# Patient Record
Sex: FEMALE | Race: WHITE | Employment: OTHER | URBAN - METROPOLITAN AREA
[De-identification: names, ages, dates, MRNs, and addresses within clinical notes are randomized per-mention and may not be internally consistent; named-entity substitution may affect disease eponyms.]

---

## 2017-01-01 ENCOUNTER — TELEPHONE (OUTPATIENT)
Dept: ONCOLOGY | Age: 67
End: 2017-01-01

## 2017-01-01 ENCOUNTER — DOCUMENTATION ONLY (OUTPATIENT)
Dept: ONCOLOGY | Age: 67
End: 2017-01-01

## 2017-01-01 ENCOUNTER — OFFICE VISIT (OUTPATIENT)
Dept: ONCOLOGY | Age: 67
End: 2017-01-01

## 2017-01-01 ENCOUNTER — HOSPITAL ENCOUNTER (OUTPATIENT)
Dept: LAB | Age: 67
Discharge: HOME | End: 2017-11-15
Payer: MEDICARE

## 2017-01-01 VITALS
RESPIRATION RATE: 16 BRPM | DIASTOLIC BLOOD PRESSURE: 87 MMHG | TEMPERATURE: 98.7 F | BODY MASS INDEX: 28.12 KG/M2 | HEIGHT: 63 IN | WEIGHT: 158.7 LBS | OXYGEN SATURATION: 97 % | SYSTOLIC BLOOD PRESSURE: 126 MMHG | HEART RATE: 111 BPM

## 2017-01-01 VITALS
DIASTOLIC BLOOD PRESSURE: 81 MMHG | OXYGEN SATURATION: 98 % | TEMPERATURE: 98.2 F | HEART RATE: 64 BPM | SYSTOLIC BLOOD PRESSURE: 129 MMHG | RESPIRATION RATE: 16 BRPM | HEIGHT: 63 IN | BODY MASS INDEX: 28.17 KG/M2 | WEIGHT: 159 LBS

## 2017-01-01 DIAGNOSIS — H66.90 EAR INFECTION: ICD-10-CM

## 2017-01-01 DIAGNOSIS — R53.0 NEOPLASTIC MALIGNANT RELATED FATIGUE: ICD-10-CM

## 2017-01-01 DIAGNOSIS — C82.00 FOLLICULAR LYMPHOMA GRADE I, UNSPECIFIED BODY REGION (HCC): Primary | ICD-10-CM

## 2017-01-01 DIAGNOSIS — N28.9 RENAL INSUFFICIENCY: ICD-10-CM

## 2017-01-01 DIAGNOSIS — J18.9 PNEUMONIA DUE TO INFECTIOUS ORGANISM, UNSPECIFIED LATERALITY, UNSPECIFIED PART OF LUNG: ICD-10-CM

## 2017-01-01 DIAGNOSIS — H57.12 LEFT EYE PAIN: ICD-10-CM

## 2017-01-01 DIAGNOSIS — H54.7 POOR VISION: ICD-10-CM

## 2017-01-01 DIAGNOSIS — R22.0 MASS OF HEAD: ICD-10-CM

## 2017-01-01 DIAGNOSIS — C85.99 ORBITAL LYMPHOMA (HCC): ICD-10-CM

## 2017-01-01 DIAGNOSIS — H73.811: ICD-10-CM

## 2017-01-01 DIAGNOSIS — Z09 CHEMOTHERAPY FOLLOW-UP EXAMINATION: ICD-10-CM

## 2017-01-01 LAB
ALBUMIN SERPL-MCNC: 4.1 G/DL (ref 3.6–4.8)
ALBUMIN SERPL-MCNC: 4.2 G/DL (ref 3.6–4.8)
ALBUMIN SERPL-MCNC: 4.4 G/DL (ref 3.6–4.8)
ALBUMIN SERPL-MCNC: 4.5 G/DL (ref 3.6–4.8)
ALBUMIN SERPL-MCNC: 4.5 G/DL (ref 3.6–4.8)
ALBUMIN/GLOB SERPL: 1.7 {RATIO} (ref 1.2–2.2)
ALBUMIN/GLOB SERPL: 1.9 {RATIO} (ref 1.2–2.2)
ALBUMIN/GLOB SERPL: 2.1 {RATIO} (ref 1.2–2.2)
ALBUMIN/GLOB SERPL: 2.2 {RATIO} (ref 1.2–2.2)
ALBUMIN/GLOB SERPL: 2.4 {RATIO} (ref 1.2–2.2)
ALP SERPL-CCNC: 89 IU/L (ref 39–117)
ALP SERPL-CCNC: 92 IU/L (ref 39–117)
ALP SERPL-CCNC: 94 IU/L (ref 39–117)
ALP SERPL-CCNC: 95 IU/L (ref 39–117)
ALP SERPL-CCNC: 97 IU/L (ref 39–117)
ALT SERPL-CCNC: 7 IU/L (ref 0–32)
ALT SERPL-CCNC: 7 IU/L (ref 0–32)
ALT SERPL-CCNC: 8 IU/L (ref 0–32)
ALT SERPL-CCNC: 9 IU/L (ref 0–32)
ALT SERPL-CCNC: 9 IU/L (ref 0–32)
AST SERPL-CCNC: 11 IU/L (ref 0–40)
AST SERPL-CCNC: 14 IU/L (ref 0–40)
AST SERPL-CCNC: 15 IU/L (ref 0–40)
AST SERPL-CCNC: 15 IU/L (ref 0–40)
AST SERPL-CCNC: 16 IU/L (ref 0–40)
BASOPHILS # BLD AUTO: 0 X10E3/UL (ref 0–0.2)
BASOPHILS # BLD AUTO: 0.1 X10E3/UL (ref 0–0.2)
BASOPHILS # BLD AUTO: 0.1 X10E3/UL (ref 0–0.2)
BASOPHILS NFR BLD AUTO: 1 %
BILIRUB SERPL-MCNC: 0.2 MG/DL (ref 0–1.2)
BILIRUB SERPL-MCNC: 0.2 MG/DL (ref 0–1.2)
BILIRUB SERPL-MCNC: 0.4 MG/DL (ref 0–1.2)
BILIRUB SERPL-MCNC: 0.4 MG/DL (ref 0–1.2)
BILIRUB SERPL-MCNC: 0.5 MG/DL (ref 0–1.2)
BUN SERPL-MCNC: 11 MG/DL (ref 8–27)
BUN SERPL-MCNC: 13 MG/DL (ref 8–27)
BUN SERPL-MCNC: 14 MG/DL (ref 8–27)
BUN SERPL-MCNC: 14 MG/DL (ref 8–27)
BUN SERPL-MCNC: 15 MG/DL (ref 8–27)
BUN/CREAT SERPL: 10 (ref 12–28)
BUN/CREAT SERPL: 11 (ref 12–28)
BUN/CREAT SERPL: 13 (ref 12–28)
CALCIUM SERPL-MCNC: 8.9 MG/DL (ref 8.7–10.3)
CALCIUM SERPL-MCNC: 9.5 MG/DL (ref 8.7–10.3)
CALCIUM SERPL-MCNC: 9.6 MG/DL (ref 8.7–10.3)
CALCIUM SERPL-MCNC: 9.7 MG/DL (ref 8.7–10.3)
CALCIUM SERPL-MCNC: 9.7 MG/DL (ref 8.7–10.3)
CHLORIDE SERPL-SCNC: 100 MMOL/L (ref 96–106)
CHLORIDE SERPL-SCNC: 101 MMOL/L (ref 96–106)
CO2 SERPL-SCNC: 26 MMOL/L (ref 18–29)
CO2 SERPL-SCNC: 27 MMOL/L (ref 18–29)
CO2 SERPL-SCNC: 27 MMOL/L (ref 18–29)
CREAT SERPL-MCNC: 1.05 MG/DL (ref 0.57–1)
CREAT SERPL-MCNC: 1.1 MG/DL (ref 0.57–1)
CREAT SERPL-MCNC: 1.11 MG/DL (ref 0.57–1)
CREAT SERPL-MCNC: 1.17 MG/DL (ref 0.57–1)
CREAT SERPL-MCNC: 1.2 MG/DL (ref 0.57–1)
EOSINOPHIL # BLD AUTO: 0.2 X10E3/UL (ref 0–0.4)
EOSINOPHIL # BLD AUTO: 0.2 X10E3/UL (ref 0–0.4)
EOSINOPHIL # BLD AUTO: 0.3 X10E3/UL (ref 0–0.4)
EOSINOPHIL # BLD AUTO: 0.3 X10E3/UL (ref 0–0.4)
EOSINOPHIL # BLD AUTO: 0.4 X10E3/UL (ref 0–0.4)
EOSINOPHIL NFR BLD AUTO: 4 %
EOSINOPHIL NFR BLD AUTO: 6 %
EOSINOPHIL NFR BLD AUTO: 6 %
ERYTHROCYTE [DISTWIDTH] IN BLOOD BY AUTOMATED COUNT: 13.3 % (ref 12.3–15.4)
ERYTHROCYTE [DISTWIDTH] IN BLOOD BY AUTOMATED COUNT: 13.5 % (ref 12.3–15.4)
ERYTHROCYTE [DISTWIDTH] IN BLOOD BY AUTOMATED COUNT: 13.7 % (ref 12.3–15.4)
GFR SERPLBLD CREATININE-BSD FMLA CKD-EPI: 47 ML/MIN/1.73
GFR SERPLBLD CREATININE-BSD FMLA CKD-EPI: 48 ML/MIN/1.73
GFR SERPLBLD CREATININE-BSD FMLA CKD-EPI: 52 ML/MIN/1.73
GFR SERPLBLD CREATININE-BSD FMLA CKD-EPI: 54 ML/MIN/1.73
GFR SERPLBLD CREATININE-BSD FMLA CKD-EPI: 56 ML/MIN/1.73
GFR SERPLBLD CREATININE-BSD FMLA CKD-EPI: 60 ML/MIN/1.73
GLOBULIN SER CALC-MCNC: 1.9 G/DL (ref 1.5–4.5)
GLOBULIN SER CALC-MCNC: 2 G/DL (ref 1.5–4.5)
GLOBULIN SER CALC-MCNC: 2.1 G/DL (ref 1.5–4.5)
GLOBULIN SER CALC-MCNC: 2.2 G/DL (ref 1.5–4.5)
GLOBULIN SER CALC-MCNC: 2.5 G/DL (ref 1.5–4.5)
GLUCOSE SERPL-MCNC: 101 MG/DL (ref 65–99)
GLUCOSE SERPL-MCNC: 102 MG/DL (ref 65–99)
GLUCOSE SERPL-MCNC: 113 MG/DL (ref 65–99)
GLUCOSE SERPL-MCNC: 96 MG/DL (ref 65–99)
GLUCOSE SERPL-MCNC: 97 MG/DL (ref 65–99)
HCT VFR BLD AUTO: 37.6 % (ref 34–46.6)
HCT VFR BLD AUTO: 38.7 % (ref 34–46.6)
HCT VFR BLD AUTO: 39.7 % (ref 34–46.6)
HCT VFR BLD AUTO: 40.6 % (ref 34–46.6)
HCT VFR BLD AUTO: 41.7 % (ref 34–46.6)
HGB BLD-MCNC: 12.8 G/DL (ref 11.1–15.9)
HGB BLD-MCNC: 12.8 G/DL (ref 11.1–15.9)
HGB BLD-MCNC: 13 G/DL (ref 11.1–15.9)
HGB BLD-MCNC: 13.7 G/DL (ref 11.1–15.9)
HGB BLD-MCNC: 13.8 G/DL (ref 11.1–15.9)
IMM GRANULOCYTES # BLD: 0 X10E3/UL (ref 0–0.1)
IMM GRANULOCYTES NFR BLD: 0 %
IMM GRANULOCYTES NFR BLD: 1 %
LYMPHOCYTES # BLD AUTO: 0.7 X10E3/UL (ref 0.7–3.1)
LYMPHOCYTES # BLD AUTO: 0.8 X10E3/UL (ref 0.7–3.1)
LYMPHOCYTES # BLD AUTO: 0.8 X10E3/UL (ref 0.7–3.1)
LYMPHOCYTES # BLD AUTO: 1 X10E3/UL (ref 0.7–3.1)
LYMPHOCYTES # BLD AUTO: 1.8 X10E3/UL (ref 0.7–3.1)
LYMPHOCYTES NFR BLD AUTO: 12 %
LYMPHOCYTES NFR BLD AUTO: 14 %
LYMPHOCYTES NFR BLD AUTO: 14 %
LYMPHOCYTES NFR BLD AUTO: 16 %
LYMPHOCYTES NFR BLD AUTO: 27 %
MCH RBC QN AUTO: 30.6 PG (ref 26.6–33)
MCH RBC QN AUTO: 30.9 PG (ref 26.6–33)
MCH RBC QN AUTO: 30.9 PG (ref 26.6–33)
MCH RBC QN AUTO: 31.2 PG (ref 26.6–33)
MCH RBC QN AUTO: 31.3 PG (ref 26.6–33)
MCHC RBC AUTO-ENTMCNC: 32.7 G/DL (ref 31.5–35.7)
MCHC RBC AUTO-ENTMCNC: 33.1 G/DL (ref 31.5–35.7)
MCHC RBC AUTO-ENTMCNC: 33.1 G/DL (ref 31.5–35.7)
MCHC RBC AUTO-ENTMCNC: 33.7 G/DL (ref 31.5–35.7)
MCHC RBC AUTO-ENTMCNC: 34 G/DL (ref 31.5–35.7)
MCV RBC AUTO: 92 FL (ref 79–97)
MCV RBC AUTO: 93 FL (ref 79–97)
MCV RBC AUTO: 93 FL (ref 79–97)
MCV RBC AUTO: 94 FL (ref 79–97)
MCV RBC AUTO: 94 FL (ref 79–97)
MONOCYTES # BLD AUTO: 0.5 X10E3/UL (ref 0.1–0.9)
MONOCYTES # BLD AUTO: 0.6 X10E3/UL (ref 0.1–0.9)
MONOCYTES # BLD AUTO: 0.8 X10E3/UL (ref 0.1–0.9)
MONOCYTES NFR BLD AUTO: 10 %
MONOCYTES NFR BLD AUTO: 11 %
MONOCYTES NFR BLD AUTO: 14 %
MONOCYTES NFR BLD AUTO: 9 %
MONOCYTES NFR BLD AUTO: 9 %
NEUTROPHILS # BLD AUTO: 3.6 X10E3/UL (ref 1.4–7)
NEUTROPHILS # BLD AUTO: 3.9 X10E3/UL (ref 1.4–7)
NEUTROPHILS # BLD AUTO: 4.1 X10E3/UL (ref 1.4–7)
NEUTROPHILS # BLD AUTO: 4.3 X10E3/UL (ref 1.4–7)
NEUTROPHILS # BLD AUTO: 4.3 X10E3/UL (ref 1.4–7)
NEUTROPHILS NFR BLD AUTO: 58 %
NEUTROPHILS NFR BLD AUTO: 65 %
NEUTROPHILS NFR BLD AUTO: 70 %
NEUTROPHILS NFR BLD AUTO: 70 %
NEUTROPHILS NFR BLD AUTO: 71 %
PLATELET # BLD AUTO: 199 X10E3/UL (ref 150–379)
PLATELET # BLD AUTO: 227 X10E3/UL (ref 150–379)
PLATELET # BLD AUTO: 236 X10E3/UL (ref 150–379)
PLATELET # BLD AUTO: 244 X10E3/UL (ref 150–379)
PLATELET # BLD AUTO: 266 X10E3/UL (ref 150–379)
POTASSIUM SERPL-SCNC: 4 MMOL/L (ref 3.5–5.2)
POTASSIUM SERPL-SCNC: 4 MMOL/L (ref 3.5–5.2)
POTASSIUM SERPL-SCNC: 4.3 MMOL/L (ref 3.5–5.2)
POTASSIUM SERPL-SCNC: 4.6 MMOL/L (ref 3.5–5.2)
POTASSIUM SERPL-SCNC: 4.7 MMOL/L (ref 3.5–5.2)
PROT SERPL-MCNC: 6.3 G/DL (ref 6–8.5)
PROT SERPL-MCNC: 6.4 G/DL (ref 6–8.5)
PROT SERPL-MCNC: 6.4 G/DL (ref 6–8.5)
PROT SERPL-MCNC: 6.6 G/DL (ref 6–8.5)
PROT SERPL-MCNC: 6.7 G/DL (ref 6–8.5)
RBC # BLD AUTO: 4.09 X10E6/UL (ref 3.77–5.28)
RBC # BLD AUTO: 4.14 X10E6/UL (ref 3.77–5.28)
RBC # BLD AUTO: 4.25 X10E6/UL (ref 3.77–5.28)
RBC # BLD AUTO: 4.39 X10E6/UL (ref 3.77–5.28)
RBC # BLD AUTO: 4.46 X10E6/UL (ref 3.77–5.28)
SODIUM SERPL-SCNC: 140 MMOL/L (ref 134–144)
SODIUM SERPL-SCNC: 141 MMOL/L (ref 134–144)
SODIUM SERPL-SCNC: 141 MMOL/L (ref 134–144)
SODIUM SERPL-SCNC: 142 MMOL/L (ref 134–144)
SODIUM SERPL-SCNC: 142 MMOL/L (ref 134–144)
WBC # BLD AUTO: 5.6 X10E3/UL (ref 3.4–10.8)
WBC # BLD AUTO: 5.8 X10E3/UL (ref 3.4–10.8)
WBC # BLD AUTO: 6 X10E3/UL (ref 3.4–10.8)
WBC # BLD AUTO: 6.1 X10E3/UL (ref 3.4–10.8)
WBC # BLD AUTO: 6.6 X10E3/UL (ref 3.4–10.8)

## 2017-01-01 PROCEDURE — 80053 COMPREHEN METABOLIC PANEL: CPT

## 2017-01-01 PROCEDURE — 85025 COMPLETE CBC W/AUTO DIFF WBC: CPT

## 2017-01-01 PROCEDURE — 36415 COLL VENOUS BLD VENIPUNCTURE: CPT

## 2017-01-01 RX ORDER — AMOXICILLIN AND CLAVULANATE POTASSIUM 875; 125 MG/1; MG/1
1 TABLET, FILM COATED ORAL 2 TIMES DAILY
Qty: 20 TAB | Refills: 0 | Status: SHIPPED | OUTPATIENT
Start: 2017-01-01 | End: 2018-01-01 | Stop reason: SDUPTHER

## 2017-01-01 RX ORDER — AZITHROMYCIN 250 MG/1
TABLET, FILM COATED ORAL
COMMUNITY
Start: 2017-01-01 | End: 2018-01-01 | Stop reason: ALTCHOICE

## 2017-01-01 RX ORDER — ALBUTEROL SULFATE 90 UG/1
POWDER, METERED RESPIRATORY (INHALATION)
COMMUNITY
Start: 2017-01-01 | End: 2018-01-01 | Stop reason: ALTCHOICE

## 2017-01-11 ENCOUNTER — DOCUMENTATION ONLY (OUTPATIENT)
Dept: ONCOLOGY | Age: 67
End: 2017-01-11

## 2017-01-11 ENCOUNTER — OFFICE VISIT (OUTPATIENT)
Dept: ONCOLOGY | Age: 67
End: 2017-01-11

## 2017-01-11 VITALS
TEMPERATURE: 97.4 F | WEIGHT: 163.8 LBS | HEART RATE: 64 BPM | RESPIRATION RATE: 16 BRPM | HEIGHT: 62 IN | BODY MASS INDEX: 30.14 KG/M2 | SYSTOLIC BLOOD PRESSURE: 151 MMHG | DIASTOLIC BLOOD PRESSURE: 79 MMHG | OXYGEN SATURATION: 98 %

## 2017-01-11 DIAGNOSIS — H05.229 ORBITAL SWELLING: ICD-10-CM

## 2017-01-11 DIAGNOSIS — C85.99 ORBITAL LYMPHOMA (HCC): ICD-10-CM

## 2017-01-11 DIAGNOSIS — H54.7 POOR VISION: ICD-10-CM

## 2017-01-11 DIAGNOSIS — C82.00 FOLLICULAR LYMPHOMA GRADE I, UNSPECIFIED BODY REGION (HCC): Primary | ICD-10-CM

## 2017-01-11 DIAGNOSIS — R22.0 MASS OF HEAD: ICD-10-CM

## 2017-01-11 NOTE — MR AVS SNAPSHOT
Visit Information Date & Time Provider Department Dept. Phone Encounter #  
 1/11/2017 10:30 AM Julia Sow, [address] [facility] Oncology at [address] [id number] Follow-up Instructions Return in about 3 months (around 4/11/2017). Follow-up and Disposition History Upcoming Health Maintenance Date Due Hepatitis C Screening 1950 DTaP/Tdap/Td series (1 - Tdap) 11/19/1970 BREAST CANCER SCRN MAMMOGRAM 11/19/1999 FOBT Q 1 YEAR AGE 50-75 11/19/1999 ZOSTER VACCINE AGE 60> 11/19/2009 GLAUCOMA SCREENING Q2Y 11/19/2014 OSTEOPOROSIS SCREENING (DEXA) 11/19/2014 Pneumococcal 65+ High/Highest Risk (1 of 2 - PCV13) 11/19/2014 MEDICARE YEARLY EXAM 11/19/2014 INFLUENZA AGE 9 TO ADULT 8/1/2016 Allergies as of 1/11/2017  Review Complete On: 1/11/2017 By: Julia Sow DO Severity Noted Reaction Type Reactions Other Medication  12/22/2015    Hives Rituxin Valtrex [Valacyclovir]  02/03/2016   Side Effect Itching Patient states no allergy Current Immunizations  Reviewed on 1/11/2017 Name Date Influenza Vaccine 10/1/2015, 11/5/2014 Reviewed by Faye Chu LPN on 4/53/4767 at 87:15 AM  
You Were Diagnosed With   
  
 Codes Comments Follicular lymphoma grade I, unspecified body region [facility])    -  Primary ICD-10-CM: C82.00 ICD-9-CM: 202.00 [facility]     ICD-10-CM: C85.81 ICD-9-CM: 202.81 Mass of head     ICD-10-CM: R22.0 ICD-9-CM: 219. 2 Poor vision     ICD-10-CM: H54.7 ICD-9-CM: 369.9 Orbital swelling     ICD-10-CM: H57.8 ICD-9-CM: 379.92 Vitals BP Pulse Temp Resp Height(growth percentile) Weight(growth percentile) 151/79 64 97.4 °F (36.3 °C) (Oral) 16 5' 2\" (1.575 m) 163 lb 12.8 oz (74.3 kg) LMP SpO2 BMI OB Status Smoking Status (LMP Unknown) 98% 29.96 kg/m2 Postmenopausal Former Smoker Vitals History BMI and BSA Data Body Mass Index Body Surface Area  
 29.96 kg/m 2 1.8 m 2 Preferred Pharmacy Pharmacy Name Phone [facility] [address] Your Updated Medication List  
  
   
This list is accurate as of: 1/11/17 11:17 AM.  Always use your most recent med list.  
  
  
  
  
 atenolol 25 mg tablet Commonly known as:  TENORMIN Take 25 mg by mouth daily. bumetanide 2 mg tablet Commonly known as:  Malachy Bile Take 2 mg by mouth as needed. cholecalciferol 1,000 unit tablet Commonly known as:  VITAMIN D3 Take  by mouth daily. gabapentin 300 mg capsule Commonly known as:  NEURONTIN Take 1 Cap by mouth three (3) times daily. Indications: NEUROPATHIC PAIN, POSTHERPETIC NEURALGIA  
  
 KLOR-CON M20 20 mEq tablet Generic drug:  potassium chloride Take 20 mEq by mouth daily. levothyroxine 175 mcg tablet Commonly known as:  SYNTHROID  
daily. MULTIVITAMIN & MINERAL FORMULA PO Take  by mouth. VITAMIN B-12 1,000 mcg/mL injection Generic drug:  cyanocobalamin  
1,000 mcg by IntraMUSCular route every month. We Performed the Following CBC WITH AUTOMATED DIFF [44657 CPT(R)]   
 LD [83624 CPT(R)] METABOLIC PANEL, COMPREHENSIVE [48083 CPT(R)] Follow-up Instructions Return in about 3 months (around 4/11/2017). To-Do List   
 02/11/2017 Imaging:  PET/CT TUMOR IMAGE SKULL THIGH (SUB) Introducing [facility] SERVICES! Galen Rubio introduces [employer] patient portal. Now you can access parts of your medical record, email your doctor's office, and request medication refills online. 1. In your internet browser, go to [email or web address] 2. Click on the First Time User? Click Here link in the Sign In box. You will see the New Member Sign Up page. 3. Enter your [employer] Access Code exactly as it appears below.  You will not need to use this code after youve completed the sign-up process. If you do not sign up before the expiration date, you must request a new code. · MedPageToday Access Code: [email or web address] Expires: 4/11/2017 11:17 AM 
 
4. Enter the last four digits of your Social Security Number (xxxx) and Date of Birth (mm/dd/yyyy) as indicated and click Submit. You will be taken to the next sign-up page. 5. Create a MedPageToday ID. This will be your MedPageToday login ID and cannot be changed, so think of one that is secure and easy to remember. 6. Create a [employer]PageToday password. You can change your password at any time. 7. Enter your Password Reset Question and Answer. This can be used at a later time if you forget your password. 8. Enter your e-mail address. You will receive e-mail notification when new information is available in [address] 9. Click Sign Up. You can now view and download portions of your medical record. 10. Click the Download Summary menu link to download a portable copy of your medical information. If you have questions, please visit the Frequently Asked Questions section of the [employer]PageToday website. Remember, MedPageToday is NOT to be used for urgent needs. For medical emergencies, dial 911. Now available from your iPhone and Android! Please provide this summary of care documentation to your next provider. Your primary care clinician is listed as Brook Carrel. If you have any questions after today's visit, please call [phone].

## 2017-01-11 NOTE — PROGRESS NOTES
Gus Macias is a 78 y.o. 1950 female and presents with NHL. CC NHL Dx  10/10    STAGE: 3 possibly    TREATMENT COURSE:  Orbit radiation  Chlorambucil x 1 cycle 4/16  Attempted one dose of rituxan. INTERIM HISTORY:  Pt here for f/u of NHL not on any tx from here. pt did oral chorambucil for 6 weeks for one cycle 4/16. Pt doing ok overall. Pt has some pain in LUQ and chest wall pain. Saw cardio and ok. PET 10/16 stable. Labs stable. No B sx. Has some allergies maybe. Here with daughter. Eating well. Past Medical History   Diagnosis Date    Anemia NEC     Cancer ([facility]      low grade NHL     Past Surgical History   Procedure Laterality Date    Hx cholecystectomy  1982    Pr rplcmt prost aortic valve open xcp homogrf/stent  1972    Hx heent  2005     RIGHT EAR     Hx cataract removal  6/2015 & 7/2015     Social History     Social History    Marital status:      Spouse name: N/A    Number of children: N/A    Years of education: N/A     Social History Main Topics    Smoking status: Former Smoker     Packs/day: 0.50     Years: 45.00     Types: Cigarettes     Quit date: 1/1/2016    Smokeless tobacco: Former User     Quit date: 12/1/2015      Comment: 7 cigarettes daily.  Alcohol use Yes      Comment: Rare    Drug use: No    Sexual activity: Not Asked     Other Topics Concern    None     Social History Narrative     Family History   Problem Relation Age of Onset    Heart Disease Mother     Kidney Disease Mother     Alcohol abuse Father     Cancer Sister      Breast    No Known Problems Brother     Cancer Sister      Colon    Alcohol abuse Sister     Alcohol abuse Sister        Current Outpatient Prescriptions   Medication Sig Dispense Refill    MULTIVITAMIN WITH MINERALS (MULTIVITAMIN & MINERAL FORMULA PO) Take  by mouth.  gabapentin (NEURONTIN) 300 mg capsule Take 1 Cap by mouth three (3) times daily.  Indications: NEUROPATHIC PAIN, POSTHERPETIC NEURALGIA 90 Cap 3    levothyroxine (SYNTHROID) 175 mcg tablet daily.  cholecalciferol (VITAMIN D3) 1,000 unit tablet Take  by mouth daily.  KLOR-CON M20 20 mEq tablet Take 20 mEq by mouth daily.  bumetanide (BUMEX) 2 mg tablet Take 2 mg by mouth as needed.  cyanocobalamin (VITAMIN B-12) 1,000 mcg/mL injection 1,000 mcg by IntraMUSCular route every month.  atenolol (TENORMIN) 25 mg tablet Take 25 mg by mouth daily. Allergies   Allergen Reactions    Other Medication Hives     Rituxin    Valtrex [Valacyclovir] Itching     Patient states no allergy       Review of Systems    A comprehensive review of systems was negative except for: per HPI and sheet      Objective:  Visit Vitals    /79    Pulse 64    Temp 97.4 °F (36.3 °C) (Oral)    Resp 16    Ht 5' 2\" (1.575 m)    Wt 163 lb 12.8 oz (74.3 kg)    LMP  (LMP Unknown)    SpO2 98%    BMI 29.96 kg/m2         Physical Exam:   General appearance - alert, well appearing, and in no distress, oriented to person, place, and time and normal appearing weight  Mental status - alert, oriented normal mood, behavior, speech, dress, motor activity, and thought processes  Head ? Mass right occiput  EYE-conj clear no swelling in eyes  Mouth - mucous membranes moist  Neck - supple   CV regular  resp clear b/l  GI soft NT  Ext-no pedal edema noted  Skin-Warm and dry.   Neuro -alert, oriented, normal speech, no focal findings    Diagnostic Imaging     reviewed      Lab Results    reviewed  Lab Results   Component Value Date/Time    WBC 7.6 10/10/2016 01:07 PM    HGB 13.9 10/10/2016 01:07 PM    HCT 41.8 10/10/2016 01:07 PM    PLATELET 109 39/43/6869 01:07 PM    MCV 90 10/10/2016 01:07 PM       Lab Results   Component Value Date/Time    Sodium 140 10/10/2016 01:07 PM    Potassium 4.1 10/10/2016 01:07 PM    Chloride 102 10/10/2016 01:07 PM    CO2 22 10/10/2016 01:07 PM    Anion gap 4 02/19/2015 09:13 AM    Glucose 111 10/10/2016 01:07 PM    BUN 14 10/10/2016 01:07 PM    Creatinine 0.92 10/10/2016 01:07 PM    BUN/Creatinine ratio 15 10/10/2016 01:07 PM    GFR est AA 75 10/10/2016 01:07 PM    GFR est non-AA 65 10/10/2016 01:07 PM    Calcium 9.5 10/10/2016 01:07 PM    ALT 13 10/10/2016 01:07 PM    AST 17 10/10/2016 01:07 PM    Alk. phosphatase 80 10/10/2016 01:07 PM    Protein, total 6.1 10/10/2016 01:07 PM    Albumin 4.1 10/10/2016 01:07 PM    Globulin 3.1 02/19/2015 09:13 AM    A-G Ratio 2.1 10/10/2016 01:07 PM       .    Assessment/Plan: Angélica Higgins is a 57 y.o. 1950 female and presents with NHL. CC NHL Dx  10/10    STAGE: 3 possibly    TREATMENT COURSE:  rituxan 2/19/15 with reaction and treatment not finished. Hives and hypotension. INTERIM HISTORY:  F/u for NHL      1. NHL low grade follicular stage 3 dx in 2010      PET 5/16 good post chlorambucil 4/16. Clinically pt is stable. Will set up labs and PET for f/u. Ordered. Pt is here with grand daughter/ new baby today. 2.  Poor vision left eye/ ocular NHL. Post XRT. Did well. 3.  Back of head mass ? Coming back on other side. Monitor. 4.  Renal insuff. Monitor. 5.  Hypothyroid on thyroid  per endocrine    6. 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 No change. Call if questions. F/u here in 3 months      ICD-10-CM ICD-9-CM    1. 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 Mass of head R22.0 784.2 PET/CT TUMOR IMAGE SKULL THIGH (SUB)      CBC WITH AUTOMATED DIFF      METABOLIC PANEL, COMPREHENSIVE      LD   4. Poor vision H54.7 369.9 PET/CT TUMOR IMAGE SKULL THIGH (SUB)      CBC WITH AUTOMATED DIFF      METABOLIC PANEL, COMPREHENSIVE      LD   5. Orbital swelling H57.8 379.92 PET/CT TUMOR IMAGE SKULL THIGH (SUB)      CBC WITH AUTOMATED DIFF      METABOLIC PANEL, COMPREHENSIVE      LD     Orders Placed This Encounter    PET/CT TUMOR IMAGE SKULL THIGH (SUB)     Standing Status:   Future     Standing Expiration Date:   2/11/2018     Order Specific Question:   Reason for Exam     Answer:   NHL off treatment/ LUQ discomfort/ left groin LN/chest wall pain     Order Specific Question:   Is Patient Allergic to Contrast Dye? Answer:   Unknown    CBC WITH AUTOMATED DIFF    METABOLIC PANEL, COMPREHENSIVE    LDH       continue present plan, call if any problems  There are no Patient Instructions on file for this visit. Follow-up Disposition:  Return in about 3 months (around 4/11/2017).     Fab Good, DO

## 2017-02-20 ENCOUNTER — HOSPITAL ENCOUNTER (OUTPATIENT)
Dept: LAB | Age: 67
Discharge: HOME | End: 2017-02-20
Payer: MEDICARE

## 2017-02-20 PROCEDURE — 85025 COMPLETE CBC W/AUTO DIFF WBC: CPT

## 2017-02-20 PROCEDURE — 80053 COMPREHEN METABOLIC PANEL: CPT

## 2017-02-20 PROCEDURE — 83615 LACTATE (LD) (LDH) ENZYME: CPT

## 2017-02-20 PROCEDURE — 36415 COLL VENOUS BLD VENIPUNCTURE: CPT

## 2017-02-21 LAB
ALBUMIN SERPL-MCNC: 3.9 G/DL (ref 3.6–4.8)
ALBUMIN/GLOB SERPL: 1.9 {RATIO} (ref 1.1–2.5)
ALP SERPL-CCNC: 75 IU/L (ref 39–117)
ALT SERPL-CCNC: 15 IU/L (ref 0–32)
AST SERPL-CCNC: 20 IU/L (ref 0–40)
BASOPHILS # BLD AUTO: 0 X10E3/UL (ref 0–0.2)
BASOPHILS NFR BLD AUTO: 1 %
BILIRUB SERPL-MCNC: 0.4 MG/DL (ref 0–1.2)
BUN SERPL-MCNC: 12 MG/DL (ref 8–27)
BUN/CREAT SERPL: 13 (ref 11–26)
CALCIUM SERPL-MCNC: 9.9 MG/DL (ref 8.7–10.3)
CHLORIDE SERPL-SCNC: 104 MMOL/L (ref 96–106)
CO2 SERPL-SCNC: 25 MMOL/L (ref 18–29)
CREAT SERPL-MCNC: 0.93 MG/DL (ref 0.57–1)
EOSINOPHIL # BLD AUTO: 0.2 X10E3/UL (ref 0–0.4)
EOSINOPHIL NFR BLD AUTO: 3 %
ERYTHROCYTE [DISTWIDTH] IN BLOOD BY AUTOMATED COUNT: 13.1 % (ref 12.3–15.4)
GLOBULIN SER CALC-MCNC: 2.1 G/DL (ref 1.5–4.5)
GLUCOSE SERPL-MCNC: 98 MG/DL (ref 65–99)
HCT VFR BLD AUTO: 39.1 % (ref 34–46.6)
HGB BLD-MCNC: 13 G/DL (ref 11.1–15.9)
IMM GRANULOCYTES # BLD: 0 X10E3/UL (ref 0–0.1)
IMM GRANULOCYTES NFR BLD: 0 %
LDH SERPL-CCNC: 159 IU/L (ref 119–226)
LYMPHOCYTES # BLD AUTO: 2.2 X10E3/UL (ref 0.7–3.1)
LYMPHOCYTES NFR BLD AUTO: 30 %
MCH RBC QN AUTO: 30.3 PG (ref 26.6–33)
MCHC RBC AUTO-ENTMCNC: 33.2 G/DL (ref 31.5–35.7)
MCV RBC AUTO: 91 FL (ref 79–97)
MONOCYTES # BLD AUTO: 0.5 X10E3/UL (ref 0.1–0.9)
MONOCYTES NFR BLD AUTO: 7 %
NEUTROPHILS # BLD AUTO: 4.3 X10E3/UL (ref 1.4–7)
NEUTROPHILS NFR BLD AUTO: 59 %
PLATELET # BLD AUTO: 241 X10E3/UL (ref 150–379)
POTASSIUM SERPL-SCNC: 4.1 MMOL/L (ref 3.5–5.2)
PROT SERPL-MCNC: 6 G/DL (ref 6–8.5)
RBC # BLD AUTO: 4.29 X10E6/UL (ref 3.77–5.28)
SODIUM SERPL-SCNC: 143 MMOL/L (ref 134–144)
WBC # BLD AUTO: 7.2 X10E3/UL (ref 3.4–10.8)

## 2017-02-22 ENCOUNTER — HOSPITAL ENCOUNTER (OUTPATIENT)
Dept: PET IMAGING | Age: 67
Discharge: HOME | End: 2017-02-22
Attending: INTERNAL MEDICINE
Payer: MEDICARE

## 2017-02-22 VITALS — WEIGHT: 160 LBS | HEIGHT: 62 IN | BODY MASS INDEX: 29.44 KG/M2

## 2017-02-22 DIAGNOSIS — R22.0 MASS OF HEAD: ICD-10-CM

## 2017-02-22 DIAGNOSIS — H05.229 ORBITAL SWELLING: ICD-10-CM

## 2017-02-22 DIAGNOSIS — C85.99 ORBITAL LYMPHOMA (HCC): ICD-10-CM

## 2017-02-22 DIAGNOSIS — C82.00 FOLLICULAR LYMPHOMA GRADE I, UNSPECIFIED BODY REGION (HCC): ICD-10-CM

## 2017-02-22 DIAGNOSIS — H54.7 POOR VISION: ICD-10-CM

## 2017-02-22 PROCEDURE — A9552 F18 FDG: HCPCS

## 2017-02-22 RX ORDER — SODIUM CHLORIDE 0.9 % (FLUSH) 0.9 %
10 SYRINGE (ML) INJECTION
Status: COMPLETED | OUTPATIENT
Start: 2017-02-22 | End: 2017-02-22

## 2017-02-22 RX ADMIN — Medication 10 ML: at 13:01

## 2017-03-02 ENCOUNTER — OFFICE VISIT (OUTPATIENT)
Dept: ONCOLOGY | Age: 67
End: 2017-03-02

## 2017-03-02 VITALS
HEIGHT: 62 IN | OXYGEN SATURATION: 98 % | BODY MASS INDEX: 30.95 KG/M2 | DIASTOLIC BLOOD PRESSURE: 70 MMHG | WEIGHT: 168.2 LBS | SYSTOLIC BLOOD PRESSURE: 130 MMHG | TEMPERATURE: 98.5 F | RESPIRATION RATE: 16 BRPM

## 2017-03-02 DIAGNOSIS — C82.00 FOLLICULAR LYMPHOMA GRADE I, UNSPECIFIED BODY REGION (HCC): Primary | ICD-10-CM

## 2017-03-02 DIAGNOSIS — R22.0 MASS OF HEAD: ICD-10-CM

## 2017-03-02 DIAGNOSIS — B02.23 POST-HERPETIC POLYNEUROPATHY: ICD-10-CM

## 2017-03-02 DIAGNOSIS — R53.0 NEOPLASTIC MALIGNANT RELATED FATIGUE: ICD-10-CM

## 2017-03-02 DIAGNOSIS — C85.99 ORBITAL LYMPHOMA (HCC): ICD-10-CM

## 2017-03-02 RX ORDER — GABAPENTIN 300 MG/1
300 CAPSULE ORAL 3 TIMES DAILY
Qty: 90 CAP | Refills: 3 | Status: SHIPPED | OUTPATIENT
Start: 2017-03-02 | End: 2018-01-01

## 2017-03-02 RX ORDER — ONDANSETRON 8 MG/1
8 TABLET, ORALLY DISINTEGRATING ORAL
Qty: 30 TAB | Refills: 2 | Status: SHIPPED | OUTPATIENT
Start: 2017-03-02 | End: 2018-01-01

## 2017-03-02 NOTE — PROGRESS NOTES
Rashmi Collins is a 78 y.o. 1950 female and presents with NHL. CC NHL Dx  10/10    STAGE: 3 possibly    TREATMENT COURSE:  Orbit radiation  Chlorambucil x 1 cycle 4/16  Attempted one dose of rituxan. INTERIM HISTORY:  Ms. Ksenia Schneider is here for follow-up for NHL. She completed oral chlorambucil for 6 weeks for one cycle 4/2016. She is not on any therapy at the present time. Most recent PET 2/22/17 shows progressive disease. She reports she has felt well since her last visit with no new symptoms. She remains fatigued but energy level is overall unchanged. She denies fevers, chills, or shortness of breath. She denies headaches or vision changes. She denies night sweats or unintentional weight loss. She reports the left upper quadrant pain she reported at her last visit has resolved. She continues to have mid-chest discomfort, worse with leaning forward. She has seen cardiology for evaluation and reports symptoms are unchanged. She feels symptoms are related to her hiatal hernia. Ms. Ksenia Schneider has noticed no enlarged lymph nodes or masses. She is eating and drinking well. Past Medical History:   Diagnosis Date    Anemia NEC     Cancer ([address]     low grade NHL     Past Surgical History:   Procedure Laterality Date    HX CATARACT REMOVAL  6/2015 & 7/2015    HX CHOLECYSTECTOMY  1982    HX HEENT  2005    RIGHT EAR     WA RPLCMT PROST AORTIC VALVE OPEN XCP HOMOGRF/STENT  1972     Social History     Social History    Marital status:      Spouse name: N/A    Number of children: N/A    Years of education: N/A     Social History Main Topics    Smoking status: Former Smoker     Packs/day: 0.50     Years: 45.00     Types: Cigarettes     Quit date: 1/1/2016    Smokeless tobacco: Former User     Quit date: 12/1/2015      Comment: 7 cigarettes daily.     Alcohol use Yes      Comment: Rare    Drug use: No    Sexual activity: Not Asked     Other Topics Concern    None     Social History Narrative Family History   Problem Relation Age of Onset    Heart Disease Mother     Kidney Disease Mother     Alcohol abuse Father     Cancer Sister      Breast    No Known Problems Brother     Cancer Sister      Colon    Alcohol abuse Sister     Alcohol abuse Sister        Current Outpatient Prescriptions   Medication Sig Dispense Refill    gabapentin (NEURONTIN) 300 mg capsule Take 1 Cap by mouth three (3) times daily. Indications: NEUROPATHIC PAIN, POSTHERPETIC NEURALGIA 90 Cap 3    chlorambucil (LEUKERAN) 2 mg tablet Take 4 Tabs by mouth daily. (0.1 mg/kg PO daily on days 1-28 of a 28 day cycle, weight 76.3 kg)  Indications: FOLLICULAR LYMPHOMA 866 Tab 2    ondansetron (ZOFRAN ODT) 8 mg disintegrating tablet Take 1 Tab by mouth every eight (8) hours as needed for Nausea. 30 Tab 2    MULTIVITAMIN WITH MINERALS (MULTIVITAMIN & MINERAL FORMULA PO) Take  by mouth.  levothyroxine (SYNTHROID) 175 mcg tablet daily.  cholecalciferol (VITAMIN D3) 1,000 unit tablet Take  by mouth daily.  KLOR-CON M20 20 mEq tablet Take 20 mEq by mouth daily.  bumetanide (BUMEX) 2 mg tablet Take 2 mg by mouth as needed.  cyanocobalamin (VITAMIN B-12) 1,000 mcg/mL injection 1,000 mcg by IntraMUSCular route every month.  atenolol (TENORMIN) 25 mg tablet Take 25 mg by mouth daily.          Allergies   Allergen Reactions    Other Medication Hives     Rituxin    Valtrex [Valacyclovir] Itching     Patient states no allergy       Review of Systems    A comprehensive review of systems was negative except for: per HPI and sheet    Objective:  Visit Vitals    /70    Temp 98.5 °F (36.9 °C)    Resp 16    Ht 5' 2\" (1.575 m)    Wt 168 lb 3.2 oz (76.3 kg)    LMP  (LMP Unknown)    SpO2 98%    BMI 30.76 kg/m2     Physical Exam:   General appearance - alert, well appearing, and in no distress, oriented to person, place, and time and normal appearing weight  Mental status - alert, oriented normal mood, behavior, speech, dress, motor activity, and thought processes  EYE-conj clear no swelling in eyes. Right eyelid droop noted. Mouth - mucous membranes pink, moist, intact. No lesions or thrush. Neck - supple, no adenopathy appreciated  CV - Regular rate and rhythm  Resp - Clear to auscultation bilaterally  GI - Round, soft, non-tender. No hepatosplenomegaly appreciated. Ext-No pedal edema noted bilaterally  Skin-Warm and dry. Intact without rash. Neuro - Awake, alert, oriented. Exam is non-focal.    Diagnostic Imaging   PET Results (most recent):    Results from Hospital Encounter encounter on 02/22/17   PET/CT TUMOR IMAGE SKULL THIGH (SUB)   Narrative PET/CT SCAN    PROCEDURE: Following IV injection of 10.6 mCi 18 Fluoro 2 deoxyglucose (FDG) and  a standard uptake delay, PET imaging is performed head to thighs and axial,  sagittal and coronal images were acquired. Unenhanced CT is obtained for  anatomic localization, and attenuation correction of the PET scan. Patient  preprocedure blood glucose level: 100mg/dL. CORRELATIVE IMAGING STUDIES: None. PRIOR PET:  October 12, 2016    HISTORY: The study is requested for subsequent treatment strategy. Non-Hodgkin's  lymphoma. FINDINGS:    HEAD/NECK: There is persistent hypermetabolic activity and a left level 2 lymph  node with improvement. Current SUV is 9.1 and was previously 11.8. There is  persistent hypermetabolic activity in the left level 1 lymph node with interval  improvement. Previous SUV was 23 and is currently 5.6. There is a hypermetabolic  lymph node in the left posterior triangle with SUV of 3.4 and maximal dimension  of 6.6 mm (image 56). There is a hypermetabolic right supraclavicular lymph node  (image 66) with maximal dimension of 5.9 mm, SUV 2.8. CHEST: There has been interval progression of hypermetabolic activity in  bilateral axillary lymph nodes.  Sample SUV on the right is 5.7 and was  previously 4.0, and on the left 5.3 and was previously 4.1. Lymph nodes have  also enlarged. The largest lymph node on the left on image 97 measures 18 mm and  previously measured 14 mm. On the right, the largest lymph node measures 16 mm  and previously measured 13 mm. A lymph node on image 108 SUV measures 5.4 and  previously measured 3.3. Short axis dimension is 13 mm and was previously 7 mm. A subcutaneous lymph node SUV is 4.1 and was previously 2.9, seen as a 11 mm  mass on image 123. Paraspinal activity in the posterior mediastinum has  significantly progressed and measures 6.4 on the left and 3.9 on the right,  previously 2.5 and 3.1. The soft tissue to the left of the spine measures 13.7  mm and was previously 9.8 mm. On the right, measurement is 9 mm and was  previously 8 mm. There is lobular hypermetabolic pleural thickening particularly  on the left which has progressed. ABDOMEN/PELVIS: There is hypermetabolic activity in the wall of the gastric  antrum with SUV of 4.9. There is a 4.1 x 2.0 cm hypermetabolic lymph node  posterior to the pancreatic head which previously measured 3.6 x 1.5 cm. SUV is  5.3 and was previously 5.2. There is a hypermetabolic left retroperitoneal lymph  node with SUV 3.6, previously 3.1, seen on image 164. There are multiple  hypermetabolic lymph nodes in the retroperitoneum extending into the left pelvis  with SUV is 6.3, previously 5.6. Maximal dimension is 7.3 x 5.7 cm and was  previously 6.8 x 5.1 cm. There is an area of hypermetabolic activity into the  right so as with SUV of 3.4. There is an area of hypermetabolic activity in the  right pelvis SUV of 5.1, new from prior study, on image 208. Hypermetabolic  activity extends into the left pelvis. There are 2 hypermetabolic left groin  lymph nodes. One of these lymph nodes is new when compared to the prior exam and  measures 17 mm, seen on image 232. SUV is 5.8. The larger left groin lymph node  SUV is 7.8.  Maximal dimension is 5.1 x 3.8 cm and was previously 4.8 x 2.8 cm. There is a right groin lymph node with SUV of 2.8, not significantly changed. SKELETON: No foci of abnormal hypermetabolism in the axial and visualized  appendicular skeleton. Impression IMPRESSION: Interval worsening of the PET scan with new and worsening areas of  hypermetabolic activity as described compatible with progressive lymphoma. Lab Results    reviewed  Lab Results   Component Value Date/Time    WBC 7.2 02/20/2017 10:53 AM    HGB 13.0 02/20/2017 10:53 AM    HCT 39.1 02/20/2017 10:53 AM    PLATELET 485 95/82/2761 10:53 AM    MCV 91 02/20/2017 10:53 AM       Lab Results   Component Value Date/Time    Sodium 143 02/20/2017 10:53 AM    Potassium 4.1 02/20/2017 10:53 AM    Chloride 104 02/20/2017 10:53 AM    CO2 25 02/20/2017 10:53 AM    Anion gap 4 02/19/2015 09:13 AM    Glucose 98 02/20/2017 10:53 AM    BUN 12 02/20/2017 10:53 AM    Creatinine 0.93 02/20/2017 10:53 AM    BUN/Creatinine ratio 13 02/20/2017 10:53 AM    GFR est AA 74 02/20/2017 10:53 AM    GFR est non-AA 64 02/20/2017 10:53 AM    Calcium 9.9 02/20/2017 10:53 AM    AST (SGOT) 20 02/20/2017 10:53 AM    Alk. phosphatase 75 02/20/2017 10:53 AM    Protein, total 6.0 02/20/2017 10:53 AM    Albumin 3.9 02/20/2017 10:53 AM    Globulin 3.1 02/19/2015 09:13 AM    A-G Ratio 1.9 02/20/2017 10:53 AM    ALT (SGPT) 15 02/20/2017 10:53 AM     Assessment/Plan: Mar Dale is a 78 y.o. female and presents with NHL. CC NHL Dx  10/10    STAGE: 3 possibly    TREATMENT COURSE:  rituxan 2/19/15 with reaction and treatment not finished. Hives and hypotension. INTERIM HISTORY:  F/u for NHL    1. NHL low grade follicular stage 3 dx in 2010     PET 5/16 good post chlorambucil 4/16. PET 10/2016 was stable. Most recent PET scan 2/22/17 showed progression of disease both above and below the level of the diaphragm. Reviewed PET with patient and daughter today. Discussed ordering CT head and orbits.  Patient feels mass is unchanged to the right back of the head. She would prefer to wait on CT's at this time. Pt clinically is doing well overall. Discussed restarting chlorambucil due to progressive disease on CTs. Patient prefers to restart therapy. Reviewed risks, benefits and alternatives to chlorambucil again and pt agreeable to chemo. Will start process for chlorambucil for patient to start. 2.  Poor vision left eye/ ocular NHL. Post XRT. No vision changes since last visit. 3.  Back of head mass. Patient report right sided mass is unchanged. Discussed CT head and orbits with patient but she declines at this time. Will continue to monitor. 4.  Renal insufficiency. Creatinine 0.96 on 2/20/17. Will monitor labs while on Chlorambucil. Will check CBC with diff and CMP weekly. 5.  Hypothyroid. Managed by endocrinology. 6.  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 Stable and not worsened. Refilled gabapentin today. Follow-up in 1 month. Seen in conjunction with Tracy Lopez NP. ICD-10-CM ICD-9-CM    1. Follicular lymphoma grade I, unspecified body region ([facility] [address] C82.00 202.00    2. 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 Indications: NEUROPATHIC PAIN, POSTHERPETIC NEURALGIA     Dispense:  90 Cap     Refill:  3    chlorambucil (LEUKERAN) 2 mg tablet     Sig: Take 4 Tabs by mouth daily. (0.1 mg/kg PO daily on days 1-28 of a 28 day cycle, weight 76.3 kg)  Indications: FOLLICULAR LYMPHOMA     Dispense:  120 Tab     Refill:  2    ondansetron (ZOFRAN ODT) 8 mg disintegrating tablet     Sig: Take 1 Tab by mouth every eight (8) hours as needed for Nausea. Dispense:  30 Tab     Refill:  2       continue present plan, call if any problems  There are no Patient Instructions on file for this visit.    Follow-up Disposition:  Return in about 4 weeks (around 3/30/2017).     Giacomo Ramirez, DO

## 2017-03-07 ENCOUNTER — DOCUMENTATION ONLY (OUTPATIENT)
Dept: ONCOLOGY | Age: 67
End: 2017-03-07

## 2017-03-07 ENCOUNTER — TELEPHONE (OUTPATIENT)
Dept: ONCOLOGY | Age: 67
End: 2017-03-07

## 2017-03-07 NOTE — TELEPHONE ENCOUNTER
Call to patient. HIPAA verified. Advised medication to be sent to [facility]  And may receive call. Aware of need for weekly labs and side effects of med reviewed. Declined additional questions. Chemo journal mailed to patient with standing lab order. To call when med recd. Thanked for call.

## 2017-03-09 ENCOUNTER — TELEPHONE (OUTPATIENT)
Dept: ONCOLOGY | Age: 67
End: 2017-03-09

## 2017-03-13 NOTE — TELEPHONE ENCOUNTER
Patient calling, she has received the medication in mail. Please call the patient back and let her know what to do now.  Thanks

## 2017-03-13 NOTE — TELEPHONE ENCOUNTER
HIPAA verified. Reviewed when to take med-1 hour before or 2 hours after meal.  Is to have labs weekly and will do on Mondays/[facility].  Reviewed major side effects per [facility]. Has anti-emetics. Thanked for call and will call as needed.

## 2017-03-20 ENCOUNTER — HOSPITAL ENCOUNTER (OUTPATIENT)
Dept: LAB | Age: 67
Discharge: HOME | End: 2017-03-20
Payer: MEDICARE

## 2017-03-20 PROCEDURE — 36415 COLL VENOUS BLD VENIPUNCTURE: CPT

## 2017-03-20 PROCEDURE — 80053 COMPREHEN METABOLIC PANEL: CPT

## 2017-03-20 PROCEDURE — 85025 COMPLETE CBC W/AUTO DIFF WBC: CPT

## 2017-03-21 LAB
ALBUMIN SERPL-MCNC: 4.1 G/DL (ref 3.6–4.8)
ALBUMIN/GLOB SERPL: 2.1 {RATIO} (ref 1.2–2.2)
ALP SERPL-CCNC: 76 IU/L (ref 39–117)
ALT SERPL-CCNC: 11 IU/L (ref 0–32)
AST SERPL-CCNC: 17 IU/L (ref 0–40)
BASOPHILS # BLD AUTO: 0 X10E3/UL (ref 0–0.2)
BASOPHILS NFR BLD AUTO: 0 %
BILIRUB SERPL-MCNC: 0.3 MG/DL (ref 0–1.2)
BUN SERPL-MCNC: 14 MG/DL (ref 8–27)
BUN/CREAT SERPL: 15 (ref 11–26)
CALCIUM SERPL-MCNC: 9.7 MG/DL (ref 8.7–10.3)
CHLORIDE SERPL-SCNC: 102 MMOL/L (ref 96–106)
CO2 SERPL-SCNC: 24 MMOL/L (ref 18–29)
CREAT SERPL-MCNC: 0.96 MG/DL (ref 0.57–1)
EOSINOPHIL # BLD AUTO: 0.2 X10E3/UL (ref 0–0.4)
EOSINOPHIL NFR BLD AUTO: 3 %
ERYTHROCYTE [DISTWIDTH] IN BLOOD BY AUTOMATED COUNT: 13.4 % (ref 12.3–15.4)
GLOBULIN SER CALC-MCNC: 2 G/DL (ref 1.5–4.5)
GLUCOSE SERPL-MCNC: 72 MG/DL (ref 65–99)
HCT VFR BLD AUTO: 39.9 % (ref 34–46.6)
HGB BLD-MCNC: 13.2 G/DL (ref 11.1–15.9)
IMM GRANULOCYTES # BLD: 0 X10E3/UL (ref 0–0.1)
IMM GRANULOCYTES NFR BLD: 1 %
LYMPHOCYTES # BLD AUTO: 1.8 X10E3/UL (ref 0.7–3.1)
LYMPHOCYTES NFR BLD AUTO: 25 %
MCH RBC QN AUTO: 30.2 PG (ref 26.6–33)
MCHC RBC AUTO-ENTMCNC: 33.1 G/DL (ref 31.5–35.7)
MCV RBC AUTO: 91 FL (ref 79–97)
MONOCYTES # BLD AUTO: 0.6 X10E3/UL (ref 0.1–0.9)
MONOCYTES NFR BLD AUTO: 9 %
NEUTROPHILS # BLD AUTO: 4.4 X10E3/UL (ref 1.4–7)
NEUTROPHILS NFR BLD AUTO: 62 %
PLATELET # BLD AUTO: 239 X10E3/UL (ref 150–379)
POTASSIUM SERPL-SCNC: 4.2 MMOL/L (ref 3.5–5.2)
PROT SERPL-MCNC: 6.1 G/DL (ref 6–8.5)
RBC # BLD AUTO: 4.37 X10E6/UL (ref 3.77–5.28)
SODIUM SERPL-SCNC: 144 MMOL/L (ref 134–144)
WBC # BLD AUTO: 7.2 X10E3/UL (ref 3.4–10.8)

## 2017-03-27 ENCOUNTER — HOSPITAL ENCOUNTER (OUTPATIENT)
Dept: LAB | Age: 67
Discharge: HOME | End: 2017-03-27
Payer: MEDICARE

## 2017-03-27 PROCEDURE — 80053 COMPREHEN METABOLIC PANEL: CPT

## 2017-03-27 PROCEDURE — 85025 COMPLETE CBC W/AUTO DIFF WBC: CPT

## 2017-03-27 PROCEDURE — 36415 COLL VENOUS BLD VENIPUNCTURE: CPT

## 2017-03-28 LAB
ALBUMIN SERPL-MCNC: 4 G/DL (ref 3.6–4.8)
ALBUMIN/GLOB SERPL: 2.1 {RATIO} (ref 1.2–2.2)
ALP SERPL-CCNC: 80 IU/L (ref 39–117)
ALT SERPL-CCNC: 13 IU/L (ref 0–32)
AST SERPL-CCNC: 18 IU/L (ref 0–40)
BASOPHILS # BLD AUTO: 0 X10E3/UL (ref 0–0.2)
BASOPHILS NFR BLD AUTO: 1 %
BILIRUB SERPL-MCNC: 0.4 MG/DL (ref 0–1.2)
BUN SERPL-MCNC: 14 MG/DL (ref 8–27)
BUN/CREAT SERPL: 15 (ref 11–26)
CALCIUM SERPL-MCNC: 9.4 MG/DL (ref 8.7–10.3)
CHLORIDE SERPL-SCNC: 103 MMOL/L (ref 96–106)
CO2 SERPL-SCNC: 25 MMOL/L (ref 18–29)
CREAT SERPL-MCNC: 0.94 MG/DL (ref 0.57–1)
EOSINOPHIL # BLD AUTO: 0.2 X10E3/UL (ref 0–0.4)
EOSINOPHIL NFR BLD AUTO: 3 %
ERYTHROCYTE [DISTWIDTH] IN BLOOD BY AUTOMATED COUNT: 13.4 % (ref 12.3–15.4)
GLOBULIN SER CALC-MCNC: 1.9 G/DL (ref 1.5–4.5)
GLUCOSE SERPL-MCNC: 98 MG/DL (ref 65–99)
HCT VFR BLD AUTO: 38.5 % (ref 34–46.6)
HGB BLD-MCNC: 12.8 G/DL (ref 11.1–15.9)
IMM GRANULOCYTES # BLD: 0 X10E3/UL (ref 0–0.1)
IMM GRANULOCYTES NFR BLD: 0 %
LYMPHOCYTES # BLD AUTO: 1.2 X10E3/UL (ref 0.7–3.1)
LYMPHOCYTES NFR BLD AUTO: 18 %
MCH RBC QN AUTO: 31.1 PG (ref 26.6–33)
MCHC RBC AUTO-ENTMCNC: 33.2 G/DL (ref 31.5–35.7)
MCV RBC AUTO: 94 FL (ref 79–97)
MONOCYTES # BLD AUTO: 0.6 X10E3/UL (ref 0.1–0.9)
MONOCYTES NFR BLD AUTO: 9 %
NEUTROPHILS # BLD AUTO: 4.6 X10E3/UL (ref 1.4–7)
NEUTROPHILS NFR BLD AUTO: 69 %
PLATELET # BLD AUTO: 255 X10E3/UL (ref 150–379)
POTASSIUM SERPL-SCNC: 3.8 MMOL/L (ref 3.5–5.2)
PROT SERPL-MCNC: 5.9 G/DL (ref 6–8.5)
RBC # BLD AUTO: 4.11 X10E6/UL (ref 3.77–5.28)
SODIUM SERPL-SCNC: 143 MMOL/L (ref 134–144)
WBC # BLD AUTO: 6.7 X10E3/UL (ref 3.4–10.8)

## 2017-04-03 ENCOUNTER — HOSPITAL ENCOUNTER (OUTPATIENT)
Dept: LAB | Age: 67
Discharge: HOME | End: 2017-04-03
Payer: MEDICARE

## 2017-04-03 ENCOUNTER — OFFICE VISIT (OUTPATIENT)
Dept: ONCOLOGY | Age: 67
End: 2017-04-03

## 2017-04-03 VITALS
WEIGHT: 162.4 LBS | OXYGEN SATURATION: 98 % | DIASTOLIC BLOOD PRESSURE: 74 MMHG | TEMPERATURE: 98.3 F | HEIGHT: 62 IN | SYSTOLIC BLOOD PRESSURE: 130 MMHG | HEART RATE: 70 BPM | RESPIRATION RATE: 16 BRPM | BODY MASS INDEX: 29.88 KG/M2

## 2017-04-03 DIAGNOSIS — Z09 CHEMOTHERAPY FOLLOW-UP EXAMINATION: ICD-10-CM

## 2017-04-03 DIAGNOSIS — C82.00 FOLLICULAR LYMPHOMA GRADE I, UNSPECIFIED BODY REGION (HCC): Primary | ICD-10-CM

## 2017-04-03 DIAGNOSIS — R01.1 HEART MURMUR: ICD-10-CM

## 2017-04-03 DIAGNOSIS — C85.99 ORBITAL LYMPHOMA (HCC): ICD-10-CM

## 2017-04-03 PROCEDURE — 85025 COMPLETE CBC W/AUTO DIFF WBC: CPT

## 2017-04-03 PROCEDURE — 80053 COMPREHEN METABOLIC PANEL: CPT

## 2017-04-03 PROCEDURE — 36415 COLL VENOUS BLD VENIPUNCTURE: CPT

## 2017-04-03 NOTE — MR AVS SNAPSHOT
Visit Information Date & Time Provider Department Dept. Phone Encounter #  
 4/3/2017  4:00 PM Ariana Martinez, [facility] [id number] Upcoming Health Maintenance Date Due Hepatitis C Screening 1950 DTaP/Tdap/Td series (1 - Tdap) 11/19/1970 BREAST CANCER SCRN MAMMOGRAM 11/19/1999 FOBT Q 1 YEAR AGE 50-75 11/19/1999 ZOSTER VACCINE AGE 60> 11/19/2009 GLAUCOMA SCREENING Q2Y 11/19/2014 OSTEOPOROSIS SCREENING (DEXA) 11/19/2014 Pneumococcal 65+ High/Highest Risk (1 of 2 - PCV13) 11/19/2014 MEDICARE YEARLY EXAM 11/19/2014 INFLUENZA AGE 9 TO ADULT 8/1/2016 Allergies as of 4/3/2017  Review Complete On: 4/3/2017 By: Ariana Martinez DO Severity Noted Reaction Type Reactions Other Medication  12/22/2015    Hives Rituxin Valtrex [Valacyclovir]  02/03/2016   Side Effect Itching Patient states no allergy Current Immunizations  Reviewed on 4/3/2017 Name Date Influenza Vaccine 10/1/2015, 11/5/2014 Reviewed by Ashley Engle LPN on 6/0/8990 at  1:53 PM  
Vitals BP Pulse Temp Resp Height(growth percentile) Weight(growth percentile) 130/74 70 98.3 °F (36.8 °C) (Oral) 16 5' 2\" (1.575 m) 162 lb 6.4 oz (73.7 kg) LMP SpO2 BMI OB Status Smoking Status (LMP Unknown) 98% 29.7 kg/m2 Postmenopausal Former Smoker Vitals History BMI and BSA Data Body Mass Index Body Surface Area  
 29.7 kg/m 2 1.8 m 2 Preferred Pharmacy Pharmacy Name Phone [facility] [address] [place] Your Updated Medication List  
  
   
This list is accurate as of: 4/3/17  4:55 PM.  Always use your most recent med list.  
  
  
  
  
 atenolol 25 mg tablet Commonly known as:  TENORMIN Take 25 mg by mouth daily. bumetanide 2 mg tablet Commonly known as:  Belkis Salon Take 2 mg by mouth as needed. chlorambucil 2 mg tablet Commonly known as:  LEUKERAN Take 4 Tabs by mouth daily. (0.1 mg/kg PO daily on days 1-28 of a 28 day cycle, weight 76.3 kg)  Indications: FOLLICULAR LYMPHOMA  
  
 cholecalciferol 1,000 unit tablet Commonly known as:  VITAMIN D3 Take  by mouth daily. gabapentin 300 mg capsule Commonly known as:  NEURONTIN Take 1 Cap by mouth three (3) times daily. Indications: NEUROPATHIC PAIN, POSTHERPETIC NEURALGIA  
  
 KLOR-CON M20 20 mEq tablet Generic drug:  potassium chloride Take 20 mEq by mouth daily. levothyroxine 175 mcg tablet Commonly known as:  SYNTHROID  
daily. MULTIVITAMIN & MINERAL FORMULA PO Take  by mouth. ondansetron 8 mg disintegrating tablet Commonly known as:  ZOFRAN ODT Take 1 Tab by mouth every eight (8) hours as needed for Nausea. VITAMIN B-12 1,000 mcg/mL injection Generic drug:  cyanocobalamin  
1,000 mcg by IntraMUSCular route every month. Introducing [facility]! [facility] introduces Unocoin patient portal. Now you can access parts of your medical record, email your doctor's office, and request medication refills online. 1. In your internet browser, go to [email or web address] 2. Click on the First Time User? Click Here link in the Sign In box. You will see the New Member Sign Up page. 3. Enter your Unocoin Access Code exactly as it appears below. You will not need to use this code after youve completed the sign-up process. If you do not sign up before the expiration date, you must request a new code. · Unocoin Access Code: [email or web address] Expires: 4/11/2017 12:17 PM 
 
4. Enter the last four digits of your Social Security Number (xxxx) and Date of Birth (mm/dd/yyyy) as indicated and click Submit. You will be taken to the next sign-up page. 5. Create a Unocoin ID.  This will be your Unocoin login ID and cannot be changed, so think of one that is secure and easy to remember. 6. Create a Nuzzel password. You can change your password at any time. 7. Enter your Password Reset Question and Answer. This can be used at a later time if you forget your password. 8. Enter your e-mail address. You will receive e-mail notification when new information is available in [address] 9. Click Sign Up. You can now view and download portions of your medical record. 10. Click the Download Summary menu link to download a portable copy of your medical information. If you have questions, please visit the Frequently Asked Questions section of the Nuzzel website. Remember, Nuzzel is NOT to be used for urgent needs. For medical emergencies, dial 911. Now available from your iPhone and Android! Please provide this summary of care documentation to your next provider. Your primary care clinician is listed as Shaniqua Mccloud. If you have any questions after today's visit, please call [phone].

## 2017-04-03 NOTE — PROGRESS NOTES
Sejal Gruber is a 78 y.o. 1950 female and presents with NHL. CC NHL Dx  10/10    STAGE: 3 possibly    TREATMENT COURSE:  Orbit radiation  Chlorambucil x 1 cycle 4/16. Cycle 2 3/17. Attempted one dose of rituxan. INTERIM HISTORY:  Ms. Srikanth Novak is here for follow-up for NHL on oral chlorambucil daily now. No problems with chemo. Some fatigue. No issues with chemo. Labs weekly ok. Pt had chemo for 6 weeks for one cycle 4/2016. Most recent PET 2/22/17 shows progressive disease. Past Medical History:   Diagnosis Date    Anemia NEC     Cancer ([facility] [address]     low grade NHL     Past Surgical History:   Procedure Laterality Date    HX CATARACT REMOVAL  6/2015 & 7/2015    HX CHOLECYSTECTOMY  1982    HX HEENT  2005    RIGHT EAR     IA RPLCMT PROST AORTIC VALVE OPEN XCP HOMOGRF/STENT  1972     Social History     Social History    Marital status:      Spouse name: N/A    Number of children: N/A    Years of education: N/A     Social History Main Topics    Smoking status: Former Smoker     Packs/day: 0.50     Years: 45.00     Types: Cigarettes     Quit date: 1/1/2016    Smokeless tobacco: Former User     Quit date: 12/1/2015      Comment: 7 cigarettes daily.  Alcohol use Yes      Comment: Rare    Drug use: No    Sexual activity: Not Asked     Other Topics Concern    None     Social History Narrative     Family History   Problem Relation Age of Onset    Heart Disease Mother     Kidney Disease Mother     Alcohol abuse Father     Cancer Sister      Breast    No Known Problems Brother     Cancer Sister      Colon    Alcohol abuse Sister     Alcohol abuse Sister        Current Outpatient Prescriptions   Medication Sig Dispense Refill    gabapentin (NEURONTIN) 300 mg capsule Take 1 Cap by mouth three (3) times daily. Indications: NEUROPATHIC PAIN, POSTHERPETIC NEURALGIA 90 Cap 3    chlorambucil (LEUKERAN) 2 mg tablet Take 4 Tabs by mouth daily.  (0.1 mg/kg PO daily on days 1-28 of a 28 day cycle, weight 76.3 kg)  Indications: FOLLICULAR LYMPHOMA 791 Tab 2    MULTIVITAMIN WITH MINERALS (MULTIVITAMIN & MINERAL FORMULA PO) Take  by mouth.  levothyroxine (SYNTHROID) 175 mcg tablet daily.  cholecalciferol (VITAMIN D3) 1,000 unit tablet Take  by mouth daily.  KLOR-CON M20 20 mEq tablet Take 20 mEq by mouth daily.  bumetanide (BUMEX) 2 mg tablet Take 2 mg by mouth as needed.  cyanocobalamin (VITAMIN B-12) 1,000 mcg/mL injection 1,000 mcg by IntraMUSCular route every month.  atenolol (TENORMIN) 25 mg tablet Take 25 mg by mouth daily.  ondansetron (ZOFRAN ODT) 8 mg disintegrating tablet Take 1 Tab by mouth every eight (8) hours as needed for Nausea. 30 Tab 2       Allergies   Allergen Reactions    Other Medication Hives     Rituxin    Valtrex [Valacyclovir] Itching     Patient states no allergy       Review of Systems    A comprehensive review of systems was negative except for: per HPI and sheet    Objective:  Visit Vitals    /74    Pulse 70    Temp 98.3 °F (36.8 °C) (Oral)    Resp 16    Ht 5' 2\" (1.575 m)    Wt 162 lb 6.4 oz (73.7 kg)    LMP  (LMP Unknown)    SpO2 98%    BMI 29.7 kg/m2     Physical Exam:   General appearance - alert, well appearing, and in no distress, oriented to person, place, and time and normal appearing weight  Mental status - alert, oriented normal mood, behavior, speech, dress, motor activity, and thought processes  EYE-conj clear no swelling in eyes. Right eyelid droop noted. Mouth - mucous membranes pink, moist, intact. No lesions or thrush. Neck - supple, no adenopathy appreciated  CV - Regular rate and rhythm with heart murmur chronically  Resp - Clear to auscultation bilaterally  GI - Round, soft, non-tender. Ext-No pedal edema noted bilaterally  Skin-Warm and dry. Intact without rash. Neuro - Awake, alert, oriented.  Exam is non-focal.    Diagnostic Imaging   PET Results (most recent):    Results from [facility] Encounter encounter on 02/22/17   PET/CT TUMOR IMAGE SKULL THIGH (SUB)   Narrative PET/CT SCAN    PROCEDURE: Following IV injection of 10.6 mCi 18 Fluoro 2 deoxyglucose (FDG) and  a standard uptake delay, PET imaging is performed head to thighs and axial,  sagittal and coronal images were acquired. Unenhanced CT is obtained for  anatomic localization, and attenuation correction of the PET scan. Patient  preprocedure blood glucose level: 100mg/dL. CORRELATIVE IMAGING STUDIES: None. PRIOR PET:  October 12, 2016    HISTORY: The study is requested for subsequent treatment strategy. Non-Hodgkin's  lymphoma. FINDINGS:    HEAD/NECK: There is persistent hypermetabolic activity and a left level 2 lymph  node with improvement. Current SUV is 9.1 and was previously 11.8. There is  persistent hypermetabolic activity in the left level 1 lymph node with interval  improvement. Previous SUV was 23 and is currently 5.6. There is a hypermetabolic  lymph node in the left posterior triangle with SUV of 3.4 and maximal dimension  of 6.6 mm (image 56). There is a hypermetabolic right supraclavicular lymph node  (image 66) with maximal dimension of 5.9 mm, SUV 2.8. CHEST: There has been interval progression of hypermetabolic activity in  bilateral axillary lymph nodes. Sample SUV on the right is 5.7 and was  previously 4.0, and on the left 5.3 and was previously 4.1. Lymph nodes have  also enlarged. The largest lymph node on the left on image 97 measures 18 mm and  previously measured 14 mm. On the right, the largest lymph node measures 16 mm  and previously measured 13 mm. A lymph node on image 108 SUV measures 5.4 and  previously measured 3.3. Short axis dimension is 13 mm and was previously 7 mm. A subcutaneous lymph node SUV is 4.1 and was previously 2.9, seen as a 11 mm  mass on image 123.  Paraspinal activity in the posterior mediastinum has  significantly progressed and measures 6.4 on the left and 3.9 on the right,  previously 2.5 and 3.1. The soft tissue to the left of the spine measures 13.7  mm and was previously 9.8 mm. On the right, measurement is 9 mm and was  previously 8 mm. There is lobular hypermetabolic pleural thickening particularly  on the left which has progressed. ABDOMEN/PELVIS: There is hypermetabolic activity in the wall of the gastric  antrum with SUV of 4.9. There is a 4.1 x 2.0 cm hypermetabolic lymph node  posterior to the pancreatic head which previously measured 3.6 x 1.5 cm. SUV is  5.3 and was previously 5.2. There is a hypermetabolic left retroperitoneal lymph  node with SUV 3.6, previously 3.1, seen on image 164. There are multiple  hypermetabolic lymph nodes in the retroperitoneum extending into the left pelvis  with SUV is 6.3, previously 5.6. Maximal dimension is 7.3 x 5.7 cm and was  previously 6.8 x 5.1 cm. There is an area of hypermetabolic activity into the  right so as with SUV of 3.4. There is an area of hypermetabolic activity in the  right pelvis SUV of 5.1, new from prior study, on image 208. Hypermetabolic  activity extends into the left pelvis. There are 2 hypermetabolic left groin  lymph nodes. One of these lymph nodes is new when compared to the prior exam and  measures 17 mm, seen on image 232. SUV is 5.8. The larger left groin lymph node  SUV is 7.8. Maximal dimension is 5.1 x 3.8 cm and was previously 4.8 x 2.8 cm. There is a right groin lymph node with SUV of 2.8, not significantly changed. SKELETON: No foci of abnormal hypermetabolism in the axial and visualized  appendicular skeleton. Impression IMPRESSION: Interval worsening of the PET scan with new and worsening areas of  hypermetabolic activity as described compatible with progressive lymphoma.         Lab Results    reviewed  Lab Results   Component Value Date/Time    WBC 6.7 03/27/2017 09:20 AM    HGB 12.8 03/27/2017 09:20 AM    HCT 38.5 03/27/2017 09:20 AM    PLATELET 729 82/64/7129 09:20 AM    MCV 94 03/27/2017 09:20 AM       Lab Results   Component Value Date/Time    Sodium 143 03/27/2017 09:20 AM    Potassium 3.8 03/27/2017 09:20 AM    Chloride 103 03/27/2017 09:20 AM    CO2 25 03/27/2017 09:20 AM    Anion gap 4 02/19/2015 09:13 AM    Glucose 98 03/27/2017 09:20 AM    BUN 14 03/27/2017 09:20 AM    Creatinine 0.94 03/27/2017 09:20 AM    BUN/Creatinine ratio 15 03/27/2017 09:20 AM    GFR est AA 73 03/27/2017 09:20 AM    GFR est non-AA 63 03/27/2017 09:20 AM    Calcium 9.4 03/27/2017 09:20 AM    AST (SGOT) 18 03/27/2017 09:20 AM    Alk. phosphatase 80 03/27/2017 09:20 AM    Protein, total 5.9 03/27/2017 09:20 AM    Albumin 4.0 03/27/2017 09:20 AM    Globulin 3.1 02/19/2015 09:13 AM    A-G Ratio 2.1 03/27/2017 09:20 AM    ALT (SGPT) 13 03/27/2017 09:20 AM     Assessment/Plan: Rosemary Hopper is a 78 y.o. female and presents with NHL. CC NHL Dx  10/10    STAGE: 3 possibly    TREATMENT COURSE:  rituxan 2/19/15 with reaction and treatment not finished. Hives and hypotension. INTERIM HISTORY:  F/u for NHL    1. NHL low grade follicular stage 3 dx in 2010   PET 5/16 good post chlorambucil 4/16. PET 10/2016 was stable. Most recent PET scan 2/22/17 showed progression of disease both above and below the level of the diaphragm. Pt clinically is doing well overall. Pt is onchlorambucil due to progressive disease on CTs. Tolerating daily chlorambucil and will finish this 6 weeks then break followed by CTs again. Weekly labs. 2.  Poor vision left eye/ ocular NHL. Post XRT. No vision changes since last visit. 3.  Back of head mass. Will continue to monitor. 4.  Renal insufficiency. Will monitor labs while on Chlorambucil. 5.  Hypothyroid. Managed by endocrinology. 6.  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 Stable and not worsened. Refilled gabapentin today. Follow-up in 1 month. Call if questions. ICD-10-CM ICD-9-CM    1.  Follicular lymphoma grade I, unspecified body region (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 Chemotherapy follow-up examination Z09 V67.2    4. Heart murmur R01.1 785.2      No orders of the defined types were placed in this encounter. continue present plan, call if any problems  There are no Patient Instructions on file for this visit.    Follow-up Disposition: Not on [address] [place] [address] Se, DO

## 2017-04-04 LAB
ALBUMIN SERPL-MCNC: 4 G/DL (ref 3.6–4.8)
ALBUMIN/GLOB SERPL: 1.9 {RATIO} (ref 1.2–2.2)
ALP SERPL-CCNC: 74 IU/L (ref 39–117)
ALT SERPL-CCNC: 15 IU/L (ref 0–32)
AST SERPL-CCNC: 18 IU/L (ref 0–40)
BASOPHILS # BLD AUTO: 0 X10E3/UL (ref 0–0.2)
BASOPHILS NFR BLD AUTO: 1 %
BILIRUB SERPL-MCNC: 0.3 MG/DL (ref 0–1.2)
BUN SERPL-MCNC: 15 MG/DL (ref 8–27)
BUN/CREAT SERPL: 15 (ref 12–28)
CALCIUM SERPL-MCNC: 9.9 MG/DL (ref 8.7–10.3)
CHLORIDE SERPL-SCNC: 103 MMOL/L (ref 96–106)
CO2 SERPL-SCNC: 25 MMOL/L (ref 18–29)
CREAT SERPL-MCNC: 1.02 MG/DL (ref 0.57–1)
EOSINOPHIL # BLD AUTO: 0.3 X10E3/UL (ref 0–0.4)
EOSINOPHIL NFR BLD AUTO: 4 %
ERYTHROCYTE [DISTWIDTH] IN BLOOD BY AUTOMATED COUNT: 13.9 % (ref 12.3–15.4)
GLOBULIN SER CALC-MCNC: 2.1 G/DL (ref 1.5–4.5)
GLUCOSE SERPL-MCNC: 100 MG/DL (ref 65–99)
HCT VFR BLD AUTO: 39.5 % (ref 34–46.6)
HGB BLD-MCNC: 13 G/DL (ref 11.1–15.9)
IMM GRANULOCYTES # BLD: 0 X10E3/UL (ref 0–0.1)
IMM GRANULOCYTES NFR BLD: 0 %
LYMPHOCYTES # BLD AUTO: 0.9 X10E3/UL (ref 0.7–3.1)
LYMPHOCYTES NFR BLD AUTO: 15 %
MCH RBC QN AUTO: 30.5 PG (ref 26.6–33)
MCHC RBC AUTO-ENTMCNC: 32.9 G/DL (ref 31.5–35.7)
MCV RBC AUTO: 93 FL (ref 79–97)
MONOCYTES # BLD AUTO: 0.6 X10E3/UL (ref 0.1–0.9)
MONOCYTES NFR BLD AUTO: 10 %
NEUTROPHILS # BLD AUTO: 4.4 X10E3/UL (ref 1.4–7)
NEUTROPHILS NFR BLD AUTO: 70 %
PLATELET # BLD AUTO: 246 X10E3/UL (ref 150–379)
POTASSIUM SERPL-SCNC: 4.4 MMOL/L (ref 3.5–5.2)
PROT SERPL-MCNC: 6.1 G/DL (ref 6–8.5)
RBC # BLD AUTO: 4.26 X10E6/UL (ref 3.77–5.28)
SODIUM SERPL-SCNC: 142 MMOL/L (ref 134–144)
WBC # BLD AUTO: 6.2 X10E3/UL (ref 3.4–10.8)

## 2017-04-10 ENCOUNTER — HOSPITAL ENCOUNTER (OUTPATIENT)
Dept: LAB | Age: 67
Discharge: HOME | End: 2017-04-10
Payer: MEDICARE

## 2017-04-10 PROCEDURE — 36415 COLL VENOUS BLD VENIPUNCTURE: CPT

## 2017-04-10 PROCEDURE — 80053 COMPREHEN METABOLIC PANEL: CPT

## 2017-04-10 PROCEDURE — 85025 COMPLETE CBC W/AUTO DIFF WBC: CPT

## 2017-04-11 LAB
ALBUMIN SERPL-MCNC: 4.1 G/DL (ref 3.6–4.8)
ALBUMIN/GLOB SERPL: 2.2 {RATIO} (ref 1.2–2.2)
ALP SERPL-CCNC: 79 IU/L (ref 39–117)
ALT SERPL-CCNC: 15 IU/L (ref 0–32)
AST SERPL-CCNC: 22 IU/L (ref 0–40)
BASOPHILS # BLD AUTO: 0.1 X10E3/UL (ref 0–0.2)
BASOPHILS NFR BLD AUTO: 1 %
BILIRUB SERPL-MCNC: 0.3 MG/DL (ref 0–1.2)
BUN SERPL-MCNC: 17 MG/DL (ref 8–27)
BUN/CREAT SERPL: 17 (ref 12–28)
CALCIUM SERPL-MCNC: 9.9 MG/DL (ref 8.7–10.3)
CHLORIDE SERPL-SCNC: 102 MMOL/L (ref 96–106)
CO2 SERPL-SCNC: 24 MMOL/L (ref 18–29)
CREAT SERPL-MCNC: 1.03 MG/DL (ref 0.57–1)
EOSINOPHIL # BLD AUTO: 0.2 X10E3/UL (ref 0–0.4)
EOSINOPHIL NFR BLD AUTO: 4 %
ERYTHROCYTE [DISTWIDTH] IN BLOOD BY AUTOMATED COUNT: 13.4 % (ref 12.3–15.4)
GLOBULIN SER CALC-MCNC: 1.9 G/DL (ref 1.5–4.5)
GLUCOSE SERPL-MCNC: 102 MG/DL (ref 65–99)
HCT VFR BLD AUTO: 37.4 % (ref 34–46.6)
HGB BLD-MCNC: 12.3 G/DL (ref 11.1–15.9)
IMM GRANULOCYTES # BLD: 0 X10E3/UL (ref 0–0.1)
IMM GRANULOCYTES NFR BLD: 0 %
LYMPHOCYTES # BLD AUTO: 1.2 X10E3/UL (ref 0.7–3.1)
LYMPHOCYTES NFR BLD AUTO: 22 %
MCH RBC QN AUTO: 30.9 PG (ref 26.6–33)
MCHC RBC AUTO-ENTMCNC: 32.9 G/DL (ref 31.5–35.7)
MCV RBC AUTO: 94 FL (ref 79–97)
MONOCYTES # BLD AUTO: 0.6 X10E3/UL (ref 0.1–0.9)
MONOCYTES NFR BLD AUTO: 11 %
NEUTROPHILS # BLD AUTO: 3.6 X10E3/UL (ref 1.4–7)
NEUTROPHILS NFR BLD AUTO: 62 %
PLATELET # BLD AUTO: 209 X10E3/UL (ref 150–379)
POTASSIUM SERPL-SCNC: 4.2 MMOL/L (ref 3.5–5.2)
PROT SERPL-MCNC: 6 G/DL (ref 6–8.5)
RBC # BLD AUTO: 3.98 X10E6/UL (ref 3.77–5.28)
SODIUM SERPL-SCNC: 142 MMOL/L (ref 134–144)
WBC # BLD AUTO: 5.8 X10E3/UL (ref 3.4–10.8)

## 2017-04-24 ENCOUNTER — HOSPITAL ENCOUNTER (OUTPATIENT)
Dept: LAB | Age: 67
Discharge: HOME | End: 2017-04-24
Payer: MEDICARE

## 2017-04-24 PROCEDURE — 85025 COMPLETE CBC W/AUTO DIFF WBC: CPT

## 2017-04-24 PROCEDURE — 80053 COMPREHEN METABOLIC PANEL: CPT

## 2017-04-24 PROCEDURE — 36415 COLL VENOUS BLD VENIPUNCTURE: CPT

## 2017-04-25 LAB
ALBUMIN SERPL-MCNC: 4.1 G/DL (ref 3.6–4.8)
ALBUMIN/GLOB SERPL: 2.3 {RATIO} (ref 1.2–2.2)
ALP SERPL-CCNC: 73 IU/L (ref 39–117)
ALT SERPL-CCNC: 15 IU/L (ref 0–32)
AST SERPL-CCNC: 17 IU/L (ref 0–40)
BASOPHILS # BLD AUTO: 0 X10E3/UL (ref 0–0.2)
BASOPHILS NFR BLD AUTO: 1 %
BILIRUB SERPL-MCNC: 0.4 MG/DL (ref 0–1.2)
BUN SERPL-MCNC: 18 MG/DL (ref 8–27)
BUN/CREAT SERPL: 18 (ref 12–28)
CALCIUM SERPL-MCNC: 9.4 MG/DL (ref 8.7–10.3)
CHLORIDE SERPL-SCNC: 102 MMOL/L (ref 96–106)
CO2 SERPL-SCNC: 24 MMOL/L (ref 18–29)
CREAT SERPL-MCNC: 0.99 MG/DL (ref 0.57–1)
EOSINOPHIL # BLD AUTO: 0.4 X10E3/UL (ref 0–0.4)
EOSINOPHIL NFR BLD AUTO: 7 %
ERYTHROCYTE [DISTWIDTH] IN BLOOD BY AUTOMATED COUNT: 14.2 % (ref 12.3–15.4)
GLOBULIN SER CALC-MCNC: 1.8 G/DL (ref 1.5–4.5)
GLUCOSE SERPL-MCNC: 109 MG/DL (ref 65–99)
HCT VFR BLD AUTO: 39.3 % (ref 34–46.6)
HGB BLD-MCNC: 13 G/DL (ref 11.1–15.9)
IMM GRANULOCYTES # BLD: 0 X10E3/UL (ref 0–0.1)
IMM GRANULOCYTES NFR BLD: 0 %
LYMPHOCYTES # BLD AUTO: 0.8 X10E3/UL (ref 0.7–3.1)
LYMPHOCYTES NFR BLD AUTO: 13 %
MCH RBC QN AUTO: 30.4 PG (ref 26.6–33)
MCHC RBC AUTO-ENTMCNC: 33.1 G/DL (ref 31.5–35.7)
MCV RBC AUTO: 92 FL (ref 79–97)
MONOCYTES # BLD AUTO: 0.7 X10E3/UL (ref 0.1–0.9)
MONOCYTES NFR BLD AUTO: 12 %
NEUTROPHILS # BLD AUTO: 4.1 X10E3/UL (ref 1.4–7)
NEUTROPHILS NFR BLD AUTO: 67 %
PLATELET # BLD AUTO: 192 X10E3/UL (ref 150–379)
POTASSIUM SERPL-SCNC: 4.4 MMOL/L (ref 3.5–5.2)
PROT SERPL-MCNC: 5.9 G/DL (ref 6–8.5)
RBC # BLD AUTO: 4.27 X10E6/UL (ref 3.77–5.28)
SODIUM SERPL-SCNC: 143 MMOL/L (ref 134–144)
WBC # BLD AUTO: 6.1 X10E3/UL (ref 3.4–10.8)

## 2017-05-02 ENCOUNTER — TELEPHONE (OUTPATIENT)
Dept: ONCOLOGY | Age: 67
End: 2017-05-02

## 2017-05-02 NOTE — TELEPHONE ENCOUNTER
Call from [facility]. Calling to confirm patient has completed leukeran. Most recent office notes reviewed. Per notes patient was to complete 6 weeks of therapy.   Thanked for assist.

## 2017-05-03 ENCOUNTER — OFFICE VISIT (OUTPATIENT)
Dept: ONCOLOGY | Age: 67
End: 2017-05-03

## 2017-05-03 VITALS
HEART RATE: 80 BPM | DIASTOLIC BLOOD PRESSURE: 75 MMHG | BODY MASS INDEX: 28.56 KG/M2 | OXYGEN SATURATION: 98 % | RESPIRATION RATE: 18 BRPM | WEIGHT: 155.2 LBS | HEIGHT: 62 IN | TEMPERATURE: 98.5 F | SYSTOLIC BLOOD PRESSURE: 114 MMHG

## 2017-05-03 DIAGNOSIS — Z09 CHEMOTHERAPY FOLLOW-UP EXAMINATION: ICD-10-CM

## 2017-05-03 DIAGNOSIS — J06.9 UPPER RESPIRATORY TRACT INFECTION, UNSPECIFIED TYPE: ICD-10-CM

## 2017-05-03 DIAGNOSIS — C82.00 FOLLICULAR LYMPHOMA GRADE I, UNSPECIFIED BODY REGION (HCC): Primary | ICD-10-CM

## 2017-05-03 DIAGNOSIS — C85.99 ORBITAL LYMPHOMA (HCC): ICD-10-CM

## 2017-05-03 RX ORDER — AZITHROMYCIN 250 MG/1
250 TABLET, FILM COATED ORAL SEE ADMIN INSTRUCTIONS
Qty: 6 TAB | Refills: 0 | Status: SHIPPED | OUTPATIENT
Start: 2017-05-03 | End: 2017-05-08

## 2017-05-03 NOTE — PROGRESS NOTES
Pt is here for 1 month follow up for follicular lymphoma. Complaints of a \"head cold\".   Yellow drainage

## 2017-05-03 NOTE — PROGRESS NOTES
John Ruiz is a 78 y.o. 1950 female and presents with NHL. CC NHL Dx  10/10    STAGE: 3 possibly    TREATMENT COURSE:  Orbit radiation  Chlorambucil x 1 cycle 4/16. Cycle 2 3/17. Attempted one dose of rituxan. INTERIM HISTORY:  Ms. Britany Hernández is here for follow-up for NHL done oral chlorambucil daily now. No problems with chemo. Some fatigue. C/o URI. Yellow drainage. Pt had chemo for 6 weeks for one cycle 4/2016. Then just finished cycle 2 last week. Pt states back of head mass and groin mass are gone on therapy. Most recent PET 2/22/17 shows progressive disease. Past Medical History:   Diagnosis Date    Anemia NEC     Cancer ([facility] [address]     low grade NHL     Past Surgical History:   Procedure Laterality Date    HX CATARACT REMOVAL  6/2015 & 7/2015    HX CHOLECYSTECTOMY  1982    HX HEENT  2005    RIGHT EAR     NC RPLCMT PROST AORTIC VALVE OPEN XCP HOMOGRF/STENT  1972     Social History     Social History    Marital status:      Spouse name: N/A    Number of children: N/A    Years of education: N/A     Social History Main Topics    Smoking status: Former Smoker     Packs/day: 0.50     Years: 45.00     Types: Cigarettes     Quit date: 1/1/2016    Smokeless tobacco: Former User     Quit date: 12/1/2015      Comment: 7 cigarettes daily.  Alcohol use Yes      Comment: Rare    Drug use: No    Sexual activity: Not on file     Other Topics Concern    Not on file     Social History Narrative     Family History   Problem Relation Age of Onset    Heart Disease Mother     Kidney Disease Mother     Alcohol abuse Father     Cancer Sister      Breast    No Known Problems Brother     Cancer Sister      Colon    Alcohol abuse Sister     Alcohol abuse Sister        Current Outpatient Prescriptions   Medication Sig Dispense Refill    azithromycin (ZITHROMAX) 250 mg tablet Take 1 Tab by mouth See Admin Instructions for 5 days.  6 Tab 0    gabapentin (NEURONTIN) 300 mg capsule Take 1 Cap by mouth three (3) times daily. Indications: NEUROPATHIC PAIN, POSTHERPETIC NEURALGIA 90 Cap 3    MULTIVITAMIN WITH MINERALS (MULTIVITAMIN & MINERAL FORMULA PO) Take  by mouth.  levothyroxine (SYNTHROID) 175 mcg tablet daily.  cholecalciferol (VITAMIN D3) 1,000 unit tablet Take  by mouth daily.  KLOR-CON M20 20 mEq tablet Take 20 mEq by mouth daily.  bumetanide (BUMEX) 2 mg tablet Take 2 mg by mouth as needed.  cyanocobalamin (VITAMIN B-12) 1,000 mcg/mL injection 1,000 mcg by IntraMUSCular route every month.  atenolol (TENORMIN) 25 mg tablet Take 25 mg by mouth daily.  chlorambucil (LEUKERAN) 2 mg tablet Take 4 Tabs by mouth daily. (0.1 mg/kg PO daily on days 1-28 of a 28 day cycle, weight 76.3 kg)  Indications: FOLLICULAR LYMPHOMA 732 Tab 2    ondansetron (ZOFRAN ODT) 8 mg disintegrating tablet Take 1 Tab by mouth every eight (8) hours as needed for Nausea. 30 Tab 2       Allergies   Allergen Reactions    Other Medication Hives     Rituxin    Valtrex [Valacyclovir] Itching     Patient states no allergy       Review of Systems    A comprehensive review of systems was negative except for: per HPI and sheet    Objective:  Visit Vitals    /75    Pulse 80    Temp 98.5 °F (36.9 °C)    Resp 18    Ht 5' 2\" (1.575 m)    Wt 155 lb 3.2 oz (70.4 kg)    LMP  (LMP Unknown)    SpO2 98%    BMI 28.39 kg/m2     Physical Exam:   General appearance - alert, well appearing, and in no distress, oriented to person, place, and time and normal appearing weight  Mental status - alert, oriented normal mood, behavior, speech, dress, motor activity, and thought processes  EYE-conj clear no swelling in eyes. Right eyelid droop noted. Mouth - mucous membranes pink, moist, intact. No lesions or thrush.   Neck - supple, no adenopathy appreciated  CV - Regular rate and rhythm with heart murmur chronically  Resp - Clear to auscultation bilaterally  GI - Round, soft, non-tender. Ext-No pedal edema noted bilaterally  Skin-Warm and dry. Intact without rash. Neuro - Awake, alert, oriented. Exam is non-focal.    Diagnostic Imaging   PET Results (most recent):    Results from Hospital Encounter encounter on 02/22/17   PET/CT TUMOR IMAGE SKULL THIGH (SUB)   Narrative PET/CT SCAN    PROCEDURE: Following IV injection of 10.6 mCi 18 Fluoro 2 deoxyglucose (FDG) and  a standard uptake delay, PET imaging is performed head to thighs and axial,  sagittal and coronal images were acquired. Unenhanced CT is obtained for  anatomic localization, and attenuation correction of the PET scan. Patient  preprocedure blood glucose level: 100mg/dL. CORRELATIVE IMAGING STUDIES: None. PRIOR PET:  October 12, 2016    HISTORY: The study is requested for subsequent treatment strategy. Non-Hodgkin's  lymphoma. FINDINGS:    HEAD/NECK: There is persistent hypermetabolic activity and a left level 2 lymph  node with improvement. Current SUV is 9.1 and was previously 11.8. There is  persistent hypermetabolic activity in the left level 1 lymph node with interval  improvement. Previous SUV was 23 and is currently 5.6. There is a hypermetabolic  lymph node in the left posterior triangle with SUV of 3.4 and maximal dimension  of 6.6 mm (image 56). There is a hypermetabolic right supraclavicular lymph node  (image 66) with maximal dimension of 5.9 mm, SUV 2.8. CHEST: There has been interval progression of hypermetabolic activity in  bilateral axillary lymph nodes. Sample SUV on the right is 5.7 and was  previously 4.0, and on the left 5.3 and was previously 4.1. Lymph nodes have  also enlarged. The largest lymph node on the left on image 97 measures 18 mm and  previously measured 14 mm. On the right, the largest lymph node measures 16 mm  and previously measured 13 mm. A lymph node on image 108 SUV measures 5.4 and  previously measured 3.3. Short axis dimension is 13 mm and was previously 7 mm.   A subcutaneous lymph node SUV is 4.1 and was previously 2.9, seen as a 11 mm  mass on image 123. Paraspinal activity in the posterior mediastinum has  significantly progressed and measures 6.4 on the left and 3.9 on the right,  previously 2.5 and 3.1. The soft tissue to the left of the spine measures 13.7  mm and was previously 9.8 mm. On the right, measurement is 9 mm and was  previously 8 mm. There is lobular hypermetabolic pleural thickening particularly  on the left which has progressed. ABDOMEN/PELVIS: There is hypermetabolic activity in the wall of the gastric  antrum with SUV of 4.9. There is a 4.1 x 2.0 cm hypermetabolic lymph node  posterior to the pancreatic head which previously measured 3.6 x 1.5 cm. SUV is  5.3 and was previously 5.2. There is a hypermetabolic left retroperitoneal lymph  node with SUV 3.6, previously 3.1, seen on image 164. There are multiple  hypermetabolic lymph nodes in the retroperitoneum extending into the left pelvis  with SUV is 6.3, previously 5.6. Maximal dimension is 7.3 x 5.7 cm and was  previously 6.8 x 5.1 cm. There is an area of hypermetabolic activity into the  right so as with SUV of 3.4. There is an area of hypermetabolic activity in the  right pelvis SUV of 5.1, new from prior study, on image 208. Hypermetabolic  activity extends into the left pelvis. There are 2 hypermetabolic left groin  lymph nodes. One of these lymph nodes is new when compared to the prior exam and  measures 17 mm, seen on image 232. SUV is 5.8. The larger left groin lymph node  SUV is 7.8. Maximal dimension is 5.1 x 3.8 cm and was previously 4.8 x 2.8 cm. There is a right groin lymph node with SUV of 2.8, not significantly changed. SKELETON: No foci of abnormal hypermetabolism in the axial and visualized  appendicular skeleton.          Impression IMPRESSION: Interval worsening of the PET scan with new and worsening areas of  hypermetabolic activity as described compatible with progressive lymphoma. Lab Results    reviewed  Lab Results   Component Value Date/Time    WBC 6.1 04/24/2017 08:37 AM    HGB 13.0 04/24/2017 08:37 AM    HCT 39.3 04/24/2017 08:37 AM    PLATELET 585 21/40/8181 08:37 AM    MCV 92 04/24/2017 08:37 AM       Lab Results   Component Value Date/Time    Sodium 143 04/24/2017 08:37 AM    Potassium 4.4 04/24/2017 08:37 AM    Chloride 102 04/24/2017 08:37 AM    CO2 24 04/24/2017 08:37 AM    Anion gap 4 02/19/2015 09:13 AM    Glucose 109 04/24/2017 08:37 AM    BUN 18 04/24/2017 08:37 AM    Creatinine 0.99 04/24/2017 08:37 AM    BUN/Creatinine ratio 18 04/24/2017 08:37 AM    GFR est AA 68 04/24/2017 08:37 AM    GFR est non-AA 59 04/24/2017 08:37 AM    Calcium 9.4 04/24/2017 08:37 AM    AST (SGOT) 17 04/24/2017 08:37 AM    Alk. phosphatase 73 04/24/2017 08:37 AM    Protein, total 5.9 04/24/2017 08:37 AM    Albumin 4.1 04/24/2017 08:37 AM    Globulin 3.1 02/19/2015 09:13 AM    A-G Ratio 2.3 04/24/2017 08:37 AM    ALT (SGPT) 15 04/24/2017 08:37 AM     Assessment/Plan: Hakan Cleveland is a 78 y.o. female and presents with NHL. CC NHL Dx  10/10    STAGE: 3 possibly    TREATMENT COURSE:  rituxan 2/19/15 with reaction and treatment not finished. Hives and hypotension. INTERIM HISTORY:  F/u for NHL    1. NHL low grade follicular stage 3 dx in 2010   PET 5/16 good post chlorambucil 4/16. PET 10/2016 was stable. Most recent PET scan 2/22/17 showed progression of disease both above and below the level of the diaphragm. Pt clinically is doing well overall. Pt is onchlorambucil due to progressive disease on CTs. Tolerating daily chlorambucil and finished last week. Weekly labs good. Back of head mass/ left groin mass almost gone. Will re check PET 6/17. 2.  Poor vision left eye/ ocular NHL. Post XRT. No vision changes since last visit. 3.  Back of head mass. Gone with chemo. 4.  Renal insufficiency. Stable. 5.  Hypothyroid.  Managed by endocrinology. 6.  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 Stable and not worsened. 7.  URI. rx zpack. Monitor sx and call if not better. Follow-up in 1 month. Call if questions. ICD-10-CM ICD-9-CM    1. 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 Chemotherapy follow-up examination Z09 V67.2 PET/CT TUMOR IMAGE SKULL THIGH (SUB)   4. Upper respiratory tract infection, unspecified type J06.9 465.9 PET/CT TUMOR IMAGE SKULL THIGH (SUB)     Orders Placed This Encounter    PET/CT TUMOR IMAGE SKULL THIGH (SUB)     Standing Status:   Future     Standing Expiration Date:   6/3/2018     Order Specific Question:   Is Patient Allergic to Contrast Dye? Answer:   Unknown    azithromycin (ZITHROMAX) 250 mg tablet     Sig: Take 1 Tab by mouth See Admin Instructions for 5 days. Dispense:  6 Tab     Refill:  0       continue present plan, call if any problems  There are no Patient Instructions on file for this visit. Follow-up Disposition:  Return in about 4 weeks (around 5/31/2017).     Flakito Delgado,

## 2017-05-24 ENCOUNTER — HOSPITAL ENCOUNTER (OUTPATIENT)
Dept: PET IMAGING | Age: 67
Discharge: HOME | End: 2017-05-24
Attending: INTERNAL MEDICINE
Payer: MEDICARE

## 2017-05-24 VITALS — WEIGHT: 155 LBS | HEIGHT: 62 IN | BODY MASS INDEX: 28.52 KG/M2

## 2017-05-24 DIAGNOSIS — J06.9 UPPER RESPIRATORY TRACT INFECTION, UNSPECIFIED TYPE: ICD-10-CM

## 2017-05-24 DIAGNOSIS — Z09 CHEMOTHERAPY FOLLOW-UP EXAMINATION: ICD-10-CM

## 2017-05-24 DIAGNOSIS — C82.00 FOLLICULAR LYMPHOMA GRADE I, UNSPECIFIED BODY REGION (HCC): ICD-10-CM

## 2017-05-24 DIAGNOSIS — C85.99 ORBITAL LYMPHOMA (HCC): ICD-10-CM

## 2017-05-24 PROCEDURE — A9552 F18 FDG: HCPCS

## 2017-05-24 RX ORDER — SODIUM CHLORIDE 0.9 % (FLUSH) 0.9 %
10 SYRINGE (ML) INJECTION
Status: COMPLETED | OUTPATIENT
Start: 2017-05-24 | End: 2017-05-24

## 2017-05-24 RX ADMIN — Medication 10 ML: at 07:52

## 2017-05-25 NOTE — PROGRESS NOTES
HIPPA verified. Patient informed of PET per provider message. Patient asked if she should keep her appointment's; Rufina Ross stated \"yes\". Patient verbalized understanding.

## 2017-06-08 ENCOUNTER — OFFICE VISIT (OUTPATIENT)
Dept: ONCOLOGY | Age: 67
End: 2017-06-08

## 2017-06-08 VITALS
SYSTOLIC BLOOD PRESSURE: 124 MMHG | DIASTOLIC BLOOD PRESSURE: 75 MMHG | RESPIRATION RATE: 16 BRPM | HEART RATE: 73 BPM | BODY MASS INDEX: 29.04 KG/M2 | WEIGHT: 157.8 LBS | HEIGHT: 62 IN | TEMPERATURE: 98.4 F | OXYGEN SATURATION: 98 %

## 2017-06-08 DIAGNOSIS — C82.00 FOLLICULAR LYMPHOMA GRADE I, UNSPECIFIED BODY REGION (HCC): Primary | ICD-10-CM

## 2017-06-08 DIAGNOSIS — R53.0 NEOPLASTIC MALIGNANT RELATED FATIGUE: ICD-10-CM

## 2017-06-08 DIAGNOSIS — B02.23 POST-HERPETIC POLYNEUROPATHY: ICD-10-CM

## 2017-06-08 DIAGNOSIS — H54.7 POOR VISION: ICD-10-CM

## 2017-06-08 DIAGNOSIS — C85.99 ORBITAL LYMPHOMA (HCC): ICD-10-CM

## 2017-06-08 DIAGNOSIS — H57.12 LEFT EYE PAIN: ICD-10-CM

## 2017-06-08 RX ORDER — DOCUSATE SODIUM 100 MG/1
CAPSULE, LIQUID FILLED ORAL
COMMUNITY
Start: 2009-07-20 | End: 2018-01-01 | Stop reason: SDUPTHER

## 2017-06-08 RX ORDER — LEVOTHYROXINE SODIUM 100 UG/1
TABLET ORAL
COMMUNITY
Start: 2009-07-10 | End: 2018-01-01 | Stop reason: DRUGHIGH

## 2017-06-08 RX ORDER — ZINC GLUCONATE 13.3 MG
LOZENGE ORAL
COMMUNITY
Start: 2009-07-10 | End: 2018-01-01 | Stop reason: ALTCHOICE

## 2017-06-08 RX ORDER — AMITRIPTYLINE HYDROCHLORIDE 50 MG/1
TABLET, FILM COATED ORAL
COMMUNITY
Start: 2009-07-10 | End: 2018-01-01 | Stop reason: ALTCHOICE

## 2017-06-08 RX ORDER — ATENOLOL 50 MG/1
TABLET ORAL
COMMUNITY
Start: 2009-07-10 | End: 2018-01-01 | Stop reason: SDUPTHER

## 2017-06-08 NOTE — PROGRESS NOTES
Sydnie Patrick is a 78 y.o. 1950 female and presents with NHL. CC NHL Dx  10/10    STAGE: 3 possibly    TREATMENT COURSE:  Orbit radiation  Chlorambucil x 1 cycle 4/16. Cycle 2 3/17. Attempted one dose of rituxan. INTERIM HISTORY:  Ms. Joanie Maldonado is here for follow-up for NHL. PET good 5/17. Doing well overall. Some fatigue still. 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 Pt had chemo for 6 weeks for one cycle 4/2016. Then just finished cycle 2 last week. Pt states back of head mass and groin mass are gone on therapy. Past Medical History:   Diagnosis Date    Anemia NEC     Cancer ([facility] [address]     low grade NHL     Past Surgical History:   Procedure Laterality Date    HX CATARACT REMOVAL  6/2015 & 7/2015    HX CHOLECYSTECTOMY  1982    HX HEENT  2005    RIGHT EAR     NE RPLCMT PROST AORTIC VALVE OPEN XCP HOMOGRF/STENT  1972     Social History     Social History    Marital status:      Spouse name: N/A    Number of children: N/A    Years of education: N/A     Social History Main Topics    Smoking status: Former Smoker     Packs/day: 0.50     Years: 45.00     Types: Cigarettes     Quit date: 1/1/2016    Smokeless tobacco: Former User     Quit date: 12/1/2015      Comment: 7 cigarettes daily.  Alcohol use Yes      Comment: Rare    Drug use: No    Sexual activity: Not Asked     Other Topics Concern    None     Social History Narrative     Family History   Problem Relation Age of Onset    Heart Disease Mother     Kidney Disease Mother     Alcohol abuse Father     Cancer Sister      Breast    No Known Problems Brother     Cancer Sister      Colon    Alcohol abuse Sister     Alcohol abuse Sister        Current Outpatient Prescriptions   Medication Sig Dispense Refill    amitriptyline (ELAVIL) 50 mg tablet Take 50 mg by mouth nightly.  cimetidine (TAGAMET) 200 mg tablet Take 200 mg by mouth daily.       docusate sodium (COLACE) 100 mg capsule Take 100 mg by mouth 3 times daily.  levothyroxine (SYNTHROID) 100 mcg tablet Take 100 mcg by mouth daily.  gabapentin (NEURONTIN) 300 mg capsule Take 1 Cap by mouth three (3) times daily. Indications: NEUROPATHIC PAIN, POSTHERPETIC NEURALGIA 90 Cap 3    chlorambucil (LEUKERAN) 2 mg tablet Take 4 Tabs by mouth daily. (0.1 mg/kg PO daily on days 1-28 of a 28 day cycle, weight 76.3 kg)  Indications: FOLLICULAR LYMPHOMA 127 Tab 2    MULTIVITAMIN WITH MINERALS (MULTIVITAMIN & MINERAL FORMULA PO) Take  by mouth.  levothyroxine (SYNTHROID) 175 mcg tablet daily.  cholecalciferol (VITAMIN D3) 1,000 unit tablet Take  by mouth daily.  KLOR-CON M20 20 mEq tablet Take 20 mEq by mouth daily.  bumetanide (BUMEX) 2 mg tablet Take 2 mg by mouth as needed.  cyanocobalamin (VITAMIN B-12) 1,000 mcg/mL injection 1,000 mcg by IntraMUSCular route every month.  atenolol (TENORMIN) 25 mg tablet Take 25 mg by mouth daily.  atenolol (TENORMIN) 50 mg tablet Take 50 mg by mouth daily.  ondansetron (ZOFRAN ODT) 8 mg disintegrating tablet Take 1 Tab by mouth every eight (8) hours as needed for Nausea. 30 Tab 2       Allergies   Allergen Reactions    Other Medication Hives     Rituxin    Valtrex [Valacyclovir] Itching     Patient states no allergy       Review of Systems    A comprehensive review of systems was negative except for: per HPI and sheet    Objective:  Visit Vitals    /75    Pulse 73    Temp 98.4 °F (36.9 °C) (Oral)    Resp 16    Ht 5' 2\" (1.575 m)    Wt 157 lb 12.8 oz (71.6 kg)    LMP  (LMP Unknown)    SpO2 98%    BMI 28.86 kg/m2     Physical Exam:   General appearance - alert, well appearing, and in no distress, oriented to person, place, and time and normal appearing weight  Mental status - alert, oriented normal mood, behavior, speech, dress, motor activity, and thought processes  EYE-conj clear no swelling in eyes. Right eyelid droop noted.   Mouth - mucous membranes pink, moist, intact. Neck - supple, no adenopathy appreciated  CV - Regular rate and rhythm with heart murmur chronically  Resp - Clear to auscultation bilaterally  GI - Round, soft, non-tender. Ext-No pedal edema noted bilaterally  No groin LAD  Skin-Warm and dry. Neuro -  non-focal.    Diagnostic Imaging   reviewed  PET Results (most recent):    Results from Hospital Encounter encounter on 05/24/17   PET/CT TUMOR IMAGE SKULL THIGH (SUB)   Narrative PET/CT SCAN    PROCEDURE: Following IV injection of mCi 18 Fluoro 2 deoxyglucose (FDG) and a  standard uptake delay, PET imaging is performed from head to thighs/skull vertex  to toes and axial, sagittal and coronal images were acquired. Unenhanced CT is  obtained for anatomic localization, and attenuation correction of the PET scan. Patient preprocedure blood glucose level: mg/dL. CORRELATIVE IMAGING STUDIES: .    PRIOR PET:  2/22/2017    HISTORY: The study is requested for subsequent treatment strategy. Biopsy  confirmed diagnosis. NHL. CC NHL Dx 10/10 Orbit radiation Chlorambucil x 1 cycle  4/16. Cycle 2 3/17. Attempted one dose of rituxan. Most recent PET 2/22/17 shows progressive disease. ?    FINDINGS:    HEAD/NECK: There is persistent hypermetabolic activity and a left level 2 lymph  node with improvement. Current SUV 5.3 previously 9.1 . 3-51     There is persistent hypermetabolic activity in the left level 1 lymph node with  interval improvement. Current SUV 3.9 previously 5.6. Resolved hypermetabolic right supraclavicular lymph node . CHEST: There has been interval regression of hypermetabolic activity in  bilateral axillary lymph nodes. .    Lymph nodes are diminished in size 3-95 5 mm short axis previously image 100 18  mm . On the right, the largest lymph node measures 5 m in short axis 16 mm  previously. A lymph node on image 107 currently exam 108 prior exam no longer  hypermetabolic.  Short axis dimension is 7 mm unchanged    Lymph node image 123 prior exam resolved activity. Paraspinal activity in the posterior mediastinum has significantly improved with  maximal SUV 2.7 previously 6.4 on the left. There is lobular only mildly pleural thickening particularly on the left . ABDOMEN/PELVIS:     Resolved hypermetabolic activity in the gastric antrum. Resolved hypermetabolic lymph node posterior to the pancreatic head. There is diminished hypermetabolic left retroperitoneal lymph node with maximal  SUV of 3.2 previously SUV 3.6, previously 3.1, seen on image 3-1 94. There are multiple enlarged though not hypermetabolic lymph nodes in the  retroperitoneum extending into the left pelvis with maximal SUV visualized of  2.13-to 86. SUV up to 6.3 previously. Diminished size of left inguinal lymph node, maximal dimension of 21.3 x 37 mm  on the current exam previously exam lymph node measured 36 x 50 mm current max  SUV of 2.3 previous max SUV is 7.8. Right inguinal hypermetabolic adenopathy has  resolved. SKELETON: No foci of abnormal hypermetabolism in the axial and visualized  appendicular skeleton. Impression IMPRESSION:    Findings are consistent with a response to therapy. Interval improvement of the PET scan with diminished adenopathy size and  diminished hypermetabolic activity.           Lab Results    reviewed  Lab Results   Component Value Date/Time    WBC 6.1 04/24/2017 08:37 AM    HGB 13.0 04/24/2017 08:37 AM    HCT 39.3 04/24/2017 08:37 AM    PLATELET 943 22/40/3022 08:37 AM    MCV 92 04/24/2017 08:37 AM       Lab Results   Component Value Date/Time    Sodium 143 04/24/2017 08:37 AM    Potassium 4.4 04/24/2017 08:37 AM    Chloride 102 04/24/2017 08:37 AM    CO2 24 04/24/2017 08:37 AM    Anion gap 4 02/19/2015 09:13 AM    Glucose 109 04/24/2017 08:37 AM    BUN 18 04/24/2017 08:37 AM    Creatinine 0.99 04/24/2017 08:37 AM    BUN/Creatinine ratio 18 04/24/2017 08:37 AM    GFR est AA 68 04/24/2017 08:37 AM    GFR est non-AA 59 04/24/2017 08:37 AM    Calcium 9.4 04/24/2017 08:37 AM    AST (SGOT) 17 04/24/2017 08:37 AM    Alk. phosphatase 73 04/24/2017 08:37 AM    Protein, total 5.9 04/24/2017 08:37 AM    Albumin 4.1 04/24/2017 08:37 AM    Globulin 3.1 02/19/2015 09:13 AM    A-G Ratio 2.3 04/24/2017 08:37 AM    ALT (SGPT) 15 04/24/2017 08:37 AM     Assessment/Plan: Vito Salas is a 78 y.o. female and presents with NHL. CC NHL Dx  10/10    STAGE: 3 possibly    TREATMENT COURSE:  rituxan 2/19/15 with reaction and treatment not finished. Hives and hypotension. INTERIM HISTORY:  F/u for NHL    1. NHL low grade follicular stage 3 dx in 2010   PET 5/16 good post chlorambucil 4/16. PET 10/2016 was stable. Most recent PET scan 2/22/17 showed progression of disease both above and below the level of the diaphragm. Pt is done chlorambucil due to progressive disease on CTs. Back of head mass/ left groin mass almost gone. PET 5/17 and good response to tx. Reviewed with pt and family today. Does not want more chemo. 2.  Poor vision left eye/ ocular NHL. Post XRT. No vision changes since last visit. 3.  Back of head mass. Gone with chemo. 4.  Renal insufficiency. Stable. 5.  Hypothyroid. Managed by endocrinology. 6.  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 Stable and not worsened. Follow-up in 3 months. Call if questions. ICD-10-CM ICD-9-CM    1. Follicular lymphoma grade I, unspecified body region ([facility] [address] C82.00 202.00    2. 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 Left eye pain H57.12 379.91    5. Poor vision H54.7 369.9    6. Neoplastic malignant related fatigue R53.0 780.79      Orders Placed This Encounter    amitriptyline (ELAVIL) 50 mg tablet     Sig: Take 50 mg by mouth nightly.  cimetidine (TAGAMET) 200 mg tablet     Sig: Take 200 mg by mouth daily.     docusate sodium (COLACE) 100 mg capsule     Sig: Take 100 mg by mouth 3 times daily.  atenolol (TENORMIN) 50 mg tablet     Sig: Take 50 mg by mouth daily.  levothyroxine (SYNTHROID) 100 mcg tablet     Sig: Take 100 mcg by mouth daily. continue present plan, call if any problems  There are no Patient Instructions on file for this visit. Follow-up Disposition:  Return in about 3 months (around 9/8/2017).     Janice Regan DO

## 2017-07-21 ENCOUNTER — TELEPHONE (OUTPATIENT)
Dept: ONCOLOGY | Age: 67
End: 2017-07-21

## 2017-07-21 NOTE — TELEPHONE ENCOUNTER
Received call from patient. HIPAA verified. States she has noticed a nodule to the middle right side of her head, toward the back. Reports this has been present for 2-3 days. Reports area is about the size of a quarter, has not increased or decreased in size. States area is firm to touch. She reports no falls, no injury to head. States area is not painful, no soreness to touch. Reports no headache or vision changes. Advised an update would be sent to provider & we will contact her with any recommendations. Patient verbalized understanding.

## 2017-07-24 NOTE — TELEPHONE ENCOUNTER
Detailed message left on patient identified VM that Dr. Dorian Truong would see 8/2/17 and no testing ordered now. Requested call back if needed.

## 2017-07-24 NOTE — TELEPHONE ENCOUNTER
Will evaluate this during office visit with Dr. Diana Aleman on 8/2. Encourage her to call prior to this should she notice any changes to the area. Please let her know.

## 2017-07-24 NOTE — TELEPHONE ENCOUNTER
Call placed to patient, HIPAA verified. Updated on note by provider and will update office with any changes. Patient states that she noted a new area just above her temple to the right side of her head, reports firm to touch, the size of a nickel. Again states no falls or injury to area. No pain noted. No vision changes. Advised patient will update provider and to call office with any further changes. Patient verbalized understanding and thanked for call.

## 2017-08-02 ENCOUNTER — OFFICE VISIT (OUTPATIENT)
Dept: ONCOLOGY | Age: 67
End: 2017-08-02

## 2017-08-02 VITALS
TEMPERATURE: 98.2 F | HEIGHT: 62 IN | SYSTOLIC BLOOD PRESSURE: 125 MMHG | DIASTOLIC BLOOD PRESSURE: 75 MMHG | OXYGEN SATURATION: 98 % | HEART RATE: 60 BPM | BODY MASS INDEX: 28.37 KG/M2 | WEIGHT: 154.2 LBS | RESPIRATION RATE: 16 BRPM

## 2017-08-02 DIAGNOSIS — R53.0 NEOPLASTIC MALIGNANT RELATED FATIGUE: ICD-10-CM

## 2017-08-02 DIAGNOSIS — R22.0 MASS OF HEAD: ICD-10-CM

## 2017-08-02 DIAGNOSIS — H54.7 POOR VISION: ICD-10-CM

## 2017-08-02 DIAGNOSIS — H57.12 LEFT EYE PAIN: ICD-10-CM

## 2017-08-02 DIAGNOSIS — H05.229 ORBITAL SWELLING: ICD-10-CM

## 2017-08-02 DIAGNOSIS — C85.99 ORBITAL LYMPHOMA (HCC): ICD-10-CM

## 2017-08-02 DIAGNOSIS — C82.00 FOLLICULAR LYMPHOMA GRADE I, UNSPECIFIED BODY REGION (HCC): Primary | ICD-10-CM

## 2017-08-02 RX ORDER — RANITIDINE 150 MG/1
150 TABLET, FILM COATED ORAL DAILY
COMMUNITY
Start: 2017-06-20

## 2017-08-02 RX ORDER — LEVOTHYROXINE SODIUM 150 UG/1
150 TABLET ORAL
COMMUNITY
Start: 2017-06-22

## 2017-08-02 NOTE — PROGRESS NOTES
Elyse Bernstein is a 78 y.o. 1950 female and presents with NHL. CC NHL Dx  10/10    STAGE: 3 possibly    TREATMENT COURSE:  Orbit radiation  Chlorambucil x 1 cycle 4/16. Cycle 2 3/17. Attempted one dose of rituxan. INTERIM HISTORY:  Ms. Alba Atkinson is here for follow-up for NHL with hx of chlorambucil chemo last done 3/17 but not on any therapy at present. Pt is here today due to 2 new spots on head and wants eval of this. Has some right ear issues and is seeing ENT this week. Still has recurrent left face/ head zoster pain/ lesions that come and go. PET good 5/17. Past Medical History:   Diagnosis Date    Anemia NEC     Cancer ([facility] [address]     low grade NHL     Past Surgical History:   Procedure Laterality Date    HX CATARACT REMOVAL  6/2015 & 7/2015    HX CHOLECYSTECTOMY  1982    HX HEENT  2005    RIGHT EAR     TN RPLCMT PROST AORTIC VALVE OPEN XCP HOMOGRF/STENT  1972     Social History     Social History    Marital status:      Spouse name: N/A    Number of children: N/A    Years of education: N/A     Social History Main Topics    Smoking status: Former Smoker     Packs/day: 0.50     Years: 45.00     Types: Cigarettes     Quit date: 1/1/2016    Smokeless tobacco: Former User     Quit date: 12/1/2015      Comment: 7 cigarettes daily.  Alcohol use Yes      Comment: Rare    Drug use: No    Sexual activity: Not Asked     Other Topics Concern    None     Social History Narrative     Family History   Problem Relation Age of Onset    Heart Disease Mother     Kidney Disease Mother     Alcohol abuse Father     Cancer Sister      Breast    No Known Problems Brother     Cancer Sister      Colon    Alcohol abuse Sister     Alcohol abuse Sister        Current Outpatient Prescriptions   Medication Sig Dispense Refill    levothyroxine (SYNTHROID) 150 mcg tablet       docusate sodium (COLACE) 100 mg capsule Take 100 mg by mouth 3 times daily.       atenolol (TENORMIN) 50 mg tablet Take 50 mg by mouth daily.  gabapentin (NEURONTIN) 300 mg capsule Take 1 Cap by mouth three (3) times daily. Indications: NEUROPATHIC PAIN, POSTHERPETIC NEURALGIA 90 Cap 3    MULTIVITAMIN WITH MINERALS (MULTIVITAMIN & MINERAL FORMULA PO) Take  by mouth.  cholecalciferol (VITAMIN D3) 1,000 unit tablet Take  by mouth daily.  KLOR-CON M20 20 mEq tablet Take 20 mEq by mouth daily.  bumetanide (BUMEX) 2 mg tablet Take 2 mg by mouth as needed.  cyanocobalamin (VITAMIN B-12) 1,000 mcg/mL injection 1,000 mcg by IntraMUSCular route every month.  atenolol (TENORMIN) 25 mg tablet Take 25 mg by mouth daily.  raNITIdine (ZANTAC) 150 mg tablet       amitriptyline (ELAVIL) 50 mg tablet Take 50 mg by mouth nightly.  cimetidine (TAGAMET) 200 mg tablet Take 200 mg by mouth daily.  levothyroxine (SYNTHROID) 100 mcg tablet Take 100 mcg by mouth daily.  chlorambucil (LEUKERAN) 2 mg tablet Take 4 Tabs by mouth daily. (0.1 mg/kg PO daily on days 1-28 of a 28 day cycle, weight 76.3 kg)  Indications: FOLLICULAR LYMPHOMA 235 Tab 2    ondansetron (ZOFRAN ODT) 8 mg disintegrating tablet Take 1 Tab by mouth every eight (8) hours as needed for Nausea. 30 Tab 2    levothyroxine (SYNTHROID) 175 mcg tablet daily.          Allergies   Allergen Reactions    Other Medication Hives     Rituxin    Valtrex [Valacyclovir] Itching     Patient states no allergy       Review of Systems    A comprehensive review of systems was negative except for: per HPI and sheet    Objective:  Visit Vitals    /75    Pulse 60    Temp 98.2 °F (36.8 °C) (Oral)    Resp 16    Ht 5' 2\" (1.575 m)    Wt 154 lb 3.2 oz (69.9 kg)    LMP  (LMP Unknown)    SpO2 98%    BMI 28.2 kg/m2     Physical Exam:   General appearance - alert, well appearing, and in no distress, oriented to person, place, and time and normal appearing weight  Mental status - alert, oriented normal mood, behavior, speech, dress, motor activity, and thought processes  Head superficial masses right temporal area and another anterior to this  EYE-conj clear no swelling in eyes. Right eyelid droop noted. Mouth - mucous membranes pink, moist, intact. Neck - supple  CV - Regular rate and rhythm with heart murmur chronically  Resp - Clear to auscultation bilaterally  GI - Round, soft, non-tender. Ext-No pedal edema noted bilaterally  Skin-Warm and dry. Neuro -  non-focal.    Diagnostic Imaging   reviewed  PET Results (most recent):    Results from Hospital Encounter encounter on 05/24/17   PET/CT TUMOR IMAGE SKULL THIGH (SUB)   Narrative PET/CT SCAN    PROCEDURE: Following IV injection of mCi 18 Fluoro 2 deoxyglucose (FDG) and a  standard uptake delay, PET imaging is performed from head to thighs/skull vertex  to toes and axial, sagittal and coronal images were acquired. Unenhanced CT is  obtained for anatomic localization, and attenuation correction of the PET scan. Patient preprocedure blood glucose level: mg/dL. CORRELATIVE IMAGING STUDIES: .    PRIOR PET:  2/22/2017    HISTORY: The study is requested for subsequent treatment strategy. Biopsy  confirmed diagnosis. NHL. CC NHL Dx 10/10 Orbit radiation Chlorambucil x 1 cycle  4/16. Cycle 2 3/17. Attempted one dose of rituxan. Most recent PET 2/22/17 shows progressive disease. ?    FINDINGS:    HEAD/NECK: There is persistent hypermetabolic activity and a left level 2 lymph  node with improvement. Current SUV 5.3 previously 9.1 . 3-51     There is persistent hypermetabolic activity in the left level 1 lymph node with  interval improvement. Current SUV 3.9 previously 5.6. Resolved hypermetabolic right supraclavicular lymph node . CHEST: There has been interval regression of hypermetabolic activity in  bilateral axillary lymph nodes. .    Lymph nodes are diminished in size 3-95 5 mm short axis previously image 100 18  mm .    On the right, the largest lymph node measures 5 m in short axis 16 mm  previously. A lymph node on image 107 currently exam 108 prior exam no longer  hypermetabolic. Short axis dimension is 7 mm unchanged    Lymph node image 123 prior exam resolved activity. Paraspinal activity in the posterior mediastinum has significantly improved with  maximal SUV 2.7 previously 6.4 on the left. There is lobular only mildly pleural thickening particularly on the left . ABDOMEN/PELVIS:     Resolved hypermetabolic activity in the gastric antrum. Resolved hypermetabolic lymph node posterior to the pancreatic head. There is diminished hypermetabolic left retroperitoneal lymph node with maximal  SUV of 3.2 previously SUV 3.6, previously 3.1, seen on image 3-1 94. There are multiple enlarged though not hypermetabolic lymph nodes in the  retroperitoneum extending into the left pelvis with maximal SUV visualized of  2.13-to 86. SUV up to 6.3 previously. Diminished size of left inguinal lymph node, maximal dimension of 21.3 x 37 mm  on the current exam previously exam lymph node measured 36 x 50 mm current max  SUV of 2.3 previous max SUV is 7.8. Right inguinal hypermetabolic adenopathy has  resolved. SKELETON: No foci of abnormal hypermetabolism in the axial and visualized  appendicular skeleton. Impression IMPRESSION:    Findings are consistent with a response to therapy. Interval improvement of the PET scan with diminished adenopathy size and  diminished hypermetabolic activity.           Lab Results    reviewed  Lab Results   Component Value Date/Time    WBC 6.1 04/24/2017 08:37 AM    HGB 13.0 04/24/2017 08:37 AM    HCT 39.3 04/24/2017 08:37 AM    PLATELET 311 00/07/2303 08:37 AM    MCV 92 04/24/2017 08:37 AM       Lab Results   Component Value Date/Time    Sodium 143 04/24/2017 08:37 AM    Potassium 4.4 04/24/2017 08:37 AM    Chloride 102 04/24/2017 08:37 AM    CO2 24 04/24/2017 08:37 AM    Anion gap 4 02/19/2015 09:13 AM    Glucose 109 04/24/2017 08:37 AM    BUN 18 04/24/2017 08:37 AM    Creatinine 0.99 04/24/2017 08:37 AM    BUN/Creatinine ratio 18 04/24/2017 08:37 AM    GFR est AA 68 04/24/2017 08:37 AM    GFR est non-AA 59 04/24/2017 08:37 AM    Calcium 9.4 04/24/2017 08:37 AM    AST (SGOT) 17 04/24/2017 08:37 AM    Alk. phosphatase 73 04/24/2017 08:37 AM    Protein, total 5.9 04/24/2017 08:37 AM    Albumin 4.1 04/24/2017 08:37 AM    Globulin 3.1 02/19/2015 09:13 AM    A-G Ratio 2.3 04/24/2017 08:37 AM    ALT (SGPT) 15 04/24/2017 08:37 AM     Assessment/Plan: Aleena Terrell is a 78 y.o. female and presents with NHL. CC NHL Dx  10/10    STAGE: 3 possibly    TREATMENT COURSE:  rituxan 2/19/15 with reaction and treatment not finished. Hives and hypotension. Chlorambucil x 2. INTERIM HISTORY:  F/u for NHL    1. NHL low grade follicular stage 3 dx in 2010   Hx of one dose of rituxan in past with side effects and pt did not want any further therapy. PET 5/16 good post chlorambucil 4/16. PET 10/2016 was stable. Most recent PET scan 2/22/17 showed progression of disease both above and below the level of the diaphragm. Pt had repeat chlorambucil due to progressive disease on CTs. Back of head mass/ left groin mass was almost gone. PET 5/17 and good response to tx. Did not want more chemo. 2.  Poor vision left eye/ ocular NHL. Post XRT. No vision changes since last visit. 3.  Back of head mass. Gone with chemo. Now 2 new mass superficial right temporal area. Monitor. 4.  Renal insufficiency. Stable. 5.  Hypothyroid. Managed by endocrinology. 6.  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 Still having lesions ? Recurrent zoster but lesions have never really gone away. May need ID eval.  ? Acyclovir again. 7.  Right ear issues. Going to ENT. Follow-up in 1 month. Call if questions. ICD-10-CM ICD-9-CM    1.  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 Mass of head R22.0 784.2 PET/CT TUMOR IMAGE [facility] W (INI)   4. Left eye pain H57.12 379.91 PET/CT TUMOR IMAGE  BDY W (INI)   5. Orbital swelling H57.8 379.92 PET/CT TUMOR IMAGE  BDY W (INI)   6. Neoplastic malignant related fatigue R53.0 780.79 PET/CT TUMOR IMAGE [facility] (INI)   7. Poor vision H54.7 369.9 PET/CT TUMOR IMAGE  BDY W (INI)     Orders Placed This Encounter    PET/CT TUMOR IMAGE [facility] (INI)     Standing Status:   Future     Standing Expiration Date:   9/2/2018     Order Specific Question:   Is Patient Allergic to Contrast Dye? Answer:   Unknown    levothyroxine (SYNTHROID) 150 mcg tablet    raNITIdine (ZANTAC) 150 mg tablet       continue present plan, call if any problems  There are no Patient Instructions on file for this visit. Follow-up Disposition:  Return in about 4 weeks (around 8/30/2017).     Nena High,

## 2017-08-02 NOTE — MR AVS SNAPSHOT
Visit Information Date & Time Provider Department Dept. Phone Encounter #  
 8/2/2017 11:30 AM Danuta Davenport 15[facility] at [address] [id number] Follow-up Instructions Return in about 4 weeks (around 8/30/2017). Routing History Follow-up and Disposition History Your Appointments 9/6/2017  1:00 PM  
Follow Up with Shiraz Dorsey  [facility] Oncology at [facility] Appt Note: 1 month f/u- Follicular lymphoma [address] [facility] [address] [id number]  
[phone]  
  
   
 [address] [facility] [address] Upcoming Health Maintenance Date Due Hepatitis C Screening 1950 DTaP/Tdap/Td series (1 - Tdap) 11/19/1970 BREAST CANCER SCRN MAMMOGRAM 11/19/1999 FOBT Q 1 YEAR AGE 50-75 11/19/1999 ZOSTER VACCINE AGE 60> 9/19/2009 GLAUCOMA SCREENING Q2Y 11/19/2014 OSTEOPOROSIS SCREENING (DEXA) 11/19/2014 Pneumococcal 65+ High/Highest Risk (1 of 2 - PCV13) 11/19/2014 MEDICARE YEARLY EXAM 11/19/2014 INFLUENZA AGE 9 TO ADULT 8/1/2017 Allergies as of 8/2/2017  Review Complete On: 8/2/2017 By: Shiraz Dorsey DO Severity Noted Reaction Type Reactions Other Medication  12/22/2015    Hives Rituxin Valtrex [Valacyclovir]  02/03/2016   Side Effect Itching Patient states no allergy Current Immunizations  Reviewed on 8/2/2017 Name Date Influenza Vaccine 10/1/2015, 11/5/2014 Reviewed by Rachel Perez LPN on 2/2/6423 at 50:52 AM  
You Were Diagnosed With   
  
 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 [address]     ICD-10-CM: C85.81 ICD-9-CM: 202.81 Mass of head     ICD-10-CM: R22.0 ICD-9-CM: 321. 2 Left eye pain     ICD-10-CM: H57.12 
ICD-9-CM: 379.91 Orbital swelling     ICD-10-CM: H57.8 ICD-9-CM: 379.92 Neoplastic malignant related fatigue     ICD-10-CM: R53.0 ICD-9-CM: 780.79 Poor vision     ICD-10-CM: H54.7 ICD-9-CM: 369.9 Vitals BP Pulse Temp Resp Height(growth percentile) Weight(growth percentile) 125/75 60 98.2 °F (36.8 °C) (Oral) 16 5' 2\" (1.575 m) 154 lb 3.2 oz (69.9 kg) LMP SpO2 BMI OB Status Smoking Status (LMP Unknown) 98% 28.2 kg/m2 Postmenopausal Former Smoker Vitals History BMI and BSA Data Body Mass Index Body Surface Area  
 28.2 kg/m 2 1.75 m 2 Preferred Pharmacy Pharmacy Name Phone 111 [address] [facility] [address] [place], [address] Your Updated Medication List  
  
   
This list is accurate as of: 8/2/17 12:47 PM.  Always use your most recent med list.  
  
  
  
  
 amitriptyline 50 mg tablet Commonly known as:  ELAVIL Take 50 mg by mouth nightly. * atenolol 50 mg tablet Commonly known as:  TENORMIN Take 50 mg by mouth daily. * atenolol 25 mg tablet Commonly known as:  TENORMIN Take 25 mg by mouth daily. bumetanide 2 mg tablet Commonly known as:  Lazarus Hillman Take 2 mg by mouth as needed. chlorambucil 2 mg tablet Commonly known as:  LEUKERAN Take 4 Tabs by mouth daily. (0.1 mg/kg PO daily on days 1-28 of a 28 day cycle, weight 76.3 kg)  Indications: FOLLICULAR LYMPHOMA  
  
 cholecalciferol 1,000 unit tablet Commonly known as:  VITAMIN D3 Take  by mouth daily. cimetidine 200 mg tablet Commonly known as:  TAGAMET Take 200 mg by mouth daily. docusate sodium 100 mg capsule Commonly known as:  Eilleen Schneiders Take 100 mg by mouth 3 times daily. gabapentin 300 mg capsule Commonly known as:  NEURONTIN Take 1 Cap by mouth three (3) times daily. Indications: NEUROPATHIC PAIN, POSTHERPETIC NEURALGIA  
  
 KLOR-CON M20 20 mEq tablet Generic drug:  potassium chloride Take 20 mEq by mouth daily. * levothyroxine 100 mcg tablet Commonly known as:  SYNTHROID Take 100 mcg by mouth daily. * levothyroxine 175 mcg tablet Commonly known as:  SYNTHROID  
daily. * levothyroxine 150 mcg tablet Commonly known as:  SYNTHROID  
  
 MULTIVITAMIN & MINERAL FORMULA PO Take  by mouth. ondansetron 8 mg disintegrating tablet Commonly known as:  ZOFRAN ODT Take 1 Tab by mouth every eight (8) hours as needed for Nausea. raNITIdine 150 mg tablet Commonly known as:  ZANTAC  
  
 VITAMIN B-12 1,000 mcg/mL injection Generic drug:  cyanocobalamin  
1,000 mcg by IntraMUSCular route every month. * Notice: This list has 5 medication(s) that are the same as other medications prescribed for you. Read the directions carefully, and ask your doctor or other care provider to review them with you. Follow-up Instructions Return in about 4 weeks (around 8/30/2017). To-Do List   
 08/02/2017 Imaging:  PET/CT TUMOR IMAGE 1 [facility]  ([facility]) Introducing [facility]! Navneet Fletcher introduces Needium patient portal. Now you can access parts of your medical record, email your doctor's office, and request medication refills online. 1. In your internet browser, go to [email or web address] 2. Click on the First Time User? Click Here link in the Sign In box. You will see the New Member Sign Up page. 3. Enter your Needium Access Code exactly as it appears below. You will not need to use this code after youve completed the sign-up process. If you do not sign up before the expiration date, you must request a new code. · Needium Access Code: [email or web address] Expires: 8/21/2017  4:42 PM 
 
4. Enter the last four digits of your Social Security Number (xxxx) and Date of Birth (mm/dd/yyyy) as indicated and click Submit. You will be taken to the next sign-up page. 5. Create a Needium ID.  This will be your Needium login ID and cannot be changed, so think of one that is secure and easy to remember. 6. Create a [employer] password. You can change your password at any time. 7. Enter your Password Reset Question and Answer. This can be used at a later time if you forget your password. 8. Enter your e-mail address. You will receive e-mail notification when new information is available in [address] 9. Click Sign Up. You can now view and download portions of your medical record. 10. Click the Download Summary menu link to download a portable copy of your medical information. If you have questions, please visit the Frequently Asked Questions section of the [employer] website. Remember, [employer] is NOT to be used for urgent needs. For medical emergencies, dial 911. Now available from your iPhone and Android! Please provide this summary of care documentation to your next provider. Your primary care clinician is listed as Dong Ross. If you have any questions after today's visit, please call [phone].

## 2017-08-08 ENCOUNTER — TELEPHONE (OUTPATIENT)
Dept: ONCOLOGY | Age: 67
End: 2017-08-08

## 2017-08-08 DIAGNOSIS — C85.99 ORBITAL LYMPHOMA (HCC): ICD-10-CM

## 2017-08-08 DIAGNOSIS — C82.00 FOLLICULAR LYMPHOMA GRADE I, UNSPECIFIED BODY REGION (HCC): Primary | ICD-10-CM

## 2017-08-08 NOTE — TELEPHONE ENCOUNTER
Evon (duncan) called in stated PET scan whole body is incorrect.  Needs to be ordered as: PET skull to thigh SUB

## 2017-08-10 ENCOUNTER — OFFICE VISIT (OUTPATIENT)
Dept: INTERNAL MEDICINE CLINIC | Age: 67
End: 2017-08-10

## 2017-08-10 VITALS
SYSTOLIC BLOOD PRESSURE: 123 MMHG | RESPIRATION RATE: 20 BRPM | OXYGEN SATURATION: 96 % | TEMPERATURE: 98.2 F | DIASTOLIC BLOOD PRESSURE: 62 MMHG | HEART RATE: 78 BPM | WEIGHT: 158 LBS | BODY MASS INDEX: 28 KG/M2 | HEIGHT: 63 IN

## 2017-08-10 DIAGNOSIS — B02.9 HERPES ZOSTER WITHOUT COMPLICATION: Primary | ICD-10-CM

## 2017-08-10 NOTE — MR AVS SNAPSHOT
Visit Information Date & Time Provider Department Dept. Phone Encounter #  
 8/10/2017  9:40 AM Tabatha Workman MD [facility] Internal Medicine [phone] [id number] Follow-up Instructions Return if symptoms worsen or fail to improve. Your Appointments 9/6/2017  1:00 PM  
Follow Up with Mor Luna DO [facility] Oncology at [facility]) Appt Note: 1 month f/u- Follicular lymphoma [address] [id number]  
[phone]  
  
   
 [address] [address] Upcoming Health Maintenance Date Due Hepatitis C Screening 1950 DTaP/Tdap/Td series (1 - Tdap) 11/19/1970 BREAST CANCER SCRN MAMMOGRAM 11/19/1999 FOBT Q 1 YEAR AGE 50-75 11/19/1999 ZOSTER VACCINE AGE 60> 9/19/2009 GLAUCOMA SCREENING Q2Y 11/19/2014 OSTEOPOROSIS SCREENING (DEXA) 11/19/2014 Pneumococcal 65+ High/Highest Risk (1 of 2 - PCV13) 11/19/2014 MEDICARE YEARLY EXAM 11/19/2014 INFLUENZA AGE 9 TO ADULT 8/1/2017 Allergies as of 8/10/2017  Review Complete On: 8/10/2017 By: Reji Manzanares LPN Severity Noted Reaction Type Reactions Other Medication  12/22/2015    Hives Rituxin Valtrex [Valacyclovir]  02/03/2016   Side Effect Itching Patient states no allergy Current Immunizations  Reviewed on 8/2/2017 Name Date Influenza Vaccine 10/1/2015, 11/5/2014 Not reviewed this visit You Were Diagnosed With   
  
 Codes Comments Herpes zoster without complication    -  Primary ICD-10-CM: B02.9 ICD-9-CM: 160. 9 Vitals BP Pulse Temp Resp Height(growth percentile) Weight(growth percentile) 123/62 (BP 1 Location: Right arm, BP Patient Position: Sitting) 78 98.2 °F (36.8 °C) (Oral) 20 5' 3\" (1.6 m) 158 lb (71.7 kg) LMP SpO2 BMI OB Status Smoking Status (LMP Unknown) 96% 27.99 kg/m2 Postmenopausal Current Every Day Smoker Vitals History BMI and BSA Data Body Mass Index Body Surface Area  
 27.99 kg/m 2 1.79 m 2 Preferred Pharmacy Pharmacy Name [facility] [address] [place], [address] Your Updated Medication List  
  
   
This list is accurate as of: 8/10/17 10:43 AM.  Always use your most recent med list.  
  
  
  
  
 amitriptyline 50 mg tablet Commonly known as:  ELAVIL Take 50 mg by mouth nightly. * atenolol 50 mg tablet Commonly known as:  TENORMIN Take 50 mg by mouth daily. * atenolol 25 mg tablet Commonly known as:  TENORMIN Take 25 mg by mouth daily. bumetanide 2 mg tablet Commonly known as:  Shellia Goad Take 2 mg by mouth as needed. chlorambucil 2 mg tablet Commonly known as:  LEUKERAN Take 4 Tabs by mouth daily. (0.1 mg/kg PO daily on days 1-28 of a 28 day cycle, weight 76.3 kg)  Indications: FOLLICULAR LYMPHOMA  
  
 cholecalciferol 1,000 unit tablet Commonly known as:  VITAMIN D3 Take  by mouth daily. cimetidine 200 mg tablet Commonly known as:  TAGAMET Take 200 mg by mouth daily. docusate sodium 100 mg capsule Commonly known as:  Russ Sher Take 100 mg by mouth 3 times daily. gabapentin 300 mg capsule Commonly known as:  NEURONTIN Take 1 Cap by mouth three (3) times daily. Indications: NEUROPATHIC PAIN, POSTHERPETIC NEURALGIA  
  
 KLOR-CON M20 20 mEq tablet Generic drug:  potassium chloride Take 20 mEq by mouth daily. * levothyroxine 100 mcg tablet Commonly known as:  SYNTHROID Take 100 mcg by mouth daily. * levothyroxine 175 mcg tablet Commonly known as:  SYNTHROID  
daily. * levothyroxine 150 mcg tablet Commonly known as:  SYNTHROID  
  
 MULTIVITAMIN & MINERAL FORMULA PO Take  by mouth. ondansetron 8 mg disintegrating tablet Commonly known as:  ZOFRAN ODT Take 1 Tab by mouth every eight (8) hours as needed for Nausea. raNITIdine 150 mg tablet Commonly known as:  ZANTAC  
  
 VITAMIN B-12 1,000 mcg/mL injection Generic drug:  cyanocobalamin  
1,000 mcg by IntraMUSCular route every month. * Notice: This list has 5 medication(s) that are the same as other medications prescribed for you. Read the directions carefully, and ask your doctor or other care provider to review them with you. Follow-up Instructions Return if symptoms worsen or fail to improve. To-Do List   
 08/30/2017 2:00 PM  
  Appointment with [facility] PET 1 at [facility] RAD PET ([phone]) FDG Instructions:  1. Patient should arrive 30 minutes early to register. Patient's entire visit to the hospital will last about 2 Hours. 2.  Eat a low carb/high protein diet the evening before your procedure. Stay away from food and drink that contains sugars the night before and ESPECIALLY the day of the test. 3.  Do Not eat 4 hours prior to your procedure. The patient may drink all the water they want, up until the time of their appointment. Encourage patient to be well hydrated. Coffee is ok, as long as an artificial sweetener is used instead of sugar. 4. Take meds with water or saltine crackers if food required. 5.  Do not exercise the morning of your procedure. 6.  If you take insulin, it must be taken at least 4 hours before your procedure. 7.  You should bring medications for pain, anxiety, or claustrophobia if you need them. If you take medications for anxiety or claustrophobia, a ride needs to come with you. 8.  Materials for this procedure are ordered in advance and are time-sensitive. If you need to cancel or reschedule, you must call [phone] at least 48 hours prior to your appointment time. 9.  Bring recent CT scan results from other facilities with you. AXUMIN Instructions:  Avoid significant or strenuous exercise for at least one day prior to PET-CT scan.    Do not eat or drink for at least 4 hours(other than small amounts of water for taking medications) prior to the scheduled time of the appointment. Please have patients report to:  [address] [facility] [address]: ER Registration SFM: [address] [facility] [place], Radiation/Oncology Department (left of the fireplace) MRM: OP Registration MOB 1. Patient Instructions You have NHL and have taken radiation to the left orbit in 2015. In 2016 you had lesions around the lefteye and got treated for shingles with valtrex. Since then you have had nerve pain and itching. You also note a spot on your eyebrow that can blister and dry up. There is also a spot on the scalp which is a dry scaly area. today I do not see any active shingle lesions and you dont need valtrex today You have post herpetic neuralgia. You are using neurontin one tablet a day- you can increase it to two or three a day . Other options are lyrica ( pregabalain) and amitryptiline If you develop any  blisters take a picture and  please let me know . Introducing [facility] & HEALTH SERVICES! [facility] introduces [employer] patient portal. Now you can access parts of your medical record, email your doctor's office, and request medication refills online. 1. In your internet browser, go to [email or web address] 2. Click on the First Time User? Click Here link in the Sign In box. You will see the New Member Sign Up page. 3. Enter your [employer] Access Code exactly as it appears below. You will not need to use this code after youve completed the sign-up process. If you do not sign up before the expiration date, you must request a new code. · [employer] Access Code: [email or web address] Expires: 8/21/2017  4:42 PM 
 
4. Enter the last four digits of your Social Security Number (xxxx) and Date of Birth (mm/dd/yyyy) as indicated and click Submit. You will be taken to the next sign-up page. 5. Create a [employer] ID. This will be your [employer] login ID and cannot be changed, so think of one that is secure and easy to remember. 6. Create a [employer] password. You can change your password at any time. 7. Enter your Password Reset Question and Answer. This can be used at a later time if you forget your password. 8. Enter your e-mail address. You will receive e-mail notification when new information is available in [address] 9. Click Sign Up. You can now view and download portions of your medical record. 10. Click the Download Summary menu link to download a portable copy of your medical information. If you have questions, please visit the Frequently Asked Questions section of the [employer] website. Remember, [employer] is NOT to be used for urgent needs. For medical emergencies, dial 911. Now available from your iPhone and Android! Please provide this summary of care documentation to your next provider. Your primary care clinician is listed as Miko Calhoun. If you have any questions after today's visit, please call [phone].

## 2017-08-10 NOTE — LETTER
8/10/2017 2:08 PM 
 
Patient: Ami Slade YOB: 1950 Date of Visit: 8/10/2017 Dear No Recipients: Thank you for referring Ms. Janna Negro to me for evaluation/treatment. Below are the relevant portions of my assessment and plan of care. If you have questions, please do not hesitate to call me. I look forward to following Ms. Watson along with you.  
 
 
 
Sincerely, 
 
 
Hansa Cummings MD

## 2017-08-10 NOTE — PROGRESS NOTES
Chief Complaint   Patient presents with   Jaren Prior Establish Care    Skin Problem     check head/scalp

## 2017-08-10 NOTE — PROGRESS NOTES
ID consult    NAME:  Saman Lucio                      :   1950                       MRN:   [id number]   Date/Time:  8/10/2017 10:00 AM  Subjective:   REASON FOR CONSULT:   ?shingles  Referred by Lizzy Martino  She is here with her daughter  Saman Lucio  is a 78 y.o. female with Non hodgkins lymphoma presents with pain left side of the head and a spot on the eyebrow questioning shingles  Pt was diagnosed with NHL in . She is followed by . She did not want any treatment so she was under dynamic observation- In  a left orbital mass developed and she went to [facility] and underwent biopsy which confirmed lymphoma- She took  A dose of rituxan and developed side effects, She then took radiation 3000gy 2015. In  she developed a painful lesion/rash over the elft [facility] and took valtrex with clearance. She has had post herpetic neuralgia since then and says occasionally she gets a few blisters on her eyebrow- and also c/o of a spot on her head that is painful. This year she has taken 2 courses of oral chlorambucil for recurrence of NHL. Pet Scan May 2017 revealed improvement in the hypermetabolic activity in LN. She now has noted a swelling on the rt side of her head- she is to get Pet scan on 17    Past Medical History:   Diagnosis Date    Anemia NEC     Cancer ([facility] [address]     low grade NHL      Past Surgical History:   Procedure Laterality Date    HX CATARACT REMOVAL  2015 & 2015    HX CHOLECYSTECTOMY  1982    HX HEENT      RIGHT EAR     MD RPLCMT PROST AORTIC VALVE OPEN XCP HOMOGRF/STENT  1972      Social History     Social History    Marital status:      Spouse name: N/A    Number of children: N/A    Years of education: N/A     Occupational History    Not on file.      Social History Main Topics    Smoking status: Current Every Day Smoker     Packs/day: 0.50     Years: 45.00     Types: Cigarettes    Smokeless tobacco: Former User     Quit date: 12/1/2015      Comment: 7 cigarettes daily.  Alcohol use Yes      Comment: Rare    Drug use: No    Sexual activity: No      Comment:  has one child     Other Topics Concern    Not on file     Social History Narrative      Family History   Problem Relation Age of Onset    Heart Disease Mother     Kidney Disease Mother     Alcohol abuse Father     Cancer Sister      Breast    No Known Problems Brother     Cancer Sister      Colon    Alcohol abuse Sister     Alcohol abuse Sister      Allergies   Allergen Reactions    Other Medication Hives     Rituxin    Valtrex [Valacyclovir] Itching     Patient states no allergy         Current Outpatient Prescriptions   Medication Sig Dispense Refill    levothyroxine (SYNTHROID) 150 mcg tablet       raNITIdine (ZANTAC) 150 mg tablet       docusate sodium (COLACE) 100 mg capsule Take 100 mg by mouth 3 times daily.  gabapentin (NEURONTIN) 300 mg capsule Take 1 Cap by mouth three (3) times daily. Indications: NEUROPATHIC PAIN, POSTHERPETIC NEURALGIA 90 Cap 3    MULTIVITAMIN WITH MINERALS (MULTIVITAMIN & MINERAL FORMULA PO) Take  by mouth.  cholecalciferol (VITAMIN D3) 1,000 unit tablet Take  by mouth daily.  KLOR-CON M20 20 mEq tablet Take 20 mEq by mouth daily.  bumetanide (BUMEX) 2 mg tablet Take 2 mg by mouth as needed.  cyanocobalamin (VITAMIN B-12) 1,000 mcg/mL injection 1,000 mcg by IntraMUSCular route every month.  atenolol (TENORMIN) 25 mg tablet Take 25 mg by mouth daily.  amitriptyline (ELAVIL) 50 mg tablet Take 50 mg by mouth nightly.  cimetidine (TAGAMET) 200 mg tablet Take 200 mg by mouth daily.  atenolol (TENORMIN) 50 mg tablet Take 50 mg by mouth daily.  levothyroxine (SYNTHROID) 100 mcg tablet Take 100 mcg by mouth daily.  chlorambucil (LEUKERAN) 2 mg tablet Take 4 Tabs by mouth daily.  (0.1 mg/kg PO daily on days 1-28 of a 28 day cycle, weight 76.3 kg)  Indications: FOLLICULAR LYMPHOMA 527 Tab 2    ondansetron (ZOFRAN ODT) 8 mg disintegrating tablet Take 1 Tab by mouth every eight (8) hours as needed for Nausea. 30 Tab 2    levothyroxine (SYNTHROID) 175 mcg tablet daily. REVIEW OF SYSTEMS:     Const: negative fever, negative chills, some weight loss  Eyes:  Ptosis rt eye  negative diplopia or visual changes,  Pain above the eye  ENT:  negative coryza, negative sore throat  Resp:  negative cough, hemoptysis, dyspnea  Cards: negative for chest pain, palpitations, lower extremity edema  : negative for frequency, dysuria and hematuria  Heme: negative for easy bruising and gum/nose bleeding  MS: fatigue  Neurolo:negative for headaches, dizziness, vertigo, memory problems       Objective:   VITALS:    Visit Vitals    /62 (BP 1 Location: Right arm, BP Patient Position: Sitting)    Pulse 78    Ht 5' 3\" (1.6 m)    Wt 158 lb (71.7 kg)    LMP  (LMP Unknown)    SpO2 96%    BMI 27.99 kg/m2       PHYSICAL EXAM:   General:    Looks well  Head:   Normocephalic, without obvious abnormality, atraumatic. Eyes:   Rt ptosis  Small pinpoint area of excoriation      Not tender  Scalp- scaly spot -no inflammation  Small swelling felt on the rt side of the head        Nose:  Nares normal. No drainage or sinus tenderness. Throat:    Lips, mucosa, and tongue normal.  No Thrush  Neck:  Supple, symmetrical,  no adenopathy, thyroid: non tender    no carotid bruit and no JVD. Back:    No CVA tenderness. Lungs:   Clear to auscultation bilaterally. Heart:   s1s2  Abdomen:   Soft,  Extremities: Extremities normal, atraumatic, no cyanosis. No edema. No clubbing  Skin:     No rashes or lesions.   Not Jaundiced  Lymph: Cervical lymphnode  Neurologic: Grossly non-focal    Pertinent Labs  none  IMAGING RESULTS:  PET scan from Jami 2017    Impression/Recommendation  Pt presenting with non specific small excoriation left eyebrow  History of herpes zoster opthalmicus- but I do not see any active lesions now to recommend starting valtrex.  Told her that she should take pictures when she gets blisters and contact me    Post herpetic neuralgia- on neurontin 1 a day- if not well controlled can increase upto three- other options are pregabalin/amitryptiline    Left orbital mass due to Non hodgkins lymphoma s/p radiation in 2016    NHL - followed by Macie Sarabia- does not systemic chemo. has taken oral chlorambucil  Awaiting PET scan as she has new lesions on the rt side of her scalp      Discussed the management with her and her daughter  Will follow her PRN

## 2017-08-10 NOTE — PATIENT INSTRUCTIONS
You have NHL and have taken radiation to the left orbit in 2015. In 2016 you had lesions around the lefteye and got treated for shingles with valtrex. Since then you have had nerve pain and itching. You also note a spot on your eyebrow that can blister and dry up. There is also a spot on the scalp which is a dry scaly area. today I do not see any active shingle lesions and you dont need valtrex today  You have post herpetic neuralgia. You are using neurontin one tablet a day- you can increase it to two or three a day . Other options are lyrica ( pregabalain) and amitryptiline   If you develop any  blisters take a picture and  please let me know .

## 2017-08-30 ENCOUNTER — HOSPITAL ENCOUNTER (OUTPATIENT)
Dept: PET IMAGING | Age: 67
Discharge: HOME | End: 2017-08-30
Attending: NURSE PRACTITIONER
Payer: MEDICARE

## 2017-08-30 DIAGNOSIS — C85.99 ORBITAL LYMPHOMA (HCC): ICD-10-CM

## 2017-08-30 DIAGNOSIS — C82.00 FOLLICULAR LYMPHOMA GRADE I, UNSPECIFIED BODY REGION (HCC): ICD-10-CM

## 2017-08-30 PROCEDURE — A9552 F18 FDG: HCPCS

## 2017-08-30 RX ORDER — SODIUM CHLORIDE 0.9 % (FLUSH) 0.9 %
10 SYRINGE (ML) INJECTION
Status: COMPLETED | OUTPATIENT
Start: 2017-08-30 | End: 2017-08-30

## 2017-08-30 RX ADMIN — Medication 10 ML: at 14:00

## 2017-09-06 ENCOUNTER — OFFICE VISIT (OUTPATIENT)
Dept: ONCOLOGY | Age: 67
End: 2017-09-06

## 2017-09-06 VITALS
SYSTOLIC BLOOD PRESSURE: 143 MMHG | RESPIRATION RATE: 16 BRPM | HEIGHT: 63 IN | TEMPERATURE: 97.7 F | OXYGEN SATURATION: 95 % | DIASTOLIC BLOOD PRESSURE: 67 MMHG | BODY MASS INDEX: 28.17 KG/M2 | WEIGHT: 159 LBS | HEART RATE: 61 BPM

## 2017-09-06 DIAGNOSIS — H54.7 POOR VISION: ICD-10-CM

## 2017-09-06 DIAGNOSIS — C85.99 ORBITAL LYMPHOMA (HCC): ICD-10-CM

## 2017-09-06 DIAGNOSIS — C82.00 FOLLICULAR LYMPHOMA GRADE I, UNSPECIFIED BODY REGION (HCC): Primary | ICD-10-CM

## 2017-09-06 DIAGNOSIS — B02.23 POST-HERPETIC POLYNEUROPATHY: ICD-10-CM

## 2017-09-06 DIAGNOSIS — H05.229 ORBITAL SWELLING: ICD-10-CM

## 2017-09-06 DIAGNOSIS — B02.8 HERPES ZOSTER WITH COMPLICATION: ICD-10-CM

## 2017-09-06 DIAGNOSIS — R22.0 MASS OF HEAD: ICD-10-CM

## 2017-09-06 RX ORDER — ACYCLOVIR 800 MG/1
800 TABLET ORAL 3 TIMES DAILY
Qty: 30 TAB | Refills: 0 | Status: SHIPPED | OUTPATIENT
Start: 2017-09-06 | End: 2017-01-01

## 2017-09-06 RX ORDER — LANOLIN ALCOHOL/MO/W.PET/CERES
CREAM (GRAM) TOPICAL
COMMUNITY
End: 2018-01-01 | Stop reason: ALTCHOICE

## 2017-09-06 NOTE — MR AVS SNAPSHOT
Visit Information Date & Time Provider Department Dept. Phone Encounter #  
 9/6/2017  1:00 PM Malik Reed, [place] [address] [place] [address] [phone] Follow-up Instructions Return in about 4 weeks (around 10/4/2017). Upcoming Health Maintenance Date Due Hepatitis C Screening 1950 DTaP/Tdap/Td series (1 - Tdap) 11/19/1970 BREAST CANCER SCRN MAMMOGRAM 11/19/1999 FOBT Q 1 YEAR AGE 50-75 11/19/1999 ZOSTER VACCINE AGE 60> 9/19/2009 GLAUCOMA SCREENING Q2Y 11/19/2014 OSTEOPOROSIS SCREENING (DEXA) 11/19/2014 Pneumococcal 65+ High/Highest Risk (1 of 2 - PCV13) 11/19/2014 MEDICARE YEARLY EXAM 11/19/2014 INFLUENZA AGE 9 TO ADULT 8/1/2017 Allergies as of 9/6/2017  Review Complete On: 9/6/2017 By: Malik Reed DO Severity Noted Reaction Type Reactions Rituxan [Rituximab]  08/30/2017    Hives Current Immunizations  Reviewed on 8/2/2017 Name Date Influenza Vaccine 10/1/2015, 11/5/2014 Not reviewed this visit You Were Diagnosed With   
  
 Codes Comments Follicular lymphoma grade I, unspecified body region [facility])    -  Primary ICD-10-CM: C82.00 ICD-9-CM: 202.00 Orbital lymphoma ([facility] [address]     ICD-10-CM: C85.81 ICD-9-CM: 202.81 Herpes zoster with complication     LZS-52-JASMIN: B02.8 ICD-9-CM: 053.8 Post-herpetic polyneuropathy     ICD-10-CM: B02.23 
ICD-9-CM: 053.13 Mass of head     ICD-10-CM: R22.0 ICD-9-CM: 000. 2 Poor vision     ICD-10-CM: H54.7 ICD-9-CM: 369.9 Orbital swelling     ICD-10-CM: H57.8 ICD-9-CM: 379.92 Vitals BP Pulse Temp Resp Height(growth percentile) Weight(growth percentile) 143/67 (BP 1 Location: Left arm, BP Patient Position: Sitting) 61 97.7 °F (36.5 °C) (Oral) 16 5' 3\" (1.6 m) 159 lb (72.1 kg) LMP SpO2 BMI OB Status Smoking Status (LMP Unknown) 95% 28.17 kg/m2 Postmenopausal Current Every Day Smoker Vitals History BMI and BSA Data Body Mass Index Body Surface Area  
 28.17 kg/m 2 1.79 m 2 Preferred Pharmacy Pharmacy Name [facility] [address] [place], [address] Your Updated Medication List  
  
   
This list is accurate as of: 9/6/17  2:42 PM.  Always use your most recent med list.  
  
  
  
  
 acyclovir 800 mg tablet Commonly known as:  ZOVIRAX Take 1 Tab by mouth three (3) times daily for 10 days. Indications: HERPES ZOSTER  
  
 amitriptyline 50 mg tablet Commonly known as:  ELAVIL Take 50 mg by mouth nightly. * atenolol 50 mg tablet Commonly known as:  TENORMIN Take 50 mg by mouth daily. * atenolol 25 mg tablet Commonly known as:  TENORMIN Take 25 mg by mouth daily. bumetanide 2 mg tablet Commonly known as:  Londell Dancer Take 2 mg by mouth as needed. chlorambucil 2 mg tablet Commonly known as:  LEUKERAN Take 4 Tabs by mouth daily. (0.1 mg/kg PO daily on days 1-28 of a 28 day cycle, weight 76.3 kg)  Indications: FOLLICULAR LYMPHOMA  
  
 cholecalciferol 1,000 unit tablet Commonly known as:  VITAMIN D3 Take  by mouth daily. cimetidine 200 mg tablet Commonly known as:  TAGAMET Take 200 mg by mouth daily. docusate sodium 100 mg capsule Commonly known as:  Martina Silk Take 100 mg by mouth 3 times daily. gabapentin 300 mg capsule Commonly known as:  NEURONTIN Take 1 Cap by mouth three (3) times daily. Indications: NEUROPATHIC PAIN, POSTHERPETIC NEURALGIA  
  
 KLOR-CON M20 20 mEq tablet Generic drug:  potassium chloride Take 20 mEq by mouth daily. * levothyroxine 100 mcg tablet Commonly known as:  SYNTHROID Take 125 mcg by mouth daily. * levothyroxine 175 mcg tablet Commonly known as:  SYNTHROID  
daily. * levothyroxine 150 mcg tablet Commonly known as:  SYNTHROID  
  
 melatonin 3 mg tablet Take  by mouth nightly. MULTIVITAMIN & MINERAL FORMULA PO Take  by mouth. [address] Take  by mouth nightly. Indications: Equate Sleep Aid  
  
 ondansetron 8 mg disintegrating tablet Commonly known as:  ZOFRAN ODT Take 1 Tab by mouth every eight (8) hours as needed for Nausea. raNITIdine 150 mg tablet Commonly known as:  ZANTAC  
  
 VITAMIN B-12 1,000 mcg/mL injection Generic drug:  cyanocobalamin  
1,000 mcg by IntraMUSCular route every month. * Notice: This list has 5 medication(s) that are the same as other medications prescribed for you. Read the directions carefully, and ask your doctor or other care provider to review them with you. Prescriptions Printed Refills  
 acyclovir (ZOVIRAX) 800 mg tablet 0 Sig: Take 1 Tab by mouth three (3) times daily for 10 days. Indications: HERPES ZOSTER Class: Print Route: Oral  
  
Follow-up Instructions Return in about 4 weeks (around 10/4/2017). Introducing [facility] & HEALTH SERVICES! [facility] introduces [employer] patient portal. Now you can access parts of your medical record, email your doctor's office, and request medication refills online. 1. In your internet browser, go to [email or web address] 2. Click on the First Time User? Click Here link in the Sign In box. You will see the New Member Sign Up page. 3. Enter your Open [employer] Access Code exactly as it appears below. You will not need to use this code after youve completed the sign-up process. If you do not sign up before the expiration date, you must request a new code. · [employer] Access Code: [email or web address] Expires: 11/27/2017 10:48 AM 
 
4. Enter the last four digits of your Social Security Number (xxxx) and Date of Birth (mm/dd/yyyy) as indicated and click Submit. You will be taken to the next sign-up page. 5. Create a Open [employer] ID.  This will be your Open m[employer] login ID and cannot be changed, so think of one that is secure and easy to remember. 6. Create a [employer] password. You can change your password at any time. 7. Enter your Password Reset Question and Answer. This can be used at a later time if you forget your password. 8. Enter your e-mail address. You will receive e-mail notification when new information is available in [address] 9. Click Sign Up. You can now view and download portions of your medical record. 10. Click the Download Summary menu link to download a portable copy of your medical information. If you have questions, please visit the Frequently Asked Questions section of the [employer] website. Remember, [employer] is NOT to be used for urgent needs. For medical emergencies, dial 911. Now available from your iPhone and Android! Please provide this summary of care documentation to your next provider. Your primary care clinician is listed as Sumeet Goodson. If you have any questions after today's visit, please call [phone].

## 2017-09-06 NOTE — PROGRESS NOTES
Armaan Christina is a 78 y.o. 1950 female and presents with NHL. CC NHL Dx  10/10    STAGE: 3 possibly    TREATMENT COURSE:  Orbit radiation  Chlorambucil x 1 cycle 4/16. Cycle 2 3/17. Cycle 3 4/17  Attempted one dose of rituxan. INTERIM HISTORY:  Ms. Roselyn Cifuentes is here for follow-up for NHL with hx of chlorambucil chemo last done 4/17 and PET good 5/17. Then more head / scalp lesions and now has a PET 8/17 that looks a little worse. Pt has right ear ache and is seeing ENT. Pt wants more acyclovir for shingles prior to trying any other therapy. Pt still feels fine. Past Medical History:   Diagnosis Date    Anemia NEC     Cancer ([facility] [address]     low grade NHL     Past Surgical History:   Procedure Laterality Date    HX CATARACT REMOVAL  6/2015 & 7/2015    HX CHOLECYSTECTOMY  1982    HX HEENT  2005    RIGHT EAR     LA RPLCMT PROST AORTIC VALVE OPEN XCP HOMOGRF/STENT  1972     Social History     Social History    Marital status:      Spouse name: N/A    Number of children: N/A    Years of education: N/A     Social History Main Topics    Smoking status: Current Every Day Smoker     Packs/day: 0.50     Years: 45.00     Types: Cigarettes    Smokeless tobacco: Former User     Quit date: 12/1/2015      Comment: 7 cigarettes daily.  Alcohol use Yes      Comment: Rare    Drug use: No    Sexual activity: No      Comment:  has one child     Other Topics Concern    None     Social History Narrative     Family History   Problem Relation Age of Onset    Heart Disease Mother     Kidney Disease Mother     Alcohol abuse Father     Cancer Sister      Breast    No Known Problems Brother     Cancer Sister      Colon    Alcohol abuse Sister     Alcohol abuse Sister        Current Outpatient Prescriptions   Medication Sig Dispense Refill    melatonin 3 mg tablet Take  by mouth nightly.  DIPHENHYDRAMINE HCL (NITETIME SLEEP AID PO) Take  by mouth nightly.  Indications: Equate Sleep Aid      acyclovir (ZOVIRAX) 800 mg tablet Take 1 Tab by mouth three (3) times daily for 10 days. Indications: HERPES ZOSTER 30 Tab 0    raNITIdine (ZANTAC) 150 mg tablet       docusate sodium (COLACE) 100 mg capsule Take 100 mg by mouth 3 times daily.  levothyroxine (SYNTHROID) 100 mcg tablet Take 125 mcg by mouth daily.  gabapentin (NEURONTIN) 300 mg capsule Take 1 Cap by mouth three (3) times daily. Indications: NEUROPATHIC PAIN, POSTHERPETIC NEURALGIA 90 Cap 3    MULTIVITAMIN WITH MINERALS (MULTIVITAMIN & MINERAL FORMULA PO) Take  by mouth.  cholecalciferol (VITAMIN D3) 1,000 unit tablet Take  by mouth daily.  KLOR-CON M20 20 mEq tablet Take 20 mEq by mouth daily.  bumetanide (BUMEX) 2 mg tablet Take 2 mg by mouth as needed.  cyanocobalamin (VITAMIN B-12) 1,000 mcg/mL injection 1,000 mcg by IntraMUSCular route every month.  atenolol (TENORMIN) 25 mg tablet Take 25 mg by mouth daily.  levothyroxine (SYNTHROID) 150 mcg tablet       amitriptyline (ELAVIL) 50 mg tablet Take 50 mg by mouth nightly.  cimetidine (TAGAMET) 200 mg tablet Take 200 mg by mouth daily.  atenolol (TENORMIN) 50 mg tablet Take 50 mg by mouth daily.  chlorambucil (LEUKERAN) 2 mg tablet Take 4 Tabs by mouth daily. (0.1 mg/kg PO daily on days 1-28 of a 28 day cycle, weight 76.3 kg)  Indications: FOLLICULAR LYMPHOMA 818 Tab 2    ondansetron (ZOFRAN ODT) 8 mg disintegrating tablet Take 1 Tab by mouth every eight (8) hours as needed for Nausea. 30 Tab 2    levothyroxine (SYNTHROID) 175 mcg tablet daily.          Allergies   Allergen Reactions    Rituxan [Rituximab] Hives       Review of Systems    A comprehensive review of systems was negative except for: per HPI and sheet    Objective:  Visit Vitals    /67 (BP 1 Location: Left arm, BP Patient Position: Sitting)    Pulse 61    Temp 97.7 °F (36.5 °C) (Oral)    Resp 16    Ht 5' 3\" (1.6 m)    Wt 159 lb (72.1 kg)    LMP  (LMP Unknown)    SpO2 95%    BMI 28.17 kg/m2     Physical Exam:   General appearance - alert, well appearing, and in no distress, oriented to person, place, and time and normal appearing weight  Mental status - alert, oriented normal mood, behavior, speech, dress, motor activity, and thought processes  Head superficial masses right temporal area and another anterior to this  EYE-conj clear no swelling in eyes. Right eyelid droop noted. Scalp lesions with small red left forehead pink lesion about 5 mm  Mouth - mucous membranes pink, moist, intact. Neck - supple  Ext-No pedal edema noted bilaterally  Skin-Warm and dry. Neuro -  non-focal.    Diagnostic Imaging   reviewed  PET Results (most recent):    Results from Hospital Encounter encounter on 08/30/17   PET/CT TUMOR IMAGE SKULL THIGH (SUB)   Narrative PET/CT SCAN    PROCEDURE: Following IV injection of 10.6 mCi 18 Fluoro 2 deoxyglucose (FDG) and  a standard uptake delay, PET imaging is performed from head to thigh and axial,  sagittal and coronal images were acquired. Unenhanced CT is obtained for  anatomic localization, and attenuation correction of the PET scan. Patient  preprocedure blood glucose level: 96mg/dL. CORRELATIVE IMAGING STUDIES: .    PRIOR PET:  5/24/2017    HISTORY: The study is requested for subsequent treatment strategy. Non-Hodgkin's  lymphoma. FINDINGS:    HEAD/NECK: There are foci of increased activity in the scalp right greater than  left with SUV up to 30 suspicious for metastatic lesions. These appear new. There are 2 left level 2 lymph nodes and one left level 1 lymph node with  increased metabolic activity of 11 and these have progressed since the prior  study. CHEST: There is extensive increased metabolic activity in left pleural  thickening which has progressed since the prior study with SUV of 17. There is a 1.4 cm nodule lingula with mild increased metabolic activity. .  There is slight increased metabolic activity in right pleural thickening upper  thorax    ABDOMEN/PELVIS: There is increased metabolic activity in the soft tissue mass at  the aortic bifurcation extending into the left iliac chain with SUV of 16 and  this has progressed since the prior study. There is increased metabolic activity  in the right external iliac mass measuring 2.8 cm with SUV of 22 and this has  progressed since the prior study. There is increased metabolic activity in a left inguinal mass measuring 3.7 cm  with SUV of 20. There is diffuse increased activity throughout the uterus    SKELETON: No foci of abnormal hypermetabolism in the axial and visualized  appendicular skeleton. Impression IMPRESSION:   1. There has been progression of the lymphoma since the prior study. There are  now scalp lesions which are new. Left level 2 lymph nodes have progressed  slightly. There is extensive pleural thickening on the left with increased  metabolic activity which has progressed since the prior study. There is slight  pleural thickening right upper lobe with slight increased metabolic activity  which is new. There is a nodule in the lingula with increased metabolic activity  which is new. Adenopathy at the aortic bifurcation extending into left common  iliac chain has progressed. Left iliac adenopathy and left inguinal adenopathy  has progressed since the prior study. 2. There is diffuse increased activity in the uterus which may be physiologic  however the uterus appears slightly larger and activity has progressed and  correlation is necessary.  23X            Lab Results    reviewed  Lab Results   Component Value Date/Time    WBC 6.1 04/24/2017 08:37 AM    HGB 13.0 04/24/2017 08:37 AM    HCT 39.3 04/24/2017 08:37 AM    PLATELET 188 19/14/4183 08:37 AM    MCV 92 04/24/2017 08:37 AM       Lab Results   Component Value Date/Time    Sodium 143 04/24/2017 08:37 AM    Potassium 4.4 04/24/2017 08:37 AM    Chloride 102 04/24/2017 08:37 AM    CO2 24 04/24/2017 08:37 AM    Anion gap 4 02/19/2015 09:13 AM    Glucose 109 04/24/2017 08:37 AM    BUN 18 04/24/2017 08:37 AM    Creatinine 0.99 04/24/2017 08:37 AM    BUN/Creatinine ratio 18 04/24/2017 08:37 AM    GFR est AA 68 04/24/2017 08:37 AM    GFR est non-AA 59 04/24/2017 08:37 AM    Calcium 9.4 04/24/2017 08:37 AM    AST (SGOT) 17 04/24/2017 08:37 AM    Alk. phosphatase 73 04/24/2017 08:37 AM    Protein, total 5.9 04/24/2017 08:37 AM    Albumin 4.1 04/24/2017 08:37 AM    Globulin 3.1 02/19/2015 09:13 AM    A-G Ratio 2.3 04/24/2017 08:37 AM    ALT (SGPT) 15 04/24/2017 08:37 AM     Assessment/Plan: Ann Nguyen is a 78 y.o. female and presents with NHL. CC NHL Dx  10/10    STAGE: 3 possibly    TREATMENT COURSE:  rituxan 2/19/15 with reaction and treatment not finished. Hives and hypotension. Chlorambucil x 2. INTERIM HISTORY:  F/u for NHL    1. NHL low grade follicular stage 3 dx in 2010   Hx of one dose of rituxan in past with side effects and pt did not want any further therapy. PET 5/16 good post chlorambucil 4/16. PET 10/2016 was stable. Most recent PET scan 2/22/17 showed progression of disease both above and below the level of the diaphragm. Pt had repeat chlorambucil 5/17 due to progressive disease on CTs. Back of head mass/ left groin mass was almost gone. PET 5/17 and good response to tx. Pt has been having more scalp lesions. Unclear per pt if NHL or zoster. PET now shows progressive disease. Reviewed with pt and family today. Pt wants to try treatment / acyclovir first to see if this treatment affects scalp/head LNs. Then pt would consider further chlorambucil. Does not want any chemo. 2.  Poor vision left eye/ ocular NHL. Post XRT. No vision changes since last visit. 3.  Back of head mass. Gone with chemo. Now recurrent. 4.  Renal insufficiency. Stable. 5.  Hypothyroid. Managed by endocrinology. 6.  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 Still having lesions ? Recurrent zoster but lesions have never really gone away. rx acyclovir today and will re eval.     7.  Right ear issues. seeing ENT. Follow-up in 1 month. Call if questions. ICD-10-CM ICD-9-CM    1. 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 Mass of head R22.0 784.2    6. Poor vision H54.7 369.9    7. Orbital swelling H57.8 379.92      Orders Placed This Encounter    melatonin 3 mg tablet     Sig: Take  by mouth nightly.  DIPHENHYDRAMINE HCL (NITETIME SLEEP AID PO)     Sig: Take  by mouth nightly. Indications: Equate Sleep Aid    acyclovir (ZOVIRAX) 800 mg tablet     Sig: Take 1 Tab by mouth three (3) times daily for 10 days. Indications: HERPES ZOSTER     Dispense:  30 Tab     Refill:  0       continue present plan, call if any problems  There are no Patient Instructions on file for this visit. Follow-up Disposition:  Return in about 4 weeks (around 10/4/2017).     Arnold Guerrero DO

## 2017-10-09 PROBLEM — H73.811 HEALED PERFORATION OF RIGHT EAR DRUM: Status: ACTIVE | Noted: 2017-01-01

## 2017-10-09 NOTE — PROGRESS NOTES
Morelia Strauss is a 78 y.o. 1950 female and presents with NHL. CC NHL Dx  10/10    STAGE: 3 possibly    TREATMENT COURSE:  Orbit radiation  Chlorambucil x 1 cycle 4/16. Cycle 2 3/17. Cycle 3 4/17  Attempted one dose of rituxan. INTERIM HISTORY:  Ms. Kassidy Spear is here for follow-up for NHL not on any therapy at present but hx of chlorambucil. hx of chlorambucil chemo last done 4/17 and PET with progression 8/17. Pt wanted to try acyclovir last visit to see if it helped with scalp lesions/ LNs. Acyclovir did not help. Has right ear fullness/ green drainage. Wants abx. Can take PCN. Went to ENT and still seeing them. Pt thinks she wants more chemo with chlorambucil. Past Medical History:   Diagnosis Date    Anemia NEC     Cancer ([facility] [address]     low grade NHL     Past Surgical History:   Procedure Laterality Date    HX CATARACT REMOVAL  6/2015 & 7/2015    HX CHOLECYSTECTOMY  1982    HX HEENT  2005    RIGHT EAR     MT RPLCMT PROST AORTIC VALVE OPEN XCP HOMOGRF/STENT  1972     Social History     Social History    Marital status:      Spouse name: N/A    Number of children: N/A    Years of education: N/A     Social History Main Topics    Smoking status: Current Every Day Smoker     Packs/day: 0.50     Years: 45.00     Types: Cigarettes    Smokeless tobacco: Former User     Quit date: 12/1/2015      Comment: 7 cigarettes daily.     Alcohol use Yes      Comment: Rare    Drug use: No    Sexual activity: No      Comment:  has one child     Other Topics Concern    None     Social History Narrative     Family History   Problem Relation Age of Onset    Heart Disease Mother     Kidney Disease Mother     Alcohol abuse Father     Cancer Sister      Breast    No Known Problems Brother     Cancer Sister      Colon    Alcohol abuse Sister     Alcohol abuse Sister        Current Outpatient Prescriptions   Medication Sig Dispense Refill    amoxicillin-clavulanate (AUGMENTIN) 875-125 mg per tablet Take 1 Tab by mouth two (2) times a day. 20 Tab 0    melatonin 3 mg tablet Take  by mouth nightly.  DIPHENHYDRAMINE HCL (NITETIME SLEEP AID PO) Take  by mouth nightly. Indications: Equate Sleep Aid      levothyroxine (SYNTHROID) 150 mcg tablet       raNITIdine (ZANTAC) 150 mg tablet       cimetidine (TAGAMET) 200 mg tablet Take 200 mg by mouth daily.  docusate sodium (COLACE) 100 mg capsule Take 100 mg by mouth 3 times daily.  gabapentin (NEURONTIN) 300 mg capsule Take 1 Cap by mouth three (3) times daily. Indications: NEUROPATHIC PAIN, POSTHERPETIC NEURALGIA 90 Cap 3    MULTIVITAMIN WITH MINERALS (MULTIVITAMIN & MINERAL FORMULA PO) Take  by mouth.  cholecalciferol (VITAMIN D3) 1,000 unit tablet Take  by mouth daily.  KLOR-CON M20 20 mEq tablet Take 20 mEq by mouth daily.  bumetanide (BUMEX) 2 mg tablet Take 2 mg by mouth as needed.  cyanocobalamin (VITAMIN B-12) 1,000 mcg/mL injection 1,000 mcg by IntraMUSCular route every month.  atenolol (TENORMIN) 25 mg tablet Take 25 mg by mouth daily.  amitriptyline (ELAVIL) 50 mg tablet Take 50 mg by mouth nightly.  atenolol (TENORMIN) 50 mg tablet Take 50 mg by mouth daily.  levothyroxine (SYNTHROID) 100 mcg tablet Take 125 mcg by mouth daily.  chlorambucil (LEUKERAN) 2 mg tablet Take 4 Tabs by mouth daily. (0.1 mg/kg PO daily on days 1-28 of a 28 day cycle, weight 76.3 kg)  Indications: FOLLICULAR LYMPHOMA 978 Tab 2    ondansetron (ZOFRAN ODT) 8 mg disintegrating tablet Take 1 Tab by mouth every eight (8) hours as needed for Nausea. 30 Tab 2    levothyroxine (SYNTHROID) 175 mcg tablet daily.          Allergies   Allergen Reactions    Rituxan [Rituximab] Hives       Review of Systems    A comprehensive review of systems was negative except for: per HPI and sheet    Objective:  Visit Vitals    /81    Pulse 64    Temp 98.2 °F (36.8 °C) (Oral)    Resp 16    Ht 5' 3\" (1.6 m)    Wt 159 lb (72.1 kg)    LMP  (LMP Unknown)    SpO2 98%    BMI 28.17 kg/m2     Physical Exam:   General appearance - alert, well appearing, and in no distress, oriented to person, place, and time and normal appearing weight  Mental status - alert, oriented normal mood, behavior, speech, dress, motor activity, and thought processes  Head superficial masses right temporal area / scalp on right . EYE-conj clear no swelling in eyes. Right eyelid droop noted. Right ear has hole in drum  Mouth - mucous membranes pink, moist, intact. Neck - supple  CV regular  resp clear  GI soft NT  Ext-No pedal edema noted bilaterally  Skin-Warm and dry. Neuro -  non-focal.    Diagnostic Imaging   reviewed  PET Results (most recent):    Results from Hospital Encounter encounter on 08/30/17   PET/CT TUMOR IMAGE SKULL THIGH (SUB)   Narrative PET/CT SCAN    PROCEDURE: Following IV injection of 10.6 mCi 18 Fluoro 2 deoxyglucose (FDG) and  a standard uptake delay, PET imaging is performed from head to thigh and axial,  sagittal and coronal images were acquired. Unenhanced CT is obtained for  anatomic localization, and attenuation correction of the PET scan. Patient  preprocedure blood glucose level: 96mg/dL. CORRELATIVE IMAGING STUDIES: .    PRIOR PET:  5/24/2017    HISTORY: The study is requested for subsequent treatment strategy. Non-Hodgkin's  lymphoma. FINDINGS:    HEAD/NECK: There are foci of increased activity in the scalp right greater than  left with SUV up to 30 suspicious for metastatic lesions. These appear new. There are 2 left level 2 lymph nodes and one left level 1 lymph node with  increased metabolic activity of 11 and these have progressed since the prior  study. CHEST: There is extensive increased metabolic activity in left pleural  thickening which has progressed since the prior study with SUV of 17. There is a 1.4 cm nodule lingula with mild increased metabolic activity. .  There is slight increased metabolic activity in right pleural thickening upper  thorax    ABDOMEN/PELVIS: There is increased metabolic activity in the soft tissue mass at  the aortic bifurcation extending into the left iliac chain with SUV of 16 and  this has progressed since the prior study. There is increased metabolic activity  in the right external iliac mass measuring 2.8 cm with SUV of 22 and this has  progressed since the prior study. There is increased metabolic activity in a left inguinal mass measuring 3.7 cm  with SUV of 20. There is diffuse increased activity throughout the uterus    SKELETON: No foci of abnormal hypermetabolism in the axial and visualized  appendicular skeleton. Impression IMPRESSION:   1. There has been progression of the lymphoma since the prior study. There are  now scalp lesions which are new. Left level 2 lymph nodes have progressed  slightly. There is extensive pleural thickening on the left with increased  metabolic activity which has progressed since the prior study. There is slight  pleural thickening right upper lobe with slight increased metabolic activity  which is new. There is a nodule in the lingula with increased metabolic activity  which is new. Adenopathy at the aortic bifurcation extending into left common  iliac chain has progressed. Left iliac adenopathy and left inguinal adenopathy  has progressed since the prior study. 2. There is diffuse increased activity in the uterus which may be physiologic  however the uterus appears slightly larger and activity has progressed and  correlation is necessary.  23X            Lab Results    reviewed  Lab Results   Component Value Date/Time    WBC 6.1 04/24/2017 08:37 AM    HGB 13.0 04/24/2017 08:37 AM    HCT 39.3 04/24/2017 08:37 AM    PLATELET 291 29/25/0625 08:37 AM    MCV 92 04/24/2017 08:37 AM       Lab Results   Component Value Date/Time    Sodium 143 04/24/2017 08:37 AM    Potassium 4.4 04/24/2017 08:37 AM    Chloride 102 04/24/2017 08:37 AM    CO2 24 04/24/2017 08:37 AM    Anion gap 4 02/19/2015 09:13 AM    Glucose 109 04/24/2017 08:37 AM    BUN 18 04/24/2017 08:37 AM    Creatinine 0.99 04/24/2017 08:37 AM    BUN/Creatinine ratio 18 04/24/2017 08:37 AM    GFR est AA 68 04/24/2017 08:37 AM    GFR est non-AA 59 04/24/2017 08:37 AM    Calcium 9.4 04/24/2017 08:37 AM    AST (SGOT) 17 04/24/2017 08:37 AM    Alk. phosphatase 73 04/24/2017 08:37 AM    Protein, total 5.9 04/24/2017 08:37 AM    Albumin 4.1 04/24/2017 08:37 AM    Globulin 3.1 02/19/2015 09:13 AM    A-G Ratio 2.3 04/24/2017 08:37 AM    ALT (SGPT) 15 04/24/2017 08:37 AM     Assessment/Plan: Shalini Grewal is a 78 y.o. female and presents with NHL. CC NHL Dx  10/10    STAGE: 3 possibly    TREATMENT COURSE:  rituxan 2/19/15 with reaction and treatment not finished. Hives and hypotension. Chlorambucil x 2. INTERIM HISTORY:  F/u for NHL    1. NHL low grade follicular stage 3 dx in 2010   Hx of one dose of rituxan in past with side effects and pt did not want any further therapy. PET 5/16 good post chlorambucil 4/16. PET 10/2016 was stable. Most recent PET scan 2/22/17 showed progression of disease both above and below the level of the diaphragm. Pt had repeat chlorambucil 5/17 due to progressive disease on CTs. Back of head mass/ left groin mass was almost gone. PET 5/17 and good response to tx. Over summer, pt has been having more scalp lesions with high SUV activity. PET 8/17 showed progressive disease throughout. Pt wanted to try treatment / acyclovir first to see if this treatment affects scalp/head LNs. Acyclovir did not help. Pt wants abx for her right ear/ green drainage. Wants abx and given augmentin today. See if this helps with ear symptoms and pt will f/u with ENT. Pt wants more chlorambucil. Will do abx first then set up chemo. Same chlorambucil dosing as prior. Will order. Does not want any IV chemo.      2.  Poor vision left eye/ ocular NHL. Post XRT. No vision changes since last visit. 3.  Right ear hole in drum. Sees ENT. ? Infection. Wants abx for green drainage. rx augmentin     4. Renal insufficiency. Stable. 5.  Hypothyroid. Managed by endocrinology. 6.  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  more acyclovir did not help. Follow-up in 1 month. Call if questions. ICD-10-CM ICD-9-CM    1. 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 Chemotherapy follow-up examination Z09 V67.2    4. Healed perforation of right ear drum H73.811 384.81    5. Ear infection H66.90 382.9    6. Poor vision H54.7 369.9    7. Left eye pain H57.12 379.91      Orders Placed This Encounter    amoxicillin-clavulanate (AUGMENTIN) 875-125 mg per tablet     Sig: Take 1 Tab by mouth two (2) times a day. Dispense:  20 Tab     Refill:  0       continue present plan, call if any problems  There are no Patient Instructions on file for this visit. Follow-up Disposition:  Return in about 4 weeks (around 11/6/2017).     Marco Antonio Ryan,

## 2017-10-11 NOTE — PROGRESS NOTES
Received call from [place] [phone] requesting clarification on prescription for chlorambucil. Reviewed prescription, discussed that dosing is the same as previous prescription. Reviewed that #84 tablets for 3 week supply at a time but that patient will take daily. No further questions.

## 2017-10-12 NOTE — PROGRESS NOTES
Co-pay assist for [employer] complete and to provider for signature. Assist for leukeran.

## 2017-10-13 NOTE — TELEPHONE ENCOUNTER
Call to patient to review oral chemo plan. Advised that co-pay assist in progress through [employer]. Patient stated [employer] has contacted her. Reviewed standing order lab frequency and will fax to [email or web address]. Patient will call when med recd. Verbalized understanding and thanked for call.

## 2017-10-23 NOTE — TELEPHONE ENCOUNTER
Call to patient to verify start date of leukeran. HIPAA verified. Advised that our office recd fax from [address] [place] [address] with $2500 co-pay assist.  Patient stated will call "[employer] today for delivery set up and have labs done as ordered. Will call our office when med started. Thanked for call.

## 2017-10-30 NOTE — TELEPHONE ENCOUNTER
Patient called requesting a return call from Matthew to discuss her medication, chemo pill. Please return call [phone].   soledad

## 2017-10-30 NOTE — TELEPHONE ENCOUNTER
HIPAA verified. Stated recd leukeran, but developed cough/nasal and chest congestion with some wheezing. Is to see PCP this am.  Advised to hold leukeran start until treatment plan defined. Encouraged baseline labs if not done. Patient denied fever. Encouraged po fluids. Patient will call our office with updated plan per PCP. Thanked for call.

## 2017-11-01 NOTE — TELEPHONE ENCOUNTER
Patient called requesting a return call from [place]. Patient stated she is suppose to start taking chlorambucil (LEUKERAN) 2 mg tablet on Monday, she can't take it now because she is on antibiotics. Patient stated she has a question about labs. Please return call to discuss [phone].   soledda

## 2017-11-02 NOTE — TELEPHONE ENCOUNTER
HIPAA verified. Stated left message that has pneumonia. Is on Z-kenisha. Encouraged po fluids/rest.  Patient will call Monday 11/6/17 to update status and obtain OK to start leukeran. Support given and to call with any questions.

## 2017-11-06 NOTE — TELEPHONE ENCOUNTER
Call to check status post pneumonia. HIPAA verified. Stated completed Z-kenisha Friday. Denied fever. Stated respiratory sx are 50% improved. Reported \"still some fluttering left chest.\"  To have CXR repeated per PCP in 3-4 weeks. Stated is not sure needs to see Dr. Keila Pantoja tomorrow has has not started leukeran. Advised would forward to provider for plan/leukeran start date. Encouraged to obtain baseline labs today if possible.

## 2017-11-06 NOTE — TELEPHONE ENCOUNTER
Call to patient. HIPAA verified. Advised of provider note. Verbalized understanding and will call back Tuesday to reschedule appt. To PSR to cancel 11/7/17 appt.

## 2017-11-06 NOTE — TELEPHONE ENCOUNTER
Discussed with Dr. Baron Arcos, if patient is still symptomatic from pneumonia, would recommend she hold chlorambucil and can reschedule appointment with Dr. Baron Arcos if wanted.

## 2017-11-14 NOTE — TELEPHONE ENCOUNTER
HIPAA verified. Stated has not started leukeran, but is feeling better. Has not had labs as instructed. Stated would try to have labs done prior to appt. Verbalized understanding and thanked for call.

## 2017-11-14 NOTE — TELEPHONE ENCOUNTER
CBC and CMP ordered. She can have these drawn tomorrow if she can. Patient has an appointment 11/16/17 for follow-up.

## 2017-11-16 PROBLEM — J18.9 PNEUMONIA DUE TO INFECTIOUS ORGANISM: Status: ACTIVE | Noted: 2017-01-01

## 2017-11-16 NOTE — PROGRESS NOTES
Darylene Lango is a 78 y.o. 1950 female and presents with NHL. CC NHL Dx  10/10    STAGE: 3 possibly    TREATMENT COURSE:  Orbit radiation  Chlorambucil x 1 cycle 4/16. Cycle 2 3/17. Cycle 3 4/17  Attempted one dose of rituxan. INTERIM HISTORY:  Ms. Faustino Mcneil is here for follow-up for NHL not on any therapy at present but hx of chlorambucil. Pt had PNA and was on abx. Felt tired. Off abx for about 10 days. Pt has more \"knots in head\" and wants to start chemo. Has chlorambucil and has no questions. Got help with chemo. Last visit:  hx of chlorambucil chemo last done 4/17 and PET with progression 8/17. Pt wanted to try acyclovir last visit to see if it helped with scalp lesions/ LNs. Acyclovir did not help. Has right ear fullness/ green drainage. Wants abx. Can take PCN. Went to ENT and still seeing them. Pt thinks she wants more chemo with chlorambucil. Past Medical History:   Diagnosis Date    Anemia NEC     Cancer ([facility] [address]     low grade NHL     Past Surgical History:   Procedure Laterality Date    HX CATARACT REMOVAL  6/2015 & 7/2015    HX CHOLECYSTECTOMY  1982    HX HEENT  2005    RIGHT EAR     SD RPLCMT PROST AORTIC VALVE OPEN XCP HOMOGRF/STENT  1972     Social History     Social History    Marital status:      Spouse name: N/A    Number of children: N/A    Years of education: N/A     Social History Main Topics    Smoking status: Current Every Day Smoker     Packs/day: 0.50     Years: 45.00     Types: Cigarettes    Smokeless tobacco: Former User     Quit date: 12/1/2015      Comment: 7 cigarettes daily.     Alcohol use Yes      Comment: Rare    Drug use: No    Sexual activity: No      Comment:  has one child     Other Topics Concern    None     Social History Narrative     Family History   Problem Relation Age of Onset    Heart Disease Mother     Kidney Disease Mother     Alcohol abuse Father     Cancer Sister      Breast    No Known Problems Brother     Cancer Sister      Colon    Alcohol abuse Sister     Alcohol abuse Sister        Current Outpatient Prescriptions   Medication Sig Dispense Refill    melatonin 3 mg tablet Take  by mouth nightly.  DIPHENHYDRAMINE HCL (NITETIME SLEEP AID PO) Take  by mouth nightly. Indications: Equate Sleep Aid      levothyroxine (SYNTHROID) 150 mcg tablet       docusate sodium (COLACE) 100 mg capsule Take 100 mg by mouth 3 times daily.  gabapentin (NEURONTIN) 300 mg capsule Take 1 Cap by mouth three (3) times daily. Indications: NEUROPATHIC PAIN, POSTHERPETIC NEURALGIA 90 Cap 3    MULTIVITAMIN WITH MINERALS (MULTIVITAMIN & MINERAL FORMULA PO) Take  by mouth.  cholecalciferol (VITAMIN D3) 1,000 unit tablet Take  by mouth daily.  KLOR-CON M20 20 mEq tablet Take 20 mEq by mouth daily.  bumetanide (BUMEX) 2 mg tablet Take 2 mg by mouth as needed.  cyanocobalamin (VITAMIN B-12) 1,000 mcg/mL injection 1,000 mcg by IntraMUSCular route every month.  atenolol (TENORMIN) 25 mg tablet Take 25 mg by mouth daily.  PROAIR RESPICLICK 90 mcg/actuation aepb       azithromycin (ZITHROMAX) 250 mg tablet       amoxicillin-clavulanate (AUGMENTIN) 875-125 mg per tablet Take 1 Tab by mouth two (2) times a day. 20 Tab 0    raNITIdine (ZANTAC) 150 mg tablet       amitriptyline (ELAVIL) 50 mg tablet Take 50 mg by mouth nightly.  cimetidine (TAGAMET) 200 mg tablet Take 200 mg by mouth daily.  atenolol (TENORMIN) 50 mg tablet Take 50 mg by mouth daily.  levothyroxine (SYNTHROID) 100 mcg tablet Take 125 mcg by mouth daily.  chlorambucil (LEUKERAN) 2 mg tablet Take 4 Tabs by mouth daily. (0.1 mg/kg PO daily on days 1-28 of a 28 day cycle, weight 76.3 kg)  Indications: FOLLICULAR LYMPHOMA 173 Tab 2    ondansetron (ZOFRAN ODT) 8 mg disintegrating tablet Take 1 Tab by mouth every eight (8) hours as needed for Nausea. 30 Tab 2    levothyroxine (SYNTHROID) 175 mcg tablet daily. Allergies   Allergen Reactions    Rituxan [Rituximab] Hives       Review of Systems    A comprehensive review of systems was negative except for: per HPI and sheet    Objective:  Visit Vitals    /87 (BP 1 Location: Left arm, BP Patient Position: Sitting)    Pulse (!) 111    Temp 98.7 °F (37.1 °C) (Oral)    Resp 16    Ht 5' 3\" (1.6 m)    Wt 158 lb 11.2 oz (72 kg)    LMP  (LMP Unknown)    SpO2 97%    BMI 28.11 kg/m2     Physical Exam:   General appearance - alert, well appearing, and in no distress, oriented to person, place, and time and normal appearing weight  Mental status - alert, oriented normal mood, behavior, speech, dress, motor activity, and thought processes  Head superficial masses right temporal area / scalp on right . EYE-conj clear no swelling in eyes. Right eyelid droop noted. Mouth - mucous membranes pink, moist, intact. Neck - supple  CV regular  resp clear  GI soft NT  Ext-No pedal edema noted bilaterally  Skin-Warm and dry. Neuro -  non-focal.    Diagnostic Imaging   reviewed  PET Results (most recent):    Results from Hospital Encounter encounter on 08/30/17   PET/CT TUMOR IMAGE SKULL THIGH (SUB)   Narrative PET/CT SCAN    PROCEDURE: Following IV injection of 10.6 mCi 18 Fluoro 2 deoxyglucose (FDG) and  a standard uptake delay, PET imaging is performed from head to thigh and axial,  sagittal and coronal images were acquired. Unenhanced CT is obtained for  anatomic localization, and attenuation correction of the PET scan. Patient  preprocedure blood glucose level: 96mg/dL. CORRELATIVE IMAGING STUDIES: .    PRIOR PET:  5/24/2017    HISTORY: The study is requested for subsequent treatment strategy. Non-Hodgkin's  lymphoma. FINDINGS:    HEAD/NECK: There are foci of increased activity in the scalp right greater than  left with SUV up to 30 suspicious for metastatic lesions. These appear new.     There are 2 left level 2 lymph nodes and one left level 1 lymph node with  increased metabolic activity of 11 and these have progressed since the prior  study. CHEST: There is extensive increased metabolic activity in left pleural  thickening which has progressed since the prior study with SUV of 17. There is a 1.4 cm nodule lingula with mild increased metabolic activity. .  There is slight increased metabolic activity in right pleural thickening upper  thorax    ABDOMEN/PELVIS: There is increased metabolic activity in the soft tissue mass at  the aortic bifurcation extending into the left iliac chain with SUV of 16 and  this has progressed since the prior study. There is increased metabolic activity  in the right external iliac mass measuring 2.8 cm with SUV of 22 and this has  progressed since the prior study. There is increased metabolic activity in a left inguinal mass measuring 3.7 cm  with SUV of 20. There is diffuse increased activity throughout the uterus    SKELETON: No foci of abnormal hypermetabolism in the axial and visualized  appendicular skeleton. Impression IMPRESSION:   1. There has been progression of the lymphoma since the prior study. There are  now scalp lesions which are new. Left level 2 lymph nodes have progressed  slightly. There is extensive pleural thickening on the left with increased  metabolic activity which has progressed since the prior study. There is slight  pleural thickening right upper lobe with slight increased metabolic activity  which is new. There is a nodule in the lingula with increased metabolic activity  which is new. Adenopathy at the aortic bifurcation extending into left common  iliac chain has progressed. Left iliac adenopathy and left inguinal adenopathy  has progressed since the prior study. 2. There is diffuse increased activity in the uterus which may be physiologic  however the uterus appears slightly larger and activity has progressed and  correlation is necessary.  23X            Lab Results    reviewed  Lab Results   Component Value Date/Time    WBC 6.6 11/15/2017 08:37 AM    HGB 13.7 11/15/2017 08:37 AM    HCT 40.6 11/15/2017 08:37 AM    PLATELET 068 38/78/6709 08:37 AM    MCV 93 11/15/2017 08:37 AM       Lab Results   Component Value Date/Time    Sodium 142 11/15/2017 08:37 AM    Potassium 4.3 11/15/2017 08:37 AM    Chloride 101 11/15/2017 08:37 AM    CO2 26 11/15/2017 08:37 AM    Anion gap 4 02/19/2015 09:13 AM    Glucose 96 11/15/2017 08:37 AM    BUN 13 11/15/2017 08:37 AM    Creatinine 1.17 11/15/2017 08:37 AM    BUN/Creatinine ratio 11 11/15/2017 08:37 AM    GFR est AA 56 11/15/2017 08:37 AM    GFR est non-AA 48 11/15/2017 08:37 AM    Calcium 9.7 11/15/2017 08:37 AM    AST (SGOT) 14 11/15/2017 08:37 AM    Alk. phosphatase 97 11/15/2017 08:37 AM    Protein, total 6.4 11/15/2017 08:37 AM    Albumin 4.4 11/15/2017 08:37 AM    Globulin 3.1 02/19/2015 09:13 AM    A-G Ratio 2.2 11/15/2017 08:37 AM    ALT (SGPT) 7 11/15/2017 08:37 AM     Assessment/Plan: Ilan Henson is a 78 y.o. female and presents with NHL. CC NHL Dx  10/10    STAGE: 3 possibly    TREATMENT COURSE:  rituxan 2/19/15 with reaction and treatment not finished. Hives and hypotension. Chlorambucil x 2. INTERIM HISTORY:  F/u for NHL    1. NHL low grade follicular stage 3 dx in 2010   Hx of one dose of rituxan in past with side effects and pt did not want any further therapy. PET 5/16 good post chlorambucil 4/16. PET 10/2016 was stable. Most recent PET scan 2/22/17 showed progression of disease both above and below the level of the diaphragm. Pt had repeat chlorambucil 5/17 due to progressive disease on CTs. Back of head mass/ left groin mass was almost gone. PET 5/17 and good response to tx. Over summer, pt has been having more scalp lesions with high SUV activity. PET 8/17 showed progressive disease throughout. Pt wanted to try treatment / acyclovir first to see if this treatment affects scalp/head LNs.   Acyclovir did not help.     Pt had recent abx for PNA and feeling better now. Pt wants to re start chlorambucil chemo. Reviewed risks, benefits and alternatives of chemo. Pt is agreeable today. Same chlorambucil dosing as prior. Weekly labs on chemo. Labs good. 2.  Recent PNA. Done abx. 3.  Right ear infection better. Per ENT. 4.  Renal insufficiency. Stable. 5.  Hypothyroid. Managed by endocrinology. 6.  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  more acyclovir did not help. Follow-up in 1 month. Weekly labs on chemo. Call if questions. ICD-10-CM ICD-9-CM    1. Follicular lymphoma grade I, unspecified body region ([facility] [address] C82.00 202.00    2. 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 Mass of head R22.0 784.2    4. Renal insufficiency N28.9 593.9    5. Pneumonia due to infectious organism, unspecified laterality, unspecified part of lung J18.9 136.9      484.8    6. Ear infection H66.90 382.9    7. Neoplastic malignant related fatigue R53.0 780.79      Orders Placed This Encounter    PROAIR RESPICLICK 90 mcg/actuation aepb    azithromycin (ZITHROMAX) 250 mg tablet       continue present plan, call if any problems  There are no Patient Instructions on file for this visit. Follow-up Disposition:  Return in about 4 weeks (around 12/14/2017) for [facility] for chemo.     Wong Russo DO

## 2017-11-16 NOTE — PROGRESS NOTES
Lolis Laureano is a 78 y.o. female      Chief Complaint   Patient presents with    Lymphoma         1. Have you been to the ER, urgent care clinic since your last visit? Hospitalized since your last visit? No    2. Have you seen or consulted any other health care providers outside of the [address] [place] [address] since your last visit? Include any pap smears or colon screening.   No

## 2017-11-16 NOTE — PROGRESS NOTES
Consent for ongoing palliative therapy obtained. Side effects and symptom management reviewed. Aware of need for weekly labs. No questions and support given.

## 2017-11-17 NOTE — TELEPHONE ENCOUNTER
Sindhu Called to inform pt is not taking her medication Lezkeran. Caitlyn Neves from [address] [place] [address] would like a call back at [phone]

## 2017-11-17 NOTE — TELEPHONE ENCOUNTER
Call returned to [facility]. Advised patient seen in office 11/16/17 and is to start leukeran today. [facility] verbalized understanding and thanked for call.

## 2017-12-05 NOTE — TELEPHONE ENCOUNTER
Call to patient. HIPAA verified. Advised that lab orders were faxed in October and again yesterday for this round of leukeran. Patient verbalized understanding and thanked for call. Will call if questions.

## 2017-12-06 NOTE — TELEPHONE ENCOUNTER
Ms Ishaan Katz is getting labs done in [place], but need an order sent again [facility] states they did not receive it

## 2017-12-06 NOTE — TELEPHONE ENCOUNTER
Call to [facility] lab [place]/Sherrie. Stated has standing order labs and are located in the standing order bin. Sherrie read order as initiated.   Thanked for assist.

## 2017-12-06 NOTE — TELEPHONE ENCOUNTER
Call to patient and advised that lab orders are at [facility].  Patient stated that she felt lab person did not want to draw patient. RN advised that labs need to be monitored on chemo. Verbalized understanding and stated would go 12/7/17 for draw. Second copy of lab orders mailed to patient.

## 2017-12-19 NOTE — PROGRESS NOTES
Call to patient. HIPAA verified. Advised that labs normal.  Stated some fatigue, but stated \"I can handle it. \"   with new onset throat cancer and is having daily radiation. Patient stated is on Cycle 2 of leukeran. No questions and thanked for call.

## 2017-12-26 NOTE — PROGRESS NOTES
Spoke with patient using 2 identifiers, name and . Patient notified per Dr. Don Em recommendation: labs are good. Patient verbalized understanding.

## 2018-01-01 ENCOUNTER — DOCUMENTATION ONLY (OUTPATIENT)
Dept: ONCOLOGY | Age: 68
End: 2018-01-01

## 2018-01-01 ENCOUNTER — TELEPHONE (OUTPATIENT)
Dept: ONCOLOGY | Age: 68
End: 2018-01-01

## 2018-01-01 ENCOUNTER — HOSPITAL ENCOUNTER (OUTPATIENT)
Dept: RADIATION THERAPY | Age: 68
Discharge: HOME | End: 2018-08-29
Payer: MEDICARE

## 2018-01-01 ENCOUNTER — HOSPITAL ENCOUNTER (OUTPATIENT)
Dept: INFUSION THERAPY | Age: 68
Discharge: HOME | End: 2018-06-16
Payer: MEDICARE

## 2018-01-01 ENCOUNTER — TELEPHONE (OUTPATIENT)
Dept: GYNECOLOGY | Age: 68
End: 2018-01-01

## 2018-01-01 ENCOUNTER — HOSPITAL ENCOUNTER (OUTPATIENT)
Dept: RADIATION THERAPY | Age: 68
Setting detail: OBSERVATION
Discharge: HOME | DRG: 841 | End: 2018-08-15
Attending: RADIOLOGY
Payer: MEDICARE

## 2018-01-01 ENCOUNTER — APPOINTMENT (OUTPATIENT)
Dept: ULTRASOUND IMAGING | Age: 68
DRG: 840 | End: 2018-01-01
Attending: INTERNAL MEDICINE
Payer: MEDICARE

## 2018-01-01 ENCOUNTER — HOSPITAL ENCOUNTER (OUTPATIENT)
Dept: INFUSION THERAPY | Age: 68
Discharge: HOME | End: 2018-05-04
Payer: MEDICARE

## 2018-01-01 ENCOUNTER — HOSPITAL ENCOUNTER (OUTPATIENT)
Dept: INFUSION THERAPY | Age: 68
End: 2018-01-01
Payer: MEDICARE

## 2018-01-01 ENCOUNTER — APPOINTMENT (OUTPATIENT)
Dept: ULTRASOUND IMAGING | Age: 68
DRG: 841 | End: 2018-01-01
Attending: INTERNAL MEDICINE
Payer: MEDICARE

## 2018-01-01 ENCOUNTER — HOSPITAL ENCOUNTER (OUTPATIENT)
Dept: RADIATION THERAPY | Age: 68
Discharge: HOME | End: 2018-08-23
Payer: MEDICARE

## 2018-01-01 ENCOUNTER — APPOINTMENT (OUTPATIENT)
Dept: RADIATION THERAPY | Age: 68
End: 2018-01-01

## 2018-01-01 ENCOUNTER — OFFICE VISIT (OUTPATIENT)
Dept: ONCOLOGY | Age: 68
End: 2018-01-01

## 2018-01-01 ENCOUNTER — HOSPITAL ENCOUNTER (OUTPATIENT)
Dept: INTERVENTIONAL RADIOLOGY/VASCULAR | Age: 68
Discharge: HOME | End: 2018-05-25
Attending: INTERNAL MEDICINE | Admitting: INTERNAL MEDICINE

## 2018-01-01 ENCOUNTER — APPOINTMENT (OUTPATIENT)
Dept: CT IMAGING | Age: 68
DRG: 841 | End: 2018-01-01
Attending: EMERGENCY MEDICINE
Payer: MEDICARE

## 2018-01-01 ENCOUNTER — TELEPHONE (OUTPATIENT)
Dept: PALLATIVE CARE | Age: 68
End: 2018-01-01

## 2018-01-01 ENCOUNTER — HOSPITAL ENCOUNTER (INPATIENT)
Age: 68
LOS: 2 days | Discharge: HOME | DRG: 841 | End: 2018-08-17
Attending: EMERGENCY MEDICINE | Admitting: HOSPITALIST
Payer: MEDICARE

## 2018-01-01 ENCOUNTER — HOSPITAL ENCOUNTER (OUTPATIENT)
Dept: RADIATION THERAPY | Age: 68
Discharge: HOME | End: 2018-08-20
Attending: RADIOLOGY
Payer: MEDICARE

## 2018-01-01 ENCOUNTER — HOSPITAL ENCOUNTER (OUTPATIENT)
Dept: RADIATION THERAPY | Age: 68
Discharge: HOME | End: 2018-09-07
Payer: MEDICARE

## 2018-01-01 ENCOUNTER — APPOINTMENT (OUTPATIENT)
Dept: ULTRASOUND IMAGING | Age: 68
DRG: 871 | End: 2018-01-01
Attending: HOSPITALIST
Payer: MEDICARE

## 2018-01-01 ENCOUNTER — HOSPITAL ENCOUNTER (OUTPATIENT)
Dept: INFUSION THERAPY | Age: 68
Discharge: HOME | End: 2018-07-07
Payer: MEDICARE

## 2018-01-01 ENCOUNTER — ANESTHESIA EVENT (OUTPATIENT)
Dept: SURGERY | Age: 68
End: 2018-01-01
Payer: MEDICARE

## 2018-01-01 ENCOUNTER — HOSPITAL ENCOUNTER (INPATIENT)
Dept: RADIATION THERAPY | Age: 68
Discharge: HOME | DRG: 841 | End: 2018-08-16
Attending: RADIOLOGY
Payer: MEDICARE

## 2018-01-01 ENCOUNTER — HOSPITAL ENCOUNTER (OUTPATIENT)
Dept: RADIATION THERAPY | Age: 68
Discharge: HOME | End: 2018-09-06
Payer: MEDICARE

## 2018-01-01 ENCOUNTER — APPOINTMENT (OUTPATIENT)
Dept: GENERAL RADIOLOGY | Age: 68
DRG: 841 | End: 2018-01-01
Attending: PHYSICIAN ASSISTANT
Payer: MEDICARE

## 2018-01-01 ENCOUNTER — APPOINTMENT (OUTPATIENT)
Dept: ULTRASOUND IMAGING | Age: 68
End: 2018-01-01
Attending: EMERGENCY MEDICINE
Payer: MEDICARE

## 2018-01-01 ENCOUNTER — OFFICE VISIT (OUTPATIENT)
Dept: GYNECOLOGY | Age: 68
End: 2018-01-01

## 2018-01-01 ENCOUNTER — APPOINTMENT (OUTPATIENT)
Dept: INFUSION THERAPY | Age: 68
End: 2018-01-01
Payer: MEDICARE

## 2018-01-01 ENCOUNTER — HOSPITAL ENCOUNTER (OUTPATIENT)
Dept: INFUSION THERAPY | Age: 68
Discharge: HOME | End: 2018-04-14
Payer: MEDICARE

## 2018-01-01 ENCOUNTER — HOSPITAL ENCOUNTER (OUTPATIENT)
Dept: RADIATION THERAPY | Age: 68
End: 2018-01-01

## 2018-01-01 ENCOUNTER — HOSPITAL ENCOUNTER (INPATIENT)
Dept: RADIATION THERAPY | Age: 68
Discharge: HOME | DRG: 841 | End: 2018-08-17
Payer: MEDICARE

## 2018-01-01 ENCOUNTER — HOSPITAL ENCOUNTER (OUTPATIENT)
Dept: RADIATION THERAPY | Age: 68
Discharge: HOME | End: 2018-09-05
Payer: MEDICARE

## 2018-01-01 ENCOUNTER — HOSPITAL ENCOUNTER (OUTPATIENT)
Dept: INFUSION THERAPY | Age: 68
Discharge: HOME | End: 2018-05-26
Payer: MEDICARE

## 2018-01-01 ENCOUNTER — NURSE NAVIGATOR (OUTPATIENT)
Dept: ONCOLOGY | Age: 68
End: 2018-01-01

## 2018-01-01 ENCOUNTER — HOSPITAL ENCOUNTER (OUTPATIENT)
Dept: RADIATION THERAPY | Age: 68
Discharge: HOME | End: 2018-08-24
Payer: MEDICARE

## 2018-01-01 ENCOUNTER — HOSPITAL ENCOUNTER (INPATIENT)
Age: 68
LOS: 7 days | Discharge: HOSPICE | DRG: 871 | End: 2018-09-06
Attending: EMERGENCY MEDICINE | Admitting: FAMILY MEDICINE
Payer: MEDICARE

## 2018-01-01 ENCOUNTER — HOSPICE ADMISSION (OUTPATIENT)
Dept: HOSPICE | Facility: HOSPICE | Age: 68
End: 2018-01-01

## 2018-01-01 ENCOUNTER — HOSPITAL ENCOUNTER (OUTPATIENT)
Dept: MRI IMAGING | Age: 68
Discharge: HOME | End: 2018-08-07
Attending: NURSE PRACTITIONER
Payer: MEDICARE

## 2018-01-01 ENCOUNTER — HOSPITAL ENCOUNTER (OUTPATIENT)
Dept: PREADMISSION TESTING | Age: 68
Discharge: HOME | End: 2018-02-27

## 2018-01-01 ENCOUNTER — APPOINTMENT (OUTPATIENT)
Dept: CT IMAGING | Age: 68
End: 2018-01-01
Attending: EMERGENCY MEDICINE
Payer: MEDICARE

## 2018-01-01 ENCOUNTER — HOSPITAL ENCOUNTER (INPATIENT)
Age: 68
LOS: 7 days | Discharge: HOME | DRG: 840 | End: 2018-03-27
Attending: EMERGENCY MEDICINE | Admitting: FAMILY MEDICINE
Payer: MEDICARE

## 2018-01-01 ENCOUNTER — APPOINTMENT (OUTPATIENT)
Dept: GENERAL RADIOLOGY | Age: 68
DRG: 871 | End: 2018-01-01
Attending: INTERNAL MEDICINE
Payer: MEDICARE

## 2018-01-01 ENCOUNTER — APPOINTMENT (OUTPATIENT)
Dept: GENERAL RADIOLOGY | Age: 68
DRG: 871 | End: 2018-01-01
Attending: EMERGENCY MEDICINE
Payer: MEDICARE

## 2018-01-01 ENCOUNTER — HOSPITAL ENCOUNTER (OUTPATIENT)
Dept: RADIATION THERAPY | Age: 68
Discharge: HOME | End: 2018-08-31
Payer: MEDICARE

## 2018-01-01 ENCOUNTER — HOSPITAL ENCOUNTER (OUTPATIENT)
Dept: INTERVENTIONAL RADIOLOGY/VASCULAR | Age: 68
Discharge: HOME | End: 2018-04-02
Attending: NURSE PRACTITIONER

## 2018-01-01 ENCOUNTER — HOSPITAL ENCOUNTER (OUTPATIENT)
Dept: ULTRASOUND IMAGING | Age: 68
Discharge: HOME | End: 2018-03-01
Attending: INTERNAL MEDICINE
Payer: MEDICARE

## 2018-01-01 ENCOUNTER — ANESTHESIA (OUTPATIENT)
Dept: SURGERY | Age: 68
End: 2018-01-01
Payer: MEDICARE

## 2018-01-01 ENCOUNTER — HOSPITAL ENCOUNTER (OUTPATIENT)
Dept: PET IMAGING | Age: 68
Discharge: HOME | End: 2018-02-21
Attending: INTERNAL MEDICINE
Payer: MEDICARE

## 2018-01-01 ENCOUNTER — APPOINTMENT (OUTPATIENT)
Dept: CT IMAGING | Age: 68
DRG: 871 | End: 2018-01-01
Attending: FAMILY MEDICINE
Payer: MEDICARE

## 2018-01-01 ENCOUNTER — HOSPITAL ENCOUNTER (OUTPATIENT)
Dept: INFUSION THERAPY | Age: 68
Discharge: HOME | End: 2018-04-13
Payer: MEDICARE

## 2018-01-01 ENCOUNTER — HOSPITAL ENCOUNTER (OUTPATIENT)
Dept: INFUSION THERAPY | Age: 68
Discharge: HOME | End: 2018-05-06
Payer: MEDICARE

## 2018-01-01 ENCOUNTER — HOSPITAL ENCOUNTER (OUTPATIENT)
Dept: INFUSION THERAPY | Age: 68
Discharge: HOME | End: 2018-07-06
Payer: MEDICARE

## 2018-01-01 ENCOUNTER — HOSPITAL ENCOUNTER (OUTPATIENT)
Dept: RADIATION THERAPY | Age: 68
Discharge: HOME | End: 2018-08-28
Payer: MEDICARE

## 2018-01-01 ENCOUNTER — HOSPITAL ENCOUNTER (OUTPATIENT)
Dept: INFUSION THERAPY | Age: 68
Discharge: HOME | End: 2018-08-02
Payer: MEDICARE

## 2018-01-01 ENCOUNTER — HOSPITAL ENCOUNTER (EMERGENCY)
Age: 68
Discharge: ANOTHER HOSPITAL | End: 2018-03-20
Attending: EMERGENCY MEDICINE
Payer: MEDICARE

## 2018-01-01 ENCOUNTER — APPOINTMENT (OUTPATIENT)
Dept: CT IMAGING | Age: 68
DRG: 841 | End: 2018-01-01
Attending: RADIOLOGY
Payer: MEDICARE

## 2018-01-01 ENCOUNTER — HOSPITAL ENCOUNTER (OUTPATIENT)
Dept: INFUSION THERAPY | Age: 68
Discharge: HOME | End: 2018-06-15
Payer: MEDICARE

## 2018-01-01 ENCOUNTER — HOSPITAL ENCOUNTER (OUTPATIENT)
Dept: GENERAL RADIOLOGY | Age: 68
Discharge: HOME | End: 2018-03-01
Attending: RADIOLOGY
Payer: MEDICARE

## 2018-01-01 ENCOUNTER — APPOINTMENT (OUTPATIENT)
Dept: ULTRASOUND IMAGING | Age: 68
DRG: 840 | End: 2018-01-01
Attending: HOSPITALIST
Payer: MEDICARE

## 2018-01-01 ENCOUNTER — HOSPITAL ENCOUNTER (OUTPATIENT)
Age: 68
Setting detail: OUTPATIENT SURGERY
Discharge: HOME | End: 2018-03-16
Attending: OBSTETRICS & GYNECOLOGY | Admitting: OBSTETRICS & GYNECOLOGY
Payer: MEDICARE

## 2018-01-01 ENCOUNTER — HOSPITAL ENCOUNTER (OUTPATIENT)
Dept: INFUSION THERAPY | Age: 68
Discharge: HOME | End: 2018-05-25
Payer: MEDICARE

## 2018-01-01 ENCOUNTER — HOSPITAL ENCOUNTER (OUTPATIENT)
Dept: INTERVENTIONAL RADIOLOGY/VASCULAR | Age: 68
Discharge: HOME | End: 2018-04-03
Attending: NURSE PRACTITIONER | Admitting: RADIOLOGY
Payer: MEDICARE

## 2018-01-01 ENCOUNTER — HOSPITAL ENCOUNTER (OUTPATIENT)
Dept: RADIATION THERAPY | Age: 68
Setting detail: OBSERVATION
Discharge: HOME | DRG: 841 | End: 2018-08-15
Payer: MEDICARE

## 2018-01-01 ENCOUNTER — HOSPITAL ENCOUNTER (OUTPATIENT)
Dept: RADIATION THERAPY | Age: 68
Discharge: HOME | End: 2018-08-30
Payer: MEDICARE

## 2018-01-01 ENCOUNTER — HOSPITAL ENCOUNTER (OUTPATIENT)
Dept: RADIATION THERAPY | Age: 68
Discharge: HOME | End: 2018-09-04
Payer: MEDICARE

## 2018-01-01 ENCOUNTER — HOSPITAL ENCOUNTER (OUTPATIENT)
Dept: RADIATION THERAPY | Age: 68
Discharge: HOME | End: 2018-08-27
Payer: MEDICARE

## 2018-01-01 ENCOUNTER — HOSPITAL ENCOUNTER (OUTPATIENT)
Dept: INFUSION THERAPY | Age: 68
Discharge: HOME | End: 2018-03-28
Payer: MEDICARE

## 2018-01-01 ENCOUNTER — HOSPITAL ENCOUNTER (OUTPATIENT)
Dept: RADIATION THERAPY | Age: 68
Discharge: HOME | End: 2018-09-10
Payer: MEDICARE

## 2018-01-01 ENCOUNTER — APPOINTMENT (OUTPATIENT)
Dept: GENERAL RADIOLOGY | Age: 68
End: 2018-01-01
Attending: EMERGENCY MEDICINE
Payer: MEDICARE

## 2018-01-01 ENCOUNTER — OFFICE VISIT (OUTPATIENT)
Dept: PALLATIVE CARE | Age: 68
End: 2018-01-01

## 2018-01-01 ENCOUNTER — HOSPITAL ENCOUNTER (OUTPATIENT)
Dept: VASCULAR SURGERY | Age: 68
Discharge: HOME | End: 2018-03-28
Attending: NURSE PRACTITIONER
Payer: MEDICARE

## 2018-01-01 ENCOUNTER — HOSPITAL ENCOUNTER (OUTPATIENT)
Dept: PET IMAGING | Age: 68
Discharge: HOME | End: 2018-07-18
Attending: NURSE PRACTITIONER
Payer: MEDICARE

## 2018-01-01 ENCOUNTER — HOSPITAL ENCOUNTER (OUTPATIENT)
Dept: RADIATION THERAPY | Age: 68
Discharge: HOME | End: 2018-08-22
Payer: MEDICARE

## 2018-01-01 ENCOUNTER — PATIENT OUTREACH (OUTPATIENT)
Dept: ONCOLOGY | Age: 68
End: 2018-01-01

## 2018-01-01 ENCOUNTER — HOSPITAL ENCOUNTER (OUTPATIENT)
Dept: PET IMAGING | Age: 68
Discharge: HOME | End: 2018-05-16
Attending: NURSE PRACTITIONER
Payer: MEDICARE

## 2018-01-01 ENCOUNTER — APPOINTMENT (OUTPATIENT)
Dept: GENERAL RADIOLOGY | Age: 68
DRG: 840 | End: 2018-01-01
Attending: INTERNAL MEDICINE
Payer: MEDICARE

## 2018-01-01 ENCOUNTER — APPOINTMENT (OUTPATIENT)
Dept: ULTRASOUND IMAGING | Age: 68
DRG: 840 | End: 2018-01-01
Attending: FAMILY MEDICINE
Payer: MEDICARE

## 2018-01-01 ENCOUNTER — HOSPITAL ENCOUNTER (EMERGENCY)
Age: 68
Discharge: HOME | End: 2018-02-17
Attending: EMERGENCY MEDICINE | Admitting: EMERGENCY MEDICINE
Payer: MEDICARE

## 2018-01-01 ENCOUNTER — HOSPITAL ENCOUNTER (INPATIENT)
Dept: RADIATION THERAPY | Age: 68
End: 2018-01-01
Payer: MEDICARE

## 2018-01-01 VITALS
BODY MASS INDEX: 27.43 KG/M2 | HEART RATE: 72 BPM | WEIGHT: 149.03 LBS | OXYGEN SATURATION: 97 % | DIASTOLIC BLOOD PRESSURE: 61 MMHG | HEIGHT: 62 IN | RESPIRATION RATE: 19 BRPM | SYSTOLIC BLOOD PRESSURE: 144 MMHG | TEMPERATURE: 98.2 F

## 2018-01-01 VITALS
TEMPERATURE: 98.3 F | HEART RATE: 66 BPM | SYSTOLIC BLOOD PRESSURE: 113 MMHG | DIASTOLIC BLOOD PRESSURE: 51 MMHG | RESPIRATION RATE: 18 BRPM | OXYGEN SATURATION: 93 %

## 2018-01-01 VITALS
WEIGHT: 152.6 LBS | DIASTOLIC BLOOD PRESSURE: 75 MMHG | HEIGHT: 63 IN | RESPIRATION RATE: 16 BRPM | HEART RATE: 66 BPM | SYSTOLIC BLOOD PRESSURE: 122 MMHG | BODY MASS INDEX: 27.04 KG/M2 | TEMPERATURE: 98.4 F | OXYGEN SATURATION: 97 %

## 2018-01-01 VITALS
BODY MASS INDEX: 26.57 KG/M2 | SYSTOLIC BLOOD PRESSURE: 110 MMHG | RESPIRATION RATE: 16 BRPM | WEIGHT: 144.4 LBS | TEMPERATURE: 97.7 F | DIASTOLIC BLOOD PRESSURE: 64 MMHG | HEART RATE: 87 BPM | HEIGHT: 62 IN

## 2018-01-01 VITALS
WEIGHT: 160 LBS | HEART RATE: 74 BPM | RESPIRATION RATE: 16 BRPM | BODY MASS INDEX: 29.26 KG/M2 | SYSTOLIC BLOOD PRESSURE: 147 MMHG | DIASTOLIC BLOOD PRESSURE: 67 MMHG | TEMPERATURE: 98 F | OXYGEN SATURATION: 99 %

## 2018-01-01 VITALS
TEMPERATURE: 98.1 F | SYSTOLIC BLOOD PRESSURE: 124 MMHG | BODY MASS INDEX: 27.63 KG/M2 | HEIGHT: 62 IN | WEIGHT: 150.13 LBS | RESPIRATION RATE: 18 BRPM | HEART RATE: 71 BPM | DIASTOLIC BLOOD PRESSURE: 71 MMHG

## 2018-01-01 VITALS
OXYGEN SATURATION: 97 % | RESPIRATION RATE: 16 BRPM | HEART RATE: 71 BPM | DIASTOLIC BLOOD PRESSURE: 58 MMHG | BODY MASS INDEX: 28.16 KG/M2 | SYSTOLIC BLOOD PRESSURE: 94 MMHG | WEIGHT: 153 LBS | HEIGHT: 62 IN | TEMPERATURE: 97.7 F

## 2018-01-01 VITALS
TEMPERATURE: 97 F | HEART RATE: 82 BPM | SYSTOLIC BLOOD PRESSURE: 123 MMHG | RESPIRATION RATE: 16 BRPM | DIASTOLIC BLOOD PRESSURE: 73 MMHG

## 2018-01-01 VITALS
HEART RATE: 84 BPM | DIASTOLIC BLOOD PRESSURE: 76 MMHG | SYSTOLIC BLOOD PRESSURE: 123 MMHG | HEIGHT: 62 IN | BODY MASS INDEX: 26.5 KG/M2 | WEIGHT: 144 LBS | OXYGEN SATURATION: 97 % | TEMPERATURE: 98.6 F | RESPIRATION RATE: 18 BRPM

## 2018-01-01 VITALS
DIASTOLIC BLOOD PRESSURE: 75 MMHG | RESPIRATION RATE: 18 BRPM | WEIGHT: 143 LBS | BODY MASS INDEX: 26.31 KG/M2 | HEIGHT: 62 IN | HEART RATE: 82 BPM | SYSTOLIC BLOOD PRESSURE: 122 MMHG | TEMPERATURE: 97.9 F

## 2018-01-01 VITALS
BODY MASS INDEX: 26.83 KG/M2 | RESPIRATION RATE: 18 BRPM | TEMPERATURE: 97.1 F | DIASTOLIC BLOOD PRESSURE: 66 MMHG | WEIGHT: 145.8 LBS | HEART RATE: 78 BPM | HEIGHT: 62 IN | SYSTOLIC BLOOD PRESSURE: 127 MMHG

## 2018-01-01 VITALS
OXYGEN SATURATION: 96 % | HEIGHT: 62 IN | RESPIRATION RATE: 16 BRPM | WEIGHT: 144.5 LBS | TEMPERATURE: 95.9 F | HEART RATE: 102 BPM | SYSTOLIC BLOOD PRESSURE: 108 MMHG | BODY MASS INDEX: 26.59 KG/M2 | DIASTOLIC BLOOD PRESSURE: 64 MMHG

## 2018-01-01 VITALS
OXYGEN SATURATION: 95 % | SYSTOLIC BLOOD PRESSURE: 121 MMHG | DIASTOLIC BLOOD PRESSURE: 74 MMHG | HEART RATE: 70 BPM | RESPIRATION RATE: 16 BRPM | TEMPERATURE: 98.2 F | WEIGHT: 145 LBS | HEIGHT: 62 IN | BODY MASS INDEX: 26.68 KG/M2

## 2018-01-01 VITALS
RESPIRATION RATE: 24 BRPM | TEMPERATURE: 98.1 F | OXYGEN SATURATION: 98 % | HEIGHT: 62 IN | WEIGHT: 163.58 LBS | SYSTOLIC BLOOD PRESSURE: 95 MMHG | DIASTOLIC BLOOD PRESSURE: 55 MMHG | BODY MASS INDEX: 30.1 KG/M2 | HEART RATE: 118 BPM

## 2018-01-01 VITALS
SYSTOLIC BLOOD PRESSURE: 159 MMHG | OXYGEN SATURATION: 98 % | HEART RATE: 78 BPM | DIASTOLIC BLOOD PRESSURE: 86 MMHG | RESPIRATION RATE: 16 BRPM | TEMPERATURE: 97.9 F

## 2018-01-01 VITALS
WEIGHT: 148 LBS | BODY MASS INDEX: 27.23 KG/M2 | SYSTOLIC BLOOD PRESSURE: 128 MMHG | HEIGHT: 62 IN | TEMPERATURE: 98.5 F | DIASTOLIC BLOOD PRESSURE: 71 MMHG | OXYGEN SATURATION: 97 % | RESPIRATION RATE: 16 BRPM | HEART RATE: 89 BPM

## 2018-01-01 VITALS
DIASTOLIC BLOOD PRESSURE: 80 MMHG | RESPIRATION RATE: 20 BRPM | OXYGEN SATURATION: 95 % | HEART RATE: 70 BPM | SYSTOLIC BLOOD PRESSURE: 124 MMHG

## 2018-01-01 VITALS
HEIGHT: 62 IN | BODY MASS INDEX: 27.46 KG/M2 | RESPIRATION RATE: 16 BRPM | OXYGEN SATURATION: 98 % | TEMPERATURE: 98 F | HEART RATE: 84 BPM | WEIGHT: 149.2 LBS | SYSTOLIC BLOOD PRESSURE: 140 MMHG | DIASTOLIC BLOOD PRESSURE: 70 MMHG

## 2018-01-01 VITALS
HEART RATE: 85 BPM | RESPIRATION RATE: 16 BRPM | WEIGHT: 145 LBS | HEIGHT: 62 IN | BODY MASS INDEX: 26.68 KG/M2 | SYSTOLIC BLOOD PRESSURE: 115 MMHG | TEMPERATURE: 97.1 F | DIASTOLIC BLOOD PRESSURE: 60 MMHG

## 2018-01-01 VITALS
RESPIRATION RATE: 16 BRPM | BODY MASS INDEX: 25.95 KG/M2 | TEMPERATURE: 98.1 F | OXYGEN SATURATION: 98 % | HEART RATE: 69 BPM | DIASTOLIC BLOOD PRESSURE: 71 MMHG | SYSTOLIC BLOOD PRESSURE: 119 MMHG | HEIGHT: 62 IN | WEIGHT: 141 LBS

## 2018-01-01 VITALS — BODY MASS INDEX: 26.52 KG/M2 | WEIGHT: 145 LBS

## 2018-01-01 VITALS — HEIGHT: 62 IN | BODY MASS INDEX: 26.5 KG/M2 | WEIGHT: 144 LBS

## 2018-01-01 VITALS
DIASTOLIC BLOOD PRESSURE: 49 MMHG | OXYGEN SATURATION: 90 % | BODY MASS INDEX: 27.99 KG/M2 | TEMPERATURE: 98.3 F | HEIGHT: 62 IN | RESPIRATION RATE: 14 BRPM | SYSTOLIC BLOOD PRESSURE: 129 MMHG | WEIGHT: 152.12 LBS | HEART RATE: 87 BPM

## 2018-01-01 VITALS
BODY MASS INDEX: 27.42 KG/M2 | SYSTOLIC BLOOD PRESSURE: 120 MMHG | WEIGHT: 149 LBS | TEMPERATURE: 99 F | DIASTOLIC BLOOD PRESSURE: 74 MMHG | RESPIRATION RATE: 16 BRPM | OXYGEN SATURATION: 97 % | HEIGHT: 62 IN | HEART RATE: 83 BPM

## 2018-01-01 VITALS
DIASTOLIC BLOOD PRESSURE: 65 MMHG | SYSTOLIC BLOOD PRESSURE: 148 MMHG | HEART RATE: 68 BPM | TEMPERATURE: 97.9 F | RESPIRATION RATE: 18 BRPM

## 2018-01-01 VITALS
HEART RATE: 66 BPM | RESPIRATION RATE: 16 BRPM | WEIGHT: 151.8 LBS | HEIGHT: 62 IN | TEMPERATURE: 97.5 F | BODY MASS INDEX: 27.94 KG/M2 | SYSTOLIC BLOOD PRESSURE: 167 MMHG | OXYGEN SATURATION: 97 % | DIASTOLIC BLOOD PRESSURE: 76 MMHG

## 2018-01-01 VITALS
RESPIRATION RATE: 16 BRPM | DIASTOLIC BLOOD PRESSURE: 79 MMHG | TEMPERATURE: 97.4 F | BODY MASS INDEX: 27.23 KG/M2 | SYSTOLIC BLOOD PRESSURE: 140 MMHG | WEIGHT: 148 LBS | HEIGHT: 62 IN | HEART RATE: 82 BPM

## 2018-01-01 VITALS — DIASTOLIC BLOOD PRESSURE: 67 MMHG | HEART RATE: 70 BPM | RESPIRATION RATE: 18 BRPM | SYSTOLIC BLOOD PRESSURE: 121 MMHG

## 2018-01-01 VITALS
SYSTOLIC BLOOD PRESSURE: 143 MMHG | DIASTOLIC BLOOD PRESSURE: 77 MMHG | TEMPERATURE: 96.8 F | HEART RATE: 85 BPM | RESPIRATION RATE: 18 BRPM

## 2018-01-01 VITALS
OXYGEN SATURATION: 97 % | BODY MASS INDEX: 26.68 KG/M2 | WEIGHT: 145 LBS | TEMPERATURE: 98 F | RESPIRATION RATE: 16 BRPM | HEART RATE: 84 BPM | DIASTOLIC BLOOD PRESSURE: 63 MMHG | SYSTOLIC BLOOD PRESSURE: 104 MMHG | HEIGHT: 62 IN

## 2018-01-01 VITALS
TEMPERATURE: 98.1 F | OXYGEN SATURATION: 98 % | BODY MASS INDEX: 26.68 KG/M2 | DIASTOLIC BLOOD PRESSURE: 70 MMHG | HEIGHT: 62 IN | SYSTOLIC BLOOD PRESSURE: 110 MMHG | RESPIRATION RATE: 16 BRPM | WEIGHT: 145 LBS | HEART RATE: 67 BPM

## 2018-01-01 VITALS
BODY MASS INDEX: 27.46 KG/M2 | HEART RATE: 75 BPM | HEIGHT: 62 IN | WEIGHT: 149.2 LBS | DIASTOLIC BLOOD PRESSURE: 65 MMHG | SYSTOLIC BLOOD PRESSURE: 135 MMHG

## 2018-01-01 VITALS
DIASTOLIC BLOOD PRESSURE: 69 MMHG | RESPIRATION RATE: 16 BRPM | SYSTOLIC BLOOD PRESSURE: 114 MMHG | HEART RATE: 83 BPM | TEMPERATURE: 97 F

## 2018-01-01 VITALS
SYSTOLIC BLOOD PRESSURE: 138 MMHG | TEMPERATURE: 97 F | HEART RATE: 100 BPM | RESPIRATION RATE: 18 BRPM | DIASTOLIC BLOOD PRESSURE: 75 MMHG

## 2018-01-01 VITALS
TEMPERATURE: 97.9 F | WEIGHT: 149 LBS | OXYGEN SATURATION: 94 % | HEIGHT: 62 IN | HEART RATE: 74 BPM | RESPIRATION RATE: 18 BRPM | BODY MASS INDEX: 27.42 KG/M2 | DIASTOLIC BLOOD PRESSURE: 76 MMHG | SYSTOLIC BLOOD PRESSURE: 132 MMHG

## 2018-01-01 VITALS — WEIGHT: 144 LBS | HEIGHT: 62 IN | BODY MASS INDEX: 26.5 KG/M2

## 2018-01-01 VITALS — WEIGHT: 143 LBS | HEIGHT: 62 IN | BODY MASS INDEX: 26.31 KG/M2

## 2018-01-01 DIAGNOSIS — R01.1 HEART MURMUR: ICD-10-CM

## 2018-01-01 DIAGNOSIS — G89.3 CANCER ASSOCIATED PAIN: ICD-10-CM

## 2018-01-01 DIAGNOSIS — N95.0 POSTMENOPAUSAL BLEEDING: Primary | ICD-10-CM

## 2018-01-01 DIAGNOSIS — F41.8 ANXIETY ABOUT HEALTH: ICD-10-CM

## 2018-01-01 DIAGNOSIS — R05.9 COUGH: ICD-10-CM

## 2018-01-01 DIAGNOSIS — Z09 CHEMOTHERAPY FOLLOW-UP EXAMINATION: ICD-10-CM

## 2018-01-01 DIAGNOSIS — N85.8 UTERINE MASS: ICD-10-CM

## 2018-01-01 DIAGNOSIS — C85.10 HIGH GRADE B-CELL LYMPHOMA (HCC): ICD-10-CM

## 2018-01-01 DIAGNOSIS — G50.9 TRIGEMINAL NERVE DISEASE: ICD-10-CM

## 2018-01-01 DIAGNOSIS — H54.7 POOR VISION: ICD-10-CM

## 2018-01-01 DIAGNOSIS — E88.3 TUMOR LYSIS SYNDROME: ICD-10-CM

## 2018-01-01 DIAGNOSIS — J90 PLEURAL EFFUSION, LEFT: ICD-10-CM

## 2018-01-01 DIAGNOSIS — R51.9 FACIAL PAIN: Primary | ICD-10-CM

## 2018-01-01 DIAGNOSIS — C85.10 HIGH GRADE B-CELL LYMPHOMA (HCC): Primary | ICD-10-CM

## 2018-01-01 DIAGNOSIS — M25.552 LEFT HIP PAIN: Primary | ICD-10-CM

## 2018-01-01 DIAGNOSIS — M79.89 LEFT LEG SWELLING: ICD-10-CM

## 2018-01-01 DIAGNOSIS — N28.9 RENAL INSUFFICIENCY: ICD-10-CM

## 2018-01-01 DIAGNOSIS — R18.0 ASCITES, MALIGNANT: ICD-10-CM

## 2018-01-01 DIAGNOSIS — K12.30 MUCOSITIS: Primary | ICD-10-CM

## 2018-01-01 DIAGNOSIS — N95.0 POSTMENOPAUSAL BLEEDING: ICD-10-CM

## 2018-01-01 DIAGNOSIS — C85.99 ORBITAL LYMPHOMA (HCC): ICD-10-CM

## 2018-01-01 DIAGNOSIS — B02.8 HERPES ZOSTER WITH COMPLICATION: ICD-10-CM

## 2018-01-01 DIAGNOSIS — R11.10 VOMITING, INTRACTABILITY OF VOMITING NOT SPECIFIED, PRESENCE OF NAUSEA NOT SPECIFIED, UNSPECIFIED VOMITING TYPE: ICD-10-CM

## 2018-01-01 DIAGNOSIS — R53.0 NEOPLASTIC MALIGNANT RELATED FATIGUE: ICD-10-CM

## 2018-01-01 DIAGNOSIS — R18.0 MALIGNANT ASCITES: ICD-10-CM

## 2018-01-01 DIAGNOSIS — C83.36 DIFFUSE LARGE B-CELL LYMPHOMA OF INTRAPELVIC LYMPH NODES (HCC): ICD-10-CM

## 2018-01-01 DIAGNOSIS — R22.0 MASS OF HEAD: ICD-10-CM

## 2018-01-01 DIAGNOSIS — H92.02 LEFT EAR PAIN: ICD-10-CM

## 2018-01-01 DIAGNOSIS — C82.00 FOLLICULAR LYMPHOMA GRADE I, UNSPECIFIED BODY REGION (HCC): Primary | ICD-10-CM

## 2018-01-01 DIAGNOSIS — R51.9 FACE PAIN: ICD-10-CM

## 2018-01-01 DIAGNOSIS — C82.00 FOLLICULAR LYMPHOMA GRADE I, UNSPECIFIED BODY REGION (HCC): ICD-10-CM

## 2018-01-01 DIAGNOSIS — R06.09 DOE (DYSPNEA ON EXERTION): ICD-10-CM

## 2018-01-01 DIAGNOSIS — R10.84 GENERALIZED ABDOMINAL PAIN: ICD-10-CM

## 2018-01-01 DIAGNOSIS — R53.81 DEBILITY: ICD-10-CM

## 2018-01-01 DIAGNOSIS — R77.8 ELEVATED TROPONIN: ICD-10-CM

## 2018-01-01 DIAGNOSIS — R06.02 SOB (SHORTNESS OF BREATH): ICD-10-CM

## 2018-01-01 DIAGNOSIS — I95.9 HYPOTENSION, UNSPECIFIED HYPOTENSION TYPE: ICD-10-CM

## 2018-01-01 DIAGNOSIS — H05.229 ORBITAL SWELLING: ICD-10-CM

## 2018-01-01 DIAGNOSIS — C83.32 DIFFUSE LARGE B-CELL LYMPHOMA OF INTRATHORACIC LYMPH NODES (HCC): ICD-10-CM

## 2018-01-01 DIAGNOSIS — R51.9 LEFT-SIDED FACE PAIN: ICD-10-CM

## 2018-01-01 DIAGNOSIS — R19.00 ABDOMINAL SWELLING: Primary | ICD-10-CM

## 2018-01-01 DIAGNOSIS — N93.9 VAGINAL BLEEDING: Primary | ICD-10-CM

## 2018-01-01 DIAGNOSIS — R20.0 NUMBNESS AND TINGLING OF LEFT SIDE OF FACE: Primary | ICD-10-CM

## 2018-01-01 DIAGNOSIS — R18.0 MALIGNANT ASCITES: Primary | ICD-10-CM

## 2018-01-01 DIAGNOSIS — R20.0 LEFT FACIAL NUMBNESS: ICD-10-CM

## 2018-01-01 DIAGNOSIS — M79.604 RIGHT LEG PAIN: ICD-10-CM

## 2018-01-01 DIAGNOSIS — H57.12 LEFT EYE PAIN: ICD-10-CM

## 2018-01-01 DIAGNOSIS — R63.0 ANOREXIA: ICD-10-CM

## 2018-01-01 DIAGNOSIS — E86.0 DEHYDRATION: Primary | ICD-10-CM

## 2018-01-01 DIAGNOSIS — E86.0 DEHYDRATION: ICD-10-CM

## 2018-01-01 DIAGNOSIS — G51.9 FACIAL NEUROPATHY: ICD-10-CM

## 2018-01-01 DIAGNOSIS — C55 MALIGNANT NEOPLASM OF UTERUS, UNSPECIFIED SITE (HCC): ICD-10-CM

## 2018-01-01 DIAGNOSIS — J06.9 UPPER RESPIRATORY TRACT INFECTION, UNSPECIFIED TYPE: ICD-10-CM

## 2018-01-01 DIAGNOSIS — R20.2 NUMBNESS AND TINGLING OF LEFT SIDE OF FACE: Primary | ICD-10-CM

## 2018-01-01 DIAGNOSIS — R14.0 ABDOMINAL DISTENTION: Primary | ICD-10-CM

## 2018-01-01 DIAGNOSIS — G51.9 NEUROPATHIC FACIAL NERVE: ICD-10-CM

## 2018-01-01 DIAGNOSIS — R40.1 OBTUNDED: ICD-10-CM

## 2018-01-01 DIAGNOSIS — R68.84 JAW PAIN: ICD-10-CM

## 2018-01-01 DIAGNOSIS — M79.2 NEUROPATHIC PAIN: ICD-10-CM

## 2018-01-01 LAB
ALBUMIN FLD-MCNC: 2.1 G/DL
ALBUMIN SERPL-MCNC: 1.7 G/DL (ref 3.5–5)
ALBUMIN SERPL-MCNC: 2.5 G/DL (ref 3.5–5)
ALBUMIN SERPL-MCNC: 2.7 G/DL (ref 3.5–5)
ALBUMIN SERPL-MCNC: 2.7 G/DL (ref 3.5–5)
ALBUMIN SERPL-MCNC: 2.8 G/DL (ref 3.5–5)
ALBUMIN SERPL-MCNC: 2.8 G/DL (ref 3.5–5)
ALBUMIN SERPL-MCNC: 2.9 G/DL (ref 3.5–5)
ALBUMIN SERPL-MCNC: 3.1 G/DL (ref 3.5–5)
ALBUMIN SERPL-MCNC: 3.2 G/DL (ref 3.5–5)
ALBUMIN SERPL-MCNC: 3.3 G/DL (ref 3.5–5)
ALBUMIN SERPL-MCNC: 3.4 G/DL (ref 3.5–5)
ALBUMIN SERPL-MCNC: 3.5 G/DL (ref 3.5–5)
ALBUMIN SERPL-MCNC: 3.5 G/DL (ref 3.5–5)
ALBUMIN SERPL-MCNC: 3.7 G/DL (ref 3.5–5)
ALBUMIN SERPL-MCNC: 3.8 G/DL (ref 3.6–4.8)
ALBUMIN SERPL-MCNC: 3.9 G/DL (ref 3.6–4.8)
ALBUMIN/GLOB SERPL: 0.7 {RATIO} (ref 1.1–2.2)
ALBUMIN/GLOB SERPL: 0.8 {RATIO} (ref 1.1–2.2)
ALBUMIN/GLOB SERPL: 0.9 {RATIO} (ref 1.1–2.2)
ALBUMIN/GLOB SERPL: 1 {RATIO} (ref 1.1–2.2)
ALBUMIN/GLOB SERPL: 1.1 {RATIO} (ref 1.1–2.2)
ALBUMIN/GLOB SERPL: 1.2 {RATIO} (ref 1.1–2.2)
ALBUMIN/GLOB SERPL: 1.2 {RATIO} (ref 1.1–2.2)
ALBUMIN/GLOB SERPL: 1.3 {RATIO} (ref 1.1–2.2)
ALBUMIN/GLOB SERPL: 1.3 {RATIO} (ref 1.1–2.2)
ALBUMIN/GLOB SERPL: 1.4 {RATIO} (ref 1.1–2.2)
ALBUMIN/GLOB SERPL: 1.6 {RATIO} (ref 1.2–2.2)
ALBUMIN/GLOB SERPL: 2.2 {RATIO} (ref 1.2–2.2)
ALP SERPL-CCNC: 102 U/L (ref 45–117)
ALP SERPL-CCNC: 102 U/L (ref 45–117)
ALP SERPL-CCNC: 104 U/L (ref 45–117)
ALP SERPL-CCNC: 105 IU/L (ref 39–117)
ALP SERPL-CCNC: 108 U/L (ref 45–117)
ALP SERPL-CCNC: 119 U/L (ref 45–117)
ALP SERPL-CCNC: 119 U/L (ref 45–117)
ALP SERPL-CCNC: 120 U/L (ref 45–117)
ALP SERPL-CCNC: 127 U/L (ref 45–117)
ALP SERPL-CCNC: 128 U/L (ref 45–117)
ALP SERPL-CCNC: 61 U/L (ref 45–117)
ALP SERPL-CCNC: 61 U/L (ref 45–117)
ALP SERPL-CCNC: 67 U/L (ref 45–117)
ALP SERPL-CCNC: 73 U/L (ref 45–117)
ALP SERPL-CCNC: 81 U/L (ref 45–117)
ALP SERPL-CCNC: 91 IU/L (ref 39–117)
ALP SERPL-CCNC: 94 U/L (ref 45–117)
ALP SERPL-CCNC: 94 U/L (ref 45–117)
ALT SERPL-CCNC: 11 U/L (ref 12–78)
ALT SERPL-CCNC: 12 U/L (ref 12–78)
ALT SERPL-CCNC: 12 U/L (ref 12–78)
ALT SERPL-CCNC: 13 U/L (ref 12–78)
ALT SERPL-CCNC: 15 U/L (ref 12–78)
ALT SERPL-CCNC: 15 U/L (ref 12–78)
ALT SERPL-CCNC: 5 IU/L (ref 0–32)
ALT SERPL-CCNC: 6 IU/L (ref 0–32)
ALT SERPL-CCNC: 7 U/L (ref 12–78)
ALT SERPL-CCNC: 8 U/L (ref 12–78)
ALT SERPL-CCNC: 9 U/L (ref 12–78)
AMMONIA PLAS-SCNC: <10 UMOL/L
ANION GAP SERPL CALC-SCNC: 10 MMOL/L (ref 5–15)
ANION GAP SERPL CALC-SCNC: 11 MMOL/L (ref 5–15)
ANION GAP SERPL CALC-SCNC: 11 MMOL/L (ref 5–15)
ANION GAP SERPL CALC-SCNC: 12 MMOL/L (ref 5–15)
ANION GAP SERPL CALC-SCNC: 14 MMOL/L (ref 5–15)
ANION GAP SERPL CALC-SCNC: 14 MMOL/L (ref 5–15)
ANION GAP SERPL CALC-SCNC: 15 MMOL/L (ref 5–15)
ANION GAP SERPL CALC-SCNC: 5 MMOL/L (ref 5–15)
ANION GAP SERPL CALC-SCNC: 6 MMOL/L (ref 5–15)
ANION GAP SERPL CALC-SCNC: 6 MMOL/L (ref 5–15)
ANION GAP SERPL CALC-SCNC: 7 MMOL/L (ref 5–15)
ANION GAP SERPL CALC-SCNC: 8 MMOL/L (ref 5–15)
ANION GAP SERPL CALC-SCNC: 9 MMOL/L (ref 5–15)
APPEARANCE FLD: ABNORMAL
APPEARANCE UR: ABNORMAL
APPEARANCE UR: ABNORMAL
APPEARANCE UR: CLEAR
APPEARANCE UR: CLEAR
APTT PPP: 27.3 SEC (ref 22.1–32)
APTT PPP: 30 SEC (ref 22.1–32.5)
ARTERIAL PATENCY WRIST A: NO
AST SERPL-CCNC: 11 U/L (ref 15–37)
AST SERPL-CCNC: 11 U/L (ref 15–37)
AST SERPL-CCNC: 12 U/L (ref 15–37)
AST SERPL-CCNC: 12 U/L (ref 15–37)
AST SERPL-CCNC: 13 IU/L (ref 0–40)
AST SERPL-CCNC: 13 U/L (ref 15–37)
AST SERPL-CCNC: 13 U/L (ref 15–37)
AST SERPL-CCNC: 17 U/L (ref 15–37)
AST SERPL-CCNC: 19 U/L (ref 15–37)
AST SERPL-CCNC: 20 U/L (ref 15–37)
AST SERPL-CCNC: 24 U/L (ref 15–37)
AST SERPL-CCNC: 25 U/L (ref 15–37)
AST SERPL-CCNC: 27 U/L (ref 15–37)
AST SERPL-CCNC: 46 U/L (ref 15–37)
AST SERPL-CCNC: 53 U/L (ref 15–37)
AST SERPL-CCNC: 64 U/L (ref 15–37)
AST SERPL-CCNC: 75 U/L (ref 15–37)
AST SERPL-CCNC: 8 IU/L (ref 0–40)
ATRIAL RATE: 101 BPM
ATRIAL RATE: 78 BPM
ATRIAL RATE: 84 BPM
ATRIAL RATE: 92 BPM
BACTERIA SPEC CULT: NORMAL
BACTERIA URNS QL MICRO: ABNORMAL /HPF
BACTERIA URNS QL MICRO: NEGATIVE /HPF
BASE DEFICIT BLDV-SCNC: 7 MMOL/L
BASOPHILS # BLD AUTO: 0 X10E3/UL (ref 0–0.2)
BASOPHILS # BLD AUTO: 0.1 X10E3/UL (ref 0–0.2)
BASOPHILS # BLD: 0 K/UL (ref 0–0.1)
BASOPHILS # BLD: 0.1 K/UL (ref 0–0.1)
BASOPHILS # BLD: 0.2 K/UL (ref 0–0.1)
BASOPHILS # BLD: 0.2 K/UL (ref 0–0.1)
BASOPHILS NFR BLD AUTO: 0 %
BASOPHILS NFR BLD AUTO: 1 %
BASOPHILS NFR BLD: 0 % (ref 0–1)
BASOPHILS NFR BLD: 1 % (ref 0–1)
BASOPHILS NFR BLD: 2 % (ref 0–1)
BASOPHILS NFR BLD: 3 % (ref 0–1)
BDY SITE: ABNORMAL
BILIRUB DIRECT SERPL-MCNC: 0.1 MG/DL (ref 0–0.2)
BILIRUB DIRECT SERPL-MCNC: <0.1 MG/DL (ref 0–0.2)
BILIRUB SERPL-MCNC: 0.2 MG/DL (ref 0.2–1)
BILIRUB SERPL-MCNC: 0.3 MG/DL (ref 0.2–1)
BILIRUB SERPL-MCNC: 0.5 MG/DL (ref 0.2–1)
BILIRUB SERPL-MCNC: <0.2 MG/DL (ref 0–1.2)
BILIRUB SERPL-MCNC: <0.2 MG/DL (ref 0–1.2)
BILIRUB UR QL CFM: NEGATIVE
BILIRUB UR QL: NEGATIVE
BUN SERPL-MCNC: 10 MG/DL (ref 6–20)
BUN SERPL-MCNC: 10 MG/DL (ref 8–27)
BUN SERPL-MCNC: 10 MG/DL (ref 8–27)
BUN SERPL-MCNC: 14 MG/DL (ref 6–20)
BUN SERPL-MCNC: 17 MG/DL (ref 6–20)
BUN SERPL-MCNC: 18 MG/DL (ref 6–20)
BUN SERPL-MCNC: 19 MG/DL (ref 6–20)
BUN SERPL-MCNC: 20 MG/DL (ref 6–20)
BUN SERPL-MCNC: 21 MG/DL (ref 6–20)
BUN SERPL-MCNC: 21 MG/DL (ref 6–20)
BUN SERPL-MCNC: 22 MG/DL (ref 6–20)
BUN SERPL-MCNC: 23 MG/DL (ref 6–20)
BUN SERPL-MCNC: 25 MG/DL (ref 6–20)
BUN SERPL-MCNC: 25 MG/DL (ref 6–20)
BUN SERPL-MCNC: 31 MG/DL (ref 6–20)
BUN/CREAT SERPL: 10 (ref 12–20)
BUN/CREAT SERPL: 10 (ref 12–28)
BUN/CREAT SERPL: 11 (ref 12–20)
BUN/CREAT SERPL: 11 (ref 12–28)
BUN/CREAT SERPL: 12 (ref 12–20)
BUN/CREAT SERPL: 13 (ref 12–20)
BUN/CREAT SERPL: 15 (ref 12–20)
BUN/CREAT SERPL: 16 (ref 12–20)
BUN/CREAT SERPL: 17 (ref 12–20)
BUN/CREAT SERPL: 18 (ref 12–20)
BUN/CREAT SERPL: 19 (ref 12–20)
BUN/CREAT SERPL: 20 (ref 12–20)
BUN/CREAT SERPL: 21 (ref 12–20)
BUN/CREAT SERPL: 22 (ref 12–20)
BUN/CREAT SERPL: 23 (ref 12–20)
BUN/CREAT SERPL: 26 (ref 12–20)
BUN/CREAT SERPL: 28 (ref 12–20)
BUN/CREAT SERPL: 29 (ref 12–20)
BUN/CREAT SERPL: 9 (ref 12–20)
C DIFF GDH STL QL: POSITIVE
C DIFF TOX A+B STL QL IA: NEGATIVE
C DIFF TOX GENS STL QL NAA+PROBE: POSITIVE
CALCIUM SERPL-MCNC: 7.5 MG/DL (ref 8.5–10.1)
CALCIUM SERPL-MCNC: 7.5 MG/DL (ref 8.5–10.1)
CALCIUM SERPL-MCNC: 7.7 MG/DL (ref 8.5–10.1)
CALCIUM SERPL-MCNC: 7.7 MG/DL (ref 8.5–10.1)
CALCIUM SERPL-MCNC: 7.8 MG/DL (ref 8.5–10.1)
CALCIUM SERPL-MCNC: 8.1 MG/DL (ref 8.5–10.1)
CALCIUM SERPL-MCNC: 8.2 MG/DL (ref 8.5–10.1)
CALCIUM SERPL-MCNC: 8.4 MG/DL (ref 8.5–10.1)
CALCIUM SERPL-MCNC: 8.6 MG/DL (ref 8.5–10.1)
CALCIUM SERPL-MCNC: 8.6 MG/DL (ref 8.5–10.1)
CALCIUM SERPL-MCNC: 8.7 MG/DL (ref 8.5–10.1)
CALCIUM SERPL-MCNC: 8.8 MG/DL (ref 8.5–10.1)
CALCIUM SERPL-MCNC: 8.9 MG/DL (ref 8.5–10.1)
CALCIUM SERPL-MCNC: 9 MG/DL (ref 8.5–10.1)
CALCIUM SERPL-MCNC: 9 MG/DL (ref 8.5–10.1)
CALCIUM SERPL-MCNC: 9.1 MG/DL (ref 8.5–10.1)
CALCIUM SERPL-MCNC: 9.2 MG/DL (ref 8.7–10.3)
CALCIUM SERPL-MCNC: 9.2 MG/DL (ref 8.7–10.3)
CALCIUM SERPL-MCNC: 9.4 MG/DL (ref 8.5–10.1)
CALCIUM SERPL-MCNC: 9.5 MG/DL (ref 8.5–10.1)
CALCIUM SERPL-MCNC: 9.7 MG/DL (ref 8.5–10.1)
CALCULATED P AXIS, ECG09: 52 DEGREES
CALCULATED P AXIS, ECG09: 63 DEGREES
CALCULATED P AXIS, ECG09: 70 DEGREES
CALCULATED P AXIS, ECG09: 70 DEGREES
CALCULATED R AXIS, ECG10: -68 DEGREES
CALCULATED R AXIS, ECG10: -68 DEGREES
CALCULATED R AXIS, ECG10: -72 DEGREES
CALCULATED R AXIS, ECG10: -76 DEGREES
CALCULATED T AXIS, ECG11: 38 DEGREES
CALCULATED T AXIS, ECG11: 42 DEGREES
CALCULATED T AXIS, ECG11: 43 DEGREES
CALCULATED T AXIS, ECG11: 47 DEGREES
CC UR VC: NORMAL
CC UR VC: NORMAL
CHLORIDE SERPL-SCNC: 100 MMOL/L (ref 96–106)
CHLORIDE SERPL-SCNC: 102 MMOL/L (ref 97–108)
CHLORIDE SERPL-SCNC: 102 MMOL/L (ref 97–108)
CHLORIDE SERPL-SCNC: 103 MMOL/L (ref 97–108)
CHLORIDE SERPL-SCNC: 103 MMOL/L (ref 97–108)
CHLORIDE SERPL-SCNC: 104 MMOL/L (ref 97–108)
CHLORIDE SERPL-SCNC: 105 MMOL/L (ref 97–108)
CHLORIDE SERPL-SCNC: 106 MMOL/L (ref 97–108)
CHLORIDE SERPL-SCNC: 107 MMOL/L (ref 97–108)
CHLORIDE SERPL-SCNC: 108 MMOL/L (ref 97–108)
CHLORIDE SERPL-SCNC: 108 MMOL/L (ref 97–108)
CHLORIDE SERPL-SCNC: 109 MMOL/L (ref 97–108)
CHLORIDE SERPL-SCNC: 110 MMOL/L (ref 97–108)
CHLORIDE SERPL-SCNC: 110 MMOL/L (ref 97–108)
CHLORIDE SERPL-SCNC: 111 MMOL/L (ref 97–108)
CHLORIDE SERPL-SCNC: 111 MMOL/L (ref 97–108)
CHLORIDE SERPL-SCNC: 112 MMOL/L (ref 97–108)
CHLORIDE SERPL-SCNC: 99 MMOL/L (ref 96–106)
CHLORIDE SERPL-SCNC: 99 MMOL/L (ref 97–108)
CHOLEST SERPL-MCNC: 114 MG/DL
CO2 SERPL-SCNC: 12 MMOL/L (ref 21–32)
CO2 SERPL-SCNC: 14 MMOL/L (ref 21–32)
CO2 SERPL-SCNC: 18 MMOL/L (ref 21–32)
CO2 SERPL-SCNC: 18 MMOL/L (ref 21–32)
CO2 SERPL-SCNC: 19 MMOL/L (ref 21–32)
CO2 SERPL-SCNC: 21 MMOL/L (ref 21–32)
CO2 SERPL-SCNC: 22 MMOL/L (ref 21–32)
CO2 SERPL-SCNC: 24 MMOL/L (ref 18–29)
CO2 SERPL-SCNC: 24 MMOL/L (ref 21–32)
CO2 SERPL-SCNC: 25 MMOL/L (ref 18–29)
CO2 SERPL-SCNC: 25 MMOL/L (ref 21–32)
CO2 SERPL-SCNC: 26 MMOL/L (ref 21–32)
CO2 SERPL-SCNC: 27 MMOL/L (ref 21–32)
CO2 SERPL-SCNC: 27 MMOL/L (ref 21–32)
CO2 SERPL-SCNC: 30 MMOL/L (ref 21–32)
COLOR FLD: YELLOW
COLOR UR: ABNORMAL
COMMENT, HOLDF: NORMAL
CREAT SERPL-MCNC: 0.78 MG/DL (ref 0.55–1.02)
CREAT SERPL-MCNC: 0.84 MG/DL (ref 0.55–1.02)
CREAT SERPL-MCNC: 0.86 MG/DL (ref 0.55–1.02)
CREAT SERPL-MCNC: 0.88 MG/DL (ref 0.55–1.02)
CREAT SERPL-MCNC: 0.88 MG/DL (ref 0.55–1.02)
CREAT SERPL-MCNC: 0.89 MG/DL (ref 0.55–1.02)
CREAT SERPL-MCNC: 0.93 MG/DL (ref 0.55–1.02)
CREAT SERPL-MCNC: 0.94 MG/DL (ref 0.57–1)
CREAT SERPL-MCNC: 0.96 MG/DL (ref 0.55–1.02)
CREAT SERPL-MCNC: 0.99 MG/DL (ref 0.55–1.02)
CREAT SERPL-MCNC: 0.99 MG/DL (ref 0.55–1.02)
CREAT SERPL-MCNC: 1 MG/DL (ref 0.57–1)
CREAT SERPL-MCNC: 1.06 MG/DL (ref 0.55–1.02)
CREAT SERPL-MCNC: 1.07 MG/DL (ref 0.55–1.02)
CREAT SERPL-MCNC: 1.08 MG/DL (ref 0.55–1.02)
CREAT SERPL-MCNC: 1.09 MG/DL (ref 0.55–1.02)
CREAT SERPL-MCNC: 1.09 MG/DL (ref 0.55–1.02)
CREAT SERPL-MCNC: 1.12 MG/DL (ref 0.55–1.02)
CREAT SERPL-MCNC: 1.18 MG/DL (ref 0.55–1.02)
CREAT SERPL-MCNC: 1.25 MG/DL (ref 0.55–1.02)
CREAT SERPL-MCNC: 1.53 MG/DL (ref 0.55–1.02)
CREAT SERPL-MCNC: 1.64 MG/DL (ref 0.55–1.02)
CREAT SERPL-MCNC: 1.7 MG/DL (ref 0.55–1.02)
CREAT SERPL-MCNC: 1.82 MG/DL (ref 0.55–1.02)
CREAT SERPL-MCNC: 1.87 MG/DL (ref 0.55–1.02)
DIAGNOSIS, 93000: NORMAL
DIFFERENTIAL METHOD BLD: ABNORMAL
EOSINOPHIL # BLD AUTO: 0 X10E3/UL (ref 0–0.4)
EOSINOPHIL # BLD AUTO: 0.4 X10E3/UL (ref 0–0.4)
EOSINOPHIL # BLD: 0 K/UL (ref 0–0.4)
EOSINOPHIL # BLD: 0.1 K/UL (ref 0–0.4)
EOSINOPHIL # BLD: 0.2 K/UL (ref 0–0.4)
EOSINOPHIL # BLD: 0.2 K/UL (ref 0–0.4)
EOSINOPHIL # BLD: 0.3 K/UL (ref 0–0.4)
EOSINOPHIL # BLD: 0.4 K/UL (ref 0–0.4)
EOSINOPHIL # BLD: 0.5 K/UL (ref 0–0.4)
EOSINOPHIL NFR BLD AUTO: 1 %
EOSINOPHIL NFR BLD AUTO: 4 %
EOSINOPHIL NFR BLD: 0 % (ref 0–7)
EOSINOPHIL NFR BLD: 1 % (ref 0–7)
EOSINOPHIL NFR BLD: 2 % (ref 0–7)
EOSINOPHIL NFR BLD: 2 % (ref 0–7)
EOSINOPHIL NFR BLD: 3 % (ref 0–7)
EOSINOPHIL NFR BLD: 4 % (ref 0–7)
EOSINOPHIL NFR BLD: 5 % (ref 0–7)
EOSINOPHIL NFR BLD: 6 % (ref 0–7)
EOSINOPHIL NFR BLD: 8 % (ref 0–7)
EPITH CASTS URNS QL MICRO: ABNORMAL /LPF
ERYTHROCYTE [DISTWIDTH] IN BLOOD BY AUTOMATED COUNT: 12.3 % (ref 11.5–14.5)
ERYTHROCYTE [DISTWIDTH] IN BLOOD BY AUTOMATED COUNT: 12.3 % (ref 11.5–14.5)
ERYTHROCYTE [DISTWIDTH] IN BLOOD BY AUTOMATED COUNT: 12.5 % (ref 11.5–14.5)
ERYTHROCYTE [DISTWIDTH] IN BLOOD BY AUTOMATED COUNT: 12.6 % (ref 11.5–14.5)
ERYTHROCYTE [DISTWIDTH] IN BLOOD BY AUTOMATED COUNT: 12.7 % (ref 11.5–14.5)
ERYTHROCYTE [DISTWIDTH] IN BLOOD BY AUTOMATED COUNT: 12.8 % (ref 11.5–14.5)
ERYTHROCYTE [DISTWIDTH] IN BLOOD BY AUTOMATED COUNT: 13.8 % (ref 11.5–14.5)
ERYTHROCYTE [DISTWIDTH] IN BLOOD BY AUTOMATED COUNT: 14 % (ref 11.5–14.5)
ERYTHROCYTE [DISTWIDTH] IN BLOOD BY AUTOMATED COUNT: 14.2 % (ref 11.5–14.5)
ERYTHROCYTE [DISTWIDTH] IN BLOOD BY AUTOMATED COUNT: 14.5 % (ref 11.5–14.5)
ERYTHROCYTE [DISTWIDTH] IN BLOOD BY AUTOMATED COUNT: 14.5 % (ref 11.5–14.5)
ERYTHROCYTE [DISTWIDTH] IN BLOOD BY AUTOMATED COUNT: 14.6 % (ref 11.5–14.5)
ERYTHROCYTE [DISTWIDTH] IN BLOOD BY AUTOMATED COUNT: 14.6 % (ref 12.3–15.4)
ERYTHROCYTE [DISTWIDTH] IN BLOOD BY AUTOMATED COUNT: 14.9 % (ref 11.5–14.5)
ERYTHROCYTE [DISTWIDTH] IN BLOOD BY AUTOMATED COUNT: 15.1 % (ref 11.5–14.5)
ERYTHROCYTE [DISTWIDTH] IN BLOOD BY AUTOMATED COUNT: 15.2 % (ref 11.5–14.5)
ERYTHROCYTE [DISTWIDTH] IN BLOOD BY AUTOMATED COUNT: 15.4 % (ref 11.5–14.5)
ERYTHROCYTE [DISTWIDTH] IN BLOOD BY AUTOMATED COUNT: 17.6 % (ref 11.5–14.5)
ERYTHROCYTE [DISTWIDTH] IN BLOOD BY AUTOMATED COUNT: 19.6 % (ref 11.5–14.5)
ERYTHROCYTE [DISTWIDTH] IN BLOOD BY AUTOMATED COUNT: 19.7 % (ref 11.5–14.5)
ERYTHROCYTE [DISTWIDTH] IN BLOOD BY AUTOMATED COUNT: 20.3 % (ref 12.3–15.4)
FERRITIN SERPL-MCNC: 169 NG/ML (ref 8–252)
FERRITIN SERPL-MCNC: 185 NG/ML (ref 8–252)
FOLATE SERPL-MCNC: 15 NG/ML (ref 5–21)
GAS FLOW.O2 O2 DELIVERY SYS: ABNORMAL L/MIN
GFR SERPLBLD CREATININE-BSD FMLA CKD-EPI: 58 ML/MIN/1.73
GFR SERPLBLD CREATININE-BSD FMLA CKD-EPI: 63 ML/MIN/1.73
GFR SERPLBLD CREATININE-BSD FMLA CKD-EPI: 67 ML/MIN/1.73
GFR SERPLBLD CREATININE-BSD FMLA CKD-EPI: 72 ML/MIN/1.73
GLOBULIN SER CALC-MCNC: 1.8 G/DL (ref 1.5–4.5)
GLOBULIN SER CALC-MCNC: 2.3 G/DL (ref 2–4)
GLOBULIN SER CALC-MCNC: 2.4 G/DL (ref 1.5–4.5)
GLOBULIN SER CALC-MCNC: 2.6 G/DL (ref 2–4)
GLOBULIN SER CALC-MCNC: 2.7 G/DL (ref 2–4)
GLOBULIN SER CALC-MCNC: 2.7 G/DL (ref 2–4)
GLOBULIN SER CALC-MCNC: 2.9 G/DL (ref 2–4)
GLOBULIN SER CALC-MCNC: 3 G/DL (ref 2–4)
GLOBULIN SER CALC-MCNC: 3.3 G/DL (ref 2–4)
GLOBULIN SER CALC-MCNC: 3.8 G/DL (ref 2–4)
GLOBULIN SER CALC-MCNC: 3.9 G/DL (ref 2–4)
GLUCOSE BLD STRIP.AUTO-MCNC: 128 MG/DL (ref 65–100)
GLUCOSE SERPL-MCNC: 100 MG/DL (ref 65–100)
GLUCOSE SERPL-MCNC: 102 MG/DL (ref 65–100)
GLUCOSE SERPL-MCNC: 104 MG/DL (ref 65–100)
GLUCOSE SERPL-MCNC: 129 MG/DL (ref 65–100)
GLUCOSE SERPL-MCNC: 131 MG/DL (ref 65–100)
GLUCOSE SERPL-MCNC: 132 MG/DL (ref 65–100)
GLUCOSE SERPL-MCNC: 133 MG/DL (ref 65–100)
GLUCOSE SERPL-MCNC: 141 MG/DL (ref 65–100)
GLUCOSE SERPL-MCNC: 154 MG/DL (ref 65–100)
GLUCOSE SERPL-MCNC: 167 MG/DL (ref 65–100)
GLUCOSE SERPL-MCNC: 79 MG/DL (ref 65–100)
GLUCOSE SERPL-MCNC: 81 MG/DL (ref 65–100)
GLUCOSE SERPL-MCNC: 82 MG/DL (ref 65–100)
GLUCOSE SERPL-MCNC: 83 MG/DL (ref 65–100)
GLUCOSE SERPL-MCNC: 83 MG/DL (ref 65–100)
GLUCOSE SERPL-MCNC: 86 MG/DL (ref 65–100)
GLUCOSE SERPL-MCNC: 87 MG/DL (ref 65–100)
GLUCOSE SERPL-MCNC: 87 MG/DL (ref 65–99)
GLUCOSE SERPL-MCNC: 91 MG/DL (ref 65–100)
GLUCOSE SERPL-MCNC: 93 MG/DL (ref 65–100)
GLUCOSE SERPL-MCNC: 94 MG/DL (ref 65–100)
GLUCOSE SERPL-MCNC: 98 MG/DL (ref 65–100)
GLUCOSE SERPL-MCNC: 98 MG/DL (ref 65–99)
GLUCOSE UR STRIP.AUTO-MCNC: NEGATIVE MG/DL
GRAM STN SPEC: NORMAL
HCO3 BLDV-SCNC: 19.3 MMOL/L (ref 23–28)
HCT VFR BLD AUTO: 23.8 % (ref 34–46.6)
HCT VFR BLD AUTO: 25.4 % (ref 35–47)
HCT VFR BLD AUTO: 26 % (ref 35–47)
HCT VFR BLD AUTO: 26 % (ref 35–47)
HCT VFR BLD AUTO: 26.6 % (ref 35–47)
HCT VFR BLD AUTO: 26.6 % (ref 35–47)
HCT VFR BLD AUTO: 26.8 % (ref 35–47)
HCT VFR BLD AUTO: 27.3 % (ref 35–47)
HCT VFR BLD AUTO: 27.5 % (ref 35–47)
HCT VFR BLD AUTO: 27.7 % (ref 34–46.6)
HCT VFR BLD AUTO: 28.3 % (ref 35–47)
HCT VFR BLD AUTO: 28.4 % (ref 35–47)
HCT VFR BLD AUTO: 28.5 % (ref 35–47)
HCT VFR BLD AUTO: 28.6 % (ref 35–47)
HCT VFR BLD AUTO: 28.6 % (ref 35–47)
HCT VFR BLD AUTO: 29.4 % (ref 35–47)
HCT VFR BLD AUTO: 30.2 % (ref 35–47)
HCT VFR BLD AUTO: 30.3 % (ref 35–47)
HCT VFR BLD AUTO: 30.3 % (ref 35–47)
HCT VFR BLD AUTO: 30.4 % (ref 35–47)
HCT VFR BLD AUTO: 31.5 % (ref 35–47)
HCT VFR BLD AUTO: 32.3 % (ref 35–47)
HCT VFR BLD AUTO: 33.3 % (ref 35–47)
HCT VFR BLD AUTO: 33.4 % (ref 35–47)
HCT VFR BLD AUTO: 37.2 % (ref 35–47)
HDLC SERPL-MCNC: 34 MG/DL
HDLC SERPL: 3.4 {RATIO} (ref 0–5)
HEMOCCULT STL QL: NEGATIVE
HGB BLD-MCNC: 10.1 G/DL (ref 11.5–16)
HGB BLD-MCNC: 10.1 G/DL (ref 11.5–16)
HGB BLD-MCNC: 10.7 G/DL (ref 11.5–16)
HGB BLD-MCNC: 12.3 G/DL (ref 11.5–16)
HGB BLD-MCNC: 7.7 G/DL (ref 11.1–15.9)
HGB BLD-MCNC: 8.1 G/DL (ref 11.5–16)
HGB BLD-MCNC: 8.2 G/DL (ref 11.5–16)
HGB BLD-MCNC: 8.3 G/DL (ref 11.5–16)
HGB BLD-MCNC: 8.4 G/DL (ref 11.5–16)
HGB BLD-MCNC: 8.5 G/DL (ref 11.5–16)
HGB BLD-MCNC: 8.8 G/DL (ref 11.5–16)
HGB BLD-MCNC: 8.9 G/DL (ref 11.5–16)
HGB BLD-MCNC: 8.9 G/DL (ref 11.5–16)
HGB BLD-MCNC: 9 G/DL (ref 11.1–15.9)
HGB BLD-MCNC: 9 G/DL (ref 11.5–16)
HGB BLD-MCNC: 9.2 G/DL (ref 11.5–16)
HGB BLD-MCNC: 9.3 G/DL (ref 11.5–16)
HGB BLD-MCNC: 9.3 G/DL (ref 11.5–16)
HGB BLD-MCNC: 9.4 G/DL (ref 11.5–16)
HGB BLD-MCNC: 9.5 G/DL (ref 11.5–16)
HGB BLD-MCNC: 9.7 G/DL (ref 11.5–16)
HGB UR QL STRIP: ABNORMAL
HGB UR QL STRIP: ABNORMAL
HGB UR QL STRIP: NEGATIVE
HGB UR QL STRIP: NEGATIVE
HYALINE CASTS URNS QL MICRO: >20 /LPF (ref 0–5)
IMM GRANULOCYTES # BLD: 0 K/UL
IMM GRANULOCYTES # BLD: 0 K/UL (ref 0–0.04)
IMM GRANULOCYTES # BLD: 0 K/UL (ref 0–0.04)
IMM GRANULOCYTES # BLD: 0.1 K/UL
IMM GRANULOCYTES # BLD: 0.1 K/UL (ref 0–0.04)
IMM GRANULOCYTES # BLD: 0.1 X10E3/UL (ref 0–0.1)
IMM GRANULOCYTES NFR BLD AUTO: 0 %
IMM GRANULOCYTES NFR BLD AUTO: 0 % (ref 0–0.5)
IMM GRANULOCYTES NFR BLD AUTO: 1 % (ref 0–0.5)
IMM GRANULOCYTES NFR BLD AUTO: 2 %
IMM GRANULOCYTES NFR BLD AUTO: 2 % (ref 0–0.5)
IMM GRANULOCYTES NFR BLD AUTO: 2 % (ref 0–0.5)
IMM GRANULOCYTES NFR BLD: 2 %
IMMATURE CELLS, 115398: ABNORMAL
INR PPP: 1 (ref 0.9–1.1)
INR PPP: 1.1 (ref 0.9–1.1)
INR PPP: 1.1 (ref 0.9–1.1)
INTERPRETATION: ABNORMAL
IRON SATN MFR SERPL: 22 % (ref 20–50)
IRON SATN MFR SERPL: 9 % (ref 20–50)
IRON SERPL-MCNC: 19 UG/DL (ref 35–150)
IRON SERPL-MCNC: 63 UG/DL (ref 35–150)
KETONES UR QL STRIP.AUTO: ABNORMAL MG/DL
KETONES UR QL STRIP.AUTO: NEGATIVE MG/DL
LACTATE SERPL-SCNC: 1.5 MMOL/L (ref 0.4–2)
LACTATE SERPL-SCNC: 1.8 MMOL/L (ref 0.4–2)
LACTATE SERPL-SCNC: 1.9 MMOL/L (ref 0.4–2)
LACTATE SERPL-SCNC: 2.3 MMOL/L (ref 0.4–2)
LACTATE SERPL-SCNC: 2.4 MMOL/L (ref 0.4–2)
LACTATE SERPL-SCNC: 2.5 MMOL/L (ref 0.4–2)
LACTATE SERPL-SCNC: 2.6 MMOL/L (ref 0.4–2)
LACTATE SERPL-SCNC: 3.8 MMOL/L (ref 0.4–2)
LDH FLD L TO P-CCNC: 333 U/L
LDH SERPL L TO P-CCNC: 350 U/L (ref 81–246)
LDH SERPL L TO P-CCNC: 355 U/L (ref 81–246)
LDH SERPL L TO P-CCNC: 357 U/L (ref 81–246)
LDH SERPL L TO P-CCNC: 366 U/L (ref 81–246)
LDH SERPL L TO P-CCNC: 405 U/L (ref 81–246)
LDH SERPL L TO P-CCNC: 434 U/L (ref 81–246)
LDH SERPL-CCNC: 236 IU/L (ref 119–226)
LDLC SERPL CALC-MCNC: 46.6 MG/DL (ref 0–100)
LEUKOCYTE ESTERASE UR QL STRIP.AUTO: ABNORMAL
LEUKOCYTE ESTERASE UR QL STRIP.AUTO: NEGATIVE
LIPASE SERPL-CCNC: 108 U/L (ref 73–393)
LIPASE SERPL-CCNC: 85 U/L (ref 73–393)
LIPID PROFILE,FLP: ABNORMAL
LYMPHOCYTES # BLD AUTO: 1.1 X10E3/UL (ref 0.7–3.1)
LYMPHOCYTES # BLD AUTO: 1.2 X10E3/UL (ref 0.7–3.1)
LYMPHOCYTES # BLD: 0.2 K/UL (ref 0.8–3.5)
LYMPHOCYTES # BLD: 0.3 K/UL (ref 0.8–3.5)
LYMPHOCYTES # BLD: 0.4 K/UL (ref 0.8–3.5)
LYMPHOCYTES # BLD: 0.5 K/UL (ref 0.8–3.5)
LYMPHOCYTES # BLD: 0.5 K/UL (ref 0.8–3.5)
LYMPHOCYTES # BLD: 0.6 K/UL (ref 0.8–3.5)
LYMPHOCYTES # BLD: 0.7 K/UL (ref 0.8–3.5)
LYMPHOCYTES # BLD: 0.7 K/UL (ref 0.8–3.5)
LYMPHOCYTES # BLD: 0.8 K/UL (ref 0.8–3.5)
LYMPHOCYTES # BLD: 0.8 K/UL (ref 0.8–3.5)
LYMPHOCYTES # BLD: 0.9 K/UL (ref 0.8–3.5)
LYMPHOCYTES # BLD: 1 K/UL (ref 0.8–3.5)
LYMPHOCYTES # BLD: 1.2 K/UL (ref 0.8–3.5)
LYMPHOCYTES # BLD: 1.2 K/UL (ref 0.8–3.5)
LYMPHOCYTES # BLD: 1.7 K/UL (ref 0.8–3.5)
LYMPHOCYTES # BLD: 2 K/UL (ref 0.8–3.5)
LYMPHOCYTES NFR BLD AUTO: 12 %
LYMPHOCYTES NFR BLD AUTO: 25 %
LYMPHOCYTES NFR BLD: 11 % (ref 12–49)
LYMPHOCYTES NFR BLD: 11 % (ref 12–49)
LYMPHOCYTES NFR BLD: 12 % (ref 12–49)
LYMPHOCYTES NFR BLD: 12 % (ref 12–49)
LYMPHOCYTES NFR BLD: 13 % (ref 12–49)
LYMPHOCYTES NFR BLD: 15 % (ref 12–49)
LYMPHOCYTES NFR BLD: 15 % (ref 12–49)
LYMPHOCYTES NFR BLD: 16 % (ref 12–49)
LYMPHOCYTES NFR BLD: 2 % (ref 12–49)
LYMPHOCYTES NFR BLD: 23 % (ref 12–49)
LYMPHOCYTES NFR BLD: 4 % (ref 12–49)
LYMPHOCYTES NFR BLD: 4 % (ref 12–49)
LYMPHOCYTES NFR BLD: 6 % (ref 12–49)
LYMPHOCYTES NFR BLD: 7 % (ref 12–49)
LYMPHOCYTES NFR BLD: 7 % (ref 12–49)
LYMPHOCYTES NFR BLD: 8 % (ref 12–49)
LYMPHOCYTES NFR BLD: 8 % (ref 12–49)
LYMPHOCYTES NFR BLD: 9 % (ref 12–49)
LYMPHOCYTES NFR FLD: 16 %
LYMPHOCYTES NFR FLD: 52 %
LYMPHOCYTES NFR FLD: 67 %
MAGNESIUM SERPL-MCNC: 1.5 MG/DL (ref 1.6–2.4)
MAGNESIUM SERPL-MCNC: 1.6 MG/DL (ref 1.6–2.4)
MAGNESIUM SERPL-MCNC: 1.6 MG/DL (ref 1.6–2.4)
MAGNESIUM SERPL-MCNC: 1.7 MG/DL (ref 1.6–2.4)
MAGNESIUM SERPL-MCNC: 2.2 MG/DL (ref 1.6–2.3)
MCH RBC QN AUTO: 29.4 PG (ref 26.6–33)
MCH RBC QN AUTO: 30 PG (ref 26–34)
MCH RBC QN AUTO: 30.1 PG (ref 26–34)
MCH RBC QN AUTO: 30.2 PG (ref 26–34)
MCH RBC QN AUTO: 30.4 PG (ref 26–34)
MCH RBC QN AUTO: 30.5 PG (ref 26–34)
MCH RBC QN AUTO: 30.8 PG (ref 26–34)
MCH RBC QN AUTO: 30.9 PG (ref 26.6–33)
MCH RBC QN AUTO: 30.9 PG (ref 26–34)
MCH RBC QN AUTO: 30.9 PG (ref 26–34)
MCH RBC QN AUTO: 31.3 PG (ref 26–34)
MCH RBC QN AUTO: 31.5 PG (ref 26–34)
MCH RBC QN AUTO: 31.6 PG (ref 26–34)
MCH RBC QN AUTO: 31.7 PG (ref 26–34)
MCH RBC QN AUTO: 31.7 PG (ref 26–34)
MCH RBC QN AUTO: 31.8 PG (ref 26–34)
MCH RBC QN AUTO: 32 PG (ref 26–34)
MCH RBC QN AUTO: 32 PG (ref 26–34)
MCH RBC QN AUTO: 32.1 PG (ref 26–34)
MCH RBC QN AUTO: 32.3 PG (ref 26–34)
MCH RBC QN AUTO: 32.3 PG (ref 26–34)
MCH RBC QN AUTO: 32.4 PG (ref 26–34)
MCH RBC QN AUTO: 33.1 PG (ref 26–34)
MCH RBC QN AUTO: 33.5 PG (ref 26–34)
MCH RBC QN AUTO: 34 PG (ref 26–34)
MCHC RBC AUTO-ENTMCNC: 30.3 G/DL (ref 30–36.5)
MCHC RBC AUTO-ENTMCNC: 30.6 G/DL (ref 30–36.5)
MCHC RBC AUTO-ENTMCNC: 30.7 G/DL (ref 30–36.5)
MCHC RBC AUTO-ENTMCNC: 30.8 G/DL (ref 30–36.5)
MCHC RBC AUTO-ENTMCNC: 31 G/DL (ref 30–36.5)
MCHC RBC AUTO-ENTMCNC: 31 G/DL (ref 30–36.5)
MCHC RBC AUTO-ENTMCNC: 31.1 G/DL (ref 30–36.5)
MCHC RBC AUTO-ENTMCNC: 31.2 G/DL (ref 30–36.5)
MCHC RBC AUTO-ENTMCNC: 31.3 G/DL (ref 30–36.5)
MCHC RBC AUTO-ENTMCNC: 31.5 G/DL (ref 30–36.5)
MCHC RBC AUTO-ENTMCNC: 31.6 G/DL (ref 30–36.5)
MCHC RBC AUTO-ENTMCNC: 31.6 G/DL (ref 30–36.5)
MCHC RBC AUTO-ENTMCNC: 32 G/DL (ref 30–36.5)
MCHC RBC AUTO-ENTMCNC: 32.3 G/DL (ref 30–36.5)
MCHC RBC AUTO-ENTMCNC: 32.4 G/DL (ref 30–36.5)
MCHC RBC AUTO-ENTMCNC: 32.4 G/DL (ref 31.5–35.7)
MCHC RBC AUTO-ENTMCNC: 32.5 G/DL (ref 31.5–35.7)
MCHC RBC AUTO-ENTMCNC: 33.1 G/DL (ref 30–36.5)
MCHC RBC AUTO-ENTMCNC: 33.1 G/DL (ref 30–36.5)
MCV RBC AUTO: 100.4 FL (ref 80–99)
MCV RBC AUTO: 102.3 FL (ref 80–99)
MCV RBC AUTO: 103.1 FL (ref 80–99)
MCV RBC AUTO: 103.2 FL (ref 80–99)
MCV RBC AUTO: 103.3 FL (ref 80–99)
MCV RBC AUTO: 103.4 FL (ref 80–99)
MCV RBC AUTO: 103.5 FL (ref 80–99)
MCV RBC AUTO: 103.5 FL (ref 80–99)
MCV RBC AUTO: 103.8 FL (ref 80–99)
MCV RBC AUTO: 103.8 FL (ref 80–99)
MCV RBC AUTO: 104.1 FL (ref 80–99)
MCV RBC AUTO: 106.2 FL (ref 80–99)
MCV RBC AUTO: 107.6 FL (ref 80–99)
MCV RBC AUTO: 107.7 FL (ref 80–99)
MCV RBC AUTO: 91 FL (ref 79–97)
MCV RBC AUTO: 92.9 FL (ref 80–99)
MCV RBC AUTO: 93.1 FL (ref 80–99)
MCV RBC AUTO: 94.1 FL (ref 80–99)
MCV RBC AUTO: 94.2 FL (ref 80–99)
MCV RBC AUTO: 94.4 FL (ref 80–99)
MCV RBC AUTO: 95.1 FL (ref 80–99)
MCV RBC AUTO: 95.5 FL (ref 80–99)
MCV RBC AUTO: 95.6 FL (ref 80–99)
MCV RBC AUTO: 96 FL (ref 79–97)
MCV RBC AUTO: 96 FL (ref 80–99)
METAMYELOCYTES NFR BLD MANUAL: 1 %
METAMYELOCYTES NFR BLD MANUAL: 2 %
METAMYELOCYTES NFR BLD: 1 %
MONOCYTES # BLD AUTO: 0.5 X10E3/UL (ref 0.1–0.9)
MONOCYTES # BLD AUTO: 0.7 X10E3/UL (ref 0.1–0.9)
MONOCYTES # BLD: 0.2 K/UL (ref 0–1)
MONOCYTES # BLD: 0.2 K/UL (ref 0–1)
MONOCYTES # BLD: 0.3 K/UL (ref 0–1)
MONOCYTES # BLD: 0.3 K/UL (ref 0–1)
MONOCYTES # BLD: 0.4 K/UL (ref 0–1)
MONOCYTES # BLD: 0.5 K/UL (ref 0–1)
MONOCYTES # BLD: 0.6 K/UL (ref 0–1)
MONOCYTES # BLD: 0.8 K/UL (ref 0–1)
MONOCYTES # BLD: 0.8 K/UL (ref 0–1)
MONOCYTES # BLD: 0.9 K/UL (ref 0–1)
MONOCYTES # BLD: 0.9 K/UL (ref 0–1)
MONOCYTES # BLD: 1 K/UL (ref 0–1)
MONOCYTES # BLD: 1.1 K/UL (ref 0–1)
MONOCYTES NFR BLD AUTO: 15 %
MONOCYTES NFR BLD AUTO: 5 %
MONOCYTES NFR BLD: 1 % (ref 5–13)
MONOCYTES NFR BLD: 10 % (ref 5–13)
MONOCYTES NFR BLD: 11 % (ref 5–13)
MONOCYTES NFR BLD: 12 % (ref 5–13)
MONOCYTES NFR BLD: 12 % (ref 5–13)
MONOCYTES NFR BLD: 13 % (ref 5–13)
MONOCYTES NFR BLD: 13 % (ref 5–13)
MONOCYTES NFR BLD: 14 % (ref 5–13)
MONOCYTES NFR BLD: 15 % (ref 5–13)
MONOCYTES NFR BLD: 15 % (ref 5–13)
MONOCYTES NFR BLD: 4 % (ref 5–13)
MONOCYTES NFR BLD: 6 % (ref 5–13)
MONOCYTES NFR BLD: 7 % (ref 5–13)
MONOCYTES NFR BLD: 8 % (ref 5–13)
MONOCYTES NFR BLD: 9 % (ref 5–13)
MONOS+MACROS NFR FLD: 13 %
MONOS+MACROS NFR FLD: 3 %
MONOS+MACROS NFR FLD: 7 %
MORPHOLOGY BLD-IMP: ABNORMAL
MUCOUS THREADS URNS QL MICRO: ABNORMAL /LPF
MYELOCYTES NFR BLD MANUAL: 1 %
MYELOCYTES NFR BLD MANUAL: 2 %
MYELOCYTES NFR BLD: 2 %
NEUTROPHILS # BLD AUTO: 2.9 X10E3/UL (ref 1.4–7)
NEUTROPHILS # BLD AUTO: 7 X10E3/UL (ref 1.4–7)
NEUTROPHILS NFR BLD AUTO: 56 %
NEUTROPHILS NFR BLD AUTO: 74 %
NEUTROPHILS NFR FLD: 16 %
NEUTROPHILS NFR FLD: 6 %
NEUTS BAND NFR BLD AUTO: 2 %
NEUTS BAND NFR BLD MANUAL: 1 % (ref 0–6)
NEUTS BAND NFR BLD MANUAL: 3 % (ref 0–6)
NEUTS BAND NFR BLD MANUAL: 3 % (ref 0–6)
NEUTS BAND NFR BLD MANUAL: 6 % (ref 0–6)
NEUTS BAND NFR BLD MANUAL: 7 % (ref 0–6)
NEUTS SEG # BLD: 19.2 K/UL (ref 1.8–8)
NEUTS SEG # BLD: 3.7 K/UL (ref 1.8–8)
NEUTS SEG # BLD: 3.8 K/UL (ref 1.8–8)
NEUTS SEG # BLD: 4.4 K/UL (ref 1.8–8)
NEUTS SEG # BLD: 4.8 K/UL (ref 1.8–8)
NEUTS SEG # BLD: 4.9 K/UL (ref 1.8–8)
NEUTS SEG # BLD: 4.9 K/UL (ref 1.8–8)
NEUTS SEG # BLD: 5.1 K/UL (ref 1.8–8)
NEUTS SEG # BLD: 5.2 K/UL (ref 1.8–8)
NEUTS SEG # BLD: 5.3 K/UL (ref 1.8–8)
NEUTS SEG # BLD: 5.3 K/UL (ref 1.8–8)
NEUTS SEG # BLD: 5.4 K/UL (ref 1.8–8)
NEUTS SEG # BLD: 5.5 K/UL (ref 1.8–8)
NEUTS SEG # BLD: 5.5 K/UL (ref 1.8–8)
NEUTS SEG # BLD: 5.6 K/UL (ref 1.8–8)
NEUTS SEG # BLD: 5.7 K/UL (ref 1.8–8)
NEUTS SEG # BLD: 6 K/UL (ref 1.8–8)
NEUTS SEG # BLD: 6.3 K/UL (ref 1.8–8)
NEUTS SEG # BLD: 6.5 K/UL (ref 1.8–8)
NEUTS SEG # BLD: 6.6 K/UL (ref 1.8–8)
NEUTS SEG # BLD: 8.5 K/UL (ref 1.8–8)
NEUTS SEG NFR BLD: 60 % (ref 32–75)
NEUTS SEG NFR BLD: 66 % (ref 32–75)
NEUTS SEG NFR BLD: 66 % (ref 32–75)
NEUTS SEG NFR BLD: 68 % (ref 32–75)
NEUTS SEG NFR BLD: 70 % (ref 32–75)
NEUTS SEG NFR BLD: 71 % (ref 32–75)
NEUTS SEG NFR BLD: 72 % (ref 32–75)
NEUTS SEG NFR BLD: 72 % (ref 32–75)
NEUTS SEG NFR BLD: 74 % (ref 32–75)
NEUTS SEG NFR BLD: 75 % (ref 32–75)
NEUTS SEG NFR BLD: 76 % (ref 32–75)
NEUTS SEG NFR BLD: 77 % (ref 32–75)
NEUTS SEG NFR BLD: 80 % (ref 32–75)
NEUTS SEG NFR BLD: 81 % (ref 32–75)
NEUTS SEG NFR BLD: 81 % (ref 32–75)
NEUTS SEG NFR BLD: 84 % (ref 32–75)
NEUTS SEG NFR BLD: 86 % (ref 32–75)
NEUTS SEG NFR BLD: 87 % (ref 32–75)
NEUTS SEG NFR BLD: 90 % (ref 32–75)
NITRITE UR QL STRIP.AUTO: NEGATIVE
NRBC # BLD: 0 K/UL (ref 0–0.01)
NRBC # BLD: 0.02 K/UL (ref 0–0.01)
NRBC # BLD: 0.03 K/UL (ref 0–0.01)
NRBC BLD AUTO-RTO: 1 %
NRBC BLD-RTO: 0 PER 100 WBC
NRBC BLD-RTO: 0.4 PER 100 WBC
NRBC BLD-RTO: 0.4 PER 100 WBC
NUC CELL # FLD: 3778 /CU MM
NUC CELL # FLD: 683 /CU MM
NUC CELL # FLD: 711 /CU MM
OTHER CELL,FOTHC: 4 %
OTHER CELL,FOTHC: 41 %
OTHER CELL,FOTHC: 75 %
P-R INTERVAL, ECG05: 156 MS
P-R INTERVAL, ECG05: 158 MS
P-R INTERVAL, ECG05: 164 MS
P-R INTERVAL, ECG05: 166 MS
PATH REV BLD -IMP: ABNORMAL
PCO2 BLDV: 40.7 MMHG (ref 41–51)
PCR REFLEX: ABNORMAL
PERIPHERAL SMEAR,PSM: NORMAL
PH BLDV: 7.29 [PH] (ref 7.32–7.42)
PH UR STRIP: 5 [PH] (ref 5–8)
PH UR STRIP: 5.5 [PH] (ref 5–8)
PH UR STRIP: 6 [PH] (ref 5–8)
PH UR STRIP: 6 [PH] (ref 5–8)
PHOSPHATE SERPL-MCNC: 2.5 MG/DL (ref 2.6–4.7)
PHOSPHATE SERPL-MCNC: 2.7 MG/DL (ref 2.6–4.7)
PHOSPHATE SERPL-MCNC: 2.8 MG/DL (ref 2.6–4.7)
PHOSPHATE SERPL-MCNC: 3.3 MG/DL (ref 2.6–4.7)
PHOSPHATE SERPL-MCNC: 3.6 MG/DL (ref 2.6–4.7)
PLATELET # BLD AUTO: 105 K/UL (ref 150–400)
PLATELET # BLD AUTO: 107 K/UL (ref 150–400)
PLATELET # BLD AUTO: 110 K/UL (ref 150–400)
PLATELET # BLD AUTO: 112 K/UL (ref 150–400)
PLATELET # BLD AUTO: 126 K/UL (ref 150–400)
PLATELET # BLD AUTO: 234 K/UL (ref 150–400)
PLATELET # BLD AUTO: 245 K/UL (ref 150–400)
PLATELET # BLD AUTO: 247 K/UL (ref 150–400)
PLATELET # BLD AUTO: 258 K/UL (ref 150–400)
PLATELET # BLD AUTO: 259 K/UL (ref 150–400)
PLATELET # BLD AUTO: 260 K/UL (ref 150–400)
PLATELET # BLD AUTO: 261 K/UL (ref 150–400)
PLATELET # BLD AUTO: 263 K/UL (ref 150–400)
PLATELET # BLD AUTO: 268 K/UL (ref 150–400)
PLATELET # BLD AUTO: 269 K/UL (ref 150–400)
PLATELET # BLD AUTO: 290 K/UL (ref 150–400)
PLATELET # BLD AUTO: 295 K/UL (ref 150–400)
PLATELET # BLD AUTO: 301 K/UL (ref 150–400)
PLATELET # BLD AUTO: 305 K/UL (ref 150–400)
PLATELET # BLD AUTO: 310 K/UL (ref 150–400)
PLATELET # BLD AUTO: 338 K/UL (ref 150–400)
PLATELET # BLD AUTO: 377 K/UL (ref 150–400)
PLATELET # BLD AUTO: 379 X10E3/UL (ref 150–379)
PLATELET # BLD AUTO: 92 X10E3/UL (ref 150–379)
PLATELET # BLD AUTO: 96 K/UL (ref 150–400)
PLATELET COMMENTS,PCOM: ABNORMAL
PLATELET COMMENTS,PCOM: ABNORMAL
PMV BLD AUTO: 10 FL (ref 8.9–12.9)
PMV BLD AUTO: 10.1 FL (ref 8.9–12.9)
PMV BLD AUTO: 10.3 FL (ref 8.9–12.9)
PMV BLD AUTO: 10.7 FL (ref 8.9–12.9)
PMV BLD AUTO: 10.8 FL (ref 8.9–12.9)
PMV BLD AUTO: 8.5 FL (ref 8.9–12.9)
PMV BLD AUTO: 8.5 FL (ref 8.9–12.9)
PMV BLD AUTO: 8.6 FL (ref 8.9–12.9)
PMV BLD AUTO: 8.7 FL (ref 8.9–12.9)
PMV BLD AUTO: 8.9 FL (ref 8.9–12.9)
PMV BLD AUTO: 9 FL (ref 8.9–12.9)
PMV BLD AUTO: 9.1 FL (ref 8.9–12.9)
PMV BLD AUTO: 9.1 FL (ref 8.9–12.9)
PMV BLD AUTO: 9.2 FL (ref 8.9–12.9)
PMV BLD AUTO: 9.3 FL (ref 8.9–12.9)
PMV BLD AUTO: 9.3 FL (ref 8.9–12.9)
PMV BLD AUTO: 9.9 FL (ref 8.9–12.9)
PO2 BLDV: 33 MMHG (ref 25–40)
POTASSIUM SERPL-SCNC: 3.3 MMOL/L (ref 3.5–5.1)
POTASSIUM SERPL-SCNC: 3.4 MMOL/L (ref 3.5–5.1)
POTASSIUM SERPL-SCNC: 3.5 MMOL/L (ref 3.5–5.1)
POTASSIUM SERPL-SCNC: 3.6 MMOL/L (ref 3.5–5.1)
POTASSIUM SERPL-SCNC: 3.7 MMOL/L (ref 3.5–5.1)
POTASSIUM SERPL-SCNC: 3.8 MMOL/L (ref 3.5–5.1)
POTASSIUM SERPL-SCNC: 3.9 MMOL/L (ref 3.5–5.1)
POTASSIUM SERPL-SCNC: 4 MMOL/L (ref 3.5–5.1)
POTASSIUM SERPL-SCNC: 4.1 MMOL/L (ref 3.5–5.1)
POTASSIUM SERPL-SCNC: 4.1 MMOL/L (ref 3.5–5.1)
POTASSIUM SERPL-SCNC: 4.2 MMOL/L (ref 3.5–5.1)
POTASSIUM SERPL-SCNC: 4.2 MMOL/L (ref 3.5–5.1)
POTASSIUM SERPL-SCNC: 4.4 MMOL/L (ref 3.5–5.1)
POTASSIUM SERPL-SCNC: 4.5 MMOL/L (ref 3.5–5.2)
POTASSIUM SERPL-SCNC: 4.5 MMOL/L (ref 3.5–5.2)
PROT SERPL-MCNC: 4 G/DL (ref 6.4–8.2)
PROT SERPL-MCNC: 5.4 G/DL (ref 6.4–8.2)
PROT SERPL-MCNC: 5.6 G/DL (ref 6.4–8.2)
PROT SERPL-MCNC: 5.6 G/DL (ref 6.4–8.2)
PROT SERPL-MCNC: 5.7 G/DL (ref 6.4–8.2)
PROT SERPL-MCNC: 5.7 G/DL (ref 6–8.5)
PROT SERPL-MCNC: 5.8 G/DL (ref 6.4–8.2)
PROT SERPL-MCNC: 5.9 G/DL (ref 6.4–8.2)
PROT SERPL-MCNC: 6 G/DL (ref 6.4–8.2)
PROT SERPL-MCNC: 6 G/DL (ref 6.4–8.2)
PROT SERPL-MCNC: 6.1 G/DL (ref 6.4–8.2)
PROT SERPL-MCNC: 6.1 G/DL (ref 6.4–8.2)
PROT SERPL-MCNC: 6.2 G/DL (ref 6.4–8.2)
PROT SERPL-MCNC: 6.2 G/DL (ref 6–8.5)
PROT SERPL-MCNC: 6.3 G/DL (ref 6.4–8.2)
PROT SERPL-MCNC: 6.4 G/DL (ref 6.4–8.2)
PROT SERPL-MCNC: 7.2 G/DL (ref 6.4–8.2)
PROT SERPL-MCNC: 7.4 G/DL (ref 6.4–8.2)
PROT UR STRIP-MCNC: 30 MG/DL
PROTHROMBIN TIME: 10 SEC (ref 9–11.1)
PROTHROMBIN TIME: 10.7 SEC (ref 9–11.1)
PROTHROMBIN TIME: 11.3 SEC (ref 9–11.1)
Q-T INTERVAL, ECG07: 392 MS
Q-T INTERVAL, ECG07: 398 MS
Q-T INTERVAL, ECG07: 400 MS
Q-T INTERVAL, ECG07: 434 MS
QRS DURATION, ECG06: 140 MS
QRS DURATION, ECG06: 140 MS
QRS DURATION, ECG06: 144 MS
QRS DURATION, ECG06: 144 MS
QTC CALCULATION (BEZET), ECG08: 463 MS
QTC CALCULATION (BEZET), ECG08: 494 MS
QTC CALCULATION (BEZET), ECG08: 494 MS
QTC CALCULATION (BEZET), ECG08: 516 MS
RBC # BLD AUTO: 2.47 M/UL (ref 3.8–5.2)
RBC # BLD AUTO: 2.49 X10E6/UL (ref 3.77–5.28)
RBC # BLD AUTO: 2.57 M/UL (ref 3.8–5.2)
RBC # BLD AUTO: 2.59 M/UL (ref 3.8–5.2)
RBC # BLD AUTO: 2.59 M/UL (ref 3.8–5.2)
RBC # BLD AUTO: 2.6 M/UL (ref 3.8–5.2)
RBC # BLD AUTO: 2.63 M/UL (ref 3.8–5.2)
RBC # BLD AUTO: 2.69 M/UL (ref 3.8–5.2)
RBC # BLD AUTO: 2.77 M/UL (ref 3.8–5.2)
RBC # BLD AUTO: 2.8 M/UL (ref 3.8–5.2)
RBC # BLD AUTO: 2.88 M/UL (ref 3.8–5.2)
RBC # BLD AUTO: 2.91 M/UL (ref 3.8–5.2)
RBC # BLD AUTO: 2.93 M/UL (ref 3.8–5.2)
RBC # BLD AUTO: 2.93 M/UL (ref 3.8–5.2)
RBC # BLD AUTO: 2.94 M/UL (ref 3.8–5.2)
RBC # BLD AUTO: 2.98 M/UL (ref 3.8–5.2)
RBC # BLD AUTO: 2.98 M/UL (ref 3.8–5.2)
RBC # BLD AUTO: 3.05 M/UL (ref 3.8–5.2)
RBC # BLD AUTO: 3.05 M/UL (ref 3.8–5.2)
RBC # BLD AUTO: 3.06 X10E6/UL (ref 3.77–5.28)
RBC # BLD AUTO: 3.12 M/UL (ref 3.8–5.2)
RBC # BLD AUTO: 3.12 M/UL (ref 3.8–5.2)
RBC # BLD AUTO: 3.2 M/UL (ref 3.8–5.2)
RBC # BLD AUTO: 3.55 M/UL (ref 3.8–5.2)
RBC # BLD AUTO: 3.91 M/UL (ref 3.8–5.2)
RBC # FLD: 100 /CU MM
RBC # FLD: >100 /CU MM
RBC # FLD: >100 /CU MM
RBC #/AREA URNS HPF: ABNORMAL /HPF (ref 0–5)
RBC MORPH BLD: ABNORMAL
RETICS # AUTO: 0.04 M/UL (ref 0.02–0.08)
RETICS/RBC NFR AUTO: 1.5 % (ref 0.7–2.1)
SAMPLES BEING HELD,HOLD: NORMAL
SAO2 % BLDV: 56 % (ref 65–88)
SERVICE CMNT-IMP: ABNORMAL
SERVICE CMNT-IMP: NORMAL
SODIUM SERPL-SCNC: 135 MMOL/L (ref 136–145)
SODIUM SERPL-SCNC: 136 MMOL/L (ref 136–145)
SODIUM SERPL-SCNC: 136 MMOL/L (ref 136–145)
SODIUM SERPL-SCNC: 137 MMOL/L (ref 136–145)
SODIUM SERPL-SCNC: 138 MMOL/L (ref 136–145)
SODIUM SERPL-SCNC: 139 MMOL/L (ref 134–144)
SODIUM SERPL-SCNC: 139 MMOL/L (ref 136–145)
SODIUM SERPL-SCNC: 141 MMOL/L (ref 134–144)
SODIUM SERPL-SCNC: 141 MMOL/L (ref 136–145)
SODIUM SERPL-SCNC: 142 MMOL/L (ref 136–145)
SODIUM SERPL-SCNC: 143 MMOL/L (ref 136–145)
SODIUM SERPL-SCNC: 143 MMOL/L (ref 136–145)
SODIUM SERPL-SCNC: 144 MMOL/L (ref 136–145)
SP GR UR REFRACTOMETRY: 1.02 (ref 1–1.03)
SP GR UR REFRACTOMETRY: 1.03 (ref 1–1.03)
SPECIMEN SOURCE FLD: ABNORMAL
SPECIMEN SOURCE FLD: NORMAL
SPECIMEN SOURCE FLD: NORMAL
SPECIMEN TYPE: ABNORMAL
THERAPEUTIC RANGE,PTTT: NORMAL SECS (ref 58–77)
THERAPEUTIC RANGE,PTTT: NORMAL SECS (ref 58–77)
TIBC SERPL-MCNC: 215 UG/DL (ref 250–450)
TIBC SERPL-MCNC: 282 UG/DL (ref 250–450)
TOTAL RESP. RATE, ITRR: 22
TRIGL SERPL-MCNC: 167 MG/DL (ref ?–150)
TROPONIN I SERPL-MCNC: 0.1 NG/ML
TROPONIN I SERPL-MCNC: 0.23 NG/ML
UR CULT HOLD, URHOLD: NORMAL
UR CULT HOLD, URHOLD: NORMAL
URATE SERPL-MCNC: 1.2 MG/DL (ref 2.6–6)
URATE SERPL-MCNC: 1.3 MG/DL (ref 2.6–6)
URATE SERPL-MCNC: 1.9 MG/DL (ref 2.6–6)
URATE SERPL-MCNC: 1.9 MG/DL (ref 2.6–6)
URATE SERPL-MCNC: 2.2 MG/DL (ref 2.6–6)
URATE SERPL-MCNC: 2.6 MG/DL (ref 2.6–6)
URATE SERPL-MCNC: 5.1 MG/DL (ref 2.6–6)
URATE SERPL-MCNC: 8.3 MG/DL (ref 2.6–6)
URATE SERPL-MCNC: 8.9 MG/DL (ref 2.6–6)
UROBILINOGEN UR QL STRIP.AUTO: 0.2 EU/DL (ref 0.2–1)
VENTRICULAR RATE, ECG03: 101 BPM
VENTRICULAR RATE, ECG03: 78 BPM
VENTRICULAR RATE, ECG03: 84 BPM
VENTRICULAR RATE, ECG03: 92 BPM
VIT B12 SERPL-MCNC: 545 PG/ML (ref 211–911)
VIT B12 SERPL-MCNC: >2000 PG/ML (ref 193–986)
VLDLC SERPL CALC-MCNC: 33.4 MG/DL
WBC # BLD AUTO: 11.5 K/UL (ref 3.6–11)
WBC # BLD AUTO: 19.8 K/UL (ref 3.6–11)
WBC # BLD AUTO: 5 K/UL (ref 3.6–11)
WBC # BLD AUTO: 5 X10E3/UL (ref 3.4–10.8)
WBC # BLD AUTO: 5.6 K/UL (ref 3.6–11)
WBC # BLD AUTO: 5.8 K/UL (ref 3.6–11)
WBC # BLD AUTO: 6.2 K/UL (ref 3.6–11)
WBC # BLD AUTO: 6.2 K/UL (ref 3.6–11)
WBC # BLD AUTO: 6.4 K/UL (ref 3.6–11)
WBC # BLD AUTO: 6.4 K/UL (ref 3.6–11)
WBC # BLD AUTO: 6.5 K/UL (ref 3.6–11)
WBC # BLD AUTO: 6.6 K/UL (ref 3.6–11)
WBC # BLD AUTO: 6.7 K/UL (ref 3.6–11)
WBC # BLD AUTO: 7 K/UL (ref 3.6–11)
WBC # BLD AUTO: 7 K/UL (ref 3.6–11)
WBC # BLD AUTO: 7.4 K/UL (ref 3.6–11)
WBC # BLD AUTO: 7.8 K/UL (ref 3.6–11)
WBC # BLD AUTO: 8 K/UL (ref 3.6–11)
WBC # BLD AUTO: 8.1 K/UL (ref 3.6–11)
WBC # BLD AUTO: 8.2 K/UL (ref 3.6–11)
WBC # BLD AUTO: 8.6 K/UL (ref 3.6–11)
WBC # BLD AUTO: 9.1 K/UL (ref 3.6–11)
WBC # BLD AUTO: 9.2 X10E3/UL (ref 3.4–10.8)
WBC MORPH BLD: ABNORMAL
WBC URNS QL MICRO: ABNORMAL /HPF (ref 0–4)
XXWBCSUS: 0

## 2018-01-01 PROCEDURE — 74011250637 HC RX REV CODE- 250/637: Performed by: FAMILY MEDICINE

## 2018-01-01 PROCEDURE — 85025 COMPLETE CBC W/AUTO DIFF WBC: CPT | Performed by: PHYSICIAN ASSISTANT

## 2018-01-01 PROCEDURE — 88360 TUMOR IMMUNOHISTOCHEM/MANUAL: CPT | Performed by: OBSTETRICS & GYNECOLOGY

## 2018-01-01 PROCEDURE — 77030012965 HC NDL HUBR BBMI -A

## 2018-01-01 PROCEDURE — 74011250636 HC RX REV CODE- 250/636: Performed by: RADIOLOGY

## 2018-01-01 PROCEDURE — 80053 COMPREHEN METABOLIC PANEL: CPT | Performed by: NURSE PRACTITIONER

## 2018-01-01 PROCEDURE — 74011250636 HC RX REV CODE- 250/636: Performed by: INTERNAL MEDICINE

## 2018-01-01 PROCEDURE — 77386 HC IMRT TRMT DLVR COMPL: CPT

## 2018-01-01 PROCEDURE — 96375 TX/PRO/DX INJ NEW DRUG ADDON: CPT

## 2018-01-01 PROCEDURE — 96361 HYDRATE IV INFUSION ADD-ON: CPT

## 2018-01-01 PROCEDURE — 82140 ASSAY OF AMMONIA: CPT | Performed by: FAMILY MEDICINE

## 2018-01-01 PROCEDURE — 74011250637 HC RX REV CODE- 250/637

## 2018-01-01 PROCEDURE — 84484 ASSAY OF TROPONIN QUANT: CPT | Performed by: INTERNAL MEDICINE

## 2018-01-01 PROCEDURE — 74011000258 HC RX REV CODE- 258: Performed by: FAMILY MEDICINE

## 2018-01-01 PROCEDURE — 88342 IMHCHEM/IMCYTCHM 1ST ANTB: CPT | Performed by: EMERGENCY MEDICINE

## 2018-01-01 PROCEDURE — 80076 HEPATIC FUNCTION PANEL: CPT | Performed by: INTERNAL MEDICINE

## 2018-01-01 PROCEDURE — 85025 COMPLETE CBC W/AUTO DIFF WBC: CPT | Performed by: INTERNAL MEDICINE

## 2018-01-01 PROCEDURE — 36415 COLL VENOUS BLD VENIPUNCTURE: CPT | Performed by: NURSE PRACTITIONER

## 2018-01-01 PROCEDURE — 83540 ASSAY OF IRON: CPT | Performed by: INTERNAL MEDICINE

## 2018-01-01 PROCEDURE — 84100 ASSAY OF PHOSPHORUS: CPT | Performed by: HOSPITALIST

## 2018-01-01 PROCEDURE — 74011250636 HC RX REV CODE- 250/636: Performed by: PHYSICIAN ASSISTANT

## 2018-01-01 PROCEDURE — 74011636637 HC RX REV CODE- 636/637: Performed by: INTERNAL MEDICINE

## 2018-01-01 PROCEDURE — 80048 BASIC METABOLIC PNL TOTAL CA: CPT | Performed by: INTERNAL MEDICINE

## 2018-01-01 PROCEDURE — 36415 COLL VENOUS BLD VENIPUNCTURE: CPT | Performed by: PHYSICIAN ASSISTANT

## 2018-01-01 PROCEDURE — 77030010507 HC ADH SKN DERMBND J&J -B

## 2018-01-01 PROCEDURE — 36415 COLL VENOUS BLD VENIPUNCTURE: CPT | Performed by: FAMILY MEDICINE

## 2018-01-01 PROCEDURE — 96372 THER/PROPH/DIAG INJ SC/IM: CPT

## 2018-01-01 PROCEDURE — 36415 COLL VENOUS BLD VENIPUNCTURE: CPT | Performed by: INTERNAL MEDICINE

## 2018-01-01 PROCEDURE — 82728 ASSAY OF FERRITIN: CPT | Performed by: INTERNAL MEDICINE

## 2018-01-01 PROCEDURE — 77030011943

## 2018-01-01 PROCEDURE — 82607 VITAMIN B-12: CPT | Performed by: NURSE PRACTITIONER

## 2018-01-01 PROCEDURE — 83735 ASSAY OF MAGNESIUM: CPT | Performed by: NURSE PRACTITIONER

## 2018-01-01 PROCEDURE — 74011250636 HC RX REV CODE- 250/636: Performed by: HOSPITALIST

## 2018-01-01 PROCEDURE — 96367 TX/PROPH/DG ADDL SEQ IV INF: CPT

## 2018-01-01 PROCEDURE — 80053 COMPREHEN METABOLIC PANEL: CPT | Performed by: PHYSICIAN ASSISTANT

## 2018-01-01 PROCEDURE — 74011250637 HC RX REV CODE- 250/637: Performed by: NURSE PRACTITIONER

## 2018-01-01 PROCEDURE — 93306 TTE W/DOPPLER COMPLETE: CPT | Performed by: SPECIALIST

## 2018-01-01 PROCEDURE — 85025 COMPLETE CBC W/AUTO DIFF WBC: CPT | Performed by: NURSE PRACTITIONER

## 2018-01-01 PROCEDURE — 74011000258 HC RX REV CODE- 258: Performed by: INTERNAL MEDICINE

## 2018-01-01 PROCEDURE — 74011000250 HC RX REV CODE- 250: Performed by: RADIOLOGY

## 2018-01-01 PROCEDURE — 88305 TISSUE EXAM BY PATHOLOGIST: CPT | Performed by: OBSTETRICS & GYNECOLOGY

## 2018-01-01 PROCEDURE — 65270000029 HC RM PRIVATE

## 2018-01-01 PROCEDURE — 74011000258 HC RX REV CODE- 258: Performed by: EMERGENCY MEDICINE

## 2018-01-01 PROCEDURE — 74011250637 HC RX REV CODE- 250/637: Performed by: INTERNAL MEDICINE

## 2018-01-01 PROCEDURE — 71045 X-RAY EXAM CHEST 1 VIEW: CPT

## 2018-01-01 PROCEDURE — 36415 COLL VENOUS BLD VENIPUNCTURE: CPT | Performed by: EMERGENCY MEDICINE

## 2018-01-01 PROCEDURE — 87205 SMEAR GRAM STAIN: CPT | Performed by: FAMILY MEDICINE

## 2018-01-01 PROCEDURE — 74011000250 HC RX REV CODE- 250: Performed by: FAMILY MEDICINE

## 2018-01-01 PROCEDURE — 74011250636 HC RX REV CODE- 250/636: Performed by: ANESTHESIOLOGY

## 2018-01-01 PROCEDURE — 74011250636 HC RX REV CODE- 250/636

## 2018-01-01 PROCEDURE — 99218 HC RM OBSERVATION: CPT

## 2018-01-01 PROCEDURE — 85610 PROTHROMBIN TIME: CPT | Performed by: NURSE PRACTITIONER

## 2018-01-01 PROCEDURE — 83605 ASSAY OF LACTIC ACID: CPT | Performed by: INTERNAL MEDICINE

## 2018-01-01 PROCEDURE — 80053 COMPREHEN METABOLIC PANEL: CPT | Performed by: EMERGENCY MEDICINE

## 2018-01-01 PROCEDURE — 74011250636 HC RX REV CODE- 250/636: Performed by: FAMILY MEDICINE

## 2018-01-01 PROCEDURE — 83615 LACTATE (LD) (LDH) ENZYME: CPT | Performed by: INTERNAL MEDICINE

## 2018-01-01 PROCEDURE — 85730 THROMBOPLASTIN TIME PARTIAL: CPT | Performed by: EMERGENCY MEDICINE

## 2018-01-01 PROCEDURE — 83615 LACTATE (LD) (LDH) ENZYME: CPT | Performed by: NURSE PRACTITIONER

## 2018-01-01 PROCEDURE — 87449 NOS EACH ORGANISM AG IA: CPT | Performed by: HOSPITALIST

## 2018-01-01 PROCEDURE — 83605 ASSAY OF LACTIC ACID: CPT | Performed by: EMERGENCY MEDICINE

## 2018-01-01 PROCEDURE — 76705 ECHO EXAM OF ABDOMEN: CPT

## 2018-01-01 PROCEDURE — 77030003560 HC NDL HUBR BARD -A

## 2018-01-01 PROCEDURE — 96374 THER/PROPH/DIAG INJ IV PUSH: CPT

## 2018-01-01 PROCEDURE — 93005 ELECTROCARDIOGRAM TRACING: CPT

## 2018-01-01 PROCEDURE — 94762 N-INVAS EAR/PLS OXIMTRY CONT: CPT

## 2018-01-01 PROCEDURE — 74011000250 HC RX REV CODE- 250

## 2018-01-01 PROCEDURE — 77030005208 HC CATH HLDR GLWC -A

## 2018-01-01 PROCEDURE — 85027 COMPLETE CBC AUTOMATED: CPT | Performed by: HOSPITALIST

## 2018-01-01 PROCEDURE — 80048 BASIC METABOLIC PNL TOTAL CA: CPT | Performed by: HOSPITALIST

## 2018-01-01 PROCEDURE — 77030013079 HC BLNKT BAIR HGGR 3M -A: Performed by: ANESTHESIOLOGY

## 2018-01-01 PROCEDURE — A9552 F18 FDG: HCPCS

## 2018-01-01 PROCEDURE — 36569 INSJ PICC 5 YR+ W/O IMAGING: CPT | Performed by: NURSE PRACTITIONER

## 2018-01-01 PROCEDURE — 85025 COMPLETE CBC W/AUTO DIFF WBC: CPT | Performed by: EMERGENCY MEDICINE

## 2018-01-01 PROCEDURE — 74011250636 HC RX REV CODE- 250/636: Performed by: NURSE PRACTITIONER

## 2018-01-01 PROCEDURE — 77412 RADIATION TX DELIVERY LVL 3: CPT

## 2018-01-01 PROCEDURE — 81001 URINALYSIS AUTO W/SCOPE: CPT | Performed by: PHYSICIAN ASSISTANT

## 2018-01-01 PROCEDURE — 77470 SPECIAL RADIATION TREATMENT: CPT

## 2018-01-01 PROCEDURE — 74011000250 HC RX REV CODE- 250: Performed by: OBSTETRICS & GYNECOLOGY

## 2018-01-01 PROCEDURE — 77387 GUIDANCE FOR RADJ TX DLVR: CPT

## 2018-01-01 PROCEDURE — 80061 LIPID PANEL: CPT | Performed by: FAMILY MEDICINE

## 2018-01-01 PROCEDURE — 80053 COMPREHEN METABOLIC PANEL: CPT | Performed by: HOSPITALIST

## 2018-01-01 PROCEDURE — 74176 CT ABD & PELVIS W/O CONTRAST: CPT

## 2018-01-01 PROCEDURE — DW011ZZ BEAM RADIATION OF HEAD AND NECK USING PHOTONS 1 - 10 MEV: ICD-10-PCS | Performed by: RADIOLOGY

## 2018-01-01 PROCEDURE — 84484 ASSAY OF TROPONIN QUANT: CPT | Performed by: PHYSICIAN ASSISTANT

## 2018-01-01 PROCEDURE — 80053 COMPREHEN METABOLIC PANEL: CPT | Performed by: INTERNAL MEDICINE

## 2018-01-01 PROCEDURE — 80048 BASIC METABOLIC PNL TOTAL CA: CPT | Performed by: FAMILY MEDICINE

## 2018-01-01 PROCEDURE — 3E04305 INTRODUCTION OF OTHER ANTINEOPLASTIC INTO CENTRAL VEIN, PERCUTANEOUS APPROACH: ICD-10-PCS | Performed by: INTERNAL MEDICINE

## 2018-01-01 PROCEDURE — 65660000000 HC RM CCU STEPDOWN

## 2018-01-01 PROCEDURE — 77030010545

## 2018-01-01 PROCEDURE — A9270 NON-COVERED ITEM OR SERVICE: HCPCS | Performed by: INTERNAL MEDICINE

## 2018-01-01 PROCEDURE — 83735 ASSAY OF MAGNESIUM: CPT | Performed by: HOSPITALIST

## 2018-01-01 PROCEDURE — 84100 ASSAY OF PHOSPHORUS: CPT | Performed by: NURSE PRACTITIONER

## 2018-01-01 PROCEDURE — 77030008684 HC TU ET CUF COVD -B: Performed by: ANESTHESIOLOGY

## 2018-01-01 PROCEDURE — 77338 DESIGN MLC DEVICE FOR IMRT: CPT

## 2018-01-01 PROCEDURE — 74011250637 HC RX REV CODE- 250/637: Performed by: HOSPITALIST

## 2018-01-01 PROCEDURE — 81001 URINALYSIS AUTO W/SCOPE: CPT | Performed by: EMERGENCY MEDICINE

## 2018-01-01 PROCEDURE — C1729 CATH, DRAINAGE: HCPCS

## 2018-01-01 PROCEDURE — 82803 BLOOD GASES ANY COMBINATION: CPT

## 2018-01-01 PROCEDURE — 82042 OTHER SOURCE ALBUMIN QUAN EA: CPT | Performed by: INTERNAL MEDICINE

## 2018-01-01 PROCEDURE — 99284 EMERGENCY DEPT VISIT MOD MDM: CPT

## 2018-01-01 PROCEDURE — 77030031139 HC SUT VCRL2 J&J -A

## 2018-01-01 PROCEDURE — C1751 CATH, INF, PER/CENT/MIDLINE: HCPCS

## 2018-01-01 PROCEDURE — 77300 RADIATION THERAPY DOSE PLAN: CPT

## 2018-01-01 PROCEDURE — 88342 IMHCHEM/IMCYTCHM 1ST ANTB: CPT | Performed by: OBSTETRICS & GYNECOLOGY

## 2018-01-01 PROCEDURE — 87493 C DIFF AMPLIFIED PROBE: CPT | Performed by: HOSPITALIST

## 2018-01-01 PROCEDURE — 74177 CT ABD & PELVIS W/CONTRAST: CPT

## 2018-01-01 PROCEDURE — 77030012935 HC DRSG AQUACEL BMS -B

## 2018-01-01 PROCEDURE — 83735 ASSAY OF MAGNESIUM: CPT | Performed by: INTERNAL MEDICINE

## 2018-01-01 PROCEDURE — 83605 ASSAY OF LACTIC ACID: CPT | Performed by: FAMILY MEDICINE

## 2018-01-01 PROCEDURE — C1769 GUIDE WIRE: HCPCS

## 2018-01-01 PROCEDURE — A9575 INJ GADOTERATE MEGLUMI 0.1ML: HCPCS | Performed by: NURSE PRACTITIONER

## 2018-01-01 PROCEDURE — 96409 CHEMO IV PUSH SNGL DRUG: CPT

## 2018-01-01 PROCEDURE — 76010000138 HC OR TIME 0.5 TO 1 HR: Performed by: OBSTETRICS & GYNECOLOGY

## 2018-01-01 PROCEDURE — 77030032490 HC SLV COMPR SCD KNE COVD -B: Performed by: OBSTETRICS & GYNECOLOGY

## 2018-01-01 PROCEDURE — 94760 N-INVAS EAR/PLS OXIMETRY 1: CPT

## 2018-01-01 PROCEDURE — 88112 CYTOPATH CELL ENHANCE TECH: CPT | Performed by: HOSPITALIST

## 2018-01-01 PROCEDURE — C1894 INTRO/SHEATH, NON-LASER: HCPCS

## 2018-01-01 PROCEDURE — 74011250636 HC RX REV CODE- 250/636: Performed by: EMERGENCY MEDICINE

## 2018-01-01 PROCEDURE — 77030003666 HC NDL SPINAL BD -A

## 2018-01-01 PROCEDURE — 96411 CHEMO IV PUSH ADDL DRUG: CPT

## 2018-01-01 PROCEDURE — 77336 RADIATION PHYSICS CONSULT: CPT

## 2018-01-01 PROCEDURE — 76210000006 HC OR PH I REC 0.5 TO 1 HR: Performed by: OBSTETRICS & GYNECOLOGY

## 2018-01-01 PROCEDURE — 85610 PROTHROMBIN TIME: CPT | Performed by: INTERNAL MEDICINE

## 2018-01-01 PROCEDURE — 85730 THROMBOPLASTIN TIME PARTIAL: CPT | Performed by: INTERNAL MEDICINE

## 2018-01-01 PROCEDURE — 85025 COMPLETE CBC W/AUTO DIFF WBC: CPT | Performed by: FAMILY MEDICINE

## 2018-01-01 PROCEDURE — 77010033678 HC OXYGEN DAILY

## 2018-01-01 PROCEDURE — 77301 RADIOTHERAPY DOSE PLAN IMRT: CPT

## 2018-01-01 PROCEDURE — 76830 TRANSVAGINAL US NON-OB: CPT

## 2018-01-01 PROCEDURE — 71046 X-RAY EXAM CHEST 2 VIEWS: CPT

## 2018-01-01 PROCEDURE — 85610 PROTHROMBIN TIME: CPT | Performed by: EMERGENCY MEDICINE

## 2018-01-01 PROCEDURE — 70553 MRI BRAIN STEM W/O & W/DYE: CPT

## 2018-01-01 PROCEDURE — 88184 FLOWCYTOMETRY/ TC 1 MARKER: CPT | Performed by: RADIOLOGY

## 2018-01-01 PROCEDURE — 76210000020 HC REC RM PH II FIRST 0.5 HR: Performed by: OBSTETRICS & GYNECOLOGY

## 2018-01-01 PROCEDURE — 96413 CHEMO IV INFUSION 1 HR: CPT

## 2018-01-01 PROCEDURE — 74011000250 HC RX REV CODE- 250: Performed by: NURSE PRACTITIONER

## 2018-01-01 PROCEDURE — 77030018719 HC DRSG PTCH ANTIMIC J&J -A

## 2018-01-01 PROCEDURE — 89050 BODY FLUID CELL COUNT: CPT | Performed by: INTERNAL MEDICINE

## 2018-01-01 PROCEDURE — 87086 URINE CULTURE/COLONY COUNT: CPT | Performed by: EMERGENCY MEDICINE

## 2018-01-01 PROCEDURE — 88185 FLOWCYTOMETRY/TC ADD-ON: CPT | Performed by: RADIOLOGY

## 2018-01-01 PROCEDURE — 77334 RADIATION TREATMENT AID(S): CPT

## 2018-01-01 PROCEDURE — P9047 ALBUMIN (HUMAN), 25%, 50ML: HCPCS | Performed by: HOSPITALIST

## 2018-01-01 PROCEDURE — 84550 ASSAY OF BLOOD/URIC ACID: CPT | Performed by: NURSE PRACTITIONER

## 2018-01-01 PROCEDURE — 77030020782 HC GWN BAIR PAWS FLX 3M -B

## 2018-01-01 PROCEDURE — 36415 COLL VENOUS BLD VENIPUNCTURE: CPT | Performed by: HOSPITALIST

## 2018-01-01 PROCEDURE — 84550 ASSAY OF BLOOD/URIC ACID: CPT | Performed by: INTERNAL MEDICINE

## 2018-01-01 PROCEDURE — 49083 ABD PARACENTESIS W/IMAGING: CPT

## 2018-01-01 PROCEDURE — 88341 IMHCHEM/IMCYTCHM EA ADD ANTB: CPT | Performed by: RADIOLOGY

## 2018-01-01 PROCEDURE — 74011636320 HC RX REV CODE- 636/320: Performed by: EMERGENCY MEDICINE

## 2018-01-01 PROCEDURE — 0W9G3ZZ DRAINAGE OF PERITONEAL CAVITY, PERCUTANEOUS APPROACH: ICD-10-PCS | Performed by: RADIOLOGY

## 2018-01-01 PROCEDURE — 87040 BLOOD CULTURE FOR BACTERIA: CPT | Performed by: INTERNAL MEDICINE

## 2018-01-01 PROCEDURE — 83540 ASSAY OF IRON: CPT | Performed by: NURSE PRACTITIONER

## 2018-01-01 PROCEDURE — 82248 BILIRUBIN DIRECT: CPT | Performed by: HOSPITALIST

## 2018-01-01 PROCEDURE — 74011000258 HC RX REV CODE- 258: Performed by: OBSTETRICS & GYNECOLOGY

## 2018-01-01 PROCEDURE — 87086 URINE CULTURE/COLONY COUNT: CPT | Performed by: FAMILY MEDICINE

## 2018-01-01 PROCEDURE — 76937 US GUIDE VASCULAR ACCESS: CPT

## 2018-01-01 PROCEDURE — 02HV33Z INSERTION OF INFUSION DEVICE INTO SUPERIOR VENA CAVA, PERCUTANEOUS APPROACH: ICD-10-PCS | Performed by: INTERNAL MEDICINE

## 2018-01-01 PROCEDURE — 83690 ASSAY OF LIPASE: CPT | Performed by: PHYSICIAN ASSISTANT

## 2018-01-01 PROCEDURE — 82728 ASSAY OF FERRITIN: CPT | Performed by: NURSE PRACTITIONER

## 2018-01-01 PROCEDURE — 85025 COMPLETE CBC W/AUTO DIFF WBC: CPT | Performed by: HOSPITALIST

## 2018-01-01 PROCEDURE — 80048 BASIC METABOLIC PNL TOTAL CA: CPT | Performed by: NURSE PRACTITIONER

## 2018-01-01 PROCEDURE — 77030003503 HC NDL BIOP TISS BD -B: Performed by: OBSTETRICS & GYNECOLOGY

## 2018-01-01 PROCEDURE — 0W9G30Z DRAINAGE OF PERITONEAL CAVITY WITH DRAINAGE DEVICE, PERCUTANEOUS APPROACH: ICD-10-PCS | Performed by: RADIOLOGY

## 2018-01-01 PROCEDURE — 87205 SMEAR GRAM STAIN: CPT | Performed by: INTERNAL MEDICINE

## 2018-01-01 PROCEDURE — 99285 EMERGENCY DEPT VISIT HI MDM: CPT

## 2018-01-01 PROCEDURE — 77290 THER RAD SIMULAJ FIELD CPLX: CPT

## 2018-01-01 PROCEDURE — 88305 TISSUE EXAM BY PATHOLOGIST: CPT | Performed by: EMERGENCY MEDICINE

## 2018-01-01 PROCEDURE — 76770 US EXAM ABDO BACK WALL COMP: CPT

## 2018-01-01 PROCEDURE — 32555 ASPIRATE PLEURA W/ IMAGING: CPT

## 2018-01-01 PROCEDURE — 83690 ASSAY OF LIPASE: CPT | Performed by: EMERGENCY MEDICINE

## 2018-01-01 PROCEDURE — 76450000000

## 2018-01-01 PROCEDURE — 82962 GLUCOSE BLOOD TEST: CPT

## 2018-01-01 PROCEDURE — 88112 CYTOPATH CELL ENHANCE TECH: CPT | Performed by: RADIOLOGY

## 2018-01-01 PROCEDURE — 51701 INSERT BLADDER CATHETER: CPT

## 2018-01-01 PROCEDURE — 76856 US EXAM PELVIC COMPLETE: CPT

## 2018-01-01 PROCEDURE — 89050 BODY FLUID CELL COUNT: CPT | Performed by: HOSPITALIST

## 2018-01-01 PROCEDURE — 74011000258 HC RX REV CODE- 258: Performed by: NURSE PRACTITIONER

## 2018-01-01 PROCEDURE — 36591 DRAW BLOOD OFF VENOUS DEVICE: CPT

## 2018-01-01 PROCEDURE — 96366 THER/PROPH/DIAG IV INF ADDON: CPT

## 2018-01-01 PROCEDURE — 99283 EMERGENCY DEPT VISIT LOW MDM: CPT

## 2018-01-01 PROCEDURE — DW061ZZ BEAM RADIATION OF PELVIC REGION USING PHOTONS 1 - 10 MEV: ICD-10-PCS | Performed by: RADIOLOGY

## 2018-01-01 PROCEDURE — 83615 LACTATE (LD) (LDH) ENZYME: CPT | Performed by: FAMILY MEDICINE

## 2018-01-01 PROCEDURE — 96360 HYDRATION IV INFUSION INIT: CPT

## 2018-01-01 PROCEDURE — 88305 TISSUE EXAM BY PATHOLOGIST: CPT | Performed by: RADIOLOGY

## 2018-01-01 PROCEDURE — 85027 COMPLETE CBC AUTOMATED: CPT | Performed by: INTERNAL MEDICINE

## 2018-01-01 PROCEDURE — 93306 TTE W/DOPPLER COMPLETE: CPT

## 2018-01-01 PROCEDURE — 89050 BODY FLUID CELL COUNT: CPT | Performed by: FAMILY MEDICINE

## 2018-01-01 PROCEDURE — 77014 CT GUIDED THERAPY PLAN/PLACEMENT: CPT

## 2018-01-01 PROCEDURE — 94761 N-INVAS EAR/PLS OXIMETRY MLT: CPT

## 2018-01-01 PROCEDURE — 88342 IMHCHEM/IMCYTCHM 1ST ANTB: CPT | Performed by: RADIOLOGY

## 2018-01-01 PROCEDURE — 88108 CYTOPATH CONCENTRATE TECH: CPT | Performed by: EMERGENCY MEDICINE

## 2018-01-01 PROCEDURE — 82746 ASSAY OF FOLIC ACID SERUM: CPT | Performed by: NURSE PRACTITIONER

## 2018-01-01 PROCEDURE — 77030020847 HC STATLOK BARD -A

## 2018-01-01 PROCEDURE — 93971 EXTREMITY STUDY: CPT

## 2018-01-01 PROCEDURE — 77417 THER RADIOLOGY PORT IMAGE(S): CPT

## 2018-01-01 PROCEDURE — 88341 IMHCHEM/IMCYTCHM EA ADD ANTB: CPT | Performed by: OBSTETRICS & GYNECOLOGY

## 2018-01-01 PROCEDURE — 77295 3-D RADIOTHERAPY PLAN: CPT

## 2018-01-01 PROCEDURE — 88341 IMHCHEM/IMCYTCHM EA ADD ANTB: CPT | Performed by: EMERGENCY MEDICINE

## 2018-01-01 PROCEDURE — 88112 CYTOPATH CELL ENHANCE TECH: CPT | Performed by: EMERGENCY MEDICINE

## 2018-01-01 PROCEDURE — 85045 AUTOMATED RETICULOCYTE COUNT: CPT | Performed by: INTERNAL MEDICINE

## 2018-01-01 PROCEDURE — P9047 ALBUMIN (HUMAN), 25%, 50ML: HCPCS | Performed by: FAMILY MEDICINE

## 2018-01-01 PROCEDURE — 82272 OCCULT BLD FECES 1-3 TESTS: CPT | Performed by: HOSPITALIST

## 2018-01-01 PROCEDURE — C1788 PORT, INDWELLING, IMP: HCPCS

## 2018-01-01 PROCEDURE — 77030018836 HC SOL IRR NACL ICUM -A

## 2018-01-01 PROCEDURE — 76060000032 HC ANESTHESIA 0.5 TO 1 HR: Performed by: OBSTETRICS & GYNECOLOGY

## 2018-01-01 PROCEDURE — 82607 VITAMIN B-12: CPT | Performed by: INTERNAL MEDICINE

## 2018-01-01 RX ORDER — FUROSEMIDE 10 MG/ML
40 INJECTION INTRAMUSCULAR; INTRAVENOUS ONCE
Status: DISCONTINUED | OUTPATIENT
Start: 2018-01-01 | End: 2018-01-01

## 2018-01-01 RX ORDER — SODIUM CHLORIDE 0.9 % (FLUSH) 0.9 %
5-10 SYRINGE (ML) INJECTION EVERY 8 HOURS
Status: DISCONTINUED | OUTPATIENT
Start: 2018-01-01 | End: 2018-01-01 | Stop reason: HOSPADM

## 2018-01-01 RX ORDER — PALONOSETRON 0.05 MG/ML
0.25 INJECTION, SOLUTION INTRAVENOUS ONCE
Status: COMPLETED | OUTPATIENT
Start: 2018-01-01 | End: 2018-01-01

## 2018-01-01 RX ORDER — ACETAMINOPHEN 500 MG
1000 TABLET ORAL EVERY 6 HOURS
Status: DISCONTINUED | OUTPATIENT
Start: 2018-01-01 | End: 2018-01-01 | Stop reason: HOSPADM

## 2018-01-01 RX ORDER — METHYLPREDNISOLONE 4 MG/1
TABLET ORAL
Qty: 1 DOSE PACK | Refills: 0 | Status: SHIPPED | OUTPATIENT
Start: 2018-01-01 | End: 2018-01-01

## 2018-01-01 RX ORDER — DOXORUBICIN HYDROCHLORIDE 2 MG/ML
44 INJECTION, SOLUTION INTRAVENOUS ONCE
Status: COMPLETED | OUTPATIENT
Start: 2018-01-01 | End: 2018-01-01

## 2018-01-01 RX ORDER — ONDANSETRON 2 MG/ML
4 INJECTION INTRAMUSCULAR; INTRAVENOUS
Status: DISCONTINUED | OUTPATIENT
Start: 2018-01-01 | End: 2018-01-01 | Stop reason: HOSPADM

## 2018-01-01 RX ORDER — ATENOLOL 25 MG/1
12.5 TABLET ORAL DAILY
COMMUNITY

## 2018-01-01 RX ORDER — FAMOTIDINE 20 MG/1
20 TABLET, FILM COATED ORAL
Status: DISCONTINUED | OUTPATIENT
Start: 2018-01-01 | End: 2018-01-01 | Stop reason: HOSPADM

## 2018-01-01 RX ORDER — POTASSIUM CHLORIDE 750 MG/1
40 TABLET, FILM COATED, EXTENDED RELEASE ORAL
Status: COMPLETED | OUTPATIENT
Start: 2018-01-01 | End: 2018-01-01

## 2018-01-01 RX ORDER — SODIUM CHLORIDE 0.9 % (FLUSH) 0.9 %
5-10 SYRINGE (ML) INJECTION AS NEEDED
Status: DISCONTINUED | OUTPATIENT
Start: 2018-01-01 | End: 2018-01-01 | Stop reason: HOSPADM

## 2018-01-01 RX ORDER — LIDOCAINE HYDROCHLORIDE 10 MG/ML
10 INJECTION, SOLUTION EPIDURAL; INFILTRATION; INTRACAUDAL; PERINEURAL
Status: COMPLETED | OUTPATIENT
Start: 2018-01-01 | End: 2018-01-01

## 2018-01-01 RX ORDER — ATENOLOL 25 MG/1
25 TABLET ORAL DAILY
Status: DISCONTINUED | OUTPATIENT
Start: 2018-01-01 | End: 2018-01-01 | Stop reason: HOSPADM

## 2018-01-01 RX ORDER — GABAPENTIN 100 MG/1
100 CAPSULE ORAL 2 TIMES DAILY
Status: DISCONTINUED | OUTPATIENT
Start: 2018-01-01 | End: 2018-01-01

## 2018-01-01 RX ORDER — BUMETANIDE 0.25 MG/ML
2 INJECTION INTRAMUSCULAR; INTRAVENOUS ONCE
Status: COMPLETED | OUTPATIENT
Start: 2018-01-01 | End: 2018-01-01

## 2018-01-01 RX ORDER — GABAPENTIN 300 MG/1
300 CAPSULE ORAL DAILY
Status: DISCONTINUED | OUTPATIENT
Start: 2018-01-01 | End: 2018-01-01 | Stop reason: HOSPADM

## 2018-01-01 RX ORDER — SODIUM CHLORIDE 9 MG/ML
100 INJECTION, SOLUTION INTRAVENOUS CONTINUOUS
Status: DISPENSED | OUTPATIENT
Start: 2018-01-01 | End: 2018-01-01

## 2018-01-01 RX ORDER — PREDNISONE 50 MG/1
100 TABLET ORAL ONCE
Status: COMPLETED | OUTPATIENT
Start: 2018-01-01 | End: 2018-01-01

## 2018-01-01 RX ORDER — MIDAZOLAM HYDROCHLORIDE 1 MG/ML
1 INJECTION, SOLUTION INTRAMUSCULAR; INTRAVENOUS AS NEEDED
Status: DISCONTINUED | OUTPATIENT
Start: 2018-01-01 | End: 2018-01-01 | Stop reason: HOSPADM

## 2018-01-01 RX ORDER — CYANOCOBALAMIN 1000 UG/ML
1000 INJECTION, SOLUTION INTRAMUSCULAR; SUBCUTANEOUS
Status: DISCONTINUED | OUTPATIENT
Start: 2018-01-01 | End: 2018-01-01 | Stop reason: HOSPADM

## 2018-01-01 RX ORDER — LIDOCAINE HYDROCHLORIDE 10 MG/ML
10 INJECTION, SOLUTION EPIDURAL; INFILTRATION; INTRACAUDAL; PERINEURAL
Status: DISCONTINUED | OUTPATIENT
Start: 2018-01-01 | End: 2018-01-01

## 2018-01-01 RX ORDER — SODIUM CHLORIDE, SODIUM LACTATE, POTASSIUM CHLORIDE, CALCIUM CHLORIDE 600; 310; 30; 20 MG/100ML; MG/100ML; MG/100ML; MG/100ML
100 INJECTION, SOLUTION INTRAVENOUS CONTINUOUS
Status: DISCONTINUED | OUTPATIENT
Start: 2018-01-01 | End: 2018-01-01 | Stop reason: HOSPADM

## 2018-01-01 RX ORDER — HEPARIN 100 UNIT/ML
500 SYRINGE INTRAVENOUS AS NEEDED
Status: ACTIVE | OUTPATIENT
Start: 2018-01-01 | End: 2018-01-01

## 2018-01-01 RX ORDER — AMOXICILLIN AND CLAVULANATE POTASSIUM 875; 125 MG/1; MG/1
TABLET, FILM COATED ORAL
COMMUNITY
Start: 2018-01-01 | End: 2018-01-01

## 2018-01-01 RX ORDER — SODIUM CHLORIDE 0.9 % (FLUSH) 0.9 %
5-10 SYRINGE (ML) INJECTION AS NEEDED
Status: ACTIVE | OUTPATIENT
Start: 2018-01-01 | End: 2018-01-01

## 2018-01-01 RX ORDER — ONDANSETRON 2 MG/ML
8 INJECTION INTRAMUSCULAR; INTRAVENOUS
Status: COMPLETED | OUTPATIENT
Start: 2018-01-01 | End: 2018-01-01

## 2018-01-01 RX ORDER — POTASSIUM CHLORIDE 750 MG/1
40 TABLET, FILM COATED, EXTENDED RELEASE ORAL DAILY
Status: COMPLETED | OUTPATIENT
Start: 2018-01-01 | End: 2018-01-01

## 2018-01-01 RX ORDER — HYDROCODONE BITARTRATE AND ACETAMINOPHEN 5; 325 MG/1; MG/1
1 TABLET ORAL
Qty: 50 TAB | Refills: 0 | Status: SHIPPED | OUTPATIENT
Start: 2018-01-01 | End: 2018-01-01

## 2018-01-01 RX ORDER — TRAMADOL HYDROCHLORIDE 50 MG/1
50 TABLET ORAL
Qty: 30 TAB | Refills: 0 | Status: SHIPPED | OUTPATIENT
Start: 2018-01-01 | End: 2018-01-01

## 2018-01-01 RX ORDER — MIDAZOLAM HYDROCHLORIDE 1 MG/ML
.5-1 INJECTION, SOLUTION INTRAMUSCULAR; INTRAVENOUS
Status: DISCONTINUED | OUTPATIENT
Start: 2018-01-01 | End: 2018-01-01 | Stop reason: HOSPADM

## 2018-01-01 RX ORDER — POTASSIUM CHLORIDE 1500 MG/1
TABLET, EXTENDED RELEASE ORAL
COMMUNITY
Start: 2018-01-01 | End: 2018-01-01

## 2018-01-01 RX ORDER — DIPHENHYDRAMINE HYDROCHLORIDE 50 MG/ML
12.5 INJECTION, SOLUTION INTRAMUSCULAR; INTRAVENOUS AS NEEDED
Status: DISCONTINUED | OUTPATIENT
Start: 2018-01-01 | End: 2018-01-01 | Stop reason: HOSPADM

## 2018-01-01 RX ORDER — DOXORUBICIN HYDROCHLORIDE 2 MG/ML
43 INJECTION, SOLUTION INTRAVENOUS ONCE
Status: COMPLETED | OUTPATIENT
Start: 2018-01-01 | End: 2018-01-01

## 2018-01-01 RX ORDER — ATENOLOL 25 MG/1
25 TABLET ORAL DAILY
Qty: 30 TAB | Refills: 0 | Status: SHIPPED
Start: 2018-01-01 | End: 2018-01-01 | Stop reason: SDUPTHER

## 2018-01-01 RX ORDER — MELATONIN
1000 DAILY
Status: DISCONTINUED | OUTPATIENT
Start: 2018-01-01 | End: 2018-01-01

## 2018-01-01 RX ORDER — SODIUM CHLORIDE 9 MG/ML
100 INJECTION, SOLUTION INTRAVENOUS CONTINUOUS
Status: DISCONTINUED | OUTPATIENT
Start: 2018-01-01 | End: 2018-01-01

## 2018-01-01 RX ORDER — SODIUM CHLORIDE 0.9 % (FLUSH) 0.9 %
10 SYRINGE (ML) INJECTION EVERY 24 HOURS
Status: DISCONTINUED | OUTPATIENT
Start: 2018-01-01 | End: 2018-01-01 | Stop reason: HOSPADM

## 2018-01-01 RX ORDER — DOCUSATE SODIUM 100 MG/1
100 CAPSULE, LIQUID FILLED ORAL
COMMUNITY
End: 2018-01-01

## 2018-01-01 RX ORDER — GABAPENTIN 300 MG/1
300 CAPSULE ORAL DAILY
Status: DISCONTINUED | OUTPATIENT
Start: 2018-01-01 | End: 2018-01-01

## 2018-01-01 RX ORDER — SODIUM CHLORIDE 9 MG/ML
25 INJECTION, SOLUTION INTRAVENOUS CONTINUOUS
Status: DISCONTINUED | OUTPATIENT
Start: 2018-01-01 | End: 2018-01-01 | Stop reason: HOSPADM

## 2018-01-01 RX ORDER — AMOXICILLIN AND CLAVULANATE POTASSIUM 875; 125 MG/1; MG/1
1 TABLET, FILM COATED ORAL EVERY 12 HOURS
Qty: 20 TAB | Refills: 0 | Status: SHIPPED | OUTPATIENT
Start: 2018-01-01 | End: 2018-01-01 | Stop reason: SDUPTHER

## 2018-01-01 RX ORDER — SODIUM CHLORIDE 0.9 % (FLUSH) 0.9 %
10 SYRINGE (ML) INJECTION
Status: COMPLETED | OUTPATIENT
Start: 2018-01-01 | End: 2018-01-01

## 2018-01-01 RX ORDER — ACETAMINOPHEN 500 MG
1000 TABLET ORAL
Status: COMPLETED | OUTPATIENT
Start: 2018-01-01 | End: 2018-01-01

## 2018-01-01 RX ORDER — ACETAMINOPHEN 325 MG/1
TABLET ORAL
Status: COMPLETED
Start: 2018-01-01 | End: 2018-01-01

## 2018-01-01 RX ORDER — GADOTERATE MEGLUMINE 376.9 MG/ML
13 INJECTION INTRAVENOUS
Status: COMPLETED | OUTPATIENT
Start: 2018-01-01 | End: 2018-01-01

## 2018-01-01 RX ORDER — OXYCODONE HYDROCHLORIDE 5 MG/1
5 TABLET ORAL AS NEEDED
Status: DISCONTINUED | OUTPATIENT
Start: 2018-01-01 | End: 2018-01-01 | Stop reason: HOSPADM

## 2018-01-01 RX ORDER — MORPHINE SULFATE 10 MG/ML
2 INJECTION, SOLUTION INTRAMUSCULAR; INTRAVENOUS
Status: DISCONTINUED | OUTPATIENT
Start: 2018-01-01 | End: 2018-01-01 | Stop reason: HOSPADM

## 2018-01-01 RX ORDER — FUROSEMIDE 10 MG/ML
40 INJECTION INTRAMUSCULAR; INTRAVENOUS DAILY
Status: DISCONTINUED | OUTPATIENT
Start: 2018-01-01 | End: 2018-01-01

## 2018-01-01 RX ORDER — GABAPENTIN 300 MG/1
600 CAPSULE ORAL 2 TIMES DAILY
Status: DISCONTINUED | OUTPATIENT
Start: 2018-01-01 | End: 2018-01-01

## 2018-01-01 RX ORDER — LEVOTHYROXINE SODIUM 75 UG/1
150 TABLET ORAL
Status: DISCONTINUED | OUTPATIENT
Start: 2018-01-01 | End: 2018-01-01 | Stop reason: HOSPADM

## 2018-01-01 RX ORDER — PROPOFOL 10 MG/ML
INJECTION, EMULSION INTRAVENOUS AS NEEDED
Status: DISCONTINUED | OUTPATIENT
Start: 2018-01-01 | End: 2018-01-01 | Stop reason: HOSPADM

## 2018-01-01 RX ORDER — HEPARIN SODIUM 5000 [USP'U]/ML
5000 INJECTION, SOLUTION INTRAVENOUS; SUBCUTANEOUS EVERY 8 HOURS
Status: DISCONTINUED | OUTPATIENT
Start: 2018-01-01 | End: 2018-01-01

## 2018-01-01 RX ORDER — VANCOMYCIN 1.75 GRAM/500 ML IN 0.9 % SODIUM CHLORIDE INTRAVENOUS
1750 ONCE
Status: COMPLETED | OUTPATIENT
Start: 2018-01-01 | End: 2018-01-01

## 2018-01-01 RX ORDER — ALLOPURINOL 100 MG/1
100 TABLET ORAL EVERY 8 HOURS
Status: DISCONTINUED | OUTPATIENT
Start: 2018-01-01 | End: 2018-01-01

## 2018-01-01 RX ORDER — SUCCINYLCHOLINE CHLORIDE 20 MG/ML
INJECTION INTRAMUSCULAR; INTRAVENOUS AS NEEDED
Status: DISCONTINUED | OUTPATIENT
Start: 2018-01-01 | End: 2018-01-01 | Stop reason: HOSPADM

## 2018-01-01 RX ORDER — LIDOCAINE HYDROCHLORIDE 10 MG/ML
10 INJECTION INFILTRATION; PERINEURAL
Status: ACTIVE | OUTPATIENT
Start: 2018-01-01 | End: 2018-01-01

## 2018-01-01 RX ORDER — ONDANSETRON 8 MG/1
8 TABLET, ORALLY DISINTEGRATING ORAL
Qty: 30 TAB | Refills: 2 | Status: SHIPPED | OUTPATIENT
Start: 2018-01-01 | End: 2018-01-01

## 2018-01-01 RX ORDER — LANOLIN ALCOHOL/MO/W.PET/CERES
400 CREAM (GRAM) TOPICAL 2 TIMES DAILY
Status: COMPLETED | OUTPATIENT
Start: 2018-01-01 | End: 2018-01-01

## 2018-01-01 RX ORDER — ONDANSETRON HYDROCHLORIDE 4 MG/5ML
4 SOLUTION ORAL
Qty: 50 ML | Refills: 0 | Status: SHIPPED | OUTPATIENT
Start: 2018-01-01 | End: 2018-01-01

## 2018-01-01 RX ORDER — SODIUM CHLORIDE, SODIUM LACTATE, POTASSIUM CHLORIDE, CALCIUM CHLORIDE 600; 310; 30; 20 MG/100ML; MG/100ML; MG/100ML; MG/100ML
25 INJECTION, SOLUTION INTRAVENOUS CONTINUOUS
Status: DISCONTINUED | OUTPATIENT
Start: 2018-01-01 | End: 2018-01-01 | Stop reason: HOSPADM

## 2018-01-01 RX ORDER — SODIUM CHLORIDE 0.9 % (FLUSH) 0.9 %
10-30 SYRINGE (ML) INJECTION AS NEEDED
Status: DISCONTINUED | OUTPATIENT
Start: 2018-01-01 | End: 2018-01-01 | Stop reason: HOSPADM

## 2018-01-01 RX ORDER — FENTANYL CITRATE 50 UG/ML
50 INJECTION, SOLUTION INTRAMUSCULAR; INTRAVENOUS AS NEEDED
Status: DISCONTINUED | OUTPATIENT
Start: 2018-01-01 | End: 2018-01-01 | Stop reason: HOSPADM

## 2018-01-01 RX ORDER — METRONIDAZOLE 500 MG/100ML
500 INJECTION, SOLUTION INTRAVENOUS EVERY 12 HOURS
Status: DISCONTINUED | OUTPATIENT
Start: 2018-01-01 | End: 2018-01-01

## 2018-01-01 RX ORDER — CEFAZOLIN SODIUM/WATER 2 G/20 ML
2 SYRINGE (ML) INTRAVENOUS ONCE
Status: COMPLETED | OUTPATIENT
Start: 2018-01-01 | End: 2018-01-01

## 2018-01-01 RX ORDER — TRAMADOL HYDROCHLORIDE 50 MG/1
50 TABLET ORAL
Status: DISCONTINUED | OUTPATIENT
Start: 2018-01-01 | End: 2018-01-01

## 2018-01-01 RX ORDER — FENTANYL CITRATE 50 UG/ML
25-50 INJECTION, SOLUTION INTRAMUSCULAR; INTRAVENOUS
Status: DISCONTINUED | OUTPATIENT
Start: 2018-01-01 | End: 2018-01-01 | Stop reason: HOSPADM

## 2018-01-01 RX ORDER — ATENOLOL 25 MG/1
12.5 TABLET ORAL
Status: DISCONTINUED | OUTPATIENT
Start: 2018-01-01 | End: 2018-01-01

## 2018-01-01 RX ORDER — ATENOLOL 25 MG/1
12.5 TABLET ORAL ONCE
Status: COMPLETED | OUTPATIENT
Start: 2018-01-01 | End: 2018-01-01

## 2018-01-01 RX ORDER — FENTANYL CITRATE 50 UG/ML
25 INJECTION, SOLUTION INTRAMUSCULAR; INTRAVENOUS
Status: DISCONTINUED | OUTPATIENT
Start: 2018-01-01 | End: 2018-01-01 | Stop reason: HOSPADM

## 2018-01-01 RX ORDER — VANCOMYCIN HYDROCHLORIDE 250 MG/5ML
125 POWDER, FOR SOLUTION ORAL EVERY 6 HOURS
Status: DISCONTINUED | OUTPATIENT
Start: 2018-01-01 | End: 2018-01-01 | Stop reason: HOSPADM

## 2018-01-01 RX ORDER — SODIUM CHLORIDE 9 MG/ML
10 INJECTION INTRAMUSCULAR; INTRAVENOUS; SUBCUTANEOUS AS NEEDED
Status: ACTIVE | OUTPATIENT
Start: 2018-01-01 | End: 2018-01-01

## 2018-01-01 RX ORDER — LIDOCAINE HYDROCHLORIDE 10 MG/ML
0.1 INJECTION, SOLUTION EPIDURAL; INFILTRATION; INTRACAUDAL; PERINEURAL AS NEEDED
Status: DISCONTINUED | OUTPATIENT
Start: 2018-01-01 | End: 2018-01-01 | Stop reason: HOSPADM

## 2018-01-01 RX ORDER — ONDANSETRON 2 MG/ML
4 INJECTION INTRAMUSCULAR; INTRAVENOUS
Status: DISCONTINUED | OUTPATIENT
Start: 2018-01-01 | End: 2018-01-01

## 2018-01-01 RX ORDER — AMITRIPTYLINE HYDROCHLORIDE 25 MG/1
12.5 TABLET, FILM COATED ORAL DAILY
Status: DISCONTINUED | OUTPATIENT
Start: 2018-01-01 | End: 2018-01-01

## 2018-01-01 RX ORDER — METRONIDAZOLE 250 MG/1
500 TABLET ORAL EVERY 12 HOURS
Status: DISCONTINUED | OUTPATIENT
Start: 2018-01-01 | End: 2018-01-01

## 2018-01-01 RX ORDER — FENTANYL CITRATE 50 UG/ML
25-200 INJECTION, SOLUTION INTRAMUSCULAR; INTRAVENOUS
Status: DISCONTINUED | OUTPATIENT
Start: 2018-01-01 | End: 2018-01-01 | Stop reason: HOSPADM

## 2018-01-01 RX ORDER — LIDOCAINE HYDROCHLORIDE AND EPINEPHRINE 20; 10 MG/ML; UG/ML
1.5 INJECTION, SOLUTION INFILTRATION; PERINEURAL ONCE
Status: COMPLETED | OUTPATIENT
Start: 2018-01-01 | End: 2018-01-01

## 2018-01-01 RX ORDER — AMOXICILLIN AND CLAVULANATE POTASSIUM 875; 125 MG/1; MG/1
1 TABLET, FILM COATED ORAL 2 TIMES DAILY
Qty: 20 TAB | Refills: 1 | Status: ON HOLD | OUTPATIENT
Start: 2018-01-01 | End: 2018-01-01

## 2018-01-01 RX ORDER — HEPARIN 100 UNIT/ML
300 SYRINGE INTRAVENOUS AS NEEDED
Status: DISCONTINUED | OUTPATIENT
Start: 2018-01-01 | End: 2018-01-01 | Stop reason: HOSPADM

## 2018-01-01 RX ORDER — GABAPENTIN 300 MG/1
600 CAPSULE ORAL 2 TIMES DAILY
Qty: 180 CAP | Refills: 2
Start: 2018-01-01 | End: 2018-09-21

## 2018-01-01 RX ORDER — PREDNISONE 20 MG/1
100 TABLET ORAL DAILY
Status: COMPLETED | OUTPATIENT
Start: 2018-01-01 | End: 2018-01-01

## 2018-01-01 RX ORDER — GABAPENTIN 300 MG/1
300 CAPSULE ORAL 3 TIMES DAILY
Status: DISCONTINUED | OUTPATIENT
Start: 2018-01-01 | End: 2018-01-01

## 2018-01-01 RX ORDER — ACETAMINOPHEN 325 MG/1
650 TABLET ORAL
Status: DISCONTINUED | OUTPATIENT
Start: 2018-01-01 | End: 2018-01-01

## 2018-01-01 RX ORDER — SODIUM CHLORIDE 9 MG/ML
150 INJECTION, SOLUTION INTRAVENOUS CONTINUOUS
Status: DISCONTINUED | OUTPATIENT
Start: 2018-01-01 | End: 2018-01-01 | Stop reason: HOSPADM

## 2018-01-01 RX ORDER — PREDNISONE 20 MG/1
TABLET ORAL
Qty: 100 TAB | Refills: 0 | Status: SHIPPED | OUTPATIENT
Start: 2018-01-01 | End: 2018-01-01

## 2018-01-01 RX ORDER — POTASSIUM CHLORIDE 750 MG/1
20 TABLET, FILM COATED, EXTENDED RELEASE ORAL DAILY
COMMUNITY
End: 2018-01-01

## 2018-01-01 RX ORDER — GABAPENTIN 300 MG/1
CAPSULE ORAL
COMMUNITY
Start: 2018-01-01 | End: 2018-01-01

## 2018-01-01 RX ORDER — SODIUM CHLORIDE 0.9 % (FLUSH) 0.9 %
20 SYRINGE (ML) INJECTION AS NEEDED
Status: DISCONTINUED | OUTPATIENT
Start: 2018-01-01 | End: 2018-01-01 | Stop reason: HOSPADM

## 2018-01-01 RX ORDER — FENTANYL CITRATE 50 UG/ML
50 INJECTION, SOLUTION INTRAMUSCULAR; INTRAVENOUS
Status: COMPLETED | OUTPATIENT
Start: 2018-01-01 | End: 2018-01-01

## 2018-01-01 RX ORDER — GABAPENTIN 300 MG/1
300 CAPSULE ORAL 2 TIMES DAILY
Qty: 60 CAP | Refills: 2 | Status: SHIPPED | OUTPATIENT
Start: 2018-01-01 | End: 2018-01-01

## 2018-01-01 RX ORDER — CEPHALEXIN 250 MG/1
250 CAPSULE ORAL 4 TIMES DAILY
Qty: 28 CAP | Refills: 0 | Status: SHIPPED | OUTPATIENT
Start: 2018-01-01 | End: 2018-01-01 | Stop reason: ALTCHOICE

## 2018-01-01 RX ORDER — SODIUM CHLORIDE 0.9 % (FLUSH) 0.9 %
10-40 SYRINGE (ML) INJECTION EVERY 8 HOURS
Status: DISCONTINUED | OUTPATIENT
Start: 2018-01-01 | End: 2018-01-01 | Stop reason: HOSPADM

## 2018-01-01 RX ORDER — MORPHINE SULFATE ORAL SOLUTION 10 MG/5ML
5 SOLUTION ORAL
Qty: 30 ML | Refills: 0 | Status: SHIPPED | OUTPATIENT
Start: 2018-01-01

## 2018-01-01 RX ORDER — GRANISETRON HYDROCHLORIDE 1 MG/ML
1 INJECTION INTRAVENOUS EVERY 24 HOURS
Status: COMPLETED | OUTPATIENT
Start: 2018-01-01 | End: 2018-01-01

## 2018-01-01 RX ORDER — AMITRIPTYLINE HYDROCHLORIDE 25 MG/1
25 TABLET, FILM COATED ORAL DAILY
Status: DISCONTINUED | OUTPATIENT
Start: 2018-01-01 | End: 2018-01-01 | Stop reason: HOSPADM

## 2018-01-01 RX ORDER — LEVOTHYROXINE SODIUM 150 UG/1
150 TABLET ORAL
Status: DISCONTINUED | OUTPATIENT
Start: 2018-01-01 | End: 2018-01-01 | Stop reason: HOSPADM

## 2018-01-01 RX ORDER — CALCIUM CARBONATE 200(500)MG
400 TABLET,CHEWABLE ORAL AS NEEDED
Status: DISCONTINUED | OUTPATIENT
Start: 2018-01-01 | End: 2018-01-01 | Stop reason: HOSPADM

## 2018-01-01 RX ORDER — SODIUM CHLORIDE 9 MG/ML
125 INJECTION, SOLUTION INTRAVENOUS CONTINUOUS
Status: DISCONTINUED | OUTPATIENT
Start: 2018-01-01 | End: 2018-01-01

## 2018-01-01 RX ORDER — FENTANYL CITRATE 50 UG/ML
INJECTION, SOLUTION INTRAMUSCULAR; INTRAVENOUS AS NEEDED
Status: DISCONTINUED | OUTPATIENT
Start: 2018-01-01 | End: 2018-01-01 | Stop reason: HOSPADM

## 2018-01-01 RX ORDER — HEPARIN 100 UNIT/ML
300-500 SYRINGE INTRAVENOUS AS NEEDED
Status: DISCONTINUED | OUTPATIENT
Start: 2018-01-01 | End: 2018-01-01 | Stop reason: HOSPADM

## 2018-01-01 RX ORDER — GABAPENTIN 300 MG/1
600 CAPSULE ORAL 3 TIMES DAILY
Status: DISCONTINUED | OUTPATIENT
Start: 2018-01-01 | End: 2018-01-01 | Stop reason: HOSPADM

## 2018-01-01 RX ORDER — HYDROMORPHONE HYDROCHLORIDE 1 MG/ML
0.2 INJECTION, SOLUTION INTRAMUSCULAR; INTRAVENOUS; SUBCUTANEOUS
Status: DISCONTINUED | OUTPATIENT
Start: 2018-01-01 | End: 2018-01-01 | Stop reason: HOSPADM

## 2018-01-01 RX ORDER — SODIUM CHLORIDE 0.9 % (FLUSH) 0.9 %
10 SYRINGE (ML) INJECTION EVERY 8 HOURS
Status: DISCONTINUED | OUTPATIENT
Start: 2018-01-01 | End: 2018-01-01 | Stop reason: HOSPADM

## 2018-01-01 RX ORDER — GABAPENTIN 300 MG/1
600 CAPSULE ORAL 3 TIMES DAILY
Qty: 180 CAP | Refills: 2 | Status: SHIPPED | OUTPATIENT
Start: 2018-01-01 | End: 2018-01-01 | Stop reason: SDUPTHER

## 2018-01-01 RX ORDER — GABAPENTIN 300 MG/1
600 CAPSULE ORAL 2 TIMES DAILY
Status: DISCONTINUED | OUTPATIENT
Start: 2018-01-01 | End: 2018-01-01 | Stop reason: HOSPADM

## 2018-01-01 RX ORDER — LIDOCAINE HYDROCHLORIDE 10 MG/ML
INJECTION, SOLUTION EPIDURAL; INFILTRATION; INTRACAUDAL; PERINEURAL
Status: COMPLETED
Start: 2018-01-01 | End: 2018-01-01

## 2018-01-01 RX ORDER — AMOXICILLIN AND CLAVULANATE POTASSIUM 875; 125 MG/1; MG/1
1 TABLET, FILM COATED ORAL EVERY 12 HOURS
Qty: 20 TAB | Refills: 0 | Status: SHIPPED | OUTPATIENT
Start: 2018-01-01 | End: 2018-01-01

## 2018-01-01 RX ORDER — PHENYLEPHRINE HCL IN 0.9% NACL 0.4MG/10ML
SYRINGE (ML) INTRAVENOUS AS NEEDED
Status: DISCONTINUED | OUTPATIENT
Start: 2018-01-01 | End: 2018-01-01 | Stop reason: HOSPADM

## 2018-01-01 RX ORDER — MELATONIN
1000 DAILY
Status: DISCONTINUED | OUTPATIENT
Start: 2018-01-01 | End: 2018-01-01 | Stop reason: HOSPADM

## 2018-01-01 RX ORDER — POTASSIUM CHLORIDE 750 MG/1
40 TABLET, FILM COATED, EXTENDED RELEASE ORAL 2 TIMES DAILY
Status: DISCONTINUED | OUTPATIENT
Start: 2018-01-01 | End: 2018-01-01 | Stop reason: HOSPADM

## 2018-01-01 RX ORDER — LIDOCAINE HYDROCHLORIDE 20 MG/ML
INJECTION, SOLUTION EPIDURAL; INFILTRATION; INTRACAUDAL; PERINEURAL AS NEEDED
Status: DISCONTINUED | OUTPATIENT
Start: 2018-01-01 | End: 2018-01-01 | Stop reason: HOSPADM

## 2018-01-01 RX ORDER — SODIUM CHLORIDE 0.9 % (FLUSH) 0.9 %
10 SYRINGE (ML) INJECTION AS NEEDED
Status: DISCONTINUED | OUTPATIENT
Start: 2018-01-01 | End: 2018-01-01 | Stop reason: HOSPADM

## 2018-01-01 RX ORDER — BACITRACIN 500 UNIT/G
1 PACKET (EA) TOPICAL AS NEEDED
Status: DISCONTINUED | OUTPATIENT
Start: 2018-01-01 | End: 2018-01-01 | Stop reason: HOSPADM

## 2018-01-01 RX ORDER — MORPHINE SULFATE ORAL SOLUTION 10 MG/5ML
5 SOLUTION ORAL
Status: DISCONTINUED | OUTPATIENT
Start: 2018-01-01 | End: 2018-01-01 | Stop reason: HOSPADM

## 2018-01-01 RX ORDER — LIDOCAINE HYDROCHLORIDE 10 MG/ML
10 INJECTION INFILTRATION; PERINEURAL
Status: COMPLETED | OUTPATIENT
Start: 2018-01-01 | End: 2018-01-01

## 2018-01-01 RX ORDER — ONDANSETRON 2 MG/ML
4 INJECTION INTRAMUSCULAR; INTRAVENOUS
Status: COMPLETED | OUTPATIENT
Start: 2018-01-01 | End: 2018-01-01

## 2018-01-01 RX ORDER — ALLOPURINOL 100 MG/1
200 TABLET ORAL EVERY 8 HOURS
Qty: 180 TAB | Refills: 0 | Status: SHIPPED | OUTPATIENT
Start: 2018-01-01 | End: 2018-01-01

## 2018-01-01 RX ORDER — POTASSIUM CHLORIDE 14.9 MG/ML
10 INJECTION INTRAVENOUS
Status: COMPLETED | OUTPATIENT
Start: 2018-01-01 | End: 2018-01-01

## 2018-01-01 RX ORDER — ALBUMIN HUMAN 250 G/1000ML
12.5 SOLUTION INTRAVENOUS EVERY 6 HOURS
Status: DISCONTINUED | OUTPATIENT
Start: 2018-01-01 | End: 2018-01-01

## 2018-01-01 RX ORDER — HEPARIN SODIUM 200 [USP'U]/100ML
500 INJECTION, SOLUTION INTRAVENOUS ONCE
Status: COMPLETED | OUTPATIENT
Start: 2018-01-01 | End: 2018-01-01

## 2018-01-01 RX ORDER — AMITRIPTYLINE HYDROCHLORIDE 25 MG/1
25 TABLET, FILM COATED ORAL DAILY
Qty: 30 TAB | Refills: 1 | Status: SHIPPED | OUTPATIENT
Start: 2018-01-01 | End: 2018-01-01 | Stop reason: SDUPTHER

## 2018-01-01 RX ORDER — AMITRIPTYLINE HYDROCHLORIDE 25 MG/1
12.5 TABLET, FILM COATED ORAL DAILY
Qty: 30 TAB | Refills: 1
Start: 2018-01-01 | End: 2018-09-21

## 2018-01-01 RX ORDER — MAGNESIUM SULFATE HEPTAHYDRATE 40 MG/ML
2 INJECTION, SOLUTION INTRAVENOUS ONCE
Status: COMPLETED | OUTPATIENT
Start: 2018-01-01 | End: 2018-01-01

## 2018-01-01 RX ORDER — SODIUM CHLORIDE 0.9 % (FLUSH) 0.9 %
10-40 SYRINGE (ML) INJECTION AS NEEDED
Status: ACTIVE | OUTPATIENT
Start: 2018-01-01 | End: 2018-01-01

## 2018-01-01 RX ORDER — GABAPENTIN 300 MG/1
600 CAPSULE ORAL ONCE
Status: COMPLETED | OUTPATIENT
Start: 2018-01-01 | End: 2018-01-01

## 2018-01-01 RX ORDER — FUROSEMIDE 10 MG/ML
40 INJECTION INTRAMUSCULAR; INTRAVENOUS 3 TIMES DAILY
Status: DISCONTINUED | OUTPATIENT
Start: 2018-01-01 | End: 2018-01-01 | Stop reason: HOSPADM

## 2018-01-01 RX ORDER — ALBUMIN HUMAN 250 G/1000ML
12.5 SOLUTION INTRAVENOUS EVERY 6 HOURS
Status: COMPLETED | OUTPATIENT
Start: 2018-01-01 | End: 2018-01-01

## 2018-01-01 RX ORDER — ACETAMINOPHEN 325 MG/1
650 TABLET ORAL
COMMUNITY

## 2018-01-01 RX ORDER — ROCURONIUM BROMIDE 10 MG/ML
INJECTION, SOLUTION INTRAVENOUS AS NEEDED
Status: DISCONTINUED | OUTPATIENT
Start: 2018-01-01 | End: 2018-01-01

## 2018-01-01 RX ORDER — AMITRIPTYLINE HYDROCHLORIDE 25 MG/1
12.5 TABLET, FILM COATED ORAL ONCE
Status: COMPLETED | OUTPATIENT
Start: 2018-01-01 | End: 2018-01-01

## 2018-01-01 RX ORDER — FUROSEMIDE 10 MG/ML
40 INJECTION INTRAMUSCULAR; INTRAVENOUS 2 TIMES DAILY
Status: DISCONTINUED | OUTPATIENT
Start: 2018-01-01 | End: 2018-01-01

## 2018-01-01 RX ORDER — ROPIVACAINE HYDROCHLORIDE 5 MG/ML
30 INJECTION, SOLUTION EPIDURAL; INFILTRATION; PERINEURAL AS NEEDED
Status: DISCONTINUED | OUTPATIENT
Start: 2018-01-01 | End: 2018-01-01 | Stop reason: HOSPADM

## 2018-01-01 RX ORDER — SODIUM CHLORIDE, SODIUM LACTATE, POTASSIUM CHLORIDE, CALCIUM CHLORIDE 600; 310; 30; 20 MG/100ML; MG/100ML; MG/100ML; MG/100ML
INJECTION, SOLUTION INTRAVENOUS
Status: DISCONTINUED | OUTPATIENT
Start: 2018-01-01 | End: 2018-01-01 | Stop reason: HOSPADM

## 2018-01-01 RX ORDER — FLUCONAZOLE 100 MG/1
200 TABLET ORAL DAILY
Status: DISCONTINUED | OUTPATIENT
Start: 2018-01-01 | End: 2018-01-01

## 2018-01-01 RX ORDER — ONDANSETRON 2 MG/ML
4 INJECTION INTRAMUSCULAR; INTRAVENOUS AS NEEDED
Status: DISCONTINUED | OUTPATIENT
Start: 2018-01-01 | End: 2018-01-01 | Stop reason: HOSPADM

## 2018-01-01 RX ORDER — GABAPENTIN 300 MG/1
300 CAPSULE ORAL DAILY
Qty: 30 CAP | Refills: 0 | Status: SHIPPED | OUTPATIENT
Start: 2018-01-01 | End: 2018-01-01 | Stop reason: SDUPTHER

## 2018-01-01 RX ORDER — LIDOCAINE AND PRILOCAINE 25; 25 MG/G; MG/G
CREAM TOPICAL AS NEEDED
Status: DISCONTINUED | OUTPATIENT
Start: 2018-01-01 | End: 2018-01-01 | Stop reason: HOSPADM

## 2018-01-01 RX ORDER — MIDAZOLAM HYDROCHLORIDE 1 MG/ML
0.5 INJECTION, SOLUTION INTRAMUSCULAR; INTRAVENOUS
Status: DISCONTINUED | OUTPATIENT
Start: 2018-01-01 | End: 2018-01-01 | Stop reason: HOSPADM

## 2018-01-01 RX ORDER — CHLORAMBUCIL 2 MG/1
TABLET, FILM COATED ORAL
Status: ON HOLD | COMMUNITY
Start: 2017-01-01 | End: 2018-01-01

## 2018-01-01 RX ORDER — FAMOTIDINE 20 MG/1
20 TABLET, FILM COATED ORAL DAILY
Status: DISCONTINUED | OUTPATIENT
Start: 2018-01-01 | End: 2018-01-01

## 2018-01-01 RX ORDER — LIDOCAINE HYDROCHLORIDE 10 MG/ML
5-20 INJECTION INFILTRATION; PERINEURAL
Status: DISCONTINUED | OUTPATIENT
Start: 2018-01-01 | End: 2018-01-01 | Stop reason: HOSPADM

## 2018-01-01 RX ORDER — ALLOPURINOL 100 MG/1
200 TABLET ORAL EVERY 8 HOURS
Status: DISCONTINUED | OUTPATIENT
Start: 2018-01-01 | End: 2018-01-01 | Stop reason: HOSPADM

## 2018-01-01 RX ORDER — ONDANSETRON 2 MG/ML
4 INJECTION INTRAMUSCULAR; INTRAVENOUS
Status: DISCONTINUED | OUTPATIENT
Start: 2018-01-01 | End: 2018-01-01 | Stop reason: SDUPTHER

## 2018-01-01 RX ORDER — ATENOLOL 25 MG/1
12.5 TABLET ORAL DAILY
Status: DISCONTINUED | OUTPATIENT
Start: 2018-01-01 | End: 2018-01-01

## 2018-01-01 RX ORDER — ACETAMINOPHEN 325 MG/1
650 TABLET ORAL
Status: DISCONTINUED | OUTPATIENT
Start: 2018-01-01 | End: 2018-01-01 | Stop reason: HOSPADM

## 2018-01-01 RX ADMIN — SODIUM CHLORIDE 1000 ML: 900 INJECTION, SOLUTION INTRAVENOUS at 17:38

## 2018-01-01 RX ADMIN — Medication 10 ML: at 14:06

## 2018-01-01 RX ADMIN — SODIUM CHLORIDE 1000 ML: 900 INJECTION, SOLUTION INTRAVENOUS at 12:53

## 2018-01-01 RX ADMIN — SODIUM CHLORIDE 125 ML/HR: 900 INJECTION, SOLUTION INTRAVENOUS at 06:42

## 2018-01-01 RX ADMIN — Medication 10 ML: at 21:21

## 2018-01-01 RX ADMIN — FAMOTIDINE 20 MG: 20 TABLET ORAL at 08:32

## 2018-01-01 RX ADMIN — CYANOCOBALAMIN 1000 MCG: 1000 INJECTION, SOLUTION INTRAMUSCULAR at 14:51

## 2018-01-01 RX ADMIN — HEPARIN SODIUM 1000 UNITS: 200 INJECTION, SOLUTION INTRAVENOUS at 11:47

## 2018-01-01 RX ADMIN — GABAPENTIN 600 MG: 300 CAPSULE ORAL at 15:56

## 2018-01-01 RX ADMIN — Medication 10 ML: at 22:11

## 2018-01-01 RX ADMIN — VANCOMYCIN HYDROCHLORIDE 125 MG: KIT at 13:22

## 2018-01-01 RX ADMIN — Medication 10 ML: at 21:44

## 2018-01-01 RX ADMIN — FAMOTIDINE 20 MG: 20 TABLET ORAL at 21:58

## 2018-01-01 RX ADMIN — LEVOTHYROXINE SODIUM 150 MCG: 150 TABLET ORAL at 06:45

## 2018-01-01 RX ADMIN — SODIUM CHLORIDE 125 ML/HR: 900 INJECTION, SOLUTION INTRAVENOUS at 23:35

## 2018-01-01 RX ADMIN — FUROSEMIDE 40 MG: 10 INJECTION, SOLUTION INTRAMUSCULAR; INTRAVENOUS at 21:09

## 2018-01-01 RX ADMIN — SODIUM CHLORIDE 100 ML/HR: 900 INJECTION, SOLUTION INTRAVENOUS at 11:55

## 2018-01-01 RX ADMIN — PALONOSETRON 0.25 MG: 0.05 INJECTION, SOLUTION INTRAVENOUS at 12:02

## 2018-01-01 RX ADMIN — VITAMIN D, TAB 1000IU (100/BT) 1000 UNITS: 25 TAB at 08:24

## 2018-01-01 RX ADMIN — CYCLOPHOSPHAMIDE 1300 MG: 500 INJECTION, POWDER, FOR SOLUTION INTRAVENOUS; ORAL at 13:56

## 2018-01-01 RX ADMIN — VITAMIN D, TAB 1000IU (100/BT) 1000 UNITS: 25 TAB at 09:08

## 2018-01-01 RX ADMIN — TBO-FILGRASTIM 300 MCG: 300 INJECTION, SOLUTION SUBCUTANEOUS at 15:20

## 2018-01-01 RX ADMIN — ACETAMINOPHEN 650 MG: 325 TABLET, FILM COATED ORAL at 13:40

## 2018-01-01 RX ADMIN — Medication 10 ML: at 14:00

## 2018-01-01 RX ADMIN — VANCOMYCIN HYDROCHLORIDE 125 MG: KIT at 17:41

## 2018-01-01 RX ADMIN — ALLOPURINOL 100 MG: 100 TABLET ORAL at 17:18

## 2018-01-01 RX ADMIN — SODIUM CHLORIDE, PRESERVATIVE FREE 500 UNITS: 5 INJECTION INTRAVENOUS at 15:04

## 2018-01-01 RX ADMIN — Medication 10 ML: at 14:32

## 2018-01-01 RX ADMIN — VITAMIN D, TAB 1000IU (100/BT) 1000 UNITS: 25 TAB at 10:01

## 2018-01-01 RX ADMIN — SODIUM CHLORIDE 125 ML/HR: 900 INJECTION, SOLUTION INTRAVENOUS at 14:56

## 2018-01-01 RX ADMIN — FAMOTIDINE 20 MG: 20 TABLET, FILM COATED ORAL at 22:00

## 2018-01-01 RX ADMIN — VANCOMYCIN HYDROCHLORIDE 125 MG: KIT at 18:00

## 2018-01-01 RX ADMIN — Medication 5 ML: at 22:00

## 2018-01-01 RX ADMIN — LIDOCAINE HYDROCHLORIDE 10 ML: 10 INJECTION, SOLUTION INFILTRATION; PERINEURAL at 12:20

## 2018-01-01 RX ADMIN — Medication 10 ML: at 15:11

## 2018-01-01 RX ADMIN — ATENOLOL 12.5 MG: 25 TABLET ORAL at 17:50

## 2018-01-01 RX ADMIN — LEVOTHYROXINE SODIUM 150 MCG: 150 TABLET ORAL at 06:42

## 2018-01-01 RX ADMIN — ACETAMINOPHEN 650 MG: 325 TABLET ORAL at 02:42

## 2018-01-01 RX ADMIN — VANCOMYCIN HYDROCHLORIDE 125 MG: KIT at 20:18

## 2018-01-01 RX ADMIN — SODIUM CHLORIDE 10 ML: 9 INJECTION INTRAMUSCULAR; INTRAVENOUS; SUBCUTANEOUS at 15:03

## 2018-01-01 RX ADMIN — LEVOTHYROXINE SODIUM 150 MCG: 150 TABLET ORAL at 06:56

## 2018-01-01 RX ADMIN — VANCOMYCIN HYDROCHLORIDE 125 MG: KIT at 06:35

## 2018-01-01 RX ADMIN — TBO-FILGRASTIM 300 MCG: 300 INJECTION, SOLUTION SUBCUTANEOUS at 14:17

## 2018-01-01 RX ADMIN — VANCOMYCIN HYDROCHLORIDE 125 MG: KIT at 06:26

## 2018-01-01 RX ADMIN — METRONIDAZOLE 500 MG: 250 TABLET ORAL at 22:15

## 2018-01-01 RX ADMIN — LEVOTHYROXINE SODIUM 150 MCG: 150 TABLET ORAL at 06:35

## 2018-01-01 RX ADMIN — SODIUM CHLORIDE 150 MG: 900 INJECTION, SOLUTION INTRAVENOUS at 12:40

## 2018-01-01 RX ADMIN — Medication 10 ML: at 05:28

## 2018-01-01 RX ADMIN — Medication 400 MG: at 18:19

## 2018-01-01 RX ADMIN — Medication 10 ML: at 21:41

## 2018-01-01 RX ADMIN — HEPARIN SODIUM 5000 UNITS: 5000 INJECTION INTRAVENOUS; SUBCUTANEOUS at 18:00

## 2018-01-01 RX ADMIN — GABAPENTIN 600 MG: 300 CAPSULE ORAL at 09:55

## 2018-01-01 RX ADMIN — GABAPENTIN 300 MG: 300 CAPSULE ORAL at 22:18

## 2018-01-01 RX ADMIN — GRANISETRON HYDROCHLORIDE 1 MG: 1 INJECTION INTRAVENOUS at 11:17

## 2018-01-01 RX ADMIN — ACETAMINOPHEN 650 MG: 325 TABLET ORAL at 22:18

## 2018-01-01 RX ADMIN — Medication 10 ML: at 06:00

## 2018-01-01 RX ADMIN — ALLOPURINOL 100 MG: 100 TABLET ORAL at 16:04

## 2018-01-01 RX ADMIN — PEGFILGRASTIM 6 MG: 6 INJECTION SUBCUTANEOUS at 10:55

## 2018-01-01 RX ADMIN — Medication 80 MCG: at 10:55

## 2018-01-01 RX ADMIN — HEPARIN SODIUM 5000 UNITS: 5000 INJECTION INTRAVENOUS; SUBCUTANEOUS at 08:42

## 2018-01-01 RX ADMIN — Medication 10 ML: at 15:00

## 2018-01-01 RX ADMIN — Medication 10 ML: at 06:56

## 2018-01-01 RX ADMIN — VANCOMYCIN HYDROCHLORIDE 1000 MG: 1 INJECTION, POWDER, LYOPHILIZED, FOR SOLUTION INTRAVENOUS at 02:21

## 2018-01-01 RX ADMIN — ALBUMIN (HUMAN) 12.5 G: 0.25 INJECTION, SOLUTION INTRAVENOUS at 10:01

## 2018-01-01 RX ADMIN — Medication 10 ML: at 05:31

## 2018-01-01 RX ADMIN — GABAPENTIN 600 MG: 300 CAPSULE ORAL at 18:00

## 2018-01-01 RX ADMIN — SODIUM CHLORIDE 1 G: 900 INJECTION, SOLUTION INTRAVENOUS at 23:38

## 2018-01-01 RX ADMIN — ALBUMIN (HUMAN) 12.5 G: 0.25 INJECTION, SOLUTION INTRAVENOUS at 15:13

## 2018-01-01 RX ADMIN — SUCCINYLCHOLINE CHLORIDE 8 MG: 20 INJECTION INTRAMUSCULAR; INTRAVENOUS at 10:44

## 2018-01-01 RX ADMIN — SODIUM CHLORIDE 125 ML/HR: 900 INJECTION, SOLUTION INTRAVENOUS at 04:12

## 2018-01-01 RX ADMIN — VANCOMYCIN HYDROCHLORIDE 125 MG: KIT at 06:20

## 2018-01-01 RX ADMIN — ALLOPURINOL 100 MG: 100 TABLET ORAL at 11:42

## 2018-01-01 RX ADMIN — Medication 20 ML: at 16:50

## 2018-01-01 RX ADMIN — PROPOFOL 50 MG: 10 INJECTION, EMULSION INTRAVENOUS at 10:49

## 2018-01-01 RX ADMIN — Medication 80 MCG: at 10:46

## 2018-01-01 RX ADMIN — CEFOTETAN DISODIUM 2 G: 2 INJECTION, POWDER, FOR SOLUTION INTRAMUSCULAR; INTRAVENOUS at 10:49

## 2018-01-01 RX ADMIN — Medication 10 ML: at 14:05

## 2018-01-01 RX ADMIN — VITAMIN D, TAB 1000IU (100/BT) 1000 UNITS: 25 TAB at 09:53

## 2018-01-01 RX ADMIN — ALLOPURINOL 100 MG: 100 TABLET ORAL at 05:32

## 2018-01-01 RX ADMIN — ACETAMINOPHEN 650 MG: 325 TABLET, FILM COATED ORAL at 22:21

## 2018-01-01 RX ADMIN — VITAMIN D, TAB 1000IU (100/BT) 1000 UNITS: 25 TAB at 11:19

## 2018-01-01 RX ADMIN — ATENOLOL 25 MG: 25 TABLET ORAL at 09:25

## 2018-01-01 RX ADMIN — ALBUMIN (HUMAN) 12.5 G: 0.25 INJECTION, SOLUTION INTRAVENOUS at 15:54

## 2018-01-01 RX ADMIN — METRONIDAZOLE 500 MG: 250 TABLET ORAL at 08:43

## 2018-01-01 RX ADMIN — SODIUM CHLORIDE 100 ML/HR: 900 INJECTION, SOLUTION INTRAVENOUS at 16:36

## 2018-01-01 RX ADMIN — ACETAMINOPHEN 650 MG: 325 TABLET, FILM COATED ORAL at 13:46

## 2018-01-01 RX ADMIN — HEPARIN SODIUM 5000 UNITS: 5000 INJECTION INTRAVENOUS; SUBCUTANEOUS at 02:44

## 2018-01-01 RX ADMIN — SODIUM CHLORIDE 100 ML/HR: 900 INJECTION, SOLUTION INTRAVENOUS at 05:08

## 2018-01-01 RX ADMIN — PEGFILGRASTIM 6 MG: 6 INJECTION SUBCUTANEOUS at 10:52

## 2018-01-01 RX ADMIN — ACETAMINOPHEN 650 MG: 325 TABLET, FILM COATED ORAL at 22:17

## 2018-01-01 RX ADMIN — ACETAMINOPHEN 1000 MG: 500 TABLET ORAL at 12:09

## 2018-01-01 RX ADMIN — SODIUM CHLORIDE 125 ML/HR: 900 INJECTION, SOLUTION INTRAVENOUS at 06:46

## 2018-01-01 RX ADMIN — FAMOTIDINE 20 MG: 20 TABLET, FILM COATED ORAL at 21:56

## 2018-01-01 RX ADMIN — Medication 10 ML: at 07:23

## 2018-01-01 RX ADMIN — Medication 10 ML: at 22:16

## 2018-01-01 RX ADMIN — DOXORUBICIN HYDROCHLORIDE 44 MG: 2 INJECTION, SOLUTION INTRAVENOUS at 13:40

## 2018-01-01 RX ADMIN — SODIUM CHLORIDE 500 ML: 900 INJECTION, SOLUTION INTRAVENOUS at 11:55

## 2018-01-01 RX ADMIN — Medication 400 MG: at 08:23

## 2018-01-01 RX ADMIN — VITAMIN D, TAB 1000IU (100/BT) 1000 UNITS: 25 TAB at 08:32

## 2018-01-01 RX ADMIN — FLUCONAZOLE 200 MG: 100 TABLET ORAL at 12:06

## 2018-01-01 RX ADMIN — FAMOTIDINE 20 MG: 20 TABLET, FILM COATED ORAL at 22:14

## 2018-01-01 RX ADMIN — ALBUMIN (HUMAN) 12.5 G: 0.25 INJECTION, SOLUTION INTRAVENOUS at 21:58

## 2018-01-01 RX ADMIN — Medication 10 ML: at 13:45

## 2018-01-01 RX ADMIN — VINCRISTINE SULFATE 2 MG: 1 INJECTION, SOLUTION INTRAVENOUS at 12:33

## 2018-01-01 RX ADMIN — LIDOCAINE HYDROCHLORIDE 10 ML: 10 INJECTION, SOLUTION EPIDURAL; INFILTRATION; INTRACAUDAL; PERINEURAL at 10:20

## 2018-01-01 RX ADMIN — SODIUM CHLORIDE 160 ML: 900 INJECTION, SOLUTION INTRAVENOUS at 14:46

## 2018-01-01 RX ADMIN — SODIUM BICARBONATE 1 ML: 0.2 INJECTION, SOLUTION INTRAVENOUS at 10:17

## 2018-01-01 RX ADMIN — ALLOPURINOL 100 MG: 100 TABLET ORAL at 21:42

## 2018-01-01 RX ADMIN — SODIUM CHLORIDE 100 ML: 900 INJECTION, SOLUTION INTRAVENOUS at 00:23

## 2018-01-01 RX ADMIN — GABAPENTIN 600 MG: 300 CAPSULE ORAL at 10:01

## 2018-01-01 RX ADMIN — POTASSIUM CHLORIDE 40 MEQ: 750 TABLET, EXTENDED RELEASE ORAL at 18:25

## 2018-01-01 RX ADMIN — VINCRISTINE SULFATE 2 MG: 1 INJECTION, SOLUTION INTRAVENOUS at 12:30

## 2018-01-01 RX ADMIN — ONDANSETRON 8 MG: 2 INJECTION INTRAMUSCULAR; INTRAVENOUS at 15:33

## 2018-01-01 RX ADMIN — GRANISETRON HYDROCHLORIDE 1 MG: 1 INJECTION INTRAVENOUS at 12:31

## 2018-01-01 RX ADMIN — LEVOTHYROXINE SODIUM 150 MCG: 150 TABLET ORAL at 07:12

## 2018-01-01 RX ADMIN — FENTANYL CITRATE 50 MCG: 50 INJECTION, SOLUTION INTRAMUSCULAR; INTRAVENOUS at 10:44

## 2018-01-01 RX ADMIN — Medication 400 MG: at 17:41

## 2018-01-01 RX ADMIN — GABAPENTIN 300 MG: 300 CAPSULE ORAL at 11:19

## 2018-01-01 RX ADMIN — ACETAMINOPHEN 1000 MG: 500 TABLET ORAL at 00:00

## 2018-01-01 RX ADMIN — ALLOPURINOL 200 MG: 100 TABLET ORAL at 21:09

## 2018-01-01 RX ADMIN — SODIUM CHLORIDE 100 ML/HR: 900 INJECTION, SOLUTION INTRAVENOUS at 11:26

## 2018-01-01 RX ADMIN — ACETAMINOPHEN 1000 MG: 500 TABLET ORAL at 17:45

## 2018-01-01 RX ADMIN — ONDANSETRON 4 MG: 2 INJECTION INTRAMUSCULAR; INTRAVENOUS at 17:38

## 2018-01-01 RX ADMIN — Medication 2 G: at 11:47

## 2018-01-01 RX ADMIN — ATENOLOL 25 MG: 25 TABLET ORAL at 10:01

## 2018-01-01 RX ADMIN — ACETAMINOPHEN 650 MG: 325 TABLET, FILM COATED ORAL at 07:36

## 2018-01-01 RX ADMIN — LIDOCAINE HYDROCHLORIDE 20 ML: 10 INJECTION, SOLUTION INFILTRATION; PERINEURAL at 11:59

## 2018-01-01 RX ADMIN — METRONIDAZOLE 500 MG: 500 INJECTION, SOLUTION INTRAVENOUS at 09:08

## 2018-01-01 RX ADMIN — ALBUMIN (HUMAN) 12.5 G: 0.25 INJECTION, SOLUTION INTRAVENOUS at 21:31

## 2018-01-01 RX ADMIN — LEVOTHYROXINE SODIUM 150 MCG: 150 TABLET ORAL at 07:00

## 2018-01-01 RX ADMIN — Medication 10 ML: at 00:29

## 2018-01-01 RX ADMIN — ATENOLOL 25 MG: 25 TABLET ORAL at 08:43

## 2018-01-01 RX ADMIN — DOXORUBICIN HYDROCHLORIDE 43 MG: 2 INJECTION, SOLUTION INTRAVENOUS at 13:15

## 2018-01-01 RX ADMIN — LEVOTHYROXINE SODIUM 150 MCG: 150 TABLET ORAL at 07:06

## 2018-01-01 RX ADMIN — ALLOPURINOL 200 MG: 100 TABLET ORAL at 07:02

## 2018-01-01 RX ADMIN — SODIUM CHLORIDE 125 ML/HR: 900 INJECTION, SOLUTION INTRAVENOUS at 21:22

## 2018-01-01 RX ADMIN — SODIUM CHLORIDE 200 ML/HR: 900 INJECTION, SOLUTION INTRAVENOUS at 21:38

## 2018-01-01 RX ADMIN — Medication 20 ML: at 03:19

## 2018-01-01 RX ADMIN — VANCOMYCIN HYDROCHLORIDE 125 MG: KIT at 07:23

## 2018-01-01 RX ADMIN — DOXORUBICIN HYDROCHLORIDE 44 MG: 2 INJECTION, SOLUTION INTRAVENOUS at 13:15

## 2018-01-01 RX ADMIN — FUROSEMIDE 40 MG: 10 INJECTION, SOLUTION INTRAMUSCULAR; INTRAVENOUS at 21:59

## 2018-01-01 RX ADMIN — ALLOPURINOL 200 MG: 100 TABLET ORAL at 12:38

## 2018-01-01 RX ADMIN — ALBUMIN (HUMAN) 12.5 G: 0.25 INJECTION, SOLUTION INTRAVENOUS at 08:52

## 2018-01-01 RX ADMIN — HEPARIN SODIUM 5000 UNITS: 5000 INJECTION INTRAVENOUS; SUBCUTANEOUS at 10:00

## 2018-01-01 RX ADMIN — Medication 10 ML: at 21:10

## 2018-01-01 RX ADMIN — ACETAMINOPHEN 650 MG: 325 TABLET, FILM COATED ORAL at 21:09

## 2018-01-01 RX ADMIN — GABAPENTIN 600 MG: 300 CAPSULE ORAL at 00:28

## 2018-01-01 RX ADMIN — MIDAZOLAM 2 MG: 1 INJECTION INTRAMUSCULAR; INTRAVENOUS at 11:59

## 2018-01-01 RX ADMIN — ALBUMIN (HUMAN) 12.5 G: 0.25 INJECTION, SOLUTION INTRAVENOUS at 04:09

## 2018-01-01 RX ADMIN — Medication 10 ML: at 21:32

## 2018-01-01 RX ADMIN — METRONIDAZOLE 500 MG: 500 INJECTION, SOLUTION INTRAVENOUS at 22:29

## 2018-01-01 RX ADMIN — GABAPENTIN 600 MG: 300 CAPSULE ORAL at 17:50

## 2018-01-01 RX ADMIN — PREDNISONE 100 MG: 20 TABLET ORAL at 12:36

## 2018-01-01 RX ADMIN — SODIUM CHLORIDE 150 ML/HR: 900 INJECTION, SOLUTION INTRAVENOUS at 06:22

## 2018-01-01 RX ADMIN — SODIUM CHLORIDE 125 ML/HR: 900 INJECTION, SOLUTION INTRAVENOUS at 11:32

## 2018-01-01 RX ADMIN — FENTANYL CITRATE 25 MCG: 50 INJECTION, SOLUTION INTRAMUSCULAR; INTRAVENOUS at 11:59

## 2018-01-01 RX ADMIN — ONDANSETRON 4 MG: 2 INJECTION INTRAMUSCULAR; INTRAVENOUS at 08:27

## 2018-01-01 RX ADMIN — SODIUM CHLORIDE 150 MG: 900 INJECTION, SOLUTION INTRAVENOUS at 11:56

## 2018-01-01 RX ADMIN — Medication 10 ML: at 13:02

## 2018-01-01 RX ADMIN — LEVOTHYROXINE SODIUM 150 MCG: 150 TABLET ORAL at 07:02

## 2018-01-01 RX ADMIN — ALLOPURINOL 100 MG: 100 TABLET ORAL at 22:15

## 2018-01-01 RX ADMIN — FAMOTIDINE 20 MG: 20 TABLET ORAL at 08:24

## 2018-01-01 RX ADMIN — CYCLOPHOSPHAMIDE 1300 MG: 1 INJECTION, POWDER, FOR SOLUTION INTRAVENOUS; ORAL at 13:06

## 2018-01-01 RX ADMIN — Medication 10 ML: at 07:08

## 2018-01-01 RX ADMIN — DOXORUBICIN HYDROCHLORIDE 43 MG: 2 INJECTION, SOLUTION INTRAVENOUS at 13:16

## 2018-01-01 RX ADMIN — Medication 10 ML: at 07:03

## 2018-01-01 RX ADMIN — ACETAMINOPHEN 1000 MG: 500 TABLET ORAL at 12:06

## 2018-01-01 RX ADMIN — IOPAMIDOL 100 ML: 755 INJECTION, SOLUTION INTRAVENOUS at 13:52

## 2018-01-01 RX ADMIN — Medication 10 ML: at 02:36

## 2018-01-01 RX ADMIN — ALLOPURINOL 100 MG: 100 TABLET ORAL at 21:43

## 2018-01-01 RX ADMIN — GABAPENTIN 300 MG: 300 CAPSULE ORAL at 09:08

## 2018-01-01 RX ADMIN — HEPARIN SODIUM 5000 UNITS: 5000 INJECTION INTRAVENOUS; SUBCUTANEOUS at 00:39

## 2018-01-01 RX ADMIN — GABAPENTIN 600 MG: 300 CAPSULE ORAL at 08:32

## 2018-01-01 RX ADMIN — Medication 10 ML: at 06:55

## 2018-01-01 RX ADMIN — ATENOLOL 25 MG: 25 TABLET ORAL at 09:09

## 2018-01-01 RX ADMIN — GABAPENTIN 600 MG: 300 CAPSULE ORAL at 08:24

## 2018-01-01 RX ADMIN — HEPARIN SODIUM 5000 UNITS: 5000 INJECTION INTRAVENOUS; SUBCUTANEOUS at 09:53

## 2018-01-01 RX ADMIN — LIDOCAINE HYDROCHLORIDE 10 ML: 10 INJECTION, SOLUTION EPIDURAL; INFILTRATION; INTRACAUDAL; PERINEURAL at 10:29

## 2018-01-01 RX ADMIN — GABAPENTIN 300 MG: 300 CAPSULE ORAL at 09:09

## 2018-01-01 RX ADMIN — GADOTERATE MEGLUMINE 13 ML: 376.9 INJECTION INTRAVENOUS at 13:04

## 2018-01-01 RX ADMIN — SODIUM CHLORIDE, PRESERVATIVE FREE 500 UNITS: 5 INJECTION INTRAVENOUS at 15:25

## 2018-01-01 RX ADMIN — ATENOLOL 25 MG: 25 TABLET ORAL at 08:59

## 2018-01-01 RX ADMIN — SODIUM CHLORIDE 125 ML/HR: 900 INJECTION, SOLUTION INTRAVENOUS at 14:41

## 2018-01-01 RX ADMIN — FAMOTIDINE 20 MG: 20 TABLET, FILM COATED ORAL at 22:16

## 2018-01-01 RX ADMIN — GABAPENTIN 600 MG: 300 CAPSULE ORAL at 21:18

## 2018-01-01 RX ADMIN — MAGNESIUM SULFATE HEPTAHYDRATE 2 G: 40 INJECTION, SOLUTION INTRAVENOUS at 08:58

## 2018-01-01 RX ADMIN — SODIUM CHLORIDE 150 MG: 900 INJECTION, SOLUTION INTRAVENOUS at 11:30

## 2018-01-01 RX ADMIN — FAMOTIDINE 20 MG: 20 TABLET ORAL at 22:18

## 2018-01-01 RX ADMIN — ONDANSETRON 4 MG: 2 INJECTION INTRAMUSCULAR; INTRAVENOUS at 09:16

## 2018-01-01 RX ADMIN — Medication 10 ML: at 14:57

## 2018-01-01 RX ADMIN — PALONOSETRON 0.25 MG: 0.05 INJECTION, SOLUTION INTRAVENOUS at 12:30

## 2018-01-01 RX ADMIN — ACETAMINOPHEN 650 MG: 325 TABLET, FILM COATED ORAL at 23:58

## 2018-01-01 RX ADMIN — VITAMIN D, TAB 1000IU (100/BT) 1000 UNITS: 25 TAB at 08:41

## 2018-01-01 RX ADMIN — ONDANSETRON 4 MG: 2 INJECTION INTRAMUSCULAR; INTRAVENOUS at 13:30

## 2018-01-01 RX ADMIN — Medication 10 ML: at 11:38

## 2018-01-01 RX ADMIN — ACETAMINOPHEN 1000 MG: 500 TABLET ORAL at 19:00

## 2018-01-01 RX ADMIN — ALLOPURINOL 100 MG: 100 TABLET ORAL at 06:34

## 2018-01-01 RX ADMIN — ACETAMINOPHEN 650 MG: 325 TABLET, FILM COATED ORAL at 07:07

## 2018-01-01 RX ADMIN — LEVOTHYROXINE SODIUM 150 MCG: 150 TABLET ORAL at 07:27

## 2018-01-01 RX ADMIN — FENTANYL CITRATE 100 MCG: 50 INJECTION, SOLUTION INTRAMUSCULAR; INTRAVENOUS at 10:41

## 2018-01-01 RX ADMIN — ACETAMINOPHEN 650 MG: 325 TABLET, FILM COATED ORAL at 06:34

## 2018-01-01 RX ADMIN — Medication 10 ML: at 06:46

## 2018-01-01 RX ADMIN — CYCLOPHOSPHAMIDE 1300 MG: 1 INJECTION, POWDER, FOR SOLUTION INTRAVENOUS; ORAL at 13:25

## 2018-01-01 RX ADMIN — GABAPENTIN 300 MG: 300 CAPSULE ORAL at 08:43

## 2018-01-01 RX ADMIN — Medication 10 ML: at 10:02

## 2018-01-01 RX ADMIN — PREDNISONE 100 MG: 20 TABLET ORAL at 12:11

## 2018-01-01 RX ADMIN — LIDOCAINE HYDROCHLORIDE 5 ML: 10 INJECTION, SOLUTION EPIDURAL; INFILTRATION; INTRACAUDAL; PERINEURAL at 09:13

## 2018-01-01 RX ADMIN — Medication 300 UNITS: at 17:57

## 2018-01-01 RX ADMIN — Medication 10 ML: at 22:00

## 2018-01-01 RX ADMIN — Medication 10 ML: at 21:45

## 2018-01-01 RX ADMIN — PREDNISONE 100 MG: 50 TABLET ORAL at 11:53

## 2018-01-01 RX ADMIN — Medication 10 ML: at 13:15

## 2018-01-01 RX ADMIN — DOXORUBICIN HYDROCHLORIDE 44 MG: 2 INJECTION, SOLUTION INTRAVENOUS at 12:50

## 2018-01-01 RX ADMIN — CYCLOPHOSPHAMIDE 1300 MG: 1 INJECTION, POWDER, FOR SOLUTION INTRAVENOUS; ORAL at 13:53

## 2018-01-01 RX ADMIN — LEVOTHYROXINE SODIUM 150 MCG: 150 TABLET ORAL at 06:31

## 2018-01-01 RX ADMIN — SODIUM CHLORIDE 125 ML/HR: 900 INJECTION, SOLUTION INTRAVENOUS at 18:58

## 2018-01-01 RX ADMIN — BACITRACIN 1 PACKET: 500 OINTMENT TOPICAL at 13:46

## 2018-01-01 RX ADMIN — GABAPENTIN 300 MG: 300 CAPSULE ORAL at 11:40

## 2018-01-01 RX ADMIN — IOPAMIDOL 100 ML: 755 INJECTION, SOLUTION INTRAVENOUS at 00:23

## 2018-01-01 RX ADMIN — POTASSIUM CHLORIDE 40 MEQ: 750 TABLET, EXTENDED RELEASE ORAL at 09:04

## 2018-01-01 RX ADMIN — BUMETANIDE 2 MG: 0.25 INJECTION INTRAMUSCULAR; INTRAVENOUS at 22:13

## 2018-01-01 RX ADMIN — Medication 10 ML: at 15:04

## 2018-01-01 RX ADMIN — FAMOTIDINE 20 MG: 20 TABLET ORAL at 09:53

## 2018-01-01 RX ADMIN — VANCOMYCIN HYDROCHLORIDE 125 MG: KIT at 13:30

## 2018-01-01 RX ADMIN — DOXORUBICIN HYDROCHLORIDE 44 MG: 2 INJECTION, SOLUTION INTRAVENOUS at 13:45

## 2018-01-01 RX ADMIN — SODIUM CHLORIDE 100 ML/HR: 900 INJECTION, SOLUTION INTRAVENOUS at 16:18

## 2018-01-01 RX ADMIN — SODIUM CHLORIDE, PRESERVATIVE FREE 500 UNITS: 5 INJECTION INTRAVENOUS at 14:32

## 2018-01-01 RX ADMIN — Medication 10 ML: at 14:34

## 2018-01-01 RX ADMIN — HEPARIN SODIUM 5000 UNITS: 5000 INJECTION INTRAVENOUS; SUBCUTANEOUS at 00:07

## 2018-01-01 RX ADMIN — VANCOMYCIN HYDROCHLORIDE 125 MG: KIT at 01:00

## 2018-01-01 RX ADMIN — ALLOPURINOL 100 MG: 100 TABLET ORAL at 05:09

## 2018-01-01 RX ADMIN — FUROSEMIDE 40 MG: 10 INJECTION, SOLUTION INTRAMUSCULAR; INTRAVENOUS at 09:04

## 2018-01-01 RX ADMIN — ATENOLOL 25 MG: 25 TABLET ORAL at 09:05

## 2018-01-01 RX ADMIN — MIDAZOLAM 1 MG: 1 INJECTION INTRAMUSCULAR; INTRAVENOUS at 12:08

## 2018-01-01 RX ADMIN — GABAPENTIN 600 MG: 300 CAPSULE ORAL at 19:45

## 2018-01-01 RX ADMIN — GABAPENTIN 600 MG: 300 CAPSULE ORAL at 09:53

## 2018-01-01 RX ADMIN — LIDOCAINE HYDROCHLORIDE 40 MG: 20 INJECTION, SOLUTION EPIDURAL; INFILTRATION; INTRACAUDAL; PERINEURAL at 10:44

## 2018-01-01 RX ADMIN — PIPERACILLIN SODIUM,TAZOBACTAM SODIUM 3.38 G: 3; .375 INJECTION, POWDER, FOR SOLUTION INTRAVENOUS at 18:07

## 2018-01-01 RX ADMIN — LEVOTHYROXINE SODIUM 150 MCG: 150 TABLET ORAL at 06:33

## 2018-01-01 RX ADMIN — VANCOMYCIN HYDROCHLORIDE 125 MG: KIT at 18:58

## 2018-01-01 RX ADMIN — Medication 10 ML: at 00:23

## 2018-01-01 RX ADMIN — VANCOMYCIN HYDROCHLORIDE 125 MG: KIT at 17:56

## 2018-01-01 RX ADMIN — HEPARIN SODIUM 5000 UNITS: 5000 INJECTION INTRAVENOUS; SUBCUTANEOUS at 02:19

## 2018-01-01 RX ADMIN — SODIUM CHLORIDE, SODIUM LACTATE, POTASSIUM CHLORIDE, AND CALCIUM CHLORIDE 25 ML/HR: 600; 310; 30; 20 INJECTION, SOLUTION INTRAVENOUS at 09:43

## 2018-01-01 RX ADMIN — SODIUM CHLORIDE 125 ML/HR: 900 INJECTION, SOLUTION INTRAVENOUS at 21:25

## 2018-01-01 RX ADMIN — ATENOLOL 12.5 MG: 25 TABLET ORAL at 00:06

## 2018-01-01 RX ADMIN — CALCIUM CARBONATE (ANTACID) CHEW TAB 500 MG 400 MG: 500 CHEW TAB at 12:37

## 2018-01-01 RX ADMIN — Medication 10 ML: at 22:29

## 2018-01-01 RX ADMIN — VANCOMYCIN HYDROCHLORIDE 125 MG: KIT at 00:07

## 2018-01-01 RX ADMIN — FUROSEMIDE 40 MG: 10 INJECTION, SOLUTION INTRAMUSCULAR; INTRAVENOUS at 16:30

## 2018-01-01 RX ADMIN — ALLOPURINOL 100 MG: 100 TABLET ORAL at 14:07

## 2018-01-01 RX ADMIN — GABAPENTIN 600 MG: 300 CAPSULE ORAL at 17:41

## 2018-01-01 RX ADMIN — ACETAMINOPHEN 650 MG: 325 TABLET, FILM COATED ORAL at 07:06

## 2018-01-01 RX ADMIN — GABAPENTIN 300 MG: 300 CAPSULE ORAL at 11:01

## 2018-01-01 RX ADMIN — SODIUM CHLORIDE 500 ML: 900 INJECTION, SOLUTION INTRAVENOUS at 11:10

## 2018-01-01 RX ADMIN — ALBUMIN (HUMAN) 12.5 G: 0.25 INJECTION, SOLUTION INTRAVENOUS at 10:24

## 2018-01-01 RX ADMIN — VANCOMYCIN HYDROCHLORIDE 1750 MG: 10 INJECTION, POWDER, LYOPHILIZED, FOR SOLUTION INTRAVENOUS at 13:39

## 2018-01-01 RX ADMIN — VANCOMYCIN HYDROCHLORIDE 125 MG: KIT at 07:01

## 2018-01-01 RX ADMIN — Medication 10 ML: at 11:37

## 2018-01-01 RX ADMIN — PIPERACILLIN SODIUM,TAZOBACTAM SODIUM 3.38 G: 3; .375 INJECTION, POWDER, FOR SOLUTION INTRAVENOUS at 02:21

## 2018-01-01 RX ADMIN — SODIUM CHLORIDE 1 G: 900 INJECTION, SOLUTION INTRAVENOUS at 22:00

## 2018-01-01 RX ADMIN — ONDANSETRON 4 MG: 2 INJECTION INTRAMUSCULAR; INTRAVENOUS at 13:22

## 2018-01-01 RX ADMIN — FAMOTIDINE 20 MG: 20 TABLET, FILM COATED ORAL at 21:43

## 2018-01-01 RX ADMIN — VANCOMYCIN HYDROCHLORIDE 125 MG: KIT at 00:40

## 2018-01-01 RX ADMIN — VINCRISTINE SULFATE 2 MG: 1 INJECTION, SOLUTION INTRAVENOUS at 13:37

## 2018-01-01 RX ADMIN — GABAPENTIN 300 MG: 300 CAPSULE ORAL at 16:01

## 2018-01-01 RX ADMIN — PREDNISONE 100 MG: 20 TABLET ORAL at 12:38

## 2018-01-01 RX ADMIN — FENTANYL CITRATE 50 MCG: 50 INJECTION, SOLUTION INTRAMUSCULAR; INTRAVENOUS at 11:47

## 2018-01-01 RX ADMIN — SODIUM CHLORIDE 1000 ML: 900 INJECTION, SOLUTION INTRAVENOUS at 11:22

## 2018-01-01 RX ADMIN — AMITRIPTYLINE HYDROCHLORIDE 12.5 MG: 25 TABLET, FILM COATED ORAL at 00:29

## 2018-01-01 RX ADMIN — SODIUM CHLORIDE 200 ML/HR: 900 INJECTION, SOLUTION INTRAVENOUS at 02:56

## 2018-01-01 RX ADMIN — ONDANSETRON 4 MG: 2 INJECTION INTRAMUSCULAR; INTRAVENOUS at 12:05

## 2018-01-01 RX ADMIN — SODIUM CHLORIDE 1 G: 900 INJECTION, SOLUTION INTRAVENOUS at 23:50

## 2018-01-01 RX ADMIN — SODIUM CHLORIDE 100 ML/HR: 900 INJECTION, SOLUTION INTRAVENOUS at 12:13

## 2018-01-01 RX ADMIN — ACETAMINOPHEN 650 MG: 325 TABLET, FILM COATED ORAL at 16:18

## 2018-01-01 RX ADMIN — PIPERACILLIN SODIUM,TAZOBACTAM SODIUM 3.38 G: 3; .375 INJECTION, POWDER, FOR SOLUTION INTRAVENOUS at 11:09

## 2018-01-01 RX ADMIN — PALONOSETRON 0.25 MG: 0.05 INJECTION, SOLUTION INTRAVENOUS at 12:12

## 2018-01-01 RX ADMIN — LEVOTHYROXINE SODIUM 150 MCG: 150 TABLET ORAL at 07:03

## 2018-01-01 RX ADMIN — ACETAMINOPHEN 1000 MG: 500 TABLET ORAL at 00:08

## 2018-01-01 RX ADMIN — PALONOSETRON HYDROCHLORIDE 0.25 MG: 0.25 INJECTION INTRAVENOUS at 11:25

## 2018-01-01 RX ADMIN — ACETAMINOPHEN 1000 MG: 500 TABLET ORAL at 18:16

## 2018-01-01 RX ADMIN — ONDANSETRON 4 MG: 2 INJECTION INTRAMUSCULAR; INTRAVENOUS at 17:56

## 2018-01-01 RX ADMIN — VITAMIN D, TAB 1000IU (100/BT) 1000 UNITS: 25 TAB at 10:16

## 2018-01-01 RX ADMIN — POTASSIUM CHLORIDE 40 MEQ: 750 TABLET, EXTENDED RELEASE ORAL at 11:01

## 2018-01-01 RX ADMIN — SODIUM CHLORIDE 500 ML: 900 INJECTION, SOLUTION INTRAVENOUS at 10:26

## 2018-01-01 RX ADMIN — Medication 10 ML: at 22:19

## 2018-01-01 RX ADMIN — ATENOLOL 12.05 MG: 25 TABLET ORAL at 21:59

## 2018-01-01 RX ADMIN — FUROSEMIDE 40 MG: 10 INJECTION, SOLUTION INTRAMUSCULAR; INTRAVENOUS at 08:59

## 2018-01-01 RX ADMIN — LEVOTHYROXINE SODIUM 150 MCG: 150 TABLET ORAL at 06:26

## 2018-01-01 RX ADMIN — Medication 10 ML: at 08:57

## 2018-01-01 RX ADMIN — ALLOPURINOL 100 MG: 100 TABLET ORAL at 21:57

## 2018-01-01 RX ADMIN — ALLOPURINOL 100 MG: 100 TABLET ORAL at 13:23

## 2018-01-01 RX ADMIN — CYCLOPHOSPHAMIDE 1300 MG: 1 INJECTION, POWDER, FOR SOLUTION INTRAVENOUS; ORAL at 13:00

## 2018-01-01 RX ADMIN — ALBUMIN (HUMAN) 12.5 G: 0.25 INJECTION, SOLUTION INTRAVENOUS at 15:57

## 2018-01-01 RX ADMIN — AMITRIPTYLINE HYDROCHLORIDE 12.5 MG: 25 TABLET, FILM COATED ORAL at 08:33

## 2018-01-01 RX ADMIN — HEPARIN SODIUM 5000 UNITS: 5000 INJECTION INTRAVENOUS; SUBCUTANEOUS at 10:22

## 2018-01-01 RX ADMIN — ACETAMINOPHEN 1000 MG: 500 TABLET ORAL at 07:00

## 2018-01-01 RX ADMIN — ALBUMIN (HUMAN) 12.5 G: 0.25 INJECTION, SOLUTION INTRAVENOUS at 04:01

## 2018-01-01 RX ADMIN — ALLOPURINOL 200 MG: 100 TABLET ORAL at 14:58

## 2018-01-01 RX ADMIN — ACETAMINOPHEN 650 MG: 325 TABLET, FILM COATED ORAL at 11:40

## 2018-01-01 RX ADMIN — Medication 20 ML: at 02:36

## 2018-01-01 RX ADMIN — VANCOMYCIN HYDROCHLORIDE 125 MG: KIT at 19:26

## 2018-01-01 RX ADMIN — FAMOTIDINE 20 MG: 20 TABLET, FILM COATED ORAL at 21:09

## 2018-01-01 RX ADMIN — VANCOMYCIN HYDROCHLORIDE 125 MG: KIT at 11:47

## 2018-01-01 RX ADMIN — PREDNISONE 100 MG: 20 TABLET ORAL at 14:59

## 2018-01-01 RX ADMIN — Medication 10 ML: at 13:40

## 2018-01-01 RX ADMIN — Medication 10 ML: at 16:31

## 2018-01-01 RX ADMIN — FAMOTIDINE 20 MG: 20 TABLET ORAL at 21:31

## 2018-01-01 RX ADMIN — ATENOLOL 25 MG: 25 TABLET ORAL at 11:01

## 2018-01-01 RX ADMIN — ACETAMINOPHEN 650 MG: 325 TABLET, FILM COATED ORAL at 21:49

## 2018-01-01 RX ADMIN — GABAPENTIN 600 MG: 300 CAPSULE ORAL at 08:42

## 2018-01-01 RX ADMIN — ATENOLOL 25 MG: 25 TABLET ORAL at 11:29

## 2018-01-01 RX ADMIN — Medication 10 ML: at 21:18

## 2018-01-01 RX ADMIN — Medication 5 ML: at 06:00

## 2018-01-01 RX ADMIN — VANCOMYCIN HYDROCHLORIDE 125 MG: KIT at 12:06

## 2018-01-01 RX ADMIN — ACETAMINOPHEN 1000 MG: 500 TABLET ORAL at 06:00

## 2018-01-01 RX ADMIN — SODIUM CHLORIDE 500 ML: 900 INJECTION, SOLUTION INTRAVENOUS at 11:30

## 2018-01-01 RX ADMIN — Medication 10 ML: at 15:01

## 2018-01-01 RX ADMIN — POTASSIUM CHLORIDE 10 MEQ: 200 INJECTION, SOLUTION INTRAVENOUS at 08:59

## 2018-01-01 RX ADMIN — GRANISETRON HYDROCHLORIDE 1 MG: 1 INJECTION INTRAVENOUS at 11:39

## 2018-01-01 RX ADMIN — SODIUM CHLORIDE, PRESERVATIVE FREE 500 UNITS: 5 INJECTION INTRAVENOUS at 13:45

## 2018-01-01 RX ADMIN — Medication 20 ML: at 02:37

## 2018-01-01 RX ADMIN — VANCOMYCIN HYDROCHLORIDE 125 MG: KIT at 12:28

## 2018-01-01 RX ADMIN — DOXORUBICIN HYDROCHLORIDE 44 MG: 2 INJECTION, SOLUTION INTRAVENOUS at 12:45

## 2018-01-01 RX ADMIN — VANCOMYCIN HYDROCHLORIDE 125 MG: KIT at 23:17

## 2018-01-01 RX ADMIN — AMITRIPTYLINE HYDROCHLORIDE 25 MG: 25 TABLET, FILM COATED ORAL at 17:05

## 2018-01-01 RX ADMIN — SODIUM CHLORIDE 1000 ML: 900 INJECTION, SOLUTION INTRAVENOUS at 23:07

## 2018-01-01 RX ADMIN — Medication 10 ML: at 15:13

## 2018-01-01 RX ADMIN — FUROSEMIDE 40 MG: 10 INJECTION, SOLUTION INTRAMUSCULAR; INTRAVENOUS at 17:00

## 2018-01-01 RX ADMIN — POTASSIUM CHLORIDE 40 MEQ: 750 TABLET, EXTENDED RELEASE ORAL at 08:59

## 2018-01-01 RX ADMIN — SODIUM CHLORIDE 1000 ML: 900 INJECTION, SOLUTION INTRAVENOUS at 13:38

## 2018-01-01 RX ADMIN — Medication 10 ML: at 06:26

## 2018-01-01 RX ADMIN — PREDNISONE 100 MG: 50 TABLET ORAL at 11:27

## 2018-01-01 RX ADMIN — METRONIDAZOLE 500 MG: 500 INJECTION, SOLUTION INTRAVENOUS at 09:13

## 2018-01-01 RX ADMIN — DOXORUBICIN HYDROCHLORIDE 43 MG: 2 INJECTION, SOLUTION INTRAVENOUS at 12:45

## 2018-01-01 RX ADMIN — GABAPENTIN 600 MG: 300 CAPSULE ORAL at 10:14

## 2018-01-01 RX ADMIN — SODIUM CHLORIDE 150 MG: 900 INJECTION, SOLUTION INTRAVENOUS at 12:32

## 2018-01-01 RX ADMIN — HEPARIN SODIUM 5000 UNITS: 5000 INJECTION INTRAVENOUS; SUBCUTANEOUS at 18:12

## 2018-01-01 RX ADMIN — ACETAMINOPHEN 650 MG: 325 TABLET, FILM COATED ORAL at 09:34

## 2018-01-01 RX ADMIN — SODIUM CHLORIDE 1000 ML: 900 INJECTION, SOLUTION INTRAVENOUS at 17:37

## 2018-01-01 RX ADMIN — ATENOLOL 25 MG: 25 TABLET ORAL at 10:56

## 2018-01-01 RX ADMIN — SODIUM CHLORIDE 150 MG: 900 INJECTION, SOLUTION INTRAVENOUS at 11:16

## 2018-01-01 RX ADMIN — POTASSIUM CHLORIDE 10 MEQ: 200 INJECTION, SOLUTION INTRAVENOUS at 10:09

## 2018-01-01 RX ADMIN — ONDANSETRON 4 MG: 2 INJECTION INTRAMUSCULAR; INTRAVENOUS at 08:23

## 2018-01-01 RX ADMIN — SODIUM CHLORIDE, SODIUM LACTATE, POTASSIUM CHLORIDE, CALCIUM CHLORIDE: 600; 310; 30; 20 INJECTION, SOLUTION INTRAVENOUS at 10:15

## 2018-01-01 RX ADMIN — FENTANYL CITRATE 50 MCG: 50 INJECTION, SOLUTION INTRAMUSCULAR; INTRAVENOUS at 15:10

## 2018-01-01 RX ADMIN — SODIUM CHLORIDE 500 ML: 900 INJECTION, SOLUTION INTRAVENOUS at 00:08

## 2018-01-01 RX ADMIN — METRONIDAZOLE 500 MG: 250 TABLET ORAL at 09:24

## 2018-01-01 RX ADMIN — DOXORUBICIN HYDROCHLORIDE 43 MG: 2 INJECTION, SOLUTION INTRAVENOUS at 13:45

## 2018-01-01 RX ADMIN — VANCOMYCIN HYDROCHLORIDE 125 MG: KIT at 06:47

## 2018-01-01 RX ADMIN — Medication 10 ML: at 13:23

## 2018-01-01 RX ADMIN — GABAPENTIN 300 MG: 300 CAPSULE ORAL at 09:04

## 2018-01-01 RX ADMIN — Medication 10 ML: at 03:18

## 2018-01-01 RX ADMIN — ONDANSETRON 4 MG: 2 INJECTION INTRAMUSCULAR; INTRAVENOUS at 07:02

## 2018-01-01 RX ADMIN — HEPARIN SODIUM 5000 UNITS: 5000 INJECTION INTRAVENOUS; SUBCUTANEOUS at 17:50

## 2018-01-01 RX ADMIN — VANCOMYCIN HYDROCHLORIDE 125 MG: KIT at 11:42

## 2018-01-01 RX ADMIN — SODIUM CHLORIDE 500 ML: 900 INJECTION, SOLUTION INTRAVENOUS at 22:29

## 2018-01-01 RX ADMIN — ONDANSETRON 4 MG: 2 INJECTION INTRAMUSCULAR; INTRAVENOUS at 09:54

## 2018-01-01 RX ADMIN — DOXORUBICIN HYDROCHLORIDE 43 MG: 2 INJECTION, SOLUTION INTRAVENOUS at 12:50

## 2018-01-01 RX ADMIN — LEVOTHYROXINE SODIUM 150 MCG: 150 TABLET ORAL at 06:21

## 2018-01-01 RX ADMIN — ACETAMINOPHEN 650 MG: 325 TABLET ORAL at 07:13

## 2018-01-01 RX ADMIN — FAMOTIDINE 20 MG: 20 TABLET ORAL at 10:17

## 2018-01-01 RX ADMIN — ALBUMIN (HUMAN) 12.5 G: 0.25 INJECTION, SOLUTION INTRAVENOUS at 02:02

## 2018-01-01 RX ADMIN — SODIUM CHLORIDE 10 ML: 9 INJECTION INTRAMUSCULAR; INTRAVENOUS; SUBCUTANEOUS at 08:10

## 2018-01-01 RX ADMIN — Medication 10 ML: at 23:17

## 2018-01-01 RX ADMIN — SODIUM CHLORIDE 1 G: 900 INJECTION, SOLUTION INTRAVENOUS at 00:30

## 2018-01-01 RX ADMIN — TBO-FILGRASTIM 300 MCG: 300 INJECTION, SOLUTION SUBCUTANEOUS at 18:41

## 2018-01-01 RX ADMIN — SODIUM CHLORIDE 200 ML/HR: 900 INJECTION, SOLUTION INTRAVENOUS at 08:11

## 2018-01-01 RX ADMIN — GABAPENTIN 600 MG: 300 CAPSULE ORAL at 18:19

## 2018-01-01 RX ADMIN — ACETAMINOPHEN 1000 MG: 500 TABLET ORAL at 11:32

## 2018-01-01 RX ADMIN — CYCLOPHOSPHAMIDE 1300 MG: 1 INJECTION, POWDER, FOR SOLUTION INTRAVENOUS; ORAL at 14:00

## 2018-01-01 RX ADMIN — PALONOSETRON 0.25 MG: 0.05 INJECTION, SOLUTION INTRAVENOUS at 11:53

## 2018-01-01 RX ADMIN — FAMOTIDINE 20 MG: 20 TABLET ORAL at 08:42

## 2018-01-01 RX ADMIN — Medication 10 ML: at 14:58

## 2018-01-01 RX ADMIN — Medication 10 ML: at 07:02

## 2018-01-01 RX ADMIN — PREDNISONE 100 MG: 50 TABLET ORAL at 12:20

## 2018-01-01 RX ADMIN — Medication 10 ML: at 13:24

## 2018-01-01 RX ADMIN — Medication 10 ML: at 11:18

## 2018-01-01 RX ADMIN — Medication 10 ML: at 06:21

## 2018-01-01 RX ADMIN — METRONIDAZOLE 500 MG: 500 INJECTION, SOLUTION INTRAVENOUS at 21:44

## 2018-01-01 RX ADMIN — LEVOTHYROXINE SODIUM 150 MCG: 150 TABLET ORAL at 07:04

## 2018-01-01 RX ADMIN — Medication 400 MG: at 08:42

## 2018-01-01 RX ADMIN — Medication 10 ML: at 13:52

## 2018-01-01 RX ADMIN — ALLOPURINOL 100 MG: 100 TABLET ORAL at 07:04

## 2018-01-01 RX ADMIN — ACETAMINOPHEN 650 MG: 325 TABLET, FILM COATED ORAL at 06:35

## 2018-01-01 RX ADMIN — PEGFILGRASTIM 6 MG: 6 INJECTION SUBCUTANEOUS at 10:54

## 2018-01-01 RX ADMIN — PEGFILGRASTIM 6 MG: 6 INJECTION SUBCUTANEOUS at 10:41

## 2018-01-01 RX ADMIN — SODIUM CHLORIDE 500 ML: 900 INJECTION, SOLUTION INTRAVENOUS at 11:38

## 2018-01-01 RX ADMIN — GABAPENTIN 300 MG: 300 CAPSULE ORAL at 08:59

## 2018-01-01 RX ADMIN — VANCOMYCIN HYDROCHLORIDE 125 MG: KIT at 23:30

## 2018-01-01 RX ADMIN — FENTANYL CITRATE 25 MCG: 50 INJECTION, SOLUTION INTRAMUSCULAR; INTRAVENOUS at 12:08

## 2018-01-01 RX ADMIN — PREDNISONE 100 MG: 20 TABLET ORAL at 12:24

## 2018-01-01 RX ADMIN — VINCRISTINE SULFATE 1.8 MG: 1 INJECTION, SOLUTION INTRAVENOUS at 13:29

## 2018-01-01 RX ADMIN — ONDANSETRON 4 MG: 2 INJECTION INTRAMUSCULAR; INTRAVENOUS at 18:17

## 2018-01-01 RX ADMIN — LEVOTHYROXINE SODIUM 150 MCG: 150 TABLET ORAL at 09:26

## 2018-01-01 RX ADMIN — FUROSEMIDE 40 MG: 10 INJECTION, SOLUTION INTRAMUSCULAR; INTRAVENOUS at 11:01

## 2018-01-01 RX ADMIN — GABAPENTIN 300 MG: 300 CAPSULE ORAL at 09:24

## 2018-01-01 RX ADMIN — SODIUM CHLORIDE 100 ML: 900 INJECTION, SOLUTION INTRAVENOUS at 13:52

## 2018-01-01 RX ADMIN — PROPOFOL 100 MG: 10 INJECTION, EMULSION INTRAVENOUS at 10:44

## 2018-01-01 RX ADMIN — ATENOLOL 25 MG: 25 TABLET ORAL at 09:08

## 2018-01-01 RX ADMIN — VINCRISTINE SULFATE 2 MG: 1 INJECTION, SOLUTION INTRAVENOUS at 13:15

## 2018-01-01 RX ADMIN — SODIUM CHLORIDE 3 MG: 900 INJECTION, SOLUTION INTRAVENOUS at 13:14

## 2018-01-01 RX ADMIN — HEPARIN SODIUM 5000 UNITS: 5000 INJECTION INTRAVENOUS; SUBCUTANEOUS at 17:41

## 2018-01-01 RX ADMIN — LEVOTHYROXINE SODIUM 150 MCG: 150 TABLET ORAL at 07:23

## 2018-01-01 RX ADMIN — DOXORUBICIN HYDROCHLORIDE 43 MG: 2 INJECTION, SOLUTION INTRAVENOUS at 13:40

## 2018-01-01 RX ADMIN — Medication 10 ML: at 06:42

## 2018-01-01 RX ADMIN — ACETAMINOPHEN 650 MG: 325 TABLET, FILM COATED ORAL at 02:42

## 2018-01-01 RX ADMIN — GABAPENTIN 600 MG: 300 CAPSULE ORAL at 21:58

## 2018-01-01 RX ADMIN — LIDOCAINE HYDROCHLORIDE,EPINEPHRINE BITARTRATE 20 MG: 20; .01 INJECTION, SOLUTION INFILTRATION; PERINEURAL at 12:00

## 2018-01-01 RX ADMIN — PREDNISONE 100 MG: 50 TABLET ORAL at 12:11

## 2018-01-01 RX ADMIN — Medication 10 ML: at 10:24

## 2018-01-01 RX ADMIN — PREDNISONE 100 MG: 50 TABLET ORAL at 12:00

## 2018-01-01 RX ADMIN — Medication 10 ML: at 13:35

## 2018-01-01 RX ADMIN — SODIUM CHLORIDE 100 ML/HR: 900 INJECTION, SOLUTION INTRAVENOUS at 11:37

## 2018-01-01 RX ADMIN — PEGFILGRASTIM 6 MG: 6 INJECTION SUBCUTANEOUS at 10:53

## 2018-01-01 RX ADMIN — FAMOTIDINE 20 MG: 20 TABLET, FILM COATED ORAL at 21:42

## 2018-01-01 RX ADMIN — GABAPENTIN 600 MG: 300 CAPSULE ORAL at 21:31

## 2018-01-01 RX ADMIN — Medication 10 ML: at 14:08

## 2018-01-01 RX ADMIN — METRONIDAZOLE 500 MG: 500 INJECTION, SOLUTION INTRAVENOUS at 22:49

## 2018-01-01 RX ADMIN — DOXORUBICIN HYDROCHLORIDE 44 MG: 2 INJECTION, SOLUTION INTRAVENOUS at 13:17

## 2018-01-03 NOTE — TELEPHONE ENCOUNTER
Refill for leukeran faxed to [address] [place] [address]. Call to patient. HIPAA verified. Stated has completed 2 cycles of leukeran and was not certain how many cycles were needed. Denied med side effects. Labs reviewed fron 12/29/17 and advised to increase po fluids in light of creatinine. Patient verbalized understanding and advised willclarify # of cycles with provider and return call.

## 2018-01-03 NOTE — TELEPHONE ENCOUNTER
HIPAA verified. Advised no furhter leukeran for now. Patient verbalized understanding. Note faxed to [employer].

## 2018-01-11 NOTE — TELEPHONE ENCOUNTER
[employer] calling to verify patient treatment. Advised note faxed to [address] [place] [address] 1/3/18 to [phone]. Thanked for update.

## 2018-01-29 NOTE — PROGRESS NOTES
1. Have you been to the ER, urgent care clinic since your last visit? Hospitalized since your last visit? No    2. Have you seen or consulted any other health care providers outside of the [address] [place] [address] since your last visit? Include any pap smears or colon screening.  No

## 2018-01-29 NOTE — PROGRESS NOTES
Ochoa Bolton is a 75 y.o. 1950 female and presents with NHL. CC NHL Dx  10/10    STAGE: 3 possibly    TREATMENT COURSE:  Orbit radiation  Chlorambucil x 1 cycle 4/16. Cycle 2 3/17. Cycle 3 4/17   Cycle 4 1/18. Attempted one dose of rituxan. INTERIM HISTORY:  Ms. Morgan Fishman is here for follow-up for NHL recently on chlorambucil again. Pt finished leukeran a few weeks ago. Has rash on legs that is itchy. Knots in head still there/ some better some not. Last PET 8/17. Otherwise pt feels fine. Always feels tired. Here with family today. Past Medical History:   Diagnosis Date    Anemia NEC     Cancer ([address]     low grade NHL     Past Surgical History:   Procedure Laterality Date    HX CATARACT REMOVAL  6/2015 & 7/2015    HX CHOLECYSTECTOMY  1982    HX HEENT  2005    RIGHT EAR     CA RPLCMT PROST AORTIC VALVE OPEN XCP HOMOGRF/STENT  1972     Social History     Social History    Marital status:      Spouse name: N/A    Number of children: N/A    Years of education: N/A     Social History Main Topics    Smoking status: Current Every Day Smoker     Packs/day: 0.50     Years: 45.00     Types: Cigarettes    Smokeless tobacco: Former User     Quit date: 12/1/2015      Comment: 7 cigarettes daily.  Alcohol use Yes      Comment: Rare    Drug use: No    Sexual activity: No      Comment:  has one child     Other Topics Concern    None     Social History Narrative     Family History   Problem Relation Age of Onset    Heart Disease Mother     Kidney Disease Mother     Alcohol abuse Father     Cancer Sister      Breast    No Known Problems Brother     Cancer Sister      Colon    Alcohol abuse Sister     Alcohol abuse Sister        Current Outpatient Prescriptions   Medication Sig Dispense Refill    levothyroxine (SYNTHROID) 150 mcg tablet       raNITIdine (ZANTAC) 150 mg tablet       docusate sodium (COLACE) 100 mg capsule Take 100 mg by mouth 3 times daily.       gabapentin (NEURONTIN) 300 mg capsule Take 1 Cap by mouth three (3) times daily. Indications: NEUROPATHIC PAIN, POSTHERPETIC NEURALGIA 90 Cap 3    cholecalciferol (VITAMIN D3) 1,000 unit tablet Take  by mouth daily.  KLOR-CON M20 20 mEq tablet Take 20 mEq by mouth daily.  bumetanide (BUMEX) 2 mg tablet Take 2 mg by mouth as needed.  cyanocobalamin (VITAMIN B-12) 1,000 mcg/mL injection 1,000 mcg by IntraMUSCular route every month.  atenolol (TENORMIN) 25 mg tablet Take 25 mg by mouth daily.  PROAIR RESPICLICK 90 mcg/actuation aepb       melatonin 3 mg tablet Take  by mouth nightly.  DIPHENHYDRAMINE HCL (NITETIME SLEEP AID PO) Take  by mouth nightly. Indications: Equate Sleep Aid      amitriptyline (ELAVIL) 50 mg tablet Take 50 mg by mouth nightly.  cimetidine (TAGAMET) 200 mg tablet Take 200 mg by mouth daily.  atenolol (TENORMIN) 50 mg tablet Take 50 mg by mouth daily.  levothyroxine (SYNTHROID) 100 mcg tablet Take 125 mcg by mouth daily.  chlorambucil (LEUKERAN) 2 mg tablet Take 4 Tabs by mouth daily. (0.1 mg/kg PO daily on days 1-28 of a 28 day cycle, weight 76.3 kg)  Indications: FOLLICULAR LYMPHOMA 611 Tab 2    ondansetron (ZOFRAN ODT) 8 mg disintegrating tablet Take 1 Tab by mouth every eight (8) hours as needed for Nausea. 30 Tab 2    MULTIVITAMIN WITH MINERALS (MULTIVITAMIN & MINERAL FORMULA PO) Take  by mouth.  levothyroxine (SYNTHROID) 175 mcg tablet daily.          Allergies   Allergen Reactions    Rituxan [Rituximab] Hives       Review of Systems    A comprehensive review of systems was negative except for: per HPI and sheet    Objective:  Visit Vitals    /75 (BP 1 Location: Left arm, BP Patient Position: Sitting)    Pulse 66    Temp 98.4 °F (36.9 °C) (Oral)    Resp 16    Ht 5' 3\" (1.6 m)    Wt 152 lb 9.6 oz (69.2 kg)    LMP  (LMP Unknown)    SpO2 97%    BMI 27.03 kg/m2     Physical Exam:   General appearance - alert, well appearing, and in no distress, oriented to person, place, and time and normal appearing weight  Mental status - alert, oriented normal mood, behavior, speech, dress, motor activity, and thought processes  Head superficial masses right temporal area / scalp on right . EYE-conj clear no swelling in eyes. Right eyelid droop noted. Mouth - mucous membranes pink, moist, intact. Neck - supple  CV regular with murmur chronic since birth  resp clear  GI soft NT  Ext-No pedal edema noted bilaterally  Skin-Warm and dry. Follicular rash on legs b/l  Neuro -  non-focal.    Diagnostic Imaging   reviewed  PET Results (most recent):    Results from Hospital Encounter encounter on 08/30/17   PET/CT TUMOR IMAGE SKULL THIGH (SUB)   Narrative PET/CT SCAN    PROCEDURE: Following IV injection of 10.6 mCi 18 Fluoro 2 deoxyglucose (FDG) and  a standard uptake delay, PET imaging is performed from head to thigh and axial,  sagittal and coronal images were acquired. Unenhanced CT is obtained for  anatomic localization, and attenuation correction of the PET scan. Patient  preprocedure blood glucose level: 96mg/dL. CORRELATIVE IMAGING STUDIES: .    PRIOR PET:  5/24/2017    HISTORY: The study is requested for subsequent treatment strategy. Non-Hodgkin's  lymphoma. FINDINGS:    HEAD/NECK: There are foci of increased activity in the scalp right greater than  left with SUV up to 30 suspicious for metastatic lesions. These appear new. There are 2 left level 2 lymph nodes and one left level 1 lymph node with  increased metabolic activity of 11 and these have progressed since the prior  study. CHEST: There is extensive increased metabolic activity in left pleural  thickening which has progressed since the prior study with SUV of 17. There is a 1.4 cm nodule lingula with mild increased metabolic activity. .  There is slight increased metabolic activity in right pleural thickening upper  thorax    ABDOMEN/PELVIS: There is increased metabolic activity in the soft tissue mass at  the aortic bifurcation extending into the left iliac chain with SUV of 16 and  this has progressed since the prior study. There is increased metabolic activity  in the right external iliac mass measuring 2.8 cm with SUV of 22 and this has  progressed since the prior study. There is increased metabolic activity in a left inguinal mass measuring 3.7 cm  with SUV of 20. There is diffuse increased activity throughout the uterus    SKELETON: No foci of abnormal hypermetabolism in the axial and visualized  appendicular skeleton. Impression IMPRESSION:   1. There has been progression of the lymphoma since the prior study. There are  now scalp lesions which are new. Left level 2 lymph nodes have progressed  slightly. There is extensive pleural thickening on the left with increased  metabolic activity which has progressed since the prior study. There is slight  pleural thickening right upper lobe with slight increased metabolic activity  which is new. There is a nodule in the lingula with increased metabolic activity  which is new. Adenopathy at the aortic bifurcation extending into left common  iliac chain has progressed. Left iliac adenopathy and left inguinal adenopathy  has progressed since the prior study. 2. There is diffuse increased activity in the uterus which may be physiologic  however the uterus appears slightly larger and activity has progressed and  correlation is necessary.  23X            Lab Results    reviewed  Lab Results   Component Value Date/Time    WBC 5.6 12/29/2017 04:24 PM    HGB 12.8 12/29/2017 04:24 PM    HCT 37.6 12/29/2017 04:24 PM    PLATELET 411 17/84/3909 04:24 PM    MCV 92 12/29/2017 04:24 PM       Lab Results   Component Value Date/Time    Sodium 141 12/29/2017 04:24 PM    Potassium 4.0 12/29/2017 04:24 PM    Chloride 101 12/29/2017 04:24 PM    CO2 26 12/29/2017 04:24 PM    Anion gap 4 02/19/2015 09:13 AM    Glucose 97 12/29/2017 04:24 PM    BUN 11 12/29/2017 04:24 PM    Creatinine 1.05 12/29/2017 04:24 PM    BUN/Creatinine ratio 10 12/29/2017 04:24 PM    GFR est AA 63 12/29/2017 04:24 PM    GFR est non-AA 55 12/29/2017 04:24 PM    Calcium 8.9 12/29/2017 04:24 PM    AST (SGOT) 15 12/29/2017 04:24 PM    Alk. phosphatase 95 12/29/2017 04:24 PM    Protein, total 6.7 12/29/2017 04:24 PM    Albumin 4.2 12/29/2017 04:24 PM    Globulin 3.1 02/19/2015 09:13 AM    A-G Ratio 1.7 12/29/2017 04:24 PM    ALT (SGPT) 9 12/29/2017 04:24 PM     Assessment/Plan: Ochoa Bolton is a 75 y.o. female and presents with NHL. CC NHL Dx  10/10    STAGE: 3 possibly    TREATMENT COURSE:  rituxan 2/19/15 with reaction and treatment not finished. Hives and hypotension. Chlorambucil x 2. INTERIM HISTORY:  F/u for NHL    1. NHL low grade follicular stage 3 dx in 2010   Hx of one dose of rituxan in past with side effects and pt did not want any further therapy. PET 5/16 good post chlorambucil 4/16. PET 10/2016 was stable. Most recent PET scan 2/22/17 showed progression of disease both above and below the level of the diaphragm. Pt had repeat chlorambucil 5/17 due to progressive disease on CTs. Back of head mass/ left groin mass was almost gone. PET 5/17 and good response to tx. Over summer, pt has been having more scalp lesions with high SUV activity. PET 8/17 showed progressive disease throughout. Pt wanted to try treatment / acyclovir first to see if this treatment affects scalp/head LNs. Acyclovir did not help. Pt had recent abx for PNA and feeling better now. Pt re started chlorambucil chemo. Finished early this month. Pt clinically stable. Will re check PET for f/u. Ordered. Labs good and will re check. 2.  Heart murmur. Pt will f/u with cardio soon. 3.  Renal insufficiency/  Hypothyroid. Per PCP. 4.  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 Stable. 5.  Rash on LE b/l itchy.   To derm for eval. Follow-up in 1 month. Call if questions. ICD-10-CM ICD-9-CM    1. 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 Orbital swelling H57.8 379.92 PET/CT TUMOR IMAGE SKULL THIGH (SUB)      CBC WITH AUTOMATED DIFF      METABOLIC PANEL, COMPREHENSIVE      LD   4. Poor vision H54.7 369.9 PET/CT TUMOR IMAGE SKULL THIGH (SUB)      CBC WITH AUTOMATED DIFF      METABOLIC PANEL, COMPREHENSIVE      LD   5. Mass of head R22.0 784.2 PET/CT TUMOR IMAGE SKULL THIGH (SUB)      CBC WITH AUTOMATED DIFF      METABOLIC PANEL, COMPREHENSIVE      LD   6. Chemotherapy follow-up examination Z09 V67.2 PET/CT TUMOR IMAGE SKULL THIGH (SUB)      CBC WITH AUTOMATED DIFF      METABOLIC PANEL, COMPREHENSIVE      LD     Orders Placed This Encounter    PET/CT TUMOR IMAGE SKULL THIGH (SUB)     Standing Status:   Future     Standing Expiration Date:   2/28/2019     Order Specific Question:   Is Patient Allergic to Contrast Dye? Answer:   Unknown    CBC WITH AUTOMATED DIFF    METABOLIC PANEL, COMPREHENSIVE    LDH       continue present plan, call if any problems  There are no Patient Instructions on file for this visit.    Follow-up Disposition: Not on [address] [place] [address] Se, DO

## 2018-02-17 NOTE — ED NOTES
I have reviewed discharge instructions with the patient. The patient verbalized understanding. VSS, respirations unlabored and in no acute distress upon discharge. Ambulated with a steady gait out of the department.

## 2018-02-17 NOTE — ED PROVIDER NOTES
HPI Comments: 75 y.o. female with past medical history significant for asthma, Aortic valve repair, D&C, Cholecystectomy who presents from home with chief complaint of vaginal bleeding. Pt reports when she awoke this morning she noticed a minimal amount of pink blood in the commode upon urinating. Pt states that the bleeding was coming from her vagina at that time. Throughout the day symptoms remained constant. Tonight, she notes that she coughed and felt a moderate amount of blood rush from her vagina. Pt states that she was not able to control the bleeding. She showered and came to the ED. Pt denies any pain. No vaginal bleeding, abdominal pain, nausea, vomiting, urinary symptoms, hematuria, headache, weakness, dizziness, lightheadedness. She has had a cold a last couple of weeks (cough and congestion) but denies any acute exacerbation of symptoms. There are no other acute medical concerns at this time. PCP: Monica Nice MD    Note written by Magdalena Roman, as dictated by Tori Quinonez MD 11:08 PM    The history is provided by the patient. No  was used. Past Medical History:   Diagnosis Date    Anemia NEC     Cancer ([facility] [address]     low grade NHL       Past Surgical History:   Procedure Laterality Date    HX CATARACT REMOVAL  6/2015 & 7/2015    HX CHOLECYSTECTOMY  1982    HX HEENT  2005    RIGHT EAR     OR RPLCMT PROST AORTIC VALVE OPEN XCP HOMOGRF/STENT  1972         Family History:   Problem Relation Age of Onset    Heart Disease Mother     Kidney Disease Mother     Alcohol abuse Father     Cancer Sister      Breast    No Known Problems Brother     Cancer Sister      Colon    Alcohol abuse Sister     Alcohol abuse Sister        Social History     Social History    Marital status:      Spouse name: N/A    Number of children: N/A    Years of education: N/A     Occupational History    Not on file.      Social History Main Topics    Smoking status: Current Every Day Smoker     Packs/day: 0.50     Years: 45.00     Types: Cigarettes    Smokeless tobacco: Former User     Quit date: 12/1/2015      Comment: 7 cigarettes daily.  Alcohol use Yes      Comment: Rare    Drug use: No    Sexual activity: No      Comment:  has one child     Other Topics Concern    Not on file     Social History Narrative         ALLERGIES: Rituxan [rituximab]    Review of Systems   Constitutional: Negative for fever. HENT: Negative for congestion. Respiratory: Negative for cough and shortness of breath. Cardiovascular: Negative for chest pain. Gastrointestinal: Negative for abdominal pain, nausea and vomiting. Genitourinary: Positive for vaginal bleeding. Negative for decreased urine volume, difficulty urinating, dysuria, hematuria, urgency and vaginal pain. Musculoskeletal: Negative for back pain and neck pain. Neurological: Negative for dizziness, weakness, light-headedness and headaches. All other systems reviewed and are negative. Vitals:    02/16/18 2242   BP: 162/79   Pulse: 83   Resp: 16   Temp: 98.6 °F (37 °C)   SpO2: 95%   Weight: 67.7 kg (149 lb 3.2 oz)   Height: 5' 2\" (1.575 m)            Physical Exam   Nursing note and vitals reviewed. CONSTITUTIONAL: Well-appearing; well-nourished; in  Mild distress  HEAD: Normocephalic; atraumatic  EYES: PERRL; EOM intact; conjunctiva and sclera are clear bilaterally. ENT: No rhinorrhea; normal pharynx with no tonsillar hypertrophy; mucous membranes pink/moist, no erythema, no exudate. NECK: Supple; non-tender; no cervical lymphadenopathy  CARD: Normal S1, S2; no murmurs, rubs, or gallops. Regular rate and rhythm. RESP: Normal respiratory effort; breath sounds clear and equal bilaterally; no wheezes, rhonchi, or rales. ABD: Normal bowel sounds; non-distended; non-tender; no palpable organomegaly, no masses, no bruits.   Back Exam: Normal inspection; no vertebral point tenderness, no CVA tenderness. Normal range of motion. EXT: Normal ROM in all four extremities; non-tender to palpation; no swelling or deformity; distal pulses are normal, no edema. SKIN: Warm; dry; no rash. NEURO:Alert and oriented x 3, coherent, RAYMON-XII grossly intact, sensory and motor are non-focal.   EXAM:  External genitalia normal.  Pelvic exam: scant bleeding in the vaginal canal:  ovaries and uterus normal size and non-tender to palpation, no cervical motion tenderness or adnexal masses. No discharge. MDM  Number of Diagnoses or Management Options  Malignant neoplasm of uterus, unspecified site [facility]):   Vaginal bleeding:   Diagnosis management comments: Assessment: vaginal bleeding in a 60-year-old female, who is postmenopausal-rule out endometrial pathology, including malignancy/ anemia      Plan: lab/ IV fluid/ ultrasound. Abdomen/ CT scan of the abdomen and pelvis/ consult GYN oncology/ Monitor and Reevaluate. Amount and/or Complexity of Data Reviewed  Clinical lab tests: ordered and reviewed  Tests in the radiology section of CPT®: ordered and reviewed  Tests in the medicine section of CPT®: reviewed and ordered  Discussion of test results with the performing providers: yes  Decide to obtain previous medical records or to obtain history from someone other than the patient: yes  Obtain history from someone other than the patient: yes  Review and summarize past medical records: yes  Discuss the patient with other providers: yes  Independent visualization of images, tracings, or specimens: yes    Risk of Complications, Morbidity, and/or Mortality  Presenting problems: moderate  Diagnostic procedures: moderate  Management options: moderate          ED Course       Procedures     CONSULT NOTE:  Pravin Phoenix MD spoke with Dr Jennifer Millard of gynecology/Oncology. Discussed patient's presentation, history, physical assessment, and available diagnostic results.  Wants patient discharged home and will follow up in the office for outpatient reevaluation and work-up as deemed appropriate. Progress Note:   Pt has been reexamined by Sol Tariq MD. Pt is feeling much better. Symptoms have improved. All available results have been reviewed with pt and any available family. Pt understands sx, dx, and tx in ED. Care plan has been outlined and questions have been answered. Pt is ready to go home. Will send home on Vaginal bleeding/ Uterine Cancer instruction. Outpatient referral with PCP/ Gynecology as needed. Written by Sol Tariq MD,12:56 AM    .   .

## 2018-02-17 NOTE — ED TRIAGE NOTES
Pt to ED for c/o vaginal bleeding that began after pt coughed and felt a rush of blood at approx 2130 this evening. Reports bright red blood with small clots. Denies dizziness.

## 2018-02-17 NOTE — DISCHARGE INSTRUCTIONS
We hope that we have addressed all of your medical concerns. The examination and treatment you received in the Emergency Department were for an emergent problem and were not intended as complete care. It is important that you follow up with your healthcare provider(s) for ongoing care. If your symptoms worsen or do not improve as expected, and you are unable to reach your usual health care provider(s), you should return to the Emergency Department. Today's healthcare is undergoing tremendous change, and patient satisfaction surveys are one of the many tools to assess the quality of medical care. You may receive a survey from the [employer] regarding your experience in the Emergency Department. I hope that your experience has been completely positive, particularly the medical care that I provided. As such, please participate in the survey; anything less than excellent does not meet my expectations or intentions. [facility] and 8 [facility] participate in nationally recognized quality of care measures. If your blood pressure is greater than 120/80, as reported below, we urge that you seek medical care to address the potential of high blood pressure, commonly known as hypertension. Hypertension can be hereditary or can be caused by certain medical conditions, pain, stress, or \"white coat syndrome. \"       Please make an appointment with your health care provider(s) for follow up of your Emergency Department visit. VITALS:   Patient Vitals for the past 8 hrs:   Temp Pulse Resp BP SpO2   02/17/18 0015 - - - 151/68 98 %   02/17/18 0000 - - - 144/71 99 %   02/16/18 2242 98.6 °F (37 °C) 83 16 162/79 95 %          Thank you for allowing us to provide you with medical care today. We realize that you have many choices for your emergency care needs. Please choose us in the future for any continued health care needs.       Regards, Yesy Hall MD    [address] [facility].   Office: [phone]            Recent Results (from the past 24 hour(s))   PROTHROMBIN TIME + INR    Collection Time: 02/16/18 10:49 PM   Result Value Ref Range    INR 1.0 0.9 - 1.1      Prothrombin time 10.0 9.0 - 11.1 sec   PTT    Collection Time: 02/16/18 10:49 PM   Result Value Ref Range    aPTT 30.0 22.1 - 32.5 sec    aPTT, therapeutic range     58.0 - 77.0 SECS   CBC WITH AUTOMATED DIFF    Collection Time: 02/16/18 10:51 PM   Result Value Ref Range    WBC 8.6 3.6 - 11.0 K/uL    RBC 3.91 3.80 - 5.20 M/uL    HGB 12.3 11.5 - 16.0 g/dL    HCT 37.2 35.0 - 47.0 %    MCV 95.1 80.0 - 99.0 FL    MCH 31.5 26.0 - 34.0 PG    MCHC 33.1 30.0 - 36.5 g/dL    RDW 12.7 11.5 - 14.5 %    PLATELET 760 718 - 491 K/uL    MPV 8.5 (L) 8.9 - 12.9 FL    NRBC 0.0 0  WBC    ABSOLUTE NRBC 0.00 0.00 - 0.01 K/uL    NEUTROPHILS 60 32 - 75 %    LYMPHOCYTES 23 12 - 49 %    MONOCYTES 11 5 - 13 %    EOSINOPHILS 5 0 - 7 %    BASOPHILS 1 0 - 1 %    IMMATURE GRANULOCYTES 0 0.0 - 0.5 %    ABS. NEUTROPHILS 5.1 1.8 - 8.0 K/UL    ABS. LYMPHOCYTES 2.0 0.8 - 3.5 K/UL    ABS. MONOCYTES 1.0 0.0 - 1.0 K/UL    ABS. EOSINOPHILS 0.4 0.0 - 0.4 K/UL    ABS. BASOPHILS 0.1 0.0 - 0.1 K/UL    ABS. IMM. GRANS. 0.0 0.00 - 0.04 K/UL    DF AUTOMATED     METABOLIC PANEL, COMPREHENSIVE    Collection Time: 02/16/18 10:51 PM   Result Value Ref Range    Sodium 141 136 - 145 mmol/L    Potassium 3.8 3.5 - 5.1 mmol/L    Chloride 104 97 - 108 mmol/L    CO2 30 21 - 32 mmol/L    Anion gap 7 5 - 15 mmol/L    Glucose 98 65 - 100 mg/dL    BUN 10 6 - 20 MG/DL    Creatinine 1.08 (H) 0.55 - 1.02 MG/DL    BUN/Creatinine ratio 9 (L) 12 - 20      GFR est AA >60 >60 ml/min/1.73m2    GFR est non-AA 50 (L) >60 ml/min/1.73m2    Calcium 9.4 8.5 - 10.1 MG/DL    Bilirubin, total 0.3 0.2 - 1.0 MG/DL    ALT (SGPT) 15 12 - 78 U/L    AST (SGOT) 13 (L) 15 - 37 U/L    Alk.  phosphatase 119 (H) 45 - 117 U/L    Protein, total 7.4 6.4 - 8.2 g/dL    Albumin 3.5 3.5 - 5.0 g/dL    Globulin 3.9 2.0 - 4.0 g/dL    A-G Ratio 0.9 (L) 1.1 - 2.2     URINALYSIS W/ RFLX MICROSCOPIC    Collection Time: 02/16/18 11:22 PM   Result Value Ref Range    Color YELLOW/STRAW      Appearance CLEAR CLEAR      Specific gravity 1.025 1.003 - 1.030      pH (UA) 6.0 5.0 - 8.0      Protein 30 (A) NEG mg/dL    Glucose NEGATIVE  NEG mg/dL    Ketone NEGATIVE  NEG mg/dL    Bilirubin NEGATIVE  NEG      Blood MODERATE (A) NEG      Urobilinogen 0.2 0.2 - 1.0 EU/dL    Nitrites NEGATIVE  NEG      Leukocyte Esterase NEGATIVE  NEG      WBC 0-4 0 - 4 /hpf    RBC 5-10 0 - 5 /hpf    Epithelial cells FEW FEW /lpf    Bacteria NEGATIVE  NEG /hpf       Ct Abd Pelv W Cont    Result Date: 2/17/2018  EXAM:  CT ABD PELV W CONT INDICATION: Pelvic pain and bleeding. Non-Hodgkin's lymphoma. COMPARISON: PET CT 8/30/2017. TECHNIQUE: Helical CT of the abdomen  and pelvis  following the uneventful intravenous administration of nonionic contrast.  Coronal and sagittal reformats are performed. CT dose reduction was achieved through use of a standardized protocol tailored for this examination and automatic exposure control for dose modulation. Adaptive statistical iterative reconstruction (ASIR) was utilized. FINDINGS: The visualized lung bases demonstrate an increased large left pleural effusion with left lower lung atelectasis. The right lung base and pleural space are clear. The heart size is normal. There is no pericardial effusion. There is a small hiatal hernia. The liver, spleen, pancreas, and adrenal glands are normal. The kidneys are symmetric without hydronephrosis. Or bilateral kidney cysts. The gall bladder is surgically absent without intra- or extra-hepatic biliary dilatation. There are no dilated bowel loops. The appendix is normal. There is a small amount of pelvic free fluid. There is no free air. The aorta tapers without aneurysm. There is aortoiliac atherosclerosis.  The uterus is enlarged measuring 6.7 x 7.1 cm. There is a left pelvic sidewall soft tissue attenuation mass measuring 4.4 x 6.2 cm (series 3, image 66) that extends along the left iliac vasculature to the midline retroperitoneum where it measures 5.0 x 6.6 cm (series 3, image 51). This mass encases but does not narrow the aortic bifurcation and IVC. An enlarged left inguinal lymph node measures 1.6 x 1.9 cm (image 3, image 88), previously 2.3 x 3.7 cm. There is a small fat-containing supraumbilical hernia. There is no aggressive bony lesion. IMPRESSION: 1. Findings suspicious for a uterine mass with a soft tissue mass in the left pelvis and extending into the retroperitoneum concerning for malignancy. Lymphoma is a differential consideration given the patient's history. The mass encases but does not narrow the aortic bifurcation and IVC. 2. Enlarged left inguinal lymph node is decreased in size since 8/30/2017. 3. Increased large left pleural effusion with left lower lung atelectasis. Us Transvaginal    Result Date: 2/17/2018  EXAM:  US PELV NON OBS, US TRANSVAGINAL INDICATION:  Vaginal bleeding. COMPARISON:  PET/CT 8/30/2017. TECHNIQUE: Transabdominal and transvaginal ultrasound of the female pelvis. FINDINGS: Transabdominal ultrasound: Urinary bladder: within normal limits. Uterus: measures 10.9 x 8.0 x 8.3 cm, which is enlarged. The myometrium is diffusely heterogeneous. Endometrium: Obscured by distance from skin and bowel gas. The ovaries are not seen due to bowel gas and postmenopausal state. There is no adnexal mass or cyst. Free fluid: None. Endovaginal ultrasound: Uterus: measures 8.5 x 8.0 x 5.9 cm, which is enlarged. The myometrium is diffusely heterogeneous. Endometrium: Not well seen due to heterogeneous myometrium. The ovaries are not seen due to bowel gas and postmenopausal state. There is no adnexal mass or cyst. Free fluid: None. IMPRESSION: 1.  The uterus is enlarged and heterogeneous in keeping with prior PET/CT findings. No focal or discrete mass is identified. Correlation with gynecologic examination is recommended. 2. Nonvisualized ovaries. Us Pelv Non Obs    Result Date: 2/17/2018  EXAM:  US PELV NON OBS, US TRANSVAGINAL INDICATION:  Vaginal bleeding. COMPARISON:  PET/CT 8/30/2017. TECHNIQUE: Transabdominal and transvaginal ultrasound of the female pelvis. FINDINGS: Transabdominal ultrasound: Urinary bladder: within normal limits. Uterus: measures 10.9 x 8.0 x 8.3 cm, which is enlarged. The myometrium is diffusely heterogeneous. Endometrium: Obscured by distance from skin and bowel gas. The ovaries are not seen due to bowel gas and postmenopausal state. There is no adnexal mass or cyst. Free fluid: None. Endovaginal ultrasound: Uterus: measures 8.5 x 8.0 x 5.9 cm, which is enlarged. The myometrium is diffusely heterogeneous. Endometrium: Not well seen due to heterogeneous myometrium. The ovaries are not seen due to bowel gas and postmenopausal state. There is no adnexal mass or cyst. Free fluid: None. IMPRESSION: 1. The uterus is enlarged and heterogeneous in keeping with prior PET/CT findings. No focal or discrete mass is identified. Correlation with gynecologic examination is recommended. 2. Nonvisualized ovaries. Vaginal Bleeding in Nonpregnant Women: Care Instructions  Your Care Instructions    Many women have bleeding or spotting between periods. Lots of things can cause it. You may bleed because of hormone problems, stress, or ovulation. Fibroids and IUDs (intrauterine devices) can also cause bleeding. If your bleeding or spotting is caused by one of these things and is not heavy or doesn't happen often, you probably don't need to worry. But in rare cases, infection, cancer, or other serious conditions can cause bleeding. So you may need more tests to find the cause of your bleeding. The doctor has checked you carefully, but problems can develop later.  If you notice any problems or new symptoms, get medical treatment right away. Follow-up care is a key part of your treatment and safety. Be sure to make and go to all appointments, and call your doctor if you are having problems. It's also a good idea to know your test results and keep a list of the medicines you take. How can you care for yourself at home? · Take pain medicines exactly as directed. ¨ If the doctor gave you a prescription medicine for pain, take it as prescribed. ¨ If you are not taking a prescription pain medicine, ask your doctor if you can take an over-the-counter medicine. Do not take aspirin, which may make bleeding worse. · If your doctor prescribed birth control pills for your bleeding, take them as directed. · Eat foods that are high in iron and vitamin C. Foods high in iron include red meat, shellfish, eggs, beans, and leafy green vegetables. Foods high in vitamin C include citrus fruits, tomatoes, and broccoli. Ask your doctor if you need to take iron pills or a multivitamin. · Ask your doctor when it is okay to have sex. When should you call for help? Call 911 anytime you think you may need emergency care. For example, call if:  ? · You passed out (lost consciousness). ?Call your doctor now or seek immediate medical care if:  ? · You have severe vaginal bleeding. ? · You are dizzy or lightheaded, or you feel like you may faint. ? · You have new or worse belly or pelvic pain. ? Watch closely for changes in your health, and be sure to contact your doctor if:  ? · Your bleeding gets worse. ? · You think you might be pregnant. ? · You do not get better as expected. Where can you learn more? Go to http://henny-vicky.info/. Enter Z435 in the search box to learn more about \"Vaginal Bleeding in Nonpregnant Women: Care Instructions. \"  Current as of: October 13, 2016  Content Version: 11.4  © 1405-8971 [employer].  Care instructions adapted under license by [employer] (which disclaims liability or warranty for this information). If you have questions about a medical condition or this instruction, always ask your healthcare professional. [address] any warranty or liability for your use of this information. Uterine Cancer: Care Instructions  Your Care Instructions  Uterine cancer is the rapid growth of abnormal cells that line the uterus. It also is called endometrial cancer. These cells may spread to nearby organs, lymph glands, or distant organs. Uterine cancer can be cured most often when found early. Treatment may include surgery to remove the uterus, ovaries, fallopian tubes, and sometimes the pelvic lymph nodes. Radiation and hormones to stop cancer growth also are sometimes used. Chemotherapy may be used if the cancer has spread. Having cancer can be scary. You may feel many emotions and may need some help coping. Seek out family, friends, and counselors for support. You can do things at home to make yourself feel better while you go through treatment. You also can call the [employer] (1-851.568.7065) or visit its website at [facility] [facility] [employer] for more information. Follow-up care is a key part of your treatment and safety. Be sure to make and go to all appointments, and call your doctor if you are having problems. It's also a good idea to know your test results and keep a list of the medicines you take. How can you care for yourself at home? · Take your medicines exactly as prescribed. Call your doctor if you think you are having a problem with your medicine. You may get medicine for nausea and vomiting if you have these side effects. · Eat healthy food. If you do not feel like eating, try to eat food that has protein and extra calories to keep up your strength and prevent weight loss. Drink liquid meal replacements for extra calories and protein. Try to eat your main meal early.   · Get some physical activity every day, but do not get too tired. Keep doing the hobbies you enjoy as your energy allows. · Take steps to control your stress and workload. Learn relaxation techniques. ¨ Share your feelings. Stress and tension affect our emotions. By expressing your feelings to others, you may be able to understand and cope with them. ¨ Consider joining a support group. Talking about a problem with your spouse, a good friend, or other people with similar problems is a good way to reduce tension and stress. ¨ Express yourself through art. Try writing, dance, art, or crafts to relieve tension. Some dance, writing, or art groups may be available just for people who have cancer. ¨ Be kind to your body and mind. Getting enough sleep, eating a healthy diet, and taking time to do things you enjoy can contribute to an overall feeling of balance in your life and help reduce stress. ¨ Get help if you need it. Discuss your concerns with your doctor or counselor. · If you are vomiting or have diarrhea:  ¨ Drink plenty of fluids (enough so that your urine is light yellow or clear like water) to prevent dehydration. Choose water and other caffeine-free clear liquids. If you have kidney, heart, or liver disease and have to limit fluids, talk with your doctor before you increase the amount of fluids you drink. ¨ When you are able to eat, try clear soups, mild foods, and liquids until all symptoms are gone for 12 to 48 hours. Other good choices include dry toast, crackers, cooked cereal, and gelatin dessert, such as Jell-O.  · Take care of your urinary tract to prevent problems such as infection, which can be caused by uterine cancer and its treatment. Limit drinks with caffeine, drink plenty of fluids, and urinate every 3 to 4 hours. · If you have not already done so, prepare a list of advance directives.  Advance directives are instructions to your doctor and family members about what kind of care you want if you become unable to speak or express yourself. When should you call for help? Call 911 anytime you think you may need emergency care. For example, call if:  ? · You passed out (lost consciousness). ?Call your doctor now or seek immediate medical care if:  ? · You have a fever. ? · You have abnormal bleeding. ? · You have new or worse pain. ? · You think you have an infection. ? · You have new symptoms, such as a cough, belly pain, vomiting, diarrhea, or a rash. ? Watch closely for changes in your health, and be sure to contact your doctor if:  ? · You are much more tired than usual.   ? · You have swollen glands in your armpits, groin, or neck. ? · You do not get better as expected. Where can you learn more? Go to http://henny-vicky.info/. Enter E343 in the search box to learn more about \"Uterine Cancer: Care Instructions. \"  Current as of: May 12, 2017  Content Version: 11.4  © 0757-0878 [employer]. Care instructions adapted under license by [employer] (which disclaims liability or warranty for this information). If you have questions about a medical condition or this instruction, always ask your healthcare professional. [address] any warranty or liability for your use of this information.

## 2018-02-19 NOTE — TELEPHONE ENCOUNTER
HIPAA verified. Stated went to ED Friday for vaginal bleeding. Having PET scan today. Stated needs referral to gyn and to be see asap. Advised would forward to provider for review. Support given. To provider.

## 2018-02-19 NOTE — TELEPHONE ENCOUNTER
Call to patient. HIPAA verified. Advised of GYN appt and to call GYN office if any recurrence. Stated only mild vag spotting now. Support given.    Patient verbalized understanding and thanked for assist.

## 2018-02-26 PROBLEM — J90 PLEURAL EFFUSION, LEFT: Status: ACTIVE | Noted: 2018-01-01

## 2018-02-26 PROBLEM — N95.0 POSTMENOPAUSAL BLEEDING: Status: ACTIVE | Noted: 2018-01-01

## 2018-02-26 PROBLEM — N85.8 UTERINE MASS: Status: ACTIVE | Noted: 2018-01-01

## 2018-02-26 PROBLEM — R05.9 COUGH: Status: ACTIVE | Noted: 2018-01-01

## 2018-02-26 NOTE — PROGRESS NOTES
Fam Dumas is a 75 y.o. 1950 female and presents with NHL. CC NHL Dx  10/10    STAGE: 3 possibly    TREATMENT COURSE:  Orbit radiation  Chlorambucil x 1 cycle 4/16. Cycle 2 3/17. Cycle 3 4/17   Cycle 4 1/18. Attempted one dose of rituxan. INTERIM HISTORY:  Ms. Syed Soto is here for follow-up for NHL not on any therapy. last leukeran 1/18  Pt went to ER for vaginal bleeding and has been seen by GYN/ONC. CT shows a uterine mass. Pt needs D+C and has this for this Friday. Pt states has been sick lately/ URI sx/ cough productive yellow/ and CT shows left pleural effusion. Thinks she needs abx. Has some PAZ and left flank pain. PET 2/18 shows mostly progressive disease/ some mixed. Pt is here with daughter today. Past Medical History:   Diagnosis Date    Anemia NEC     Cancer ([address]     low grade NHL     Past Surgical History:   Procedure Laterality Date    HX CATARACT REMOVAL  6/2015 & 7/2015    HX CHOLECYSTECTOMY  1982    HX HEENT  2005    RIGHT EAR     [place] RPLCMT PROST AORTIC VALVE OPEN XCP HOMOGRF/STENT  1972     Social History     Social History    Marital status:      Spouse name: N/A    Number of children: N/A    Years of education: N/A     Social History Main Topics    Smoking status: Current Every Day Smoker     Packs/day: 0.50     Years: 45.00     Types: Cigarettes    Smokeless tobacco: Former User     Quit date: 12/1/2015      Comment: 7 cigarettes daily.     Alcohol use Yes      Comment: Rare    Drug use: No    Sexual activity: No      Comment:  has one child     Other Topics Concern    None     Social History Narrative     Family History   Problem Relation Age of Onset    Heart Disease Mother     Kidney Disease Mother     Alcohol abuse Father     Cancer Sister      Breast    No Known Problems Brother     Cancer Sister      Colon    Alcohol abuse Sister     Alcohol abuse Sister        Current Outpatient Prescriptions   Medication Sig Dispense Refill    amoxicillin-clavulanate (AUGMENTIN) 875-125 mg per tablet Take 1 Tab by mouth two (2) times a day. 20 Tab 1    DIPHENHYDRAMINE HCL (NITETIME SLEEP AID PO) Take  by mouth nightly. Indications: Equate Sleep Aid      levothyroxine (SYNTHROID) 150 mcg tablet       raNITIdine (ZANTAC) 150 mg tablet       docusate sodium (COLACE) 100 mg capsule Take 100 mg by mouth 3 times daily.  gabapentin (NEURONTIN) 300 mg capsule Take 1 Cap by mouth three (3) times daily. Indications: NEUROPATHIC PAIN, POSTHERPETIC NEURALGIA 90 Cap 3    cholecalciferol (VITAMIN D3) 1,000 unit tablet Take  by mouth daily.  KLOR-CON M20 20 mEq tablet Take 20 mEq by mouth daily.  bumetanide (BUMEX) 2 mg tablet Take 2 mg by mouth as needed.  cyanocobalamin (VITAMIN B-12) 1,000 mcg/mL injection 1,000 mcg by IntraMUSCular route every month.  atenolol (TENORMIN) 25 mg tablet Take 25 mg by mouth daily.  ondansetron (ZOFRAN ODT) 8 mg disintegrating tablet Take 1 Tab by mouth every eight (8) hours as needed for Nausea. 30 Tab 2    MULTIVITAMIN WITH MINERALS (MULTIVITAMIN & MINERAL FORMULA PO) Take  by mouth. Allergies   Allergen Reactions    Rituxan [Rituximab] Hives       Review of Systems    A comprehensive review of systems was negative except for: per HPI and sheet    Objective:  Visit Vitals    /76 (BP 1 Location: Left arm, BP Patient Position: Sitting)    Pulse 74    Temp 97.9 °F (36.6 °C) (Oral)    Resp 18    Ht 5' 2\" (1.575 m)    Wt 149 lb (67.6 kg)    LMP  (LMP Unknown)    SpO2 94%    BMI 27.25 kg/m2     Physical Exam:   General appearance - alert, well appearing, and in no distress, oriented to person, place, and time and normal appearing weight  Mental status - alert, oriented normal mood, behavior, speech, dress, motor activity, and thought processes  Head superficial masses right temporal area / scalp on right . EYE-conj clear no swelling in eyes.  Right eyelid droop noted.  Mouth - mucous membranes pink, moist, intact. Neck - supple  CV regular with murmur chronic  resp clear with some decreased bs left base  GI soft NT  Ext-No pedal edema noted bilaterally  Skin-Warm and dry. Neuro -  non-focal.    Diagnostic Imaging   reviewed  PET Results (most recent):    Results from Hospital Encounter encounter on 02/21/18   PET/CT TUMOR IMAGE SKULL THIGH (SUB)   Narrative PET/CT SCAN    PROCEDURE: Following IV injection of 11.0 mCi 18 Fluoro 2 deoxyglucose (FDG) and  a standard uptake delay, PET imaging is performed from the skull vertex to mid  thigh and axial, sagittal and coronal images were acquired. Unenhanced CT is  obtained for anatomic localization, and attenuation correction of the PET scan. Patient preprocedure blood glucose level: 95mg/dL. CORRELATIVE IMAGING STUDIES: Abdomen pelvis CT 2/17/2018. PRIOR PET:  8/30/2017    HISTORY: The study is requested for restaging follicular lymphoma. Status post  chemotherapy. FINDINGS:    HEAD/NECK: Abnormal right parietal scalp activity has increased, while the right  posterior parietal/occipital scalp activity has resolved. Left level 2 lymph  nodes are unchanged. Maximum SUV is 7.3, previously 11. CHEST: Bilateral areas of hypermetabolic pleural thickening are again noted,  somewhat progressive in the left lower lobe and right upper lobe compared to the  prior exam. New bilateral hypermetabolic enlarged axillary lymph nodes are  evident. Reference left axillary lymph node now measures 3.3 cm, previously  measuring 2.3 cm. Hypermetabolic subcarinal lymph node is new compared to the  prior exam.    ABDOMEN/PELVIS: Progression in the para-aortic and left external iliac  lymphadenopathy. Conglomerate left common iliac lymphadenopathy previously  measuring 3.3 x 5.8 cm now measures 4.6 x 6.8 cm. Left external iliac lymph node  now measures 5.3 cm, previously measuring 3.3 cm.  Improvement in the abnormal  activity and the size of the left inguinal lymph node, previously measuring 3.7  cm and measuring 2.8 cm. Left New 3.7 cm hypermetabolic soft tissue abnormality  in the left iliac fossa. SKELETON: No foci of abnormal hypermetabolism in the axial and visualized  appendicular skeleton. Impression IMPRESSION:   1. Increasing right parietal soft tissue activity. 2. Progression in the bilateral areas of hypermetabolic pleural thickening. 3. New bilateral hypermetabolic axillary lymph nodes and subcarinal lymph node  and progression of retroperitoneal and left pelvic lymphadenopathy. 4. Improvement in the left inguinal lymphadenopathy  5. Improvement in the right parieto-occipital scalp activity. 6. New hypermetabolic soft tissue abnormality left iliac fossa. 7. Stable left cervical hypermetabolic lymph nodes. Lab Results    reviewed  Lab Results   Component Value Date/Time    WBC 8.6 02/16/2018 10:51 PM    HGB 12.3 02/16/2018 10:51 PM    HCT 37.2 02/16/2018 10:51 PM    PLATELET 088 56/56/1169 10:51 PM    MCV 95.1 02/16/2018 10:51 PM       Lab Results   Component Value Date/Time    Sodium 141 02/16/2018 10:51 PM    Potassium 3.8 02/16/2018 10:51 PM    Chloride 104 02/16/2018 10:51 PM    CO2 30 02/16/2018 10:51 PM    Anion gap 7 02/16/2018 10:51 PM    Glucose 98 02/16/2018 10:51 PM    BUN 10 02/16/2018 10:51 PM    Creatinine 1.08 (H) 02/16/2018 10:51 PM    BUN/Creatinine ratio 9 (L) 02/16/2018 10:51 PM    GFR est AA >60 02/16/2018 10:51 PM    GFR est non-AA 50 (L) 02/16/2018 10:51 PM    Calcium 9.4 02/16/2018 10:51 PM    AST (SGOT) 13 (L) 02/16/2018 10:51 PM    Alk. phosphatase 119 (H) 02/16/2018 10:51 PM    Protein, total 7.4 02/16/2018 10:51 PM    Albumin 3.5 02/16/2018 10:51 PM    Globulin 3.9 02/16/2018 10:51 PM    A-G Ratio 0.9 (L) 02/16/2018 10:51 PM    ALT (SGPT) 15 02/16/2018 10:51 PM     Assessment/Plan: Miladis Aguilar is a 75 y.o. female and presents with NHL.     CC NHL Dx  10/10    STAGE: 3 possibly    TREATMENT COURSE:  rituxan 2/19/15 with reaction and treatment not finished. Hives and hypotension. Chlorambucil x 2. INTERIM HISTORY:  F/u for NHL    1. NHL low grade follicular stage 3 dx in 2010   Hx of one dose of rituxan in past with side effects and pt did not want any further therapy. PET 5/16 good post chlorambucil 4/16. PET 10/2016 was stable. Most recent PET scan 2/22/17 showed progression of disease both above and below the level of the diaphragm. Pt had repeat chlorambucil 5/17 due to progressive disease on CTs. Back of head mass/ left groin mass was almost gone. PET 5/17 and good response to tx. Over summer, pt has been having more scalp lesions with high SUV activity. PET 8/17 showed progressive disease throughout. Pt wanted to try treatment / acyclovir first to see if this treatment affects scalp/head LNs. Acyclovir did not help. Pt had recent abx for PNA and feeling better now. Pt re started chlorambucil chemo was 1/18. PET 2/18 shows progressive disease/ some mixed. Reviewed PET with pt and daughter today. Pt had vaginal bleeding/ uterine mass and seeing GYN/ONC. For D+C eval of this Friday. Will d/w GYN/ONC>     Pt also has a pleural effusion on CT reported as large but not large on exam.  Pt has PAZ/ cough/ left flank pain so will refer for ultrasound guided thoracentesis/ diagnostic/ therapeutic. Reviewed overall likely progressive NHL and need for RCHOP chemo. Pt is not sure she will agree to chemo but will think about it. Pt will do above then f/u again for further discussion. Labs good. 2.  Vaginal bleeding/ uterine mass. Seeing GYN/ONC and for D+C on Friday. 3.  Left pleural effusion. Cough productive / PAZ/ left flank pain. Will rx augmentin as pt feels she needs abx. Will order thoracentesis. 4.  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 Stable. 5.  Support good from daughter here today.          Follow-up in 2 weeks    Call if questions. ICD-10-CM ICD-9-CM    1. 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 Pleural effusion, left J90 511.9 US THORACENTESIS LT NDL W IMAGE   4. Cough R05 786.2 US THORACENTESIS LT NDL W IMAGE   5. PAZ (dyspnea on exertion) R06.09 786.09 US THORACENTESIS LT NDL W IMAGE   6. Upper respiratory tract infection, unspecified type J06.9 465.9 US THORACENTESIS LT NDL W IMAGE     Orders Placed This Encounter    US THORACENTESIS LT NDL W IMAGE     Standing Status:   Future     Standing Expiration Date:   3/26/2019     Order Specific Question:   Reason for Exam     Answer:   NHL progressive / hx of PNA/ PAZ/ cough/ left flank pain/  diagnostic and therapeutic thora     Order Specific Question:   Does fluid need testing? If yes, please place LAB Orders. Answer:   Yes     Comments:   r/o lymphoma     Order Specific Question:   Is the procedure for Diagnostic or Therapeutic reasons? Answer:   Diagnostic     Comments:   both    amoxicillin-clavulanate (AUGMENTIN) 875-125 mg per tablet     Sig: Take 1 Tab by mouth two (2) times a day. Dispense:  20 Tab     Refill:  1       continue present plan, call if any problems  There are no Patient Instructions on file for this visit. Follow-up Disposition:  Return in about 2 weeks (around 3/12/2018).     Charley Tsang DO

## 2018-02-26 NOTE — PROGRESS NOTES
Tell pt CTs mixed / some good and some increasing/ new disease  Will need to discuss next step at next visit.

## 2018-02-26 NOTE — PROGRESS NOTES
[address] [place] [address] Mathias Moritz [address], [address] [place] [address]  P [phone]  F [phone]    Office Note  Patient ID:  Name:  Zane Steen  MRN:  [id number]  :  [birth year]36 y.o. Date:  2018      HISTORY OF PRESENT ILLNESS:  Zane Steen is a 75 y.o.  postmenopausal female who is being seen for postmenopausal bleeding. She is referred by Dr. Rosalino Newberry. She was also seen in the ER on 18 for bleeding. She has a diagnosis of low-grade non-Hodgkins lymphoma, originally diagnosed in . Her most recent imaging suggests widespread disease including retroperitoneal and left pelvic lymphadenopathy. She also has hypermetabolic activity on PET in the uterus, which seems to be contiguous with the pelvic rohit disease. This was present on her scan in 2017 as well. She is not currently on therapy for her lymphoma. She has been on Rituxan, chlorambucil, and Radha Abts in the past.  She denies any previous vaginal bleeding and she states that she has had no further bleeding since the ER visit. ROS:   and GI review:  Negative  Cardiopulmonary review:  Negative   Musculoskeletal:  Negative    A comprehensive review of systems was negative except for that written in the History of Present Illness. , 10 point ROS      OB/GYN ROS:  Hx D&C many years ago  Patient denies any abnormal bleeding or vaginal discharge.        Problem List:  Patient Active Problem List    Diagnosis Date Noted    Postmenopausal bleeding 2018    Uterine mass 2018    Pneumonia due to infectious organism 2017    Healed perforation of right ear drum 10/09/2017    Herpes zoster with complication     Chemotherapy follow-up examination 2017    Upper respiratory tract infection 2016    Currently attempting to quit using tobacco 2016    PAZ (dyspnea on exertion) 2016    Post-herpetic polyneuropathy 2016    Mass of head 02/17/2016    Herpes zoster without complication 18/19/4714    Tobacco abuse 11/30/2015    Headache(784.0) 02/27/2015    Renal insufficiency 02/11/2015    Poor vision 12/09/2014    Left eye pain 12/09/2014    Orbital swelling 06/13/2013    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    Fatigue 12/05/2012    HTN (hypertension) 85/00/0748    Follicular lymphoma grade I ([facility] 06/06/2012    Heart murmur 06/06/2012    Hypothyroid 06/06/2012    Asthma 06/06/2012    PUD (peptic ulcer disease) 06/06/2012    Ear infection 06/06/2012     PMH:  Past Medical History:   Diagnosis Date    Anemia NEC     Cancer ([facility]     low grade NHL      PSH:  Past Surgical History:   Procedure Laterality Date    HX CATARACT REMOVAL  6/2015 & 7/2015    HX CHOLECYSTECTOMY  1982    HX HEENT  2005    RIGHT EAR     [place] RPLCMT PROST AORTIC VALVE OPEN XCP HOMOGRF/STENT  1972      Social History:  Social History   Substance Use Topics    Smoking status: Current Every Day Smoker     Packs/day: 0.50     Years: 45.00     Types: Cigarettes    Smokeless tobacco: Former User     Quit date: 12/1/2015      Comment: 7 cigarettes daily.  Alcohol use Yes      Comment: Rare      Family History:  Family History   Problem Relation Age of Onset    Heart Disease Mother     Kidney Disease Mother     Alcohol abuse Father     Cancer Sister      Breast    No Known Problems Brother     Cancer Sister      Colon    Alcohol abuse Sister     Alcohol abuse Sister       Medications: (reviewed)  Current Outpatient Prescriptions   Medication Sig    DIPHENHYDRAMINE HCL (NITETIME SLEEP AID PO) Take  by mouth nightly. Indications: Equate Sleep Aid    levothyroxine (SYNTHROID) 150 mcg tablet     raNITIdine (ZANTAC) 150 mg tablet     docusate sodium (COLACE) 100 mg capsule Take 100 mg by mouth 3 times daily.  gabapentin (NEURONTIN) 300 mg capsule Take 1 Cap by mouth three (3) times daily.  Indications: NEUROPATHIC PAIN, POSTHERPETIC NEURALGIA    cholecalciferol (VITAMIN D3) 1,000 unit tablet Take  by mouth daily.  KLOR-CON M20 20 mEq tablet Take 20 mEq by mouth daily.  bumetanide (BUMEX) 2 mg tablet Take 2 mg by mouth as needed.  cyanocobalamin (VITAMIN B-12) 1,000 mcg/mL injection 1,000 mcg by IntraMUSCular route every month.  atenolol (TENORMIN) 25 mg tablet Take 25 mg by mouth daily.  PROAIR RESPICLICK 90 mcg/actuation aepb     melatonin 3 mg tablet Take  by mouth nightly.  amitriptyline (ELAVIL) 50 mg tablet Take 50 mg by mouth nightly.  cimetidine (TAGAMET) 200 mg tablet Take 200 mg by mouth daily.  atenolol (TENORMIN) 50 mg tablet Take 50 mg by mouth daily.  levothyroxine (SYNTHROID) 100 mcg tablet Take 125 mcg by mouth daily.  chlorambucil (LEUKERAN) 2 mg tablet Take 4 Tabs by mouth daily. (0.1 mg/kg PO daily on days 1-28 of a 28 day cycle, weight 76.3 kg)  Indications: FOLLICULAR LYMPHOMA    ondansetron (ZOFRAN ODT) 8 mg disintegrating tablet Take 1 Tab by mouth every eight (8) hours as needed for Nausea.  MULTIVITAMIN WITH MINERALS (MULTIVITAMIN & MINERAL FORMULA PO) Take  by mouth.  levothyroxine (SYNTHROID) 175 mcg tablet daily. No current facility-administered medications for this visit. Allergies: (reviewed)  Allergies   Allergen Reactions    Rituxan [Rituximab] Hives          OBJECTIVE:    Physical Exam:  VITAL SIGNS: Vitals:    02/26/18 1343   BP: 135/65   Pulse: 75   Weight: 149 lb 3.2 oz (67.7 kg)   Height: 5' 2.01\" (1.575 m)     Body mass index is 27.28 kg/(m^2). GENERAL RATNA: Conversant, alert, oriented. No acute distress. HEENT: HEENT. No thyroid enlargement. No JVD. Neck: Supple without restrictions. RESPIRATORY: Clear to auscultation and percussion to the bases. No CVAT. CARDIOVASC: RRR without murmur/rub.    GASTROINT: soft, non-tender, without masses or organomegaly   MUSCULOSKEL: no joint tenderness, deformity or swelling   EXTREMITIES: extremities normal, atraumatic, no cyanosis or edema   PELVIC: Normal external genitalia. Normal distal vagina. Firm mass replacing uterus and cervix. Unable to visualize a normal cervical os. Mass is fixed and somewhat tender on manipulation. Ovaries not palpable. RECTAL: Deferred   ZEV SURVEY: No suspicious lymphadenopathy or edema noted. NEURO: Grossly intact. No acute deficit. Lab Results   Component Value Date/Time    WBC 8.6 02/16/2018 10:51 PM    HGB 12.3 02/16/2018 10:51 PM    HCT 37.2 02/16/2018 10:51 PM    PLATELET 051 77/60/6180 10:51 PM    MCV 95.1 02/16/2018 10:51 PM     Lab Results   Component Value Date/Time    Sodium 141 02/16/2018 10:51 PM    Potassium 3.8 02/16/2018 10:51 PM    Chloride 104 02/16/2018 10:51 PM    CO2 30 02/16/2018 10:51 PM    Anion gap 7 02/16/2018 10:51 PM    Glucose 98 02/16/2018 10:51 PM    BUN 10 02/16/2018 10:51 PM    Creatinine 1.08 (H) 02/16/2018 10:51 PM    BUN/Creatinine ratio 9 (L) 02/16/2018 10:51 PM    GFR est AA >60 02/16/2018 10:51 PM    GFR est non-AA 50 (L) 02/16/2018 10:51 PM    Calcium 9.4 02/16/2018 10:51 PM       Pelvic ultrasound (2/16/18)  Transabdominal ultrasound:  Urinary bladder: within normal limits.     Uterus: measures 10.9 x 8.0 x 8.3 cm, which is enlarged. The myometrium is  diffusely heterogeneous.     Endometrium: Obscured by distance from skin and bowel gas.     The ovaries are not seen due to bowel gas and postmenopausal state. There is no  adnexal mass or cyst.     Free fluid: None.     Endovaginal ultrasound:   Uterus: measures 8.5 x 8.0 x 5.9 cm, which is enlarged. The myometrium is  diffusely heterogeneous.     Endometrium: Not well seen due to heterogeneous myometrium.     The ovaries are not seen due to bowel gas and postmenopausal state. There is no  adnexal mass or cyst.     Free fluid: None.     IMPRESSION:   1. The uterus is enlarged and heterogeneous in keeping with prior PET/CT  findings. No focal or discrete mass is identified.  Correlation with gynecologic  examination is recommended.     2. Nonvisualized ovaries. CT of abdomen/pelvis (2/17/18)  The visualized lung bases demonstrate an increased large left pleural effusion  with left lower lung atelectasis. The right lung base and pleural space are  clear. The heart size is normal. There is no pericardial effusion. There is a  small hiatal hernia.     The liver, spleen, pancreas, and adrenal glands are normal. The kidneys are  symmetric without hydronephrosis. Or bilateral kidney cysts. The gall bladder is  surgically absent without intra- or extra-hepatic biliary dilatation.       There are no dilated bowel loops. The appendix is normal. There is a small  amount of pelvic free fluid. There is no free air. The aorta tapers without  aneurysm. There is aortoiliac atherosclerosis.     The uterus is enlarged measuring 6.7 x 7.1 cm. There is a left pelvic sidewall  soft tissue attenuation mass measuring 4.4 x 6.2 cm (series 3, image 66) that  extends along the left iliac vasculature to the midline retroperitoneum where it  measures 5.0 x 6.6 cm (series 3, image 51). This mass encases but does not  narrow the aortic bifurcation and IVC.     An enlarged left inguinal lymph node measures 1.6 x 1.9 cm (image 3, image 88),  previously 2.3 x 3.7 cm.     There is a small fat-containing supraumbilical hernia. There is no aggressive  bony lesion. IMPRESSION:   1. Findings suspicious for a uterine mass with a soft tissue mass in the left  pelvis and extending into the retroperitoneum concerning for malignancy. Lymphoma is a differential consideration given the patient's history. The mass  encases but does not narrow the aortic bifurcation and IVC.     2. Enlarged left inguinal lymph node is decreased in size since 8/30/2017.     3. Increased large left pleural effusion with left lower lung atelectasis.       PET/CT (2/21/18)  HEAD/NECK: Abnormal right parietal scalp activity has increased, while the right  posterior parietal/occipital scalp activity has resolved. Left level 2 lymph  nodes are unchanged. Maximum SUV is 7.3, previously 11.     CHEST: Bilateral areas of hypermetabolic pleural thickening are again noted,  somewhat progressive in the left lower lobe and right upper lobe compared to the  prior exam. New bilateral hypermetabolic enlarged axillary lymph nodes are  evident. Reference left axillary lymph node now measures 3.3 cm, previously  measuring 2.3 cm. Hypermetabolic subcarinal lymph node is new compared to the  prior exam.     ABDOMEN/PELVIS: Progression in the para-aortic and left external iliac  lymphadenopathy. Conglomerate left common iliac lymphadenopathy previously  measuring 3.3 x 5.8 cm now measures 4.6 x 6.8 cm. Left external iliac lymph node  now measures 5.3 cm, previously measuring 3.3 cm. Improvement in the abnormal  activity and the size of the left inguinal lymph node, previously measuring 3.7  cm and measuring 2.8 cm. Left New 3.7 cm hypermetabolic soft tissue abnormality  in the left iliac fossa.     SKELETON: No foci of abnormal hypermetabolism in the axial and visualized  appendicular skeleton.     IMPRESSION:   1. Increasing right parietal soft tissue activity. 2. Progression in the bilateral areas of hypermetabolic pleural thickening. 3. New bilateral hypermetabolic axillary lymph nodes and subcarinal lymph node  and progression of retroperitoneal and left pelvic lymphadenopathy. 4. Improvement in the left inguinal lymphadenopathy  5. Improvement in the right parieto-occipital scalp activity. 6. New hypermetabolic soft tissue abnormality left iliac fossa. 7. Stable left cervical hypermetabolic lymph nodes. IMPRESSION/PLAN:  Jeancarlos Shi is a 75 y.o. female with a working diagnosis of postmenopausal bleeding in the setting of progressive low-grade, non-Hodgkins lymphoma and is not currently on any therapy.   I reviewed with Jeancarlos Shi her medical records, physical exam, and review of symptoms. She clearly has some sort of malignant process going on in the uterus, but I'm not sure if this is due to a uterine or cervical malignancy or if it is due to involvement by her lymphoma. She has been followed under the assumption that her metastatic disease is due to her lymphoma, but it is possible that she could have two separate processes. Since I was unable to biopsy her in the office today, I have recommended an exam under anesthesia in the OR with Shlomo-cut biopsies of the uterus and cervix, and possibly a D&C if I can identify the endocervical canal.  She was counseled on the risks, benefits, indications, and alternatives of surgery. Her questions were answered and she wishes to proceed as planned.         Signed By: Raghav Calderon MD     2/26/2018/1:31 PM

## 2018-02-26 NOTE — MR AVS SNAPSHOT
2700 [facility] [address] 
[phone] Patient: Yuri Fontaine MRN: [id number] Visit Information Date & Time Provider Department Dept. Phone Encounter #  
 2/26/2018  3:00 PM Danuta Linton 15[facility] Oncology at [facility] [phone] Follow-up Instructions Return in about 2 weeks (around 3/12/2018). Routing History Follow-up and Disposition History Your Appointments 3/2/2018 11:00 AM  
SURGERY with Vielka Carbajal MD  
1310 [facility] Gynecologic Oncology 3651 [facility]) Appt Note: eua martin-cut bx d&c  
 200 [facility] [address] [phone]  
[address] 3/12/2018  3:00 PM  
Follow Up with Gonzalo Paige  Formerly [facility], [facility] Oncology at [facility] Appt Note: 2wk fu (Follicular lymphoma) [address] [facility] [address] [address]  
[phone]  
  
   
 [address] [facility] [address] [address] Upcoming Health Maintenance Date Due Hepatitis C Screening 1950 DTaP/Tdap/Td series (1 - Tdap) 11/19/1970 BREAST CANCER SCRN MAMMOGRAM 11/19/1999 FOBT Q 1 YEAR AGE 50-75 11/19/1999 ZOSTER VACCINE AGE 60> 9/19/2009 GLAUCOMA SCREENING Q2Y 11/19/2014 OSTEOPOROSIS SCREENING (DEXA) 11/19/2014 Pneumococcal 65+ High/Highest Risk (1 of 2 - PCV13) 11/19/2014 MEDICARE YEARLY EXAM 11/19/2014 Influenza Age 5 to Adult 8/1/2017 Allergies as of 2/26/2018  Review Complete On: 2/26/2018 By: Gonzalo Paige DO Severity Noted Reaction Type Reactions Rituxan [Rituximab]  08/30/2017    Hives Current Immunizations  Reviewed on 10/9/2017 Name Date Influenza Vaccine 10/1/2015, 11/5/2014 Not reviewed this visit You Were Diagnosed With   
  
 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 
ICD-9-CM: 786.09 Upper respiratory tract infection, unspecified type     ICD-10-CM: J06.9 ICD-9-CM: 465.9 Vitals BP Pulse Temp Resp Height(growth percentile) Weight(growth percentile) 132/76 (BP 1 Location: Left arm, BP Patient Position: Sitting) 74 97.9 °F (36.6 °C) (Oral) 18 5' 2\" (1.575 m) 149 lb (67.6 kg) LMP SpO2 BMI OB Status Smoking Status (LMP Unknown) 94% 27.25 kg/m2 Postmenopausal Current Every Day Smoker BMI and BSA Data Body Mass Index Body Surface Area  
 27.25 kg/m 2 1.72 m 2 Preferred Pharmacy Pharmacy Name Phone 500 [facility] [address] [place], [address] Your Updated Medication List  
  
   
This list is accurate as of 2/26/18  4:05 PM.  Always use your most recent med list.  
  
  
  
  
 amoxicillin-clavulanate 875-125 mg per tablet Commonly known as:  AUGMENTIN Take 1 Tab by mouth two (2) times a day. atenolol 25 mg tablet Commonly known as:  TENORMIN Take 25 mg by mouth daily. bumetanide 2 mg tablet Commonly known as:  Naeem Dandridge Take 2 mg by mouth as needed. cholecalciferol 1,000 unit tablet Commonly known as:  VITAMIN D3 Take  by mouth daily. docusate sodium 100 mg capsule Commonly known as:  Montey Roch Take 100 mg by mouth 3 times daily. gabapentin 300 mg capsule Commonly known as:  NEURONTIN Take 1 Cap by mouth three (3) times daily. Indications: NEUROPATHIC PAIN, POSTHERPETIC NEURALGIA  
  
 KLOR-CON M20 20 mEq tablet Generic drug:  potassium chloride Take 20 mEq by mouth daily. levothyroxine 150 mcg tablet Commonly known as:  SYNTHROID  
  
 MULTIVITAMIN & MINERAL FORMULA PO Take  by mouth. [address] Take  by mouth nightly. Indications: Equate Sleep Aid  
  
 ondansetron 8 mg disintegrating tablet Commonly known as:  ZOFRAN ODT Take 1 Tab by mouth every eight (8) hours as needed for Nausea. raNITIdine 150 mg tablet Commonly known as:  ZANTAC  
  
 VITAMIN B-12 1,000 mcg/mL injection Generic drug:  cyanocobalamin  
1,000 mcg by IntraMUSCular route every month. Prescriptions Printed Refills  
 amoxicillin-clavulanate (AUGMENTIN) 875-125 mg per tablet 1 Sig: Take 1 Tab by mouth two (2) times a day. Class: Print Route: Oral  
  
Follow-up Instructions Return in about 2 weeks (around 3/12/2018). To-Do List   
 02/26/2018 Imaging:  US THORACENTESIS LT NDL W IMAGE   
  
 02/27/2018 3:00 PM  
  Appointment with [facility] PAT EXAM RM 3 at [address] [place] [address] ([phone])  
  
 03/01/2018 10:30 AM  
  Appointment with Ric Rios ER 1 at [facility] ([phone]) GENERAL INSTRUCTIONS 1. [facility] films/reports pertaining to the area being studied with you on the day of appointment. 2. Bring a list of all medications you are currently taking, including over the counter medications. 3. A written order with a valid diagnosis and Physicians signature is required for all scheduled tests. 4. Blood thinners and platelet inhibitors should be stopped 3-5 days prior to procedure. Consult your ordering physician prior to stopping them. 5. Check in at registration 30 minutes before your appointment time unless you were instructed to do otherwise. 6. A  is requested for the procedure. For some patients, there may be some unsteadiness post procedure. If no  is present, the patient may have to remain in the department for a longer period of time.  7. The procedure may last 1-1 ½ hours. You may be required to stay 4-6 hours after the procedure for observation. --Lab work for bleeding times (INR, PT , PTT and platelets) should be be completed at least the day before the exam.  Without this information the patient is delayed or  rescheduled. Introducing [facility]! Marbin Norman introduces Nov[employer] patient portal. Now you can access parts of your medical record, email your doctor's office, and request medication refills online. 1. In your internet browser, go to [email or web address] 2. Click on the First Time User? Click Here link in the Sign In box. You will see the New Member Sign Up page. 3. Enter your Novadiol Access Code exactly as it appears below. You will not need to use this code after youve completed the sign-up process. If you do not sign up before the expiration date, you must request a new code. · Novadiol Access Code: [email or web address] Expires: 5/17/2018  2:45 PM 
 
4. Enter the last four digits of your Social Security Number (xxxx) and Date of Birth (mm/dd/yyyy) as indicated and click Submit. You will be taken to the next sign-up page. 5. Create a Novadiol ID. This will be your Novadiol login ID and cannot be changed, so think of one that is secure and easy to remember. 6. Create a Nov[employer]iol password. You can change your password at any time. 7. Enter your Password Reset Question and Answer. This can be used at a later time if you forget your password. 8. Enter your e-mail address. You will receive e-mail notification when new information is available in [address] 9. Click Sign Up. You can now view and download portions of your medical record. 10. Click the Download Summary menu link to download a portable copy of your medical information. If you have questions, please visit the Frequently Asked Questions section of the Novadiol website. Remember, Novadiol is NOT to be used for urgent needs. For medical emergencies, dial 911. Now available from your iPhone and Android! Please provide this summary of care documentation to your next provider. Your primary care clinician is listed as Alex Farah. If you have any questions after today's visit, please call [phone].

## 2018-02-26 NOTE — LETTER
2018 3:28 PM 
 
Patient: Reta Guadarrama YOB: 1950 Date of Visit: 2018 Dear John Estrella [address] [facility] 8 209 [facility] 7 [address] VIA In Basket Anastacia Sequeira MD 
[address] [address] VIA Facsimile: [phone] 
 : 
 
 
Thank you for referring Ms. Hermelinda Alvarez to me for evaluation/treatment. Below are the relevant portions of my assessment and plan of care. 27 [facility], [address] 
P [phone]  F [phone] Office Note Patient ID: 
Name:  Reta Guadarrama MRN:  [id number] :  1950/67 y.o. Date:  2018 HISTORY OF PRESENT ILLNESS: 
Reta Guadarrama is a 75 y.o.  postmenopausal female who is being seen for postmenopausal bleeding. She is referred by Dr. Lucas Rodriguez. She was also seen in the ER on 18 for bleeding. She has a diagnosis of low-grade non-Hodgkins lymphoma, originally diagnosed in . Her most recent imaging suggests widespread disease including retroperitoneal and left pelvic lymphadenopathy. She also has hypermetabolic activity on PET in the uterus, which seems to be contiguous with the pelvic rohit disease. This was present on her scan in 2017 as well. She is not currently on therapy for her lymphoma. She has been on Rituxan, chlorambucil, and Maddi Mends in the past.  She denies any previous vaginal bleeding and she states that she has had no further bleeding since the ER visit. ROS: 
 and GI review:  Negative Cardiopulmonary review:  Negative Musculoskeletal:  Negative A comprehensive review of systems was negative except for that written in the History of Present Illness. , 10 point ROS 
 
 
OB/GYN ROS: 
Hx D&C many years ago Patient denies any abnormal bleeding or vaginal discharge. Problem List: 
Patient Active Problem List  
 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  
 HTN (hypertension) 06/06/2012  Follicular lymphoma grade I ([address] 06/06/2012  Heart murmur 06/06/2012  Hypothyroid 06/06/2012  Asthma 06/06/2012  PUD (peptic ulcer disease) 06/06/2012  Ear infection 06/06/2012 PMH: 
Past Medical History:  
Diagnosis Date  Anemia NEC  Cancer ([address]   
 low grade NHL  
  
PSH: 
Past Surgical History:  
Procedure Laterality Date  HX CATARACT REMOVAL  6/2015 & 7/2015 [place]  HX HEENT  2005 RIGHT EAR   
 VA RPLCMT PROST AORTIC VALVE OPEN XCP HOMOGRF/STENT  1972 Social History: 
Social History Substance Use Topics  Smoking status: Current Every Day Smoker Packs/day: 0.50 Years: 45.00 Types: Cigarettes  Smokeless tobacco: Former User Quit date: 12/1/2015 Comment: 7 cigarettes daily.  Alcohol use Yes Comment: Rare Family History: 
Family History Problem Relation Age of Onset  Heart Disease Mother  Kidney Disease Mother  Alcohol abuse Father  Cancer Sister Breast  
 No Known Problems Brother  Cancer Sister Colon  Alcohol abuse Sister  Alcohol abuse Sister Medications: (reviewed) Current Outpatient Prescriptions Medication Sig  DIPHENHYDRAMINE HCL (NITETIME SLEEP AID PO) Take  by mouth nightly. Indications: Equate Sleep Aid  levothyroxine (SYNTHROID) 150 mcg tablet  raNITIdine (ZANTAC) 150 mg tablet  docusate sodium (COLACE) 100 mg capsule Take 100 mg by mouth 3 times daily.  gabapentin (NEURONTIN) 300 mg capsule Take 1 Cap by mouth three (3) times daily. Indications: NEUROPATHIC PAIN, POSTHERPETIC NEURALGIA  cholecalciferol (VITAMIN D3) 1,000 unit tablet Take  by mouth daily.  KLOR-CON M20 20 mEq tablet Take 20 mEq by mouth daily.  bumetanide (BUMEX) 2 mg tablet Take 2 mg by mouth as needed.  cyanocobalamin (VITAMIN B-12) 1,000 mcg/mL injection 1,000 mcg by IntraMUSCular route every month.  atenolol (TENORMIN) 25 mg tablet Take 25 mg by mouth daily.  PROAIR RESPICLICK 90 mcg/actuation aepb  melatonin 3 mg tablet Take  by mouth nightly.  amitriptyline (ELAVIL) 50 mg tablet Take 50 mg by mouth nightly.  cimetidine (TAGAMET) 200 mg tablet Take 200 mg by mouth daily.  atenolol (TENORMIN) 50 mg tablet Take 50 mg by mouth daily.  levothyroxine (SYNTHROID) 100 mcg tablet Take 125 mcg by mouth daily.  chlorambucil (LEUKERAN) 2 mg tablet Take 4 Tabs by mouth daily. (0.1 mg/kg PO daily on days 1-28 of a 28 day cycle, weight 76.3 kg)  Indications: FOLLICULAR LYMPHOMA  ondansetron (ZOFRAN ODT) 8 mg disintegrating tablet Take 1 Tab by mouth every eight (8) hours as needed for Nausea.  MULTIVITAMIN WITH MINERALS (MULTIVITAMIN & MINERAL FORMULA PO) Take  by mouth.  levothyroxine (SYNTHROID) 175 mcg tablet daily. No current facility-administered medications for this visit. Allergies: (reviewed) Allergies Allergen Reactions  Rituxan [Rituximab] Hives OBJECTIVE: 
 
Physical Exam: VITAL SIGNS: Vitals:  
 02/26/18 1343 BP: 135/65 Pulse: 75 Weight: 149 lb 3.2 oz (67.7 kg) Height: 5' 2.01\" (1.575 m) Body mass index is 27.28 kg/(m^2). GENERAL RATNA: Conversant, alert, oriented. No acute distress. HEENT: HEENT. No thyroid enlargement. No JVD. Neck: Supple without restrictions. RESPIRATORY: Clear to auscultation and percussion to the bases. No CVAT. CARDIOVASC: RRR without murmur/rub. GASTROINT: soft, non-tender, without masses or organomegaly MUSCULOSKEL: no joint tenderness, deformity or swelling EXTREMITIES: extremities normal, atraumatic, no cyanosis or edema PELVIC: Normal external genitalia. Normal distal vagina. Firm mass replacing uterus and cervix. Unable to visualize a normal cervical os. Mass is fixed and somewhat tender on manipulation. Ovaries not palpable. RECTAL: Deferred ZEV SURVEY: No suspicious lymphadenopathy or edema noted. NEURO: Grossly intact. No acute deficit. Lab Results Component Value Date/Time WBC 8.6 02/16/2018 10:51 PM  
 HGB 12.3 02/16/2018 10:51 PM  
 HCT 37.2 02/16/2018 10:51 PM  
 PLATELET 476 48/42/1802 10:51 PM  
 MCV 95.1 02/16/2018 10:51 PM  
 
Lab Results Component Value Date/Time Sodium 141 02/16/2018 10:51 PM  
 Potassium 3.8 02/16/2018 10:51 PM  
 Chloride 104 02/16/2018 10:51 PM  
 CO2 30 02/16/2018 10:51 PM  
 Anion gap 7 02/16/2018 10:51 PM  
 Glucose 98 02/16/2018 10:51 PM  
 BUN 10 02/16/2018 10:51 PM  
 Creatinine 1.08 (H) 02/16/2018 10:51 PM  
 BUN/Creatinine ratio 9 (L) 02/16/2018 10:51 PM  
 GFR est AA >60 02/16/2018 10:51 PM  
 GFR est non-AA 50 (L) 02/16/2018 10:51 PM  
 Calcium 9.4 02/16/2018 10:51 PM  
 
 
Pelvic ultrasound (2/16/18) Transabdominal ultrasound: 
Urinary bladder: within normal limits. 
  
Uterus: measures 10.9 x 8.0 x 8.3 cm, which is enlarged. The myometrium is 
diffusely heterogeneous. 
  
Endometrium: Obscured by distance from skin and bowel gas. 
  
The ovaries are not seen due to bowel gas and postmenopausal state.  There is no 
adnexal mass or cyst. 
  
 Free fluid: None. 
  
Endovaginal ultrasound:  
Uterus: measures 8.5 x 8.0 x 5.9 cm, which is enlarged. The myometrium is 
diffusely heterogeneous. 
  
Endometrium: Not well seen due to heterogeneous myometrium. 
  
The ovaries are not seen due to bowel gas and postmenopausal state. There is no 
adnexal mass or cyst. 
  
Free fluid: None. 
  
IMPRESSION:  
1. The uterus is enlarged and heterogeneous in keeping with prior PET/CT 
findings. No focal or discrete mass is identified. Correlation with gynecologic 
examination is recommended. 
  
2. Nonvisualized ovaries. CT of abdomen/pelvis (2/17/18) The visualized lung bases demonstrate an increased large left pleural effusion 
with left lower lung atelectasis. The right lung base and pleural space are 
clear. The heart size is normal. There is no pericardial effusion. There is a 
small hiatal hernia. 
  
The liver, spleen, pancreas, and adrenal glands are normal. The kidneys are 
symmetric without hydronephrosis. Or bilateral kidney cysts. The gall bladder is 
surgically absent without intra- or extra-hepatic biliary dilatation.   
  
There are no dilated bowel loops. The appendix is normal. There is a small 
amount of pelvic free fluid. There is no free air. The aorta tapers without 
aneurysm. There is aortoiliac atherosclerosis. 
  
The uterus is enlarged measuring 6.7 x 7.1 cm. There is a left pelvic sidewall 
soft tissue attenuation mass measuring 4.4 x 6.2 cm (series 3, image 66) that 
extends along the left iliac vasculature to the midline retroperitoneum where it 
measures 5.0 x 6.6 cm (series 3, image 51). This mass encases but does not 
narrow the aortic bifurcation and IVC. 
  
An enlarged left inguinal lymph node measures 1.6 x 1.9 cm (image 3, image 88), 
previously 2.3 x 3.7 cm. 
  
There is a small fat-containing supraumbilical hernia. There is no aggressive 
bony lesion.   
 
IMPRESSION:  
 1. Findings suspicious for a uterine mass with a soft tissue mass in the left 
pelvis and extending into the retroperitoneum concerning for malignancy. Lymphoma is a differential consideration given the patient's history. The mass 
encases but does not narrow the aortic bifurcation and IVC. 
  
2. Enlarged left inguinal lymph node is decreased in size since 8/30/2017. 
  
3. Increased large left pleural effusion with left lower lung atelectasis. PET/CT (2/21/18) HEAD/NECK: Abnormal right parietal scalp activity has increased, while the right 
posterior parietal/occipital scalp activity has resolved. Left level 2 lymph 
nodes are unchanged. Maximum SUV is 7.3, previously 11. 
  
CHEST: Bilateral areas of hypermetabolic pleural thickening are again noted, 
somewhat progressive in the left lower lobe and right upper lobe compared to the 
prior exam. New bilateral hypermetabolic enlarged axillary lymph nodes are 
evident. Reference left axillary lymph node now measures 3.3 cm, previously 
measuring 2.3 cm. Hypermetabolic subcarinal lymph node is new compared to the 
prior exam. 
  
ABDOMEN/PELVIS: Progression in the para-aortic and left external iliac 
lymphadenopathy. Conglomerate left common iliac lymphadenopathy previously 
measuring 3.3 x 5.8 cm now measures 4.6 x 6.8 cm. Left external iliac lymph node 
now measures 5.3 cm, previously measuring 3.3 cm. Improvement in the abnormal 
activity and the size of the left inguinal lymph node, previously measuring 3.7 
cm and measuring 2.8 cm. Left New 3.7 cm hypermetabolic soft tissue abnormality 
in the left iliac fossa. 
  
SKELETON: No foci of abnormal hypermetabolism in the axial and visualized 
appendicular skeleton. 
  
IMPRESSION:  
1. Increasing right parietal soft tissue activity. 2. Progression in the bilateral areas of hypermetabolic pleural thickening. 3. New bilateral hypermetabolic axillary lymph nodes and subcarinal lymph node and progression of retroperitoneal and left pelvic lymphadenopathy. 4. Improvement in the left inguinal lymphadenopathy 5. Improvement in the right parieto-occipital scalp activity. 6. New hypermetabolic soft tissue abnormality left iliac fossa. 7. Stable left cervical hypermetabolic lymph nodes. IMPRESSION/PLAN: 
Annita Hill is a 75 y.o. female with a working diagnosis of postmenopausal bleeding in the setting of progressive low-grade, non-Hodgkins lymphoma and is not currently on any therapy. I reviewed with Annita Hill her medical records, physical exam, and review of symptoms. She clearly has some sort of malignant process going on in the uterus, but I'm not sure if this is due to a uterine or cervical malignancy or if it is due to involvement by her lymphoma. She has been followed under the assumption that her metastatic disease is due to her lymphoma, but it is possible that she could have two separate processes. Since I was unable to biopsy her in the office today, I have recommended an exam under anesthesia in the OR with Shlomo-cut biopsies of the uterus and cervix, and possibly a D&C if I can identify the endocervical canal.  She was counseled on the risks, benefits, indications, and alternatives of surgery. Her questions were answered and she wishes to proceed as planned. Signed By: Serena Ramirez MD   
 2/26/2018/1:31 PM  
 
 
New patient referred for possible mass on CT and vaginal bleeding that started Friday. If you have questions, please do not hesitate to call me. I look forward to following Ms. Shirley along with you.  
 
 
 
Sincerely, 
 
 
Serena Ramirez MD

## 2018-02-26 NOTE — PROGRESS NOTES
Yuri Fontaine is a 75 y.o. female  Chief Complaint   Patient presents with    Lymphoma     1 mo follow up     Visit Vitals    /76 (BP 1 Location: Left arm, BP Patient Position: Sitting)    Pulse 74    Temp 97.9 °F (36.6 °C) (Oral)    Resp 18    Ht 5' 2\" (1.575 m)    Wt 149 lb (67.6 kg)    LMP  (LMP Unknown)    SpO2 94%    BMI 27.25 kg/m2     1. Have you been to the ER, urgent care clinic since your last visit? Hospitalized since your last visit? yes  [facility] vaginal bleeding 2/16/18    2. Have you seen or consulted any other health care providers outside of the [address] [place] [address] since your last visit? Include any pap smears or colon screening.  No  Dr Keturah Gallego cardiologist

## 2018-02-27 NOTE — PERIOP NOTES
PATIENT GIVEN SURGICAL SITE INFECTION FAQS INFORMATION HANDOUT. PT HAS BEEN GIVEN THE OPPORTUNITY TO ASK ADDITIONAL QUESTIONS. CALL PLACED TO DR. Mendoza Host OFFICE, PT WOULD NEED CXR PER ANESTHESIA PROTOCOL. SHE STATES SHE IS HAVE A PROCEDURE ON Thursday WITH HER ONCOLOGIST ON HER LUNGS, SHE IS NOT SURE IF SHE WILL HAVE A CXR. DR. Mendoza Host  CHECKED WITH HIM, HE DOES NOT WANT A CXR ORDERED.     PT OFFERED REFERRAL TO [facility], SHE IS NOT INTERESTED AT THIS TIME, BUT IF THINGS CHANGE SHE WILL CALL BACK TO HAVE REFERRAL PLACED IN CC

## 2018-02-28 NOTE — TELEPHONE ENCOUNTER
Received call from Rohan Schneider, [address] [place] [address]. States patient is scheduled for thoracentesis tomorrow morning. States if reason for test is diagnostic, we will need to place lab orders for fluid to be tested. Will forward to provider.

## 2018-03-01 NOTE — DISCHARGE INSTRUCTIONS
[address] [facility]  Department of Interventional Radiology  [facility]      THORACENTESIS DISCHARGE INSTRUCTIONS    General Information:  During this procedures, the doctor will insert a needle into the body to drain fluid from the chest. After the procedure, you will be able to take a deep breath much easier. The site of the puncture may ooze the first day. This will decrease and eventually stop. With the Thoracentesis (draining fluid from the chest), there is a risk of air leaking into the chest around the lung, and risk of bleeding into the chest, with the resulting pressure on the lung possibly making it collapse. A chest x-ray is done after the procedure to detect possible complications. Home Care Instructions:  Keep the puncture site clean and dry. No tub baths or swimming until puncture site heals. Showering is acceptable. Resume your normal diet, and resume your normal activity slowly and as you tolerate. If you are short of breath, rest. If shortness of breath does not ease, please call your ordering doctor. Fluid can re-accumulate in the chest and/or in the abdomen. If this should occur, your doctor needs to know as you may need to have the procedure done again. Call If:     You should call your Physician and/or the Radiology Nurse if you notice any signs of infection, like pus draining, or if it is swollen or reddened. Also call if you have a fever, or if you are bleeding from the puncture site more than a small amount on the dressing. Call if the puncture site keeps draining fluid. Some oozing is to be expected, but should slow and then stop. Call if you feel like you have pressure in your abdomen. SEEK IMMEDIATE CARE OR CALL 911 IF YOU SUDDENLY HAVE TROUBLE BREATHING, OR IF YOUR LIPS TURN BLUE, OR IF YOU NOTICE BLOOD IN YOUR SPUTUM. Follow-Up Instructions: Please see your ordering doctor as he/she has requested.       To Reach Us:   Side effects of sedation medications and other medications used today have been reviewed. Notify us of nausea, itching, hives, dizziness, or anything else out of the ordinary. Should you experience any of these significant changes, please call [phone] between the hours of 7:30 am and 10 pm or [phone] after hours.  After hours, ask the  to page the [address] Technologist, and describe the problem to the technologist.       Date: 3/1/2018  Discharging Nurse: Skylar Grajeda RN

## 2018-03-01 NOTE — PROGRESS NOTES
Dr Junior Hayes performed ultrasound-guided thoracentesis, draining 750 ml of clear yellow fluid. CXR performed post procedure.

## 2018-03-05 NOTE — PROGRESS NOTES
Call to [phone] to verify that cytology in process. Spoke with Josefina Camacho. Fluid recd 3/1/18 and is pending.

## 2018-03-12 PROBLEM — C85.10 HIGH GRADE B-CELL LYMPHOMA (HCC): Status: ACTIVE | Noted: 2018-01-01

## 2018-03-12 NOTE — MR AVS SNAPSHOT
[address] [facility] [address] [facility] [address] 
[phone] Patient: Brenton Parr MRN: [id number] NYK:[id number] Visit Information Date & Time Provider Department Dept. Phone Encounter #  
 3/12/2018  3:00 PM Danuta Kaba [address] [place] [address] Oncology at [facility] [id number] Follow-up Instructions Return in about 1 week (around 3/19/2018). Routing History Follow-up and Disposition History Your Appointments 3/16/2018  9:30 AM  
SURGERY with Linh Isabel MD  
[facility] Gynecologic Oncology [address]) Appt Note: eua martin-cut bx d&c; .; .  
 [address] [facility] [address] [phone]  
[facility] [address] [phone]  
  
    
 3/20/2018  1:30 PM  
Follow Up with Jack Cameron  [facility] at [facility] Appt Note: 1wk fu (Follicular lymphoma) [address] [facility] [address] [address]  
[phone]  
  
   
 [address] [facility] [address] Upcoming Health Maintenance Date Due Hepatitis C Screening 1950 DTaP/Tdap/Td series (1 - Tdap) 11/19/1970 BREAST CANCER SCRN MAMMOGRAM 11/19/1999 FOBT Q 1 YEAR AGE 50-75 11/19/1999 ZOSTER VACCINE AGE 60> 9/19/2009 GLAUCOMA SCREENING Q2Y 11/19/2014 Bone Densitometry (Dexa) Screening 11/19/2014 Pneumococcal 65+ High/Highest Risk (1 of 2 - PCV13) 11/19/2014 MEDICARE YEARLY EXAM 11/19/2014 Influenza Age 5 to Adult 8/1/2017 Allergies as of 3/12/2018  Review Complete On: 3/12/2018 By: Jack Cameron DO Severity Noted Reaction Type Reactions Rituxan [Rituximab]  08/30/2017    Hives Current Immunizations  Reviewed on 3/12/2018 Name Date Influenza Vaccine 10/1/2015, 11/5/2014  Reviewed by Radha Martínez LPN on 1/92/2550 at  2:47 PM  
You Were Diagnosed With   
  
 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 
ICD-9-CM: 379.91   
 PAZ (dyspnea on exertion)     ICD-10-CM: R06.09 
ICD-9-CM: 786.09 Pleural effusion, left     ICD-10-CM: J90 ICD-9-CM: 511.9 Uterine mass     ICD-10-CM: N85.9 ICD-9-CM: 625.8 Postmenopausal bleeding     ICD-10-CM: N95.0 ICD-9-CM: 627.1 Cough     ICD-10-CM: R05 ICD-9-CM: 113. 2 Vitals BP Pulse Temp Resp Height(growth percentile) Weight(growth percentile) 120/74 83 99 °F (37.2 °C) (Oral) 16 5' 2\" (1.575 m) 149 lb (67.6 kg) LMP SpO2 BMI OB Status Smoking Status (LMP Unknown) 97% 27.25 kg/m2 Postmenopausal Current Every Day Smoker Vitals History BMI and BSA Data Body Mass Index Body Surface Area  
 27.25 kg/m 2 1.72 m 2 Preferred Pharmacy Pharmacy Name Phone [facility] [address] [place] Your Updated Medication List  
  
   
This list is accurate as of 3/12/18  4:05 PM.  Always use your most recent med list.  
  
  
  
  
 amoxicillin-clavulanate 875-125 mg per tablet Commonly known as:  AUGMENTIN Take 1 Tab by mouth two (2) times a day. atenolol 25 mg tablet Commonly known as:  TENORMIN Take 25 mg by mouth daily. bumetanide 2 mg tablet Commonly known as:  Rufus Elyse Take 2 mg by mouth as needed. cholecalciferol 1,000 unit tablet Commonly known as:  VITAMIN D3 Take  by mouth daily. docusate sodium 100 mg capsule Commonly known as:  COLACE  
DAILY PRN  
  
 gabapentin 300 mg capsule Commonly known as:  NEURONTIN Take 1 Cap by mouth three (3) times daily.  Indications: NEUROPATHIC PAIN, POSTHERPETIC NEURALGIA  
  
 KLOR-CON M20 20 mEq tablet Generic drug:  potassium chloride Take 20 mEq by mouth daily. LEUKERAN 2 mg tablet Generic drug:  chlorambucil  
  
 levothyroxine 150 mcg tablet Commonly known as:  SYNTHROID  
150 mcg Daily (before breakfast). [address] Take 50 mg by mouth nightly. Indications: Equate Sleep Aid  
  
 raNITIdine 150 mg tablet Commonly known as:  ZANTAC  
daily. VITAMIN B-12 1,000 mcg/mL injection Generic drug:  cyanocobalamin  
1,000 mcg by IntraMUSCular route every month. Follow-up Instructions Return in about 1 week (around 3/19/2018). Introducing [facility]! Kassie Hampton introduces [employer] patient portal. Now you can access parts of your medical record, email your doctor's office, and request medication refills online. 1. In your internet browser, go to [email or web address] 2. Click on the First Time User? Click Here link in the Sign In box. You will see the New Member Sign Up page. 3. Enter your [employer] Access Code exactly as it appears below. You will not need to use this code after youve completed the sign-up process. If you do not sign up before the expiration date, you must request a new code. · [employer] Access Code: [email or web address] Expires: 5/17/2018  3:45 PM 
 
4. Enter the last four digits of your Social Security Number (xxxx) and Date of Birth (mm/dd/yyyy) as indicated and click Submit. You will be taken to the next sign-up page. 5. Create a [employer] ID. This will be your [employer] login ID and cannot be changed, so think of one that is secure and easy to remember. 6. Create a [employer] password. You can change your password at any time. 7. Enter your Password Reset Question and Answer. This can be used at a later time if you forget your password. 8. Enter your e-mail address. You will receive e-mail notification when new information is available in [address] 9. Click Sign Up. You can now view and download portions of your medical record. 10. Click the Download Summary menu link to download a portable copy of your medical information. If you have questions, please visit the Frequently Asked Questions section of the [employer] website. Remember, [employer] is NOT to be used for urgent needs. For medical emergencies, dial 911. Now available from your iPhone and Android! Please provide this summary of care documentation to your next provider. Your primary care clinician is listed as Sylvia Kramer. If you have any questions after today's visit, please call [phone].

## 2018-03-12 NOTE — PROGRESS NOTES
Keri Babb is a 75 y.o. 1950 female and presents with NHL. CC NHL Dx  10/10  Transformation 3/18 with pleural effusion    STAGE: 3 possibly    TREATMENT COURSE:  Orbit radiation  Chlorambucil x 1 cycle 4/16. Cycle 2 3/17. Cycle 3 4/17   Cycle 4 1/18. Attempted one dose of rituxan. INTERIM HISTORY:  Ms. Sheyla Vega is here for follow-up for NHL not on any therapy. Pt had a thoracentesis for pleural effusion and path is suspicious for transformation of NHL to a higher grade/ likely DLBC. Pt feels better and less SOB but still has cough but better. abx did help since last visit. Pt is here with daughter for cytology results and treatment plan. Pt is having gyn/onc biopsy this Friday. Pt is very resistant to having chemo. Last PET with progressive disease. Had ECHO recently for heart. Last visit:  Pt went to ER for vaginal bleeding and has been seen by GYN/ONC. CT shows a uterine mass. Pt needs D+C and has this for this Friday. Pt states has been sick lately/ URI sx/ cough productive yellow/ and CT shows left pleural effusion. Thinks she needs abx. Has some PAZ and left flank pain. PET 2/18 shows mostly progressive disease/ some mixed. Pt is here with daughter today.        Past Medical History:   Diagnosis Date    Anemia NEC     Arrhythmia     VSD; TRICUSPID REGURG    Cancer ([facility] 2010    low grade NHL    GERD (gastroesophageal reflux disease)     Hypertension     Pleural effusion 02/21/2018    LEFT    Pulmonary hypertension 02/21/2018    PER CARDIOLOGY NOTES FROM PTS VISIT ON 2/21/18    Thyroid disease      Past Surgical History:   Procedure Laterality Date    HX CATARACT REMOVAL  6/2015 & 7/2015    MERRY    HX CHOLECYSTECTOMY  1982    HX DILATION AND CURETTAGE      HX HEENT  2005    RIGHT EAR     HX OTHER SURGICAL      LEFT PAROTIDECTOMY    FL RPLCMT PROST AORTIC VALVE OPEN XCP HOMOGRF/STENT  1972    STENT PLACED TO STRAIGHTEN THE AORTA     Social History Social History    Marital status:      Spouse name: N/A    Number of children: N/A    Years of education: N/A     Social History Main Topics    Smoking status: Current Every Day Smoker     Packs/day: 0.50     Years: 45.00     Types: Cigarettes    Smokeless tobacco: Former User     Quit date: 12/1/2015    Alcohol use Yes      Comment: Rare    Drug use: No    Sexual activity: No      Comment:  has one child     Other Topics Concern    None     Social History Narrative     Family History   Problem Relation Age of Onset    Heart Disease Mother     Kidney Disease Mother     Alcohol abuse Father     Liver Disease Father     Cancer Sister      Breast    No Known Problems Brother     Cancer Sister      Colon    No Known Problems Sister     No Known Problems Sister     No Known Problems Daughter     Anesth Problems Neg Hx        Current Outpatient Prescriptions   Medication Sig Dispense Refill    DIPHENHYDRAMINE HCL (NITETIME SLEEP AID PO) Take 50 mg by mouth nightly. Indications: Equate Sleep Aid      levothyroxine (SYNTHROID) 150 mcg tablet 150 mcg Daily (before breakfast).  raNITIdine (ZANTAC) 150 mg tablet daily.  docusate sodium (COLACE) 100 mg capsule DAILY PRN      gabapentin (NEURONTIN) 300 mg capsule Take 1 Cap by mouth three (3) times daily. Indications: NEUROPATHIC PAIN, POSTHERPETIC NEURALGIA (Patient taking differently: Take 300 mg by mouth three (3) times daily as needed. Indications: NEUROPATHIC PAIN, POSTHERPETIC NEURALGIA) 90 Cap 3    cholecalciferol (VITAMIN D3) 1,000 unit tablet Take  by mouth daily.  KLOR-CON M20 20 mEq tablet Take 20 mEq by mouth daily.  bumetanide (BUMEX) 2 mg tablet Take 2 mg by mouth as needed.  cyanocobalamin (VITAMIN B-12) 1,000 mcg/mL injection 1,000 mcg by IntraMUSCular route every month.  atenolol (TENORMIN) 25 mg tablet Take 25 mg by mouth daily.       LEUKERAN 2 mg tablet       amoxicillin-clavulanate (AUGMENTIN) 875-125 mg per tablet Take 1 Tab by mouth two (2) times a day. 20 Tab 1       Allergies   Allergen Reactions    Rituxan [Rituximab] Hives       Review of Systems    A comprehensive review of systems was negative except for: per HPI and sheet    Objective:  Visit Vitals    /74    Pulse 83    Temp 99 °F (37.2 °C) (Oral)    Resp 16    Ht 5' 2\" (1.575 m)    Wt 149 lb (67.6 kg)    LMP  (LMP Unknown)    SpO2 97%    BMI 27.25 kg/m2     Physical Exam:   General appearance - alert, well appearing, and in no distress, oriented to person, place, and time and normal appearing weight  Mental status - alert, oriented normal mood, behavior, speech, dress, motor activity, and thought processes  Head superficial masses right temporal area / scalp on right . EYE-conj clear no swelling in eyes. Right eyelid droop noted. Mouth - mucous membranes pink, moist, intact. Neck - supple  CV regular with murmur chronic  resp clear with some decreased bs left base  GI soft NT  Ext-No pedal edema noted bilaterally  Skin-Warm and dry. Neuro -  non-focal.    Diagnostic Imaging   reviewed  PET Results (most recent):    Results from Hospital Encounter encounter on 02/21/18   PET/CT TUMOR IMAGE SKULL THIGH (SUB)   Narrative PET/CT SCAN    PROCEDURE: Following IV injection of 11.0 mCi 18 Fluoro 2 deoxyglucose (FDG) and  a standard uptake delay, PET imaging is performed from the skull vertex to mid  thigh and axial, sagittal and coronal images were acquired. Unenhanced CT is  obtained for anatomic localization, and attenuation correction of the PET scan. Patient preprocedure blood glucose level: 95mg/dL. CORRELATIVE IMAGING STUDIES: Abdomen pelvis CT 2/17/2018. PRIOR PET:  8/30/2017    HISTORY: The study is requested for restaging follicular lymphoma. Status post  chemotherapy.     FINDINGS:    HEAD/NECK: Abnormal right parietal scalp activity has increased, while the right  posterior parietal/occipital scalp activity has resolved. Left level 2 lymph  nodes are unchanged. Maximum SUV is 7.3, previously 11. CHEST: Bilateral areas of hypermetabolic pleural thickening are again noted,  somewhat progressive in the left lower lobe and right upper lobe compared to the  prior exam. New bilateral hypermetabolic enlarged axillary lymph nodes are  evident. Reference left axillary lymph node now measures 3.3 cm, previously  measuring 2.3 cm. Hypermetabolic subcarinal lymph node is new compared to the  prior exam.    ABDOMEN/PELVIS: Progression in the para-aortic and left external iliac  lymphadenopathy. Conglomerate left common iliac lymphadenopathy previously  measuring 3.3 x 5.8 cm now measures 4.6 x 6.8 cm. Left external iliac lymph node  now measures 5.3 cm, previously measuring 3.3 cm. Improvement in the abnormal  activity and the size of the left inguinal lymph node, previously measuring 3.7  cm and measuring 2.8 cm. Left New 3.7 cm hypermetabolic soft tissue abnormality  in the left iliac fossa. SKELETON: No foci of abnormal hypermetabolism in the axial and visualized  appendicular skeleton. Impression IMPRESSION:   1. Increasing right parietal soft tissue activity. 2. Progression in the bilateral areas of hypermetabolic pleural thickening. 3. New bilateral hypermetabolic axillary lymph nodes and subcarinal lymph node  and progression of retroperitoneal and left pelvic lymphadenopathy. 4. Improvement in the left inguinal lymphadenopathy  5. Improvement in the right parieto-occipital scalp activity. 6. New hypermetabolic soft tissue abnormality left iliac fossa. 7. Stable left cervical hypermetabolic lymph nodes.       Lab Results    reviewed  Lab Results   Component Value Date/Time    WBC 8.6 02/16/2018 10:51 PM    HGB 12.3 02/16/2018 10:51 PM    HCT 37.2 02/16/2018 10:51 PM    PLATELET 114 15/80/1980 10:51 PM    MCV 95.1 02/16/2018 10:51 PM       Lab Results   Component Value Date/Time    Sodium 141 02/16/2018 10:51 PM    Potassium 3.8 02/16/2018 10:51 PM    Chloride 104 02/16/2018 10:51 PM    CO2 30 02/16/2018 10:51 PM    Anion gap 7 02/16/2018 10:51 PM    Glucose 98 02/16/2018 10:51 PM    BUN 10 02/16/2018 10:51 PM    Creatinine 1.08 (H) 02/16/2018 10:51 PM    BUN/Creatinine ratio 9 (L) 02/16/2018 10:51 PM    GFR est AA >60 02/16/2018 10:51 PM    GFR est non-AA 50 (L) 02/16/2018 10:51 PM    Calcium 9.4 02/16/2018 10:51 PM    AST (SGOT) 13 (L) 02/16/2018 10:51 PM    Alk. phosphatase 119 (H) 02/16/2018 10:51 PM    Protein, total 7.4 02/16/2018 10:51 PM    Albumin 3.5 02/16/2018 10:51 PM    Globulin 3.9 02/16/2018 10:51 PM    A-G Ratio 0.9 (L) 02/16/2018 10:51 PM    ALT (SGPT) 15 02/16/2018 10:51 PM     Assessment/Plan: Alex Nguyen is a 75 y.o. female and presents with NHL. CC NHL Dx  10/10    STAGE: 3 possibly    TREATMENT COURSE:  rituxan 2/19/15 with reaction and treatment not finished. Hives and hypotension. Chlorambucil x 2. INTERIM HISTORY:  F/u for NHL    1. NHL low grade follicular stage 3 dx in 2010   Hx of one dose of rituxan in past with side effects and pt did not want any further therapy. PET 5/16 good post chlorambucil 4/16. PET 10/2016 was stable. Most recent PET scan 2/22/17 showed progression of disease both above and below the level of the diaphragm. Pt had repeat chlorambucil 5/17 due to progressive disease on CTs. Back of head mass/ left groin mass was almost gone. PET 5/17 and good response to tx. Over summer, pt has been having more scalp lesions with high SUV activity. PET 8/17 showed progressive disease throughout. Pt wanted to try treatment / acyclovir first to see if this treatment affects scalp/head LNs. Acyclovir did not help. Pt had recent abx for PNA and feeling better now. last chlorambucil chemo was 1/18. PET 2/18 now shows diffuse progressive disease/ some mixed. Reviewed PET with pt and daughter.     Pt is post thoracentesis and cytology now shows likely transformation to high grade / DLBCL. Reviewed path with pt and daughter today. Pt had vaginal bleeding/ uterine mass and seeing GYN/ONC. For D+C eval of this Friday. Will d/w GYN/ONC>     Pt feels better post thora and will monitor for fluid re accumulation. Pt will let us know. Reviewed transformation of NHL and likely new DLBCL and need for RCHOP chemo. Pt is not sure she will agree to chemo but will think about it. Give info on CHOP. Pt does not think she will try rituxan again. Labs good. 2.  Vaginal bleeding/ uterine mass. Seeing GYN/ONC and for D+C on Friday. 3.  Left pleural effusion post thoracentesis with + cytology. Monitor for re accumulation. 4.  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 Stable. 5.  Support good from daughter here today. Follow-up in 1 week  Call if questions. ICD-10-CM ICD-9-CM    1. High grade B-cell lymphoma (HCC) C85.10 202.80    2. Heart murmur R01.1 785.2    3. 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 Poor vision H54.7 369.9    5. Left eye pain H57.12 379.91    6. PAZ (dyspnea on exertion) R06.09 786.09    7. Pleural effusion, left J90 511.9    8. Uterine mass N85.9 625.8    9. Postmenopausal bleeding N95.0 627.1    10. Cough R05 786.2          continue present plan, call if any problems  There are no Patient Instructions on file for this visit. Follow-up Disposition:  Return in about 1 week (around 3/19/2018).     Jack Cameron DO

## 2018-03-12 NOTE — PROGRESS NOTES
Jain Frey [facility] info given to patient for review. Support given. Treatment plan in process.

## 2018-03-16 NOTE — IP AVS SNAPSHOT
2700 [facility] [address] [place] [address] 
[phone] Patient: Jeancarlos Shi MRN: [id number] [facility] About your hospitalization You were admitted on:  2018 You last received care in the:  [facility] PACU You were discharged on:  2018 Why you were hospitalized Your primary diagnosis was:  Not on File Follow-up Information Follow up With Details Comments Contact Info Luiz Rodriguez MD   1320 [facility] [address] [place] [address] 
[phone] Brenda Mathew MD Schedule an appointment as soon as possible for a visit in 2 week(s)  5875 [facility] G7 603 [facility] [address] [place] [address] 
[phone] Your Scheduled Appointments 2018  1:30 PM EDT Follow Up with Leighann Montoya DO [facility] [place] Oncology at [facility] 217 [facility] 209 [address] [place] [address]  
[phone] Discharge Orders None A check amol indicates which time of day the medication should be taken. My Medications CHANGE how you take these medications Instructions Each Dose to Equal  
 Morning Noon Evening Bedtime  
 gabapentin 300 mg capsule Commonly known as:  NEURONTIN What changed:   
- when to take this 
- reasons to take this Your last dose was: Your next dose is: Take 1 Cap by mouth three (3) times daily. Indications: NEUROPATHIC PAIN, POSTHERPETIC NEURALGIA  
 300 mg CONTINUE taking these medications Instructions Each Dose to Equal  
 Morning Noon Evening Bedtime  
 atenolol 25 mg tablet Commonly known as:  TENORMIN Your last dose was: Your next dose is: Take 25 mg by mouth daily. 25 mg  
    
   
   
   
  
 bumetanide 2 mg tablet Commonly known as:  Willean Aston Your last dose was: Your next dose is: Take 2 mg by mouth as needed. 2 mg  
    
   
   
   
  
 cholecalciferol 1,000 unit tablet Commonly known as:  VITAMIN D3 Your last dose was: Your next dose is: Take  by mouth daily. docusate sodium 100 mg capsule Commonly known as:  Anabella Boggs Your last dose was: Your next dose is:    
   
   
 DAILY PRN  
     
   
   
   
  
 KLOR-CON M20 20 mEq tablet Generic drug:  potassium chloride Your last dose was: Your next dose is: Take 20 mEq by mouth daily. 20 mEq  
    
   
   
   
  
 levothyroxine 150 mcg tablet Commonly known as:  SYNTHROID Your last dose was: Your next dose is:    
   
   
 150 mcg Daily (before breakfast). 150 mcg [address] Your last dose was: Your next dose is: Take 50 mg by mouth nightly. Indications: Equate Sleep Aid 50 mg  
    
   
   
   
  
 raNITIdine 150 mg tablet Commonly known as:  ZANTAC Your last dose was: Your next dose is:    
   
   
 daily. VITAMIN B-12 1,000 mcg/mL injection Generic drug:  cyanocobalamin Your last dose was: Your next dose is:    
   
   
 1,000 mcg by IntraMUSCular route every month. 1000 mcg Discharge Instructions Instructions Following Ambulatory Surgery Activity · As tolerated and directed by your doctor · Shower tomorrow · Do not soak in tub, swim, etc until ok'd to do so by Dr. Zuleyka Sorenson at your follow up appointment · Vaginal Rest for two weeks (no tampons, douching, intercourse. ..) Diet · Light diet for the first day · Advance to regular diet on second day, unless your doctor orders otherwise · If nausea and vomiting continues, call your doctor Pain · Take pain medication as directed by your doctor ·  Call your doctor if pain is NOT relieved by medication · DO NOT take aspirin or blood thinners until directed by your doctor Dressing Care: wear a cat pad until any discharge ceases Follow-Up Phone Calls · Will be made nursing staff · If you have any problems, call your doctor as needed Call your doctor if 
· Excessive bleeding that does not stop (if you soak a cat pad in one hour) · Temperature of 101 degrees F or above · Redness,excessive swelling or bruising, and/or green or yellow, smelly discharge After Anesthesia · For the first 24 hours: DO NOT Drive, Drink alcoholic beverages, or Make important decisions · Be aware of dizziness following anesthesia and while taking pain medication Introducing [facility]! Alexei Kumar introduces [employer] patient portal. Now you can access parts of your medical record, email your doctor's office, and request medication refills online. 1. In your internet browser, go to [email or web address] 2. Click on the First Time User? Click Here link in the Sign In box. You will see the New Member Sign Up page. 3. Enter your [employer] Access Code exactly as it appears below. You will not need to use this code after youve completed the sign-up process. If you do not sign up before the expiration date, you must request a new code. · [employer] Access Code: [email or web address] Expires: 5/17/2018  3:45 PM 
 
4. Enter the last four digits of your Social Security Number (xxxx) and Date of Birth (mm/dd/yyyy) as indicated and click Submit. You will be taken to the next sign-up page. 5. Create a [employer] ID. This will be your [employer] login ID and cannot be changed, so think of one that is secure and easy to remember. 6. Create a [employer] password. You can change your password at any time. 7. Enter your Password Reset Question and Answer. This can be used at a later time if you forget your password. 8. Enter your e-mail address.  You will receive e-mail notification when new information is available in [employer]. 9. Click Sign Up. You can now view and download portions of your medical record. 10. Click the Download Summary menu link to download a portable copy of your medical information. If you have questions, please visit the Frequently Asked Questions section of the [employer] website. Remember, [employer] is NOT to be used for urgent needs. For medical emergencies, dial 911. Now available from your iPhone and Android! Providers Seen During Your Hospitalization Provider Specialty Primary office phone Osmany Braga MD Gynecologic Oncology [phone] Your Primary Care Physician (PCP) Primary Care Physician Office Phone Office Fax Harry Abdon [phone] You are allergic to the following Allergen Reactions Rituxan (Rituximab) Hives Recent Documentation OB Status Smoking Status Postmenopausal Current Every Day Smoker Emergency Contacts Name Discharge Info Relation Home Work Mobile [facility] DISCHARGE CAREGIVER [3] Daughter [21] [phone] [facility]  Child [2] [phone] Patient Belongings The following personal items are in your possession at time of discharge: 
  Dental Appliances: None  Visual Aid: None             Clothing:  (belongings sent to pacu) Please provide this summary of care documentation to your next provider. Signatures-by signing, you are acknowledging that this After Visit Summary has been reviewed with you and you have received a copy. Patient Signature:  ____________________________________________________________ Date:  ____________________________________________________________  
  
Sadaf Yo Provider Signature:  ____________________________________________________________ Date:  ____________________________________________________________

## 2018-03-16 NOTE — BRIEF OP NOTE
BRIEF OPERATIVE NOTE    Date of Procedure: 3/16/2018   Preoperative Diagnosis: POST MENOPAUSAL BLEEDING, UTERINE MASS  Postoperative Diagnosis: POST MENOPAUSAL BLEEDING, UTERINE MASS    Procedure(s): EXAM UNDER ANESTHESIA, VAGINAL BIOPSY, MICKY-CUT BIOPSY OF MASS  Surgeon(s) and Role:     * Stefanie Araujo MD - Primary  Assistant Staff: None  Surgical Staff:  Circ-1: Kedar Clark RN  Circ-Relief: Rosiland Aase, RN  Scrub RN-1: Brenda Sinclair  Float Staff: Sho Sidhu RN  Event Time In   Incision Start 1050   Incision Close      Anesthesia: General   Estimated Blood Loss: 25 cc  Specimens:   ID Type Source Tests Collected by Time Destination   1 : LEFT VAGINAL SUCTION  Fresh Petar Fernandez MD 3/16/2018 1056 Pathology   2 : LEFT PELVIC SIDEWALL Fresh Vagina  Stefanie Araujo MD 3/16/2018 1058 Pathology      Findings: Very narrowed vagina, especially at apex. Unable to confidently visualize or palpate the cervix. Mass effect along the left vaginal wall. Linear split along the vaginal mucosa overlying the mass. Unable to perform a D&C.      Complications: None    Stefanie Araujo MD

## 2018-03-16 NOTE — ANESTHESIA POSTPROCEDURE EVALUATION
Post-Anesthesia Evaluation and Assessment    Patient: Zahida Mckeon MRN: [id number]  SSN: [id number]    YOB: 1950  Age: 75 y.o. Sex: female       Cardiovascular Function/Vital Signs  Visit Vitals    /51    Pulse 66    Temp 36.8 °C (98.3 °F)    Resp 18    SpO2 93%       Patient is status post general anesthesia for Procedure(s):  EXAM UNDER ANESTHESIA, VAGINAL BIOPSY, TRUE CUT BIOPSY OF MASS. Nausea/Vomiting: None    Postoperative hydration reviewed and adequate. Pain:  Pain Scale 1: Numeric (0 - 10) (03/16/18 1201)  Pain Intensity 1: 0 (03/16/18 1201)   Managed    Neurological Status:   Neuro (WDL): Within Defined Limits (03/16/18 1201)   At baseline    Mental Status and Level of Consciousness: Arousable    Pulmonary Status:   O2 Device: Room air (03/16/18 1201)   Adequate oxygenation and airway patent    Complications related to anesthesia: None    Post-anesthesia assessment completed.  No concerns    Signed By: Saad Anaya MD     March 16, 2018

## 2018-03-16 NOTE — OP NOTES
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 Followed by Dr. Seernity Madison. She recently began having postmenopausal vaginal bleeding. I was asked to see her to determine the cause. Vaginal exam in the office was very limited. I recommended pelvic exam under anesthesia with biopsy and possible D&C. Operative findings:  Very narrowed vagina, especially at apex. Unable to confidently visualize or palpate the cervix. Mass effect along the left vaginal wall. Linear split along the vaginal mucosa overlying the mass. Unable to perform a D&C. Procedure in detail:  After the risks, benefits, indications and alternatives of the procedure were discussed with the patient and informed consent was obtained, the patient was taken to the operating room. She was identified and then administered general anesthesia and placed in the dorsal lithotomy position in [address] [place] [address]. She was prepped and draped in the usual fashion. An open-sided Graves speculum was placed into the vagina to try to visualize the cervix. Visualization was very limited. I could not identify the cervix on direct visualization. There was narrowing and scarring at the vaginal apex. There was a linear tear or splitting of the vaginal mucosa along the left side. It was not bleeding. I palpated it with my finger and it seemed consistent with tumor. I used a IDverge biopsy forcep to take multiple biopsies of that area. No appreciable bleeding was noted from the biopsy site. The speculum was removed.  I then palpated the area and could feel a mass along the left sidewall that was inseparable from the uterus. I used a Shlomo-cut biopsy instrument to sample the mass. The patient was then taken out of stirrups, awakened from anesthesia, and taken to the recovery room in stable condition. All sponge, lap, needle counts were correct.        Abdiaziz Hernandez., MD  3/16/2018  2:19 PM

## 2018-03-16 NOTE — PERIOP NOTES
1000 - Patient interviewed in holding area, patient identity, allergies, and procedure verified. 1 - Patient's family member (daughter Kendra Chester) called and updated on patient progression and start of procedure.

## 2018-03-16 NOTE — PERIOP NOTES
Patient: Zane Steen MRN: [id number]  SSN: [id number]   YOB: 1950  Age: 75 y.o. Sex: female     Patient is status post Procedure(s):  EXAM UNDER ANESTHESIA, VAGINAL BIOPSY, TRUE CUT BIOPSY OF MASS.     Surgeon(s) and Role:     * Nicolás Leavitt MD - Primary    Local/Dose/Irrigation:  N/A                  Peripheral IV 03/16/18 Left Hand (Active)            Airway - Endotracheal Tube 03/16/18 Oral (Active)                   Dressing/Packing:  Wound Vagina-DRESSING TYPE: Alicia-pad (03/16/18 1101)  Splint/Cast:  ]

## 2018-03-16 NOTE — DISCHARGE INSTRUCTIONS
Instructions Following Ambulatory Surgery    Activity  · As tolerated and directed by your doctor  · Shower tomorrow  · Do not soak in tub, swim, etc until ok'd to do so by Dr. Zuleyka Sorenson at your follow up appointment  · Vaginal Rest for two weeks (no tampons, douching, intercourse. ..)      Diet  · Light diet for the first day  · Advance to regular diet on second day, unless your doctor orders otherwise  · If nausea and vomiting continues, call your doctor    Pain  · Take pain medication as directed by your doctor  ·  Call your doctor if pain is NOT relieved by medication  · DO NOT take aspirin or blood thinners until directed by your doctor    Dressing Care: wear a cat pad until any discharge ceases    Follow-Up Phone Calls  · Will be made nursing staff  · If you have any problems, call your doctor as needed    Call your doctor if  · Excessive bleeding that does not stop (if you soak a cat pad in one hour)  · Temperature of 101 degrees F or above  · Redness,excessive swelling or bruising, and/or green or yellow, smelly discharge    After Anesthesia  · For the first 24 hours: DO NOT Drive, Drink alcoholic beverages, or Make important decisions  · Be aware of dizziness following anesthesia and while taking pain medication

## 2018-03-16 NOTE — PERIOP NOTES
Patient and family in Phase II. Discharge instructions reviewed, opportunity for questions given. IV removed and all belongings returned to patient. Patient is ready for discharge.

## 2018-03-16 NOTE — H&P
[facility] [address], [address]  P [phone]  F [phone]        Patient ID:  Name:  Fam Dumas  MRN:  [id number]  :  /69 y.o. Date:  3/16/2018      HISTORY OF PRESENT ILLNESS:  Fam Dumas is a 75 y.o.  postmenopausal female who is being seen for postmenopausal bleeding. She is referred by Dr. Vicki Burrows. She was also seen in the ER on 18 for bleeding. She has a diagnosis of low-grade non-Hodgkins lymphoma, originally diagnosed in . Her most recent imaging suggests widespread disease including retroperitoneal and left pelvic lymphadenopathy. She also has hypermetabolic activity on PET in the uterus, which seems to be contiguous with the pelvic rohit disease. This was present on her scan in 2017 as well. She is not currently on therapy for her lymphoma. She has been on Rituxan, chlorambucil, and Leighann Arnaud in the past.  She denies any previous vaginal bleeding and she states that she has had no further bleeding since the ER visit. She recently underwent a thoracentesis which demonstrated malignant B-cell lymphoma in the pleural fluid, increasing the likelihood that her pelvic disease is lymphoma as well.          ROS:   and GI review:  Negative  Cardiopulmonary review:  Negative   Musculoskeletal:  Negative     A comprehensive review of systems was negative except for that written in the History of Present Illness. , 10 point ROS        OB/GYN ROS:  Hx D&C many years ago  Patient denies any abnormal bleeding or vaginal discharge.        Problem List:  Patient Active Problem List    Diagnosis Date Noted    High grade B-cell lymphoma ([facility] [address] 2018    Postmenopausal bleeding 2018    Uterine mass 2018    Pleural effusion, left 2018    Cough 2018    Pneumonia due to infectious organism 2017    Healed perforation of right ear drum 10/09/2017    Herpes zoster with complication 37/84/1025    Chemotherapy follow-up examination 04/03/2017    Upper respiratory tract infection 06/07/2016    Currently attempting to quit using tobacco 04/06/2016    PAZ (dyspnea on exertion) 04/06/2016    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    Mass of head 02/17/2016    Herpes zoster without complication 99/94/8666    Tobacco abuse 11/30/2015    Headache(784.0) 02/27/2015    Renal insufficiency 02/11/2015    Poor vision 12/09/2014    Left eye pain 12/09/2014    Orbital swelling 06/13/2013    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    Fatigue 12/05/2012    HTN (hypertension) 92/21/5291    Follicular lymphoma grade I ([address] 06/06/2012    Heart murmur 06/06/2012    Hypothyroid 06/06/2012    Asthma 06/06/2012    PUD (peptic ulcer disease) 06/06/2012    Ear infection 06/06/2012     PMH:  Past Medical History:   Diagnosis Date    Anemia NEC     Arrhythmia     VSD; TRICUSPID REGURG    Cancer ([address] 2010    low grade NHL    GERD (gastroesophageal reflux disease)     Hypertension     Pleural effusion 02/21/2018    LEFT    Pulmonary hypertension 02/21/2018    PER CARDIOLOGY NOTES FROM PTS VISIT ON 2/21/18    Thyroid disease       PSH:  Past Surgical History:   Procedure Laterality Date    HX CATARACT REMOVAL  6/2015 & 7/2015    MERRY    HX CHOLECYSTECTOMY  1982    HX DILATION AND CURETTAGE      HX HEENT  2005    RIGHT EAR     HX OTHER SURGICAL      LEFT PAROTIDECTOMY    CT RPLCMT PROST AORTIC VALVE OPEN XCP HOMOGRF/STENT  1972    STENT PLACED TO STRAIGHTEN THE AORTA      Social History:  Social History   Substance Use Topics    Smoking status: Current Every Day Smoker     Packs/day: 0.50     Years: 45.00     Types: Cigarettes    Smokeless tobacco: Former User     Quit date: 12/1/2015    Alcohol use Yes      Comment: Rare      Family History:  Family History   Problem Relation Age of Onset    Heart Disease Mother     Kidney Disease Mother     Alcohol abuse Father  Liver Disease Father     Cancer Sister      Breast    No Known Problems Brother     Cancer Sister      Colon    No Known Problems Sister     No Known Problems Sister     No Known Problems Daughter     Anesth Problems Neg Hx       Medications: (reviewed)  No current facility-administered medications for this encounter. Current Outpatient Prescriptions   Medication Sig    LEUKERAN 2 mg tablet     amoxicillin-clavulanate (AUGMENTIN) 875-125 mg per tablet Take 1 Tab by mouth two (2) times a day.  DIPHENHYDRAMINE HCL (NITETIME SLEEP AID PO) Take 50 mg by mouth nightly. Indications: Equate Sleep Aid    levothyroxine (SYNTHROID) 150 mcg tablet 150 mcg Daily (before breakfast).  raNITIdine (ZANTAC) 150 mg tablet daily.  docusate sodium (COLACE) 100 mg capsule DAILY PRN    gabapentin (NEURONTIN) 300 mg capsule Take 1 Cap by mouth three (3) times daily. Indications: NEUROPATHIC PAIN, POSTHERPETIC NEURALGIA (Patient taking differently: Take 300 mg by mouth three (3) times daily as needed. Indications: NEUROPATHIC PAIN, POSTHERPETIC NEURALGIA)    cholecalciferol (VITAMIN D3) 1,000 unit tablet Take  by mouth daily.  KLOR-CON M20 20 mEq tablet Take 20 mEq by mouth daily.  bumetanide (BUMEX) 2 mg tablet Take 2 mg by mouth as needed.  cyanocobalamin (VITAMIN B-12) 1,000 mcg/mL injection 1,000 mcg by IntraMUSCular route every month.  atenolol (TENORMIN) 25 mg tablet Take 25 mg by mouth daily. Allergies: (reviewed)  Allergies   Allergen Reactions    Rituxan [Rituximab] Hives          OBJECTIVE:    Physical Exam:  VITAL SIGNS: There were no vitals filed for this visit. There is no height or weight on file to calculate BMI. GENERAL RATNA: Conversant, alert, oriented. No acute distress. HEENT: HEENT. No thyroid enlargement. No JVD. Neck: Supple without restrictions. RESPIRATORY: Clear to auscultation and percussion to the bases. No CVAT. CARDIOVASC: RRR without murmur/rub. GASTROINT: soft, non-tender, without masses or organomegaly   MUSCULOSKEL: no joint tenderness, deformity or swelling   EXTREMITIES: extremities normal, atraumatic, no cyanosis or edema   PELVIC: Normal external genitalia. Normal distal vagina. Firm mass replacing uterus and cervix. Unable to visualize a normal cervical os. Mass is fixed and somewhat tender on manipulation. Ovaries not palpable. RECTAL: Deferred   ZEV SURVEY: No suspicious lymphadenopathy or edema noted. NEURO: Grossly intact. No acute deficit. Lab Results   Component Value Date/Time    WBC 8.6 02/16/2018 10:51 PM    HGB 12.3 02/16/2018 10:51 PM    HCT 37.2 02/16/2018 10:51 PM    PLATELET 829 93/71/6557 10:51 PM    MCV 95.1 02/16/2018 10:51 PM     Lab Results   Component Value Date/Time    Sodium 141 02/16/2018 10:51 PM    Potassium 3.8 02/16/2018 10:51 PM    Chloride 104 02/16/2018 10:51 PM    CO2 30 02/16/2018 10:51 PM    Anion gap 7 02/16/2018 10:51 PM    Glucose 98 02/16/2018 10:51 PM    BUN 10 02/16/2018 10:51 PM    Creatinine 1.08 (H) 02/16/2018 10:51 PM    BUN/Creatinine ratio 9 (L) 02/16/2018 10:51 PM    GFR est AA >60 02/16/2018 10:51 PM    GFR est non-AA 50 (L) 02/16/2018 10:51 PM    Calcium 9.4 02/16/2018 10:51 PM       Pelvic ultrasound (2/16/18)  Transabdominal ultrasound:  Urinary bladder: within normal limits.      Uterus: measures 10.9 x 8.0 x 8.3 cm, which is enlarged. The myometrium is  diffusely heterogeneous.      Endometrium: Obscured by distance from skin and bowel gas.      The ovaries are not seen due to bowel gas and postmenopausal state. There is no  adnexal mass or cyst.      Free fluid: None.      Endovaginal ultrasound:   Uterus: measures 8.5 x 8.0 x 5.9 cm, which is enlarged. The myometrium is  diffusely heterogeneous.      Endometrium: Not well seen due to heterogeneous myometrium.      The ovaries are not seen due to bowel gas and postmenopausal state.  There is no  adnexal mass or cyst.      Free fluid: None.      IMPRESSION:   1. The uterus is enlarged and heterogeneous in keeping with prior PET/CT  findings. No focal or discrete mass is identified. Correlation with gynecologic  examination is recommended.      2. Nonvisualized ovaries.         CT of abdomen/pelvis (2/17/18)  The visualized lung bases demonstrate an increased large left pleural effusion  with left lower lung atelectasis. The right lung base and pleural space are  clear. The heart size is normal. There is no pericardial effusion. There is a  small hiatal hernia.      The liver, spleen, pancreas, and adrenal glands are normal. The kidneys are  symmetric without hydronephrosis. Or bilateral kidney cysts. The gall bladder is  surgically absent without intra- or extra-hepatic biliary dilatation.        There are no dilated bowel loops.  The appendix is normal. There is a small  amount of pelvic free fluid. There is no free air. The aorta tapers without  aneurysm. There is aortoiliac atherosclerosis.     The uterus is enlarged measuring 6.7 x 7.1 cm. There is a left pelvic sidewall  soft tissue attenuation mass measuring 4.4 x 6.2 cm (series 3, image 66) that  extends along the left iliac vasculature to the midline retroperitoneum where it  measures 5.0 x 6.6 cm (series 3, image 51). This mass encases but does not  narrow the aortic bifurcation and IVC.     An enlarged left inguinal lymph node measures 1.6 x 1.9 cm (image 3, image 88),  previously 2.3 x 3.7 cm.      There is a small fat-containing supraumbilical hernia. There is no aggressive  bony lesion.      IMPRESSION:   1. Findings suspicious for a uterine mass with a soft tissue mass in the left  pelvis and extending into the retroperitoneum concerning for malignancy. Lymphoma is a differential consideration given the patient's history. The mass  encases but does not narrow the aortic bifurcation and IVC.      2.  Enlarged left inguinal lymph node is decreased in size since 8/30/2017.      3. Increased large left pleural effusion with left lower lung atelectasis.       PET/CT (2/21/18)  HEAD/NECK: Abnormal right parietal scalp activity has increased, while the right  posterior parietal/occipital scalp activity has resolved. Left level 2 lymph  nodes are unchanged. Maximum SUV is 7.3, previously 11.      CHEST: Bilateral areas of hypermetabolic pleural thickening are again noted,  somewhat progressive in the left lower lobe and right upper lobe compared to the  prior exam. New bilateral hypermetabolic enlarged axillary lymph nodes are  evident. Reference left axillary lymph node now measures 3.3 cm, previously  measuring 2.3 cm. Hypermetabolic subcarinal lymph node is new compared to the  prior exam.      ABDOMEN/PELVIS: Progression in the para-aortic and left external iliac  lymphadenopathy. Conglomerate left common iliac lymphadenopathy previously  measuring 3.3 x 5.8 cm now measures 4.6 x 6.8 cm. Left external iliac lymph node  now measures 5.3 cm, previously measuring 3.3 cm. Improvement in the abnormal  activity and the size of the left inguinal lymph node, previously measuring 3.7  cm and measuring 2.8 cm. Left New 3.7 cm hypermetabolic soft tissue abnormality  in the left iliac fossa.      SKELETON: No foci of abnormal hypermetabolism in the axial and visualized  appendicular skeleton.      IMPRESSION:   1. Increasing right parietal soft tissue activity. 2. Progression in the bilateral areas of hypermetabolic pleural thickening. 3. New bilateral hypermetabolic axillary lymph nodes and subcarinal lymph node  and progression of retroperitoneal and left pelvic lymphadenopathy. 4. Improvement in the left inguinal lymphadenopathy  5. Improvement in the right parieto-occipital scalp activity.    6. New hypermetabolic soft tissue abnormality left iliac fossa.   7. Stable left cervical hypermetabolic lymph nodes.        IMPRESSION/PLAN:  Tricia Rm is a 75 y.o. female with a working diagnosis of postmenopausal bleeding in the setting of progressive low-grade, non-Hodgkins lymphoma and is not currently on any therapy. I reviewed with Yuri Fontaine her medical records, physical exam, and review of symptoms. She clearly has some sort of malignant process going on in the uterus, but I'm not sure if this is due to a uterine or cervical malignancy or if it is due to involvement by her lymphoma. She has been followed under the assumption that her metastatic disease is due to her lymphoma, but it is possible that she could have two separate processes. Since I was unable to biopsy her in the office today, I have recommended an exam under anesthesia in the OR with Shlomo-cut biopsies of the uterus and cervix, and possibly a D&C if I can identify the endocervical canal.  She was counseled on the risks, benefits, indications, and alternatives of surgery. Her questions were answered and she wishes to proceed as planned.       Patient Active Problem List    Diagnosis Date Noted    High grade B-cell lymphoma ([facility] [address] 03/12/2018    Postmenopausal bleeding 02/26/2018    Uterine mass 02/26/2018    Pleural effusion, left 02/26/2018    Cough 02/26/2018    Pneumonia due to infectious organism 11/16/2017    Healed perforation of right ear drum 10/09/2017    Herpes zoster with complication 79/94/8245    Chemotherapy follow-up examination 04/03/2017    Upper respiratory tract infection 06/07/2016    Currently attempting to quit using tobacco 04/06/2016    PAZ (dyspnea on exertion) 04/06/2016    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    Mass of head 02/17/2016    Herpes zoster without complication 92/79/2177    Tobacco abuse 11/30/2015    Headache(784.0) 02/27/2015    Renal insufficiency 02/11/2015    Poor vision 12/09/2014    Left eye pain 12/09/2014    Orbital swelling 06/13/2013    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    Fatigue 12/05/2012    HTN (hypertension) 37/85/0217    Follicular lymphoma grade I ([facility] [address] 06/06/2012    Heart murmur 06/06/2012    Hypothyroid 06/06/2012    Asthma 06/06/2012    PUD (peptic ulcer disease) 06/06/2012    Ear infection 06/06/2012         Signed By: Rachael Lopes MD     3/16/2018/7:39 AM

## 2018-03-16 NOTE — ANESTHESIA PREPROCEDURE EVALUATION
Anesthetic History   No history of anesthetic complications            Review of Systems / Medical History  Patient summary reviewed, nursing notes reviewed and pertinent labs reviewed    Pulmonary        Sleep apnea  Smoker  Asthma        Neuro/Psych   Within defined limits           Cardiovascular    Hypertension          Complex congenital heart defect (s/p coarctation repair at age 23, current small vsd with PAH and TR)    Exercise tolerance: >4 METS     GI/Hepatic/Renal     GERD: well controlled           Endo/Other      Hypothyroidism       Other Findings            Physical Exam    Airway  Mallampati: II  TM Distance: 4 - 6 cm  Neck ROM: normal range of motion        Cardiovascular    Rhythm: regular  Rate: normal         Dental    Dentition: Caps/crowns, Upper dentition intact and Lower dentition intact     Pulmonary  Breath sounds clear to auscultation               Abdominal  GI exam deferred       Other Findings            Anesthetic Plan    ASA: 3  Anesthesia type: general          Induction: Intravenous  Anesthetic plan and risks discussed with: Patient

## 2018-03-20 PROBLEM — R18.8 ASCITES: Status: ACTIVE | Noted: 2018-01-01

## 2018-03-20 PROBLEM — R18.0 ASCITES, MALIGNANT: Status: ACTIVE | Noted: 2018-01-01

## 2018-03-20 NOTE — ED NOTES
AIDET communication provided and informed of purposeful rounding to include collaboration of entire care team; patient acknowledged understanding. Patient offered time to go to the commode, not ready yet.  Awaiting test results

## 2018-03-20 NOTE — TELEPHONE ENCOUNTER
Call to patient to advise ok to change appt as GYN path result pending. ML to call our office to reschedule appt. Message left for daughter also to reschedule appt.

## 2018-03-20 NOTE — ED NOTES
Pt resting on the stretcher in no distress. All results available for review and awaiting further care mgmt. Will continue to monitor. \"I am not really in pain , it just feels like tightness in my belly. \"

## 2018-03-20 NOTE — Clinical Note
Status[de-identified] Inpatient [101] Type of Bed: Telemetry [19] Inpatient Hospitalization Certified Necessary for the Following Reasons: 9. Other (further clarification in H&P documentation) Admitting Diagnosis: Ascites [[id number]] Admitting Physician: Jamaica Saez [[id number]] Attending Physician: Jia Benitez Estimated Length of Stay: 3-4 Midnights Discharge Plan[de-identified] Home with Office Follow-up

## 2018-03-20 NOTE — IP AVS SNAPSHOT
2700 [facility] [address] [place] [address] 
[phone] Patient: Mace Barthel MRN: [id number] You are allergic to the following Allergen Reactions Rituxan (Rituximab) Hives Immunizations Administered for This Admission Name Date Influenza Vaccine (Quad) PF  Deferred () Recent Documentation Weight BMI OB Status Smoking Status 72.6 kg 29.26 kg/m2 Postmenopausal Current Every Day Smoker Unresulted Labs-Please follow up with your PCP about these lab tests Order Current Status URIC ACID Collected (18 0400) Emergency Contacts  (Rel.) Home Phone Work Phone Mobile Phone Kimberly Hu (Daughter) [phone] -- [phone] About your hospitalization You were admitted on:  2018 You last received care in the:  [facility] You were discharged on:  2018 Why you were hospitalized Your primary diagnosis was:  Ascites Your diagnoses also included:  Ascites, Malignant Providers Seen During Your Hospitalization Provider Specialty Primary office phone Syed Fuentes MD Emergency Medicine [phone] Reanna Stratton MD Hospitalist [phone] Tono Fulton MD Internal Medicine [phone] [address] [facility] MD Phi Internal Medicine [phone] Lavelle Cheadle, MD Internal Medicine [phone] Your Primary Care Physician (PCP) Primary Care Physician Office Phone Office Fax Temitope Maradiaga [phone] Follow-up Information Follow up With Details Comments Contact Info Reanna Stratton MD In 1 week to reasses for renal function [facility] A [facility] [phone] [id number] [phone] Geoff Bradford DO In 3 days for nulesta shot [address] [place] [address] At [facility] 209 [address] [place] [address] 
[phone] Clay Summers MD On 3/28/2018 Hospital follow up PCP appointment on Wednesday, 3/28/18 @ 3:00 p.m.  [address] 
[phone] Appointments for Next 14 days 3/28/2018  2:30 PM  BSV NEULASTA . 601 [facility] My Medications STOP taking these medications   
 bumetanide 2 mg tablet Commonly known as:  Salena Cranker KLOR-CON M20 20 mEq tablet Generic drug:  potassium chloride NITETIME SLEEP AID PO  
   
  
  
TAKE these medications as instructed Instructions Each Dose to Equal  
 Morning Noon Evening Bedtime  
 allopurinol 100 mg tablet Commonly known as:  Edmond Delarosa Your last dose was: Your next dose is: Take 2 Tabs by mouth every eight (8) hours for 30 days. 200 mg  
    
   
   
   
  
 atenolol 25 mg tablet Commonly known as:  TENORMIN Your last dose was: Your next dose is: Take 25 mg by mouth daily. 25 mg  
    
   
   
   
  
 cholecalciferol 1,000 unit tablet Commonly known as:  VITAMIN D3 Your last dose was: Your next dose is: Take  by mouth daily. docusate sodium 100 mg capsule Commonly known as:  Janneth Concepción Your last dose was: Your next dose is:    
   
   
 DAILY PRN  
     
   
   
   
  
 gabapentin 300 mg capsule Commonly known as:  NEURONTIN Your last dose was: Your next dose is: Take 1 Cap by mouth daily for 30 days. Indications: NEUROPATHIC PAIN, POSTHERPETIC NEURALGIA  
 300 mg  
    
   
   
   
  
 levothyroxine 150 mcg tablet Commonly known as:  SYNTHROID Your last dose was: Your next dose is:    
   
   
 150 mcg Daily (before breakfast). 150 mcg  
    
   
   
   
  
 raNITIdine 150 mg tablet Commonly known as:  ZANTAC Your last dose was: Your next dose is: Take 150 mg by mouth nightly.   
 150 mg  
    
 VITAMIN B-12 1,000 mcg/mL injection Generic drug:  cyanocobalamin Your last dose was: Your next dose is:    
   
   
 1,000 mcg by IntraMUSCular route every month. 1000 mcg Where to Get Your Medications Information on where to get these meds will be given to you by the nurse or doctor. ! Ask your nurse or doctor about these medications  
  allopurinol 100 mg tablet  
 gabapentin 300 mg capsule Discharge Instructions Discharge Instructions for Chemotherapy/Biotherapy Chemotherapy has the potential to cause many side effects. The following are general precautions that chemo patients should take: 1. Practice good hand washing: ? Use soap and water for at least 15 seconds, covering all areas of hands. ? Always wash hands before eating. ? Wash hands after contact with public surfaces such as door knobs and handles, shopping carts, telephones and elevator buttons. 2. Get plenty of rest:  
? You will likely experience fatigue three to five days following your treatment. It may last as long as seven days. 3. Drink plenty of fluids. Water is best.  
4. Eat a well balanced diet:  
? Small frequent meals may help if you are having trouble with nausea or your appetite. Some people also do well with nutritional supplements. 5. Pace yourself with daily activities:  
? Take frequent breaks and ask for help if you need it. 6. Exercise is very important:  
? It will increase circulation and will help the fatigue. Do what you can each day. 7. If your regimen results in hair loss:  
? You will likely notice effects between two and three weeks following your first treatment. Some lose all hair while others only experience thinning. 8. Practice good oral hygiene:  
? Notify your M.D. immediately if any mouth sores or discomfort develop. 9. Protect yourself from the sun. Signs/Symptoms of an allergic reaction and/or some side effects may require immediate medical attention. Notify your physician if you develop one or more of the following:  
Temperature of 100.5 degrees or greater;  
Skin redness, itching, swelling, blistering, weeping, crusting, rash, or hives; Wheezing, chest tightness, cough, or shortness of breath;  
Swelling of the face, eyelids, lips, tongue, or throat;  
Severe, persistent headache;  
Stuffy nose, runny nose, sneezing;  
Red (bloodshot), itchy, swollen, or watery eyes;  
Stomach pain, nausea, vomiting, diarrhea, or bloody stools;  
Mouth sores Your physician should also be aware of the following symptoms:  
Persistent and unresolved nausea and/or vomiting;  
Persistent and unresolved diarrhea or constipation;  
Numbness/tingling/burning of the extremities, including the fingers and toes; Bleeding or unexplained bruising; Unexplained redness/swelling/pain in the arms or legs; Shortness of breath or fatigue that worsens;  
Pain with urination or blood in the urine; Chills;  
Cough, especially a productive cough;  
Mouth sores or a white coating of the tongue; Redness, swelling, pain or drainage at the port-a-cath or IV site; Increased feeling of bloating or water retention; Excessive weight loss or gain;  
Ringing in the ears; Difficulty swallowing;  
Dizziness, vertigo, lightheadedness, or fainting. Chemotherapy can cause birth defects. It is very important not to become pregnant  
or father a child while undergoing chemotherapy. During chemotherapy and for 48 hours after chemotherapy, the drugs may still be in  
your urine and other body fluids. You should use the following precautions to reduce  
exposure to others: * Both men and women should sit on the toilet to cut down on splashing. Flush   toilets with the lid down.   Always wash your hands well with soap and water after using the toilet. You may want to use a separate toilet if possible. * Caregivers should wear disposable gloves to handle body wastes. Dispose of  
gloves after each use and wash hands. * Any sheets or clothes soiled with bodily fluids should be machine washed separately from other laundry. Wash twice with regular laundry detergent in hot water. If they cannot be washed right away they can be stored in a sealed plastic  bag.   
* If disposable adult diapers, underwear, or sanitary pads are used, they can be sealed in a plastic bag, and thrown away with regular trash. Discharge Instructions PATIENT ID: Yuri Fontaine MRN: [id number] YOB: 1950 DATE OF ADMISSION: 3/20/2018  7:38 PM   
DATE OF DISCHARGE: 3/27/2018 PRIMARY CARE PROVIDER: Clay Summers MD  
 
ATTENDING PHYSICIAN: Ira Sutton MD 
DISCHARGING PROVIDER: Ira Sutton MD   
To contact this individual call [phone] and ask the  to page. If unavailable ask to be transferred the Adult Hospitalist Department. DISCHARGE DIAGNOSES Malignant ascitics from [facility] 
 
CONSULTATIONS: IP CONSULT TO INTERVENTIONAL RADIOLOGY 
IP CONSULT TO NEPHROLOGY 
IP CONSULT TO CARDIOLOGY PROCEDURES/SURGERIES: * No surgery found * PENDING TEST RESULTS:  
At the time of discharge the following test results are still pending: FOLLOW UP APPOINTMENTS:  
Follow-up Information Follow up With Details Comments Contact Info Fraser Boxer, MD In 1 week to reasses for renal function [facility] 7 [address] 
[phone] Gonzalo Paige DO In 3 days for [facility] shot [address] [place] [address] [facility] 209 401 [facility] 
[phone] Clay Summers MD In 2 weeks  [address] 
[phone] ADDITIONAL CARE RECOMMENDATIONS:  
 
DIET: Cardiac Diet ACTIVITY: Activity as tolerated WOUND CARE:  
 
 EQUIPMENT needed:  
 
 
DISCHARGE MEDICATIONS: 
 See Medication Reconciliation Form · It is important that you take the medication exactly as they are prescribed. · Keep your medication in the bottles provided by the pharmacist and keep a list of the medication names, dosages, and times to be taken in your wallet. · Do not take other medications without consulting your doctor. NOTIFY YOUR PHYSICIAN FOR ANY OF THE FOLLOWING:  
Fever over 101 degrees for 24 hours. Chest pain, shortness of breath, fever, chills, nausea, vomiting, diarrhea, change in mentation, falling, weakness, bleeding. Severe pain or pain not relieved by medications. Or, any other signs or symptoms that you may have questions about. DISPOSITION: 
x  Home With: 
 OT  PT  [facility]  RN  
  
 SNF/Inpatient Rehab/LTAC Independent/assisted living Hospice Other: CDMP Checked:  
Yes x PROBLEM LIST Updated: 
Yes x Signed:  
Beulah Triana MD 
3/27/2018 
8:31 AM 
 
I have reviewed discharge instructions with the patient. The patient verbalized understanding. [employer] Activation Thank you for requesting access to [employer]. Please follow the instructions below to securely access and download your online medical record. [employer] allows you to send messages to your doctor, view your test results, renew your prescriptions, schedule appointments, and more. How Do I Sign Up? 1. In your internet browser, go to [email or web address] 
2. Click on the First Time User? Click Here link in the Sign In box. You will be redirect to the New Member Sign Up page. 3. Enter your [employer] Access Code exactly as it appears below. You will not need to use this code after youve completed the sign-up process. If you do not sign up before the expiration date, you must request a new code. [employer] Access Code: [email or web address] Expires: 2018  3:45 PM (This is the date your [employer] access code will ) 4. Enter the last four digits of your Social Security Number (xxxx) and Date of Birth (mm/dd/yyyy) as indicated and click Submit. You will be taken to the next sign-up page. 5. Create a [employer] ID. This will be your [employer] login ID and cannot be changed, so think of one that is secure and easy to remember. 6. Create a [employer] password. You can change your password at any time. 7. Enter your Password Reset Question and Answer. This can be used at a later time if you forget your password. 8. Enter your e-mail address. You will receive e-mail notification when new information is available in [address] 9. Click Sign Up. You can now view and download portions of your medical record. 10. Click the Download Summary menu link to download a portable copy of your medical information. Additional Information If you have questions, please visit the Frequently Asked Questions section of the [employer] website at [email or web address] Remember, [employer] is NOT to be used for urgent needs. For medical emergencies, dial 911. Discharge Orders None [employer] Announcement We are excited to announce that we are making your provider's discharge notes available to you in [employer]. You will see these notes when they are completed and signed by the physician that discharged you from your recent hospital stay. If you have any questions or concerns about any information you see in [employer], please call the Health Information Department where you were seen or reach out to your Primary Care Provider for more information about your plan of care. Introducing hospitals & HEALTH SERVICES! [facility] introduces [employer] patient portal. Now you can access parts of your medical record, email your doctor's office, and request medication refills online. 1. In your internet browser, go to [email or web address] 2. Click on the First Time User? Click Here link in the Sign In box.  You will see the New Member Sign Up page. 3. Enter your [employer] Access Code exactly as it appears below. You will not need to use this code after youve completed the sign-up process. If you do not sign up before the expiration date, you must request a new code. · [employer] Access Code: [email or web address] Expires: 5/17/2018  3:45 PM 
 
4. Enter the last four digits of your Social Security Number (xxxx) and Date of Birth (mm/dd/yyyy) as indicated and click Submit. You will be taken to the next sign-up page. 5. Create a [employer] ID. This will be your [employer] login ID and cannot be changed, so think of one that is secure and easy to remember. 6. Create a [employer] password. You can change your password at any time. 7. Enter your Password Reset Question and Answer. This can be used at a later time if you forget your password. 8. Enter your e-mail address. You will receive e-mail notification when new information is available in [address] 9. Click Sign Up. You can now view and download portions of your medical record. 10. Click the Download Summary menu link to download a portable copy of your medical information. If you have questions, please visit the Frequently Asked Questions section of the [employer] website. Remember, [employer] is NOT to be used for urgent needs. For medical emergencies, dial 911. Now available from your iPhone and Android! General Information Please provide this summary of care documentation to your next provider. Patient Signature:  ____________________________________________________________ Date:  ____________________________________________________________  
  
Brenda Payor Provider Signature:  ____________________________________________________________ Date:  ____________________________________________________________

## 2018-03-20 NOTE — ED NOTES
Per Dr Chas Fitch pt to transfer to [facility] ER via BLS transport. [facility] called BLS transport arranged. Dr Chas Fitch has spoken with Dr Heidy Mackenzie.

## 2018-03-20 NOTE — TELEPHONE ENCOUNTER
Discussed with Dr. Malik Escobedo who spoke with ED at [facility]. Patient has ascites and plan is for her to be transferred to [facility] for admission under the hospitalist service. She will need US guided paracentesis for pathology.

## 2018-03-20 NOTE — ED NOTES
TRANSFER - OUT REPORT:    Verbal report given to [facility] (name) on Trudy Glance  being transferred to [facility] ED (unit) for routine progression of care       Report consisted of patients Situation, Background, Assessment and   Recommendations(SBAR). Information from the following report(s) SBAR was reviewed with the receiving nurse. Lines:   Peripheral IV 03/20/18 Right Antecubital (Active)   Site Assessment Clean, dry, & intact 3/20/2018 12:30 PM   Phlebitis Assessment 0 3/20/2018 12:30 PM   Infiltration Assessment 0 3/20/2018 12:30 PM   Dressing Status Clean, dry, & intact 3/20/2018 12:30 PM   Dressing Type Transparent 3/20/2018 12:30 PM   Hub Color/Line Status Pink 3/20/2018 12:30 PM        Opportunity for questions and clarification was provided.       Patient transported with:   Monitor

## 2018-03-20 NOTE — ED TRIAGE NOTES
Patient had a \"GYN procedure with biopsy\" last Friday. C/o increased \"belly swelling and pain\" intermittently since Friday. One episode of emesis on Friday and one today. Last BM was this morning. Ate toast this morning.

## 2018-03-20 NOTE — ED PROVIDER NOTES
HPI Comments: 75 y.o. female with past medical history significant for anemia, NHL, HTN, GERD, HTN, pulmonary HTN, tricuspid regurgitation, and cholecyctectomy  who presents to the ED with chief complaint of abdominal pain. Pt is transferred from outside the ED for admission and hold in the ED. The hospialist service and oncology are aware. There are no other acute medical concerns at this time. Social hx: current smoker, EtOH use  PCP: Viraj Vo MD    Note written by Magdalena Hong, as dictated by Vicky Devine MD 7:48 PM      The history is provided by the patient. No  was used. Past Medical History:   Diagnosis Date    Anemia NEC     Arrhythmia     VSD; TRICUSPID REGURG    Cancer ([facility] [address] 2010    low grade NHL    GERD (gastroesophageal reflux disease)     Hypertension     NHL (non-Hodgkin's lymphoma) ([facility] [address] 2010    received chemo and radiation. not in remission.     Pleural effusion 02/21/2018    LEFT    Pulmonary hypertension 02/21/2018    PER CARDIOLOGY NOTES FROM PTS VISIT ON 2/21/18    Thyroid disease        Past Surgical History:   Procedure Laterality Date    HX CATARACT REMOVAL  6/2015 & 7/2015    MERRY    HX CHOLECYSTECTOMY  1982    HX DILATION AND CURETTAGE      HX HEENT  2005    RIGHT EAR     HX OTHER SURGICAL      LEFT PAROTIDECTOMY    IL RPLCMT PROST AORTIC VALVE OPEN XCP HOMOGRF/STENT  1972    STENT PLACED TO STRAIGHTEN THE AORTA         Family History:   Problem Relation Age of Onset    Heart Disease Mother     Kidney Disease Mother     Alcohol abuse Father     Liver Disease Father     Cancer Sister      Breast    No Known Problems Brother     Cancer Sister      Colon    No Known Problems Sister     No Known Problems Sister     No Known Problems Daughter     Anesth Problems Neg Hx        Social History     Social History    Marital status:      Spouse name: N/A    Number of children: N/A    Years of education: N/A Occupational History    Not on file. Social History Main Topics    Smoking status: Current Every Day Smoker     Packs/day: 0.50     Years: 45.00     Types: Cigarettes    Smokeless tobacco: Former User     Quit date: 12/1/2015    Alcohol use Yes      Comment: Rare    Drug use: No    Sexual activity: No      Comment:  has one child     Other Topics Concern    Not on file     Social History Narrative         ALLERGIES: Rituxan [rituximab]    Review of Systems   Gastrointestinal: Positive for abdominal pain. Vitals:    03/20/18 1943   BP: 137/48   Pulse: 83   Resp: 18   Temp: 98.6 °F (37 °C)   SpO2: 96%            Physical Exam   Constitutional: She is oriented to person, place, and time. She appears well-developed and well-nourished. No distress. HENT:   Head: Normocephalic and atraumatic. Eyes: Pupils are equal, round, and reactive to light. Neck: Normal range of motion. Neck supple. Cardiovascular: Normal rate, regular rhythm and normal heart sounds. Exam reveals no gallop and no friction rub. No murmur heard. Pulmonary/Chest: Effort normal and breath sounds normal. No respiratory distress. She has no wheezes. Abdominal: Soft. Bowel sounds are normal. She exhibits fluid wave and ascites. She exhibits no distension. There is no tenderness. There is no rebound and no guarding. Musculoskeletal: Normal range of motion. Neurological: She is alert and oriented to person, place, and time. Skin: Skin is warm. No rash noted. She is not diaphoretic. Psychiatric: She has a normal mood and affect. Her behavior is normal. Judgment and thought content normal.   Nursing note and vitals reviewed. [facility]      ED Course       Procedures  CONSULT NOTE:  7:45 PM Markie Matrini MD spoke with Dr. Shaila Edmonds, Consult for Hospitalist.  Discussed available diagnostic tests and clinical findings. He is aware that the pt is in the ED.

## 2018-03-20 NOTE — ED NOTES
Working on bed placement for the patient at [facility]. Patient resting on the stretcher, currently without complaints. V/S reassessed. Call bell within reach; will continue to monitor.

## 2018-03-20 NOTE — TELEPHONE ENCOUNTER
HIPAA verified. Stated nausea, vomiting and abd swelling since surgery Friday. Denied fever or chills. Advised to contact surgeon's office as soon as possible for direction. Patient verbalized understanding. Aware of follow up appt here 3/22/18 @ 12p. Apologized for appointment rescheduling, but path not available for plan. Patient verbalized understanding and thanked for call. Support given.

## 2018-03-20 NOTE — ED NOTES
Discharge or Transfer Assessment: Patient A&O x4 and in no distress. Physical re-examination reveals some improvement in pts condition as far as relief from Nausea, with reassessment of vital signs completed at the time of admission transfer and/or discharge. However, patient feels like her Bri Lesch is tight\" and does not call it pain, just \"very uncomfortable. \" Depending on position in the bed, patient's SP02 can be as high as 94% or as low as 90%. All EMTALA paperwork signed for and sent with [facility].

## 2018-03-20 NOTE — ED NOTES
Pt resting on the stretcher. All results available for review and awaiting further care mgmt. Will continue to monitor.

## 2018-03-20 NOTE — TELEPHONE ENCOUNTER
Call from Green Burkitt NP who stated patient had vaginal bx on Friday and symptoms are in upper left back. RN advised would contact patient to clarify symptoms. Call to patient. HIPAA verified. Patient stated has 2 areas of concern. Stated ongoing left upper back pain under shoulder blade x several weeks. Stated discomfort present prior to thoracentesis. Rates pain as 5/10 during day and 7/10 at night. Stated has not taken medication for pain. Discomfort same with inspiration/expiration. Describes as dull in nature. Also reported abdominal pain, bloating and nausea/vomiting which developed Friday evening. Stated, \"I feel like I am 9 months pregnant. \"  Denied taking meds for nausea and feels \"weak\". Encouraged sips po fluids and would send to provider for plan. Patient verbalized understanding and thanked for call.

## 2018-03-20 NOTE — TELEPHONE ENCOUNTER
Discussed with Dr. Mavis Owens. Please advise patient go to the ED for evaluation of symptoms to r/o obstruction.

## 2018-03-20 NOTE — ED NOTES
Patient ambulated to the bathroom, with her daughter, to void. Awaiting bed assignment at [facility].

## 2018-03-20 NOTE — PROGRESS NOTES
I do not have patients path back from GYN/ONC biopsies  Patient can reschedule for when I get path back if she wants

## 2018-03-20 NOTE — TELEPHONE ENCOUNTER
HIPAA verified. Advised of provider recommendation. Patient stated will proceed to [facility] ED. Verbalized understanding and thanked for call. To provider.

## 2018-03-20 NOTE — ED PROVIDER NOTES
HPI Comments: The patient had a vaginal exam under anesthesia on 3/16/2018 at which time she was noted to have a left vaginal wall and left pelvic sidewall mass and biopsies were taken. She has a previous history of lymphoma. Since this procedure the patient has developed abdominal swelling, no vomiting and low-grade fever. She also has decreased p.o. intake and has had occasional episodes of diarrhea she was referred here today to rule out bowel obstruction. Patient is a 75 y.o. female presenting with abdominal pain. Abdominal Pain    Associated symptoms include a fever, diarrhea, nausea and vomiting. Pertinent negatives include no dysuria, no headaches, no chest pain and no back pain. Past Medical History:   Diagnosis Date    Anemia NEC     Arrhythmia     VSD; TRICUSPID REGURG    Cancer ([facility] [address] 2010    low grade NHL    GERD (gastroesophageal reflux disease)     Hypertension     NHL (non-Hodgkin's lymphoma) ([facility] [address] 2010    received chemo and radiation. not in remission.     Pleural effusion 02/21/2018    LEFT    Pulmonary hypertension 02/21/2018    PER CARDIOLOGY NOTES FROM PTS VISIT ON 2/21/18    Thyroid disease        Past Surgical History:   Procedure Laterality Date    HX CATARACT REMOVAL  6/2015 & 7/2015    MERRY    HX CHOLECYSTECTOMY  1982    HX DILATION AND CURETTAGE      HX HEENT  2005    RIGHT EAR     HX OTHER SURGICAL      LEFT PAROTIDECTOMY    MO RPLCMT PROST AORTIC VALVE OPEN XCP HOMOGRF/STENT  1972    STENT PLACED TO STRAIGHTEN THE AORTA         Family History:   Problem Relation Age of Onset    Heart Disease Mother     Kidney Disease Mother     Alcohol abuse Father     Liver Disease Father     Cancer Sister      Breast    No Known Problems Brother     Cancer Sister      Colon    No Known Problems Sister     No Known Problems Sister     No Known Problems Daughter     Anesth Problems Neg Hx        Social History     Social History    Marital status:      Spouse name: N/A    Number of children: N/A    Years of education: N/A     Occupational History    Not on file. Social History Main Topics    Smoking status: Current Every Day Smoker     Packs/day: 0.50     Years: 45.00     Types: Cigarettes    Smokeless tobacco: Former User     Quit date: 12/1/2015    Alcohol use Yes      Comment: Rare    Drug use: No    Sexual activity: No      Comment:  has one child     Other Topics Concern    Not on file     Social History Narrative         ALLERGIES: Rituxan [rituximab]    Review of Systems   Constitutional: Positive for appetite change and fever. Eyes: Negative for visual disturbance. Respiratory: Negative for cough, shortness of breath and wheezing. Cardiovascular: Negative for chest pain and leg swelling. Gastrointestinal: Positive for abdominal pain, diarrhea, nausea and vomiting. Genitourinary: Negative for dysuria. Musculoskeletal: Negative. Negative for back pain and neck stiffness. Skin: Negative for rash. Neurological: Negative. Negative for syncope and headaches. Psychiatric/Behavioral: Negative for confusion. Vitals:    03/20/18 1230   BP: 143/69   Pulse: 83   Resp: 16   Temp: 100 °F (37.8 °C)   SpO2: 97%   Weight: 69 kg (152 lb 1.9 oz)   Height: 5' 2\" (1.575 m)            Physical Exam   Constitutional: She appears well-developed. No distress. HENT:   Head: Normocephalic. Pale mm   Eyes: Pupils are equal, round, and reactive to light. Neck: Normal range of motion. Cardiovascular: Normal rate and regular rhythm. Murmur heard. Pulmonary/Chest: Effort normal and breath sounds normal. No respiratory distress. Abdominal: Soft. She exhibits distension. Mild generalized ttp. Musculoskeletal: Normal range of motion. She exhibits no edema. Neurological: She is alert. She has normal strength. No cranial nerve deficit. Skin: Skin is warm and dry. Psychiatric: She has a normal mood and affect.  Her behavior is normal.   Nursing note and vitals reviewed. MDM      ED Course       Procedures    I spoke with Dr. Inocencio Sneed and Dr. Richa Kruger and they both would like the patient admitted to the hospitalist at [facility] for treatment of her ascites, vomiting, dehydration and they will probably start chemotherapy also. spoke c dr. Eusebia Israel at [facility] . He will accept the pt.

## 2018-03-20 NOTE — ED NOTES
TRANSFER - OUT REPORT:    Verbal report given to Norma Root RN (name) on Keri Babb  being transferred to [address] [place] [address] ED (unit) for routine progression of care       Report consisted of patients Situation, Background, Assessment and   Recommendations(SBAR). Information from the following report(s) SBAR was reviewed with the receiving nurse. Lines:   Peripheral IV 03/20/18 Right Antecubital (Active)        Opportunity for questions and clarification was provided.       Patient transported with:   Monitor

## 2018-03-20 NOTE — TELEPHONE ENCOUNTER
Pt c/o pain in her back on the left side in the area between her lung and kidney. She also c/o abdominal bloating, nausea, decreased appetite, and some vomiting when she eats. She denies bowel changes, urinary symptoms, fever, chills, or abnormal pain vaginally. I reviewed the patient's symptoms with Kit Paniagua NP and she called to Dr. Oliverio Mcnair office and spoke with Shailesh Vega. She advised her to please contact the patient as these symptoms should not be related to the biopsies taken by .

## 2018-03-20 NOTE — ED NOTES
Patient's IVF's were started, she is tolerating well. Resting on the stretcher and is waiting for her radiology procedures to be done. Daughter at bedside. \"I am hungry but my stomach feels like a volcano and I have some nausea. \"

## 2018-03-20 NOTE — ED TRIAGE NOTES
PT presents via EMS from [facility] ED as TRansfer. PT had no n/v en route.   PT presents with distended abdomen

## 2018-03-20 NOTE — ED NOTES
Patient resting on the stretcher and watching TV. V/S reassessed. Call bell within reach; will continue to monitor.

## 2018-03-21 NOTE — ED NOTES
The documentation for this period is being entered following the guidelines as defined in the [facility] by Ximena Li RN.      [id number]

## 2018-03-21 NOTE — PROGRESS NOTES
Day #1 of cefepime  Indication:  intra-abdominal infection (no hospitalist note yet.)    Abx regimen: cefepime, Flagyl  Recent Labs      18   1243   WBC  11.5*   CREA  1.70*   BUN  17     Est CrCl: ~29 ml/min  Temp (24hrs), Av.7 °F (37.1 °C), Min:98 °F (36.7 °C), Max:100 °F (37.8 °C)    Cultures: none    Plan: Change to cefepime 1g IV Q24h per renal dose adjustment policy.

## 2018-03-21 NOTE — ED NOTES
Bedside and Verbal shift change report given to Arben Romo RN (oncoming nurse) by Brennon Naylor RN (offgoing nurse). Report included the following information SBAR, Kardex, Intake/Output, MAR, Recent Results and Med Rec Status.

## 2018-03-21 NOTE — ED NOTES
Patient resting comfortably at this time, monitoring continues, alarms active and audible. Family remains at bedside.

## 2018-03-21 NOTE — PROGRESS NOTES
TRANSFER - IN REPORT:    Verbal report received from jose(name) on Marc Chnog  being received from ed(unit) for routine progression of care      Report consisted of patients Situation, Background, Assessment and   Recommendations(SBAR). Information from the following report(s) SBAR, Kardex, Procedure Summary, Intake/Output and Recent Results was reviewed with the receiving nurse. Opportunity for questions and clarification was provided. Assessment completed upon patients arrival to unit and care assumed.

## 2018-03-21 NOTE — H&P
[address]  ACUTE CARE HISTORY AND PHYSICAL    Manny Reyes  MR#: [id number]  : 1950  ACCOUNT #: [de-identified]   DATE OF SERVICE: 2018    CHIEF COMPLAINT:  Abdominal pain. HISTORY OF PRESENT ILLNESS:  Patient is a 77-year-old female with past medical history of non-Hodgkin's lymphoma, anemia, VSD, tricuspid regurgitation, GERD, hypertension, pleural effusion, pulmonary hypertension and thyroid disease. Patient was diagnosed with non-Hodgkin's lymphoma in 2010, underwent treatment for the same. Patient had a repeat PET scan 2017 which showed progression of disease, both above and below the level of the diaphragm. Patient was restarted on chemotherapy, but continued to have progressive disease, developed some pleural effusion, had thoracocentesis done. They were concerned that the malignancy has transformed to high-grade DLBCL. The patient started having some vaginal bleeding about two to three weeks back, was seen by Dr. Shannan Reardon who under anesthesia did a biopsy of what appeared to be some vaginal lesions and growth. Patient reports that this was done last Friday. Patient reports post that she started experiencing some abdominal pain associated with poor appetite, increasing abdominal girth, nausea, some episodes of vomiting and called her oncologist today who told her to come to the ER. The patient reports that she has not eaten or drank well in the past few days, she has had increasing weight, her abdomen is hurting and she feels that there is a lot of fluid in her abdomen. The patient reports that she has never had these symptoms before. Patient was seen at [facility] had a CT scan done which showed significant ascites, possibly malignant and was requested to be admitted to the hospitalist service. Currently the patient is resting in bed.   Denies any headache, blurry vision, sore throat, trouble swallowing, trouble with speech, headache, chest pain, any constipation, diarrhea, hematemesis, melena, hemoptysis, fever, chills, urinary symptoms, focal or generalized neurological weakness, recent travels or sick contacts. Patient denies any falls, injuries or other concerns or problems. PAST MEDICAL HISTORY:  See above. HOME MEDICATIONS:  Currently on diphenhydramine, Synthroid 150 mcg daily, Zantac 150 mg nightly, Colace 100 mg every day, gabapentin 300 mg t.i.d., Bumex 2 mg as needed, cyanocobalamin and atenolol. SOCIAL HISTORY:  Current everyday smoker, smokes half a pack per day for 45 years. Occasional alcohol. Denies IV drug abuse. ALLERGIES:  RITUXAN. REVIEW OF SYSTEMS:  All systems reviewed and were found to be essentially negative except for the symptoms mentioned above. FAMILY HISTORY:  Mother had history of heart disease and kidney disease. Father had history of alcohol abuse and liver disease. Sister has history of breast cancer and colon cancer. PHYSICAL EXAMINATION:  VITAL SIGNS:  Temperature 98.6, pulse 83, respiratory rate 18, blood pressure 135/60, pulse ox 95% on room air. GENERAL:  Alert x3, awake, mildly distressed, pleasant female, appears to be stated age. HEENT:  Pupils equal and reactive to light. Dry mucous membranes. Tympanic membranes clear. The patient has some proptosis of the right eye, which she reports is chronic. NECK:  Supple. CHEST:  Decreased basal breath sounds. HEART:  S1, S2 heard. ABDOMEN:  Soft, distended. Bowel sounds were hypoactive. Mildly tender to palpation diffusely. No rebound, no guarding. Positive for shifting dullness. Positive for fluid thrill. EXTREMITIES:  No clubbing, no cyanosis, no edema. NEUROPSYCHIATRIC:  Pleasant mood and affect. No focal neurological deficits were noted. SKIN:  Warm. LABORATORY DATA:  White count 11.5, hemoglobin 10.7, hematocrit 33.4, platelets 052.   Urine shows large amount of blood, trace amount of ketones, negative for glucose, negative for nitrite, negative for leukocyte esterase, 5-10 WBCs, 1+ bacteria. Sodium 139, potassium 4.1, chloride 99, bicarbonate 30, anion gap 10, glucose 104, BUN 17, creatinine 1.70, calcium 9.7, bilirubin total 0.5, ALT 12, AST 27, alkaline phosphatase 94, lipase 85, lactic acid 1.5. CT of the abdomen and pelvis shows new omental nodularity consistent with peritoneal carcinomatosis; worsening retroperitoneal lymphadenopathy; and bulky lower abdominal and pelvic soft tissue masses; new  mild left hydronephrosis; moderate amount of free intraperitoneal fluid increased in quantity; interval decrease in size of small left pleural effusion; persistent left lower hemithorax pleural based soft tissue mass unchanged. Chest x-ray showed unchanged left pleural thickening, small left pleural effusion unchanged, left basilar atelectasis, no new airspace disease. ASSESSMENT AND PLAN:  1. Ascites, most likely malignant ascites. Patient will be admitted on a telemetry bed. IR consultation requested for paracentesis in the morning, we will send fluid for evaluation. We will put patient on IV albumin, pain control. I's and O's. We will try to diurese gently. In light of mild hypertension, we will start patient on Bumex IV and give 1 dose now and further doses will be per hospital course. We will continue to closely monitor. Further intervention will be per hospital course. Dr. Berta Morse from oncology has been consulted and their recommendations will be incorporated. Continue to closely monitor. Reassess as needed. We will send paracentesis fluid for analysis. 2.  SINDHU with hydronephrosis, most likely secondary to malignant disease. We will get a renal ultrasound. Avoid nephrotoxic medication. Currently the patient has no problems when urinating, we will keep a close eye. If persists, may consider urology consult for possible stent. Further intervention will be per hospital course.   Reassess as needed. Continue to monitor. We will trend creatinine. 3.  Possible urinary tract infection. The patient is on broad spectrum antibiotics. Urine culture has been ordered. Further intervention will be per hospital course. 4.  History of non-Hodgkin lymphoma. Provide supportive care, pain control. Oncology consultation requested. Further intervention will be per hospital course. 5.  Anemia, appears to be chronic. Patient denies any hematemesis, melena, hemoptysis. We will check stool for occult blood. Patient denies any vaginal bleeding or vaginal discharge after the biopsy. We will continue to closely monitor. Further intervention will be per hospital course. We will trend hemoglobin. 6.  Hypothyroidism. Continue home medication. 7.  GI and DVT prophylaxis. The patient is on SCDs.       Misael Dill MD MM / MN  D: 03/20/2018 22:07     T: 03/20/2018 22:59  JOB #: [id number]

## 2018-03-21 NOTE — ED NOTES
TRANSFER - OUT REPORT:    Verbal report given to Galen Altamirano RN(name) on Miladis Senters  being transferred to [facility](unit) for routine progression of care       Report consisted of patients Situation, Background, Assessment and   Recommendations(SBAR). Information from the following report(s) SBAR, Kardex, ED Summary, Intake/Output and MAR was reviewed with the receiving nurse. Lines:   Peripheral IV 03/21/18 Left Hand (Active)   Site Assessment Clean, dry, & intact 3/21/2018  3:22 PM   Phlebitis Assessment 0 3/21/2018  3:22 PM   Infiltration Assessment 0 3/21/2018  3:22 PM   Dressing Status Clean, dry, & intact 3/21/2018  3:22 PM   Dressing Type Transparent 3/21/2018  3:22 PM   Hub Color/Line Status Pink 3/21/2018  3:22 PM        Opportunity for questions and clarification was provided.

## 2018-03-21 NOTE — ED NOTES
Report received from Be Galaviz, [address]. Patient resting comfortably in hospital bed, monitoring continues. Alarms active and audible, family bedside. Updated as to plan of care. Call bell within patient reach.

## 2018-03-21 NOTE — PROGRESS NOTES
Hospitalist Progress Note  Philomena Carson MD  Office: [phone]      Date of Service:  3/21/2018  NAME:  Brenton Parr  :    MRN:  [id number]      Admission Summary: This is a 75 y.o female with significant medical history of NH lymphoma, anemia, hypertension, tachyarrhythmia, history of aortic valve repair (at age of 24), VSD, mild TR,  thyroid disease, and history of pulmonary hypertension presented to the hospital with a chief complaint of shortness of breath and abdominal pain which she associated with after undergoing a uterine biopsy one week prior to this presentation. Associated with above patient noticed abdominal girth increment with low grade fever.    Cardiology and Oncology consulted    Interval history / Subjective:   Seen early in am, not in distress, answers to questions appropriately      Assessment & Plan:     Abdominal distension likely secondary to malignant ascites in a patient with history of NHL (not on chemotherapy)   For paracentesis and fluid analysis   Follow ascitic fluid culture   CT abdomen: with finding consistent with rapid progression of disease with carcinomatosis, abdominal pelvic mass and lymphadenopathy   Oncology consultation and follow up appreciated: Recommendation noted   For possible start of chemotherapy if patient is agreeable for tomorrow  Treatment plan as per oncology     NHL  Treatment plan as per oncology   Oncology recommendations noted     Acute kidney insufficieny   Base line creatinine (18) around 1.08   Current BUN/Creatinine trending  1.70>1.87   Bun/creatinine trending up secondary to dehydration/pre-renal vs TLS   Continue with normal saline 100 ml/hr continuous infusion   Follow bmp in am if trending upwards   Consider nephrology consultation     UTI  Low grade fever and UA consistent with above  Will cover with broad spectrum antibiotics   Follow ascitic fluid and urine culture   Trend fever curve   Started on broad spectrum antibiotics   Will de-escalate antibiotics as per culture result     History of VSD, mild TR, and aortic valve replacement   Cardiology on board: recommendation noted   Echocardiogram pending   Continue with home medications if not contraindicated     History of tachyarrhythmia   Continue with Atenolol     Hypertension   Continue with Atenolol    Hypothyroidism   Continue with home medications     Anemia of chronic illness   Normocytic anemia   Will trend H and H   Oncology on board     Code status: Full code   DVT prophylaxis: SCD    Care Plan discussed with: Patient/Family and Nurse  Disposition: TBD     Hospital Problems  Date Reviewed: 3/20/2018          Codes Class Noted POA    * (Principal)Ascites ICD-10-CM: R18.8  ICD-9-CM: 789.59  3/20/2018 Unknown        Ascites, malignant ICD-10-CM: R18.0  ICD-9-CM: 789.51  3/20/2018 Unknown                Review of Systems:   A comprehensive review of systems was negative except for that written in the HPI. Vital Signs:    Last 24hrs VS reviewed since prior progress note. Most recent are:  Visit Vitals    /55    Pulse 73    Temp 97.8 °F (36.6 °C)    Resp 19    SpO2 93%       No intake or output data in the 24 hours ending 03/21/18 1741     Physical Examination:             Constitutional:  No acute distress, cooperative, pleasant    ENT:  Oral mucous moist, oropharynx benign. Neck supple,    Resp:  CTA bilaterally. No wheezing/rhonchi/rales. No accessory muscle use   CV:  Regular rhythm, normal rate, no murmurs, gallops, rubs    GI:  mid line surgical incision, distended abdominal distension. Positive for FT and SD      Musculoskeletal:  No edema, warm, 2+ pulses throughout    Neurologic:  Moves all extremities. AAOx3, CN II-XII reviewed     Psych:  Good insight, Not anxious nor agitated.        Data Review:    Review and/or order of clinical lab test  Review and/or order of tests in the radiology section of CPT  Review and/or order of tests in the medicine section of CPT      Labs:     Recent Labs      03/21/18   0527  03/20/18   1243   WBC  7.0  11.5*   HGB  9.2*  10.7*   HCT  28.5*  33.4*   PLT  263  338     Recent Labs      03/21/18   0744  03/21/18   0527  03/20/18   1243   NA   --   139  139   K   --   3.5  4.1   CL   --   102  99   CO2   --   26  30   BUN   --   20  17   CREA   --   1.87*  1.70*   GLU   --   87  104*   CA   --   9.0  9.7   URICA  8.9*   --    --      Recent Labs      03/20/18   1243   SGOT  27   ALT  12   AP  94   TBILI  0.5   TP  7.2   ALB  3.4*   GLOB  3.8   LPSE  85     No results for input(s): INR, PTP, APTT in the last 72 hours. No lab exists for component: INREXT   Recent Labs      03/21/18   0744   TIBC  215*   PSAT  9*   FERR  185      No results found for: FOL, RBCF   No results for input(s): PH, PCO2, PO2 in the last 72 hours. No results for input(s): CPK, CKNDX, TROIQ in the last 72 hours.     No lab exists for component: CPKMB  No results found for: CHOL, CHOLX, CHLST, CHOLV, HDL, LDL, LDLC, DLDLP, TGLX, TRIGL, TRIGP, CHHD, CHHDX  No results found for: [facility]  Lab Results   Component Value Date/Time    Color YELLOW/STRAW 03/20/2018 12:36 PM    Appearance CLEAR 03/20/2018 12:36 PM    Specific gravity 1.025 03/20/2018 12:36 PM    Specific gravity 1.025 02/16/2018 11:22 PM    pH (UA) 6.0 03/20/2018 12:36 PM    Protein 30 (A) 03/20/2018 12:36 PM    Glucose NEGATIVE  03/20/2018 12:36 PM    Ketone TRACE (A) 03/20/2018 12:36 PM    Bilirubin NEGATIVE  02/16/2018 11:22 PM    Urobilinogen 0.2 03/20/2018 12:36 PM    Nitrites NEGATIVE  03/20/2018 12:36 PM    Leukocyte Esterase NEGATIVE  03/20/2018 12:36 PM    Epithelial cells FEW 03/20/2018 12:36 PM    Bacteria 1+ (A) 03/20/2018 12:36 PM    WBC 5-10 03/20/2018 12:36 PM    RBC 10-20 03/20/2018 12:36 PM         Medications Reviewed:     Current Facility-Administered Medications   Medication Dose Route Frequency    acetaminophen (TYLENOL) tablet 650 mg  650 mg Oral Q6H PRN    atenolol (TENORMIN) tablet 25 mg  25 mg Oral DAILY    0.9% sodium chloride infusion  100 mL/hr IntraVENous CONTINUOUS    [START ON 3/26/2018] cyanocobalamin (VITAMIN B12) injection 1,000 mcg  1,000 mcg IntraMUSCular EVERY MONTH    gabapentin (NEURONTIN) capsule 300 mg  300 mg Oral DAILY    levothyroxine (SYNTHROID) tablet 150 mcg  150 mcg Oral ACB    famotidine (PEPCID) tablet 20 mg  20 mg Oral QHS    sodium chloride (NS) flush 5-10 mL  5-10 mL IntraVENous Q8H    sodium chloride (NS) flush 5-10 mL  5-10 mL IntraVENous PRN    ondansetron (ZOFRAN) injection 4 mg  4 mg IntraVENous Q4H PRN    cefepime (MAXIPIME) 1 g in 0.9% sodium chloride (MBP/ADV) 50 mL ADV  1 g IntraVENous Q24H    metroNIDAZOLE (FLAGYL) IVPB premix 500 mg  500 mg IntraVENous Q12H     ______________________________________________________________________  EXPECTED LENGTH OF STAY: 2d 16h  ACTUAL LENGTH OF STAY:          1                 Kaylee Beltrán MD

## 2018-03-21 NOTE — PROGRESS NOTES
Clinical Pharmacy Note: Metronidazole Dosing    Please note that the metronidazole dose for Celine Thomson has been changed to 500 mg IV q12h per [facility]-approved protocol. Please contact the pharmacy with any questions.     Danielle Price, BERENICED

## 2018-03-21 NOTE — CONSULTS
Cardiology Consult Note      Patient Name: Ochoa Bolton  :  MRN: [id number]  Date: 3/21/2018  Time: 4:35 PM    Admit Diagnosis: Ascites; Ascites, malignant    Primary Cardiologist: Dr. Aspen Damon, [facility]   Consulting Cardiologist: Franco Pino M.D.    Reason for Consult: VSD    Requesting MD: Junie Marie NP    HPI:  Ochoa Bolton is a 75 y.o. female admitted on 3/20/2018  for Ascites; Ascites, malignant. Past medical history of Anemia NEC; Arrhythmia; Cancer ([facility] [address] (); GERD (gastroesophageal reflux disease); Hypertension; NHL (non-Hodgkin's lymphoma) ([facility] [address] (); Pleural effusion (2018); Pulmonary hypertension (2018); and Thyroid disease. Presents to ED with c/o abdominal pain. Referred to the ED by her Oncologist, Dr. Red Gandhi for further evaluation. Suspicions for progression of newly dx transformation of NHL to a DLBCL. Recent thoracentesis with pleural fluid was high grade NHL. Fluid ascites noted on exam, she complete paracentesis today with successful drainage and fluid sent for cytology. As suspicions are high for malignant fluid, Dr. Red Gandhi is recommending chemotherapy; However, this patient is somewhat resistant. This patient has a h/o VSD as well as mild TR and mild to mod pulm HTN with last echo in . She last saw Dr. Saulo Hill in  for evaluation. Cardiac MRI completed in  noted normal systolic function, LVEF 20%, small perimembranous ventricular septal defect (VSD) measuring an average of 3 mm in diameter with small left to right shunt. S/p coarctation repair with minimal residual coarctation of the aorta with distal aortic arch after the origin of the left subclavian measuring 16 mm (lowest normal diameter limit is 18 mm). Since , she has followed up yearly with Dr. Aspen Damon of [facility]. She recently 17 completed an echo in his office.   Results varied little from 2013 with small VSD, mild TR. No EF given in summary note I personal read. She has had no cardiac issues since last seen by  [facility]. No chest pain, SOB, edema or suspicions for CAD, CHF or MI. Subjective:  Denies CP, SOB or palpitations. More comfortable since paracentesis. Assessment and Plan     1. VSD   - Small by MRI in 2013   - Echo completed with Dr. Sangeetha Jimenez, will try to get report. Consider repeating echo here   - Will simply check 12 lead EKG  2. NHL   - suspect progression to DLBCL   - Followed by Dr. Galen Fabry for starting CHOP here, if patient agreeable  3. S/p Aortic valve replacement   - homograft/stent at age 20  4. HTN   - Atenolol 25 mg  5. Hypothyroid   - Synthroid 150 mcg    H/o VSD and aortic valve/root replacement (age 24), and mild TR/PHTN. Now faced with needing CHOP for progressive NHL. Has been following up with Dr. Sangeetha Jimenez for cards needs. Recent echo there with unchanged results. Most likely will need to repeat echo here.  [facility] to follow this patient tomorrow. Patient to get [employer] for probable chemo treatments. I have seen and examined the patient and agree with PA. Assessment  Mostly c/o significant ascites and sob  No cp reported  proceed with echo mostly for ef      No specialty comments available.     Review of Systems:     GENERAL   Recent weight loss - no   Fever -----------------   no   Chills -----------------   no     EYES, VISION   Visual Changes - no     EARS, NOSE, THROAT   Hearing loss ----------- no   Swallowing difficulties - no     CARDIOVASCULAR   Chest pain/pressure ---- no   Arrhythmia/palpitations - no       RESPIRATORY   Cough ------------------ no   Shortness of breath - no   Wheezing -------------- no   GASTROINTESTINAL   Abdominal pain - no   Heartburn -------- no   Bloody stool ----- no     GENITOURINARY   Frequent urination - no   Urgency -------------- no     MUSCULOSKELETAL   Joint pain/swelling ---- no Musculoskeletal pain - no     SKIN & INTEGUMENTARY   Rashes - no   Sores --- no         NEUROLOGICAL   Numbness/tingling - no   Sensation loss ------ no     PSYCHIATRIC   Nervousness/anxiety - no   Depression -------------- no     ENDOCRINE   Heat/cold intolerance - no   Excessive thirst -------- no     HEMATOLOGIC/LYMPHATIC   Abnormal bleeding - no     ALL/IMMUN   Allergic reaction ------ no   Recurrent infections - no     Previous treatment/evaluation includes echocardiogram and MRI . Cardiac risk factors: hypertension, post-menopausal.    Past Medical History:   Diagnosis Date    Anemia NEC     Arrhythmia     VSD; TRICUSPID REGURG    [facility] [address] 2010    low grade NHL    GERD (gastroesophageal reflux disease)     Hypertension     NHL (non-Hodgkin's lymphoma) ([facility] [address] [facility] 2010    received chemo and radiation. not in remission.     Pleural effusion 02/21/2018    LEFT    Pulmonary hypertension 02/21/2018    PER CARDIOLOGY NOTES FROM PTS VISIT ON 2/21/18    Thyroid disease      Past Surgical History:   Procedure Laterality Date    HX CATARACT REMOVAL  6/2015 & 7/2015    MERRY    HX CHOLECYSTECTOMY  1982    HX DILATION AND CURETTAGE      HX HEENT  2005    RIGHT EAR     HX OTHER SURGICAL      LEFT PAROTIDECTOMY    [facility] RPLCMT PROST AORTIC VALVE OPEN XCP HOMOGRF/STENT  1972    STENT PLACED TO STRAIGHTEN THE AORTA     Current Facility-Administered Medications   Medication Dose Route Frequency    acetaminophen (TYLENOL) tablet 650 mg  650 mg Oral Q6H PRN    atenolol (TENORMIN) tablet 25 mg  25 mg Oral DAILY    0.9% sodium chloride infusion  100 mL/hr IntraVENous CONTINUOUS    [START ON 3/26/2018] cyanocobalamin (VITAMIN B12) injection 1,000 mcg  1,000 mcg IntraMUSCular EVERY MONTH    gabapentin (NEURONTIN) capsule 300 mg  300 mg Oral DAILY    levothyroxine (SYNTHROID) tablet 150 mcg  150 mcg Oral ACB    famotidine (PEPCID) tablet 20 mg  20 mg Oral QHS    sodium chloride (NS) flush 5-10 mL  5-10 mL IntraVENous Q8H    sodium chloride (NS) flush 5-10 mL  5-10 mL IntraVENous PRN    ondansetron (ZOFRAN) injection 4 mg  4 mg IntraVENous Q4H PRN    cefepime (MAXIPIME) 1 g in 0.9% sodium chloride (MBP/ADV) 50 mL ADV  1 g IntraVENous Q24H    metroNIDAZOLE (FLAGYL) IVPB premix 500 mg  500 mg IntraVENous Q12H       Allergies   Allergen Reactions    Rituxan [Rituximab] Hives      Family History   Problem Relation Age of Onset    Heart Disease Mother     Kidney Disease Mother     Alcohol abuse Father     Liver Disease Father     Cancer Sister      Breast    No Known Problems Brother     Cancer Sister      Colon    No Known Problems Sister     No Known Problems Sister     No Known Problems Daughter     Anesth Problems Neg Hx       Social History     Social History    Marital status:      Spouse name: N/A    Number of children: N/A    Years of education: N/A     Social History Main Topics    Smoking status: Current Every Day Smoker     Packs/day: 0.50     Years: 45.00     Types: Cigarettes    Smokeless tobacco: Former User     Quit date: 12/1/2015    Alcohol use Yes      Comment: Rare    Drug use: No    Sexual activity: No      Comment:  has one child     Other Topics Concern    None     Social History Narrative       Objective:    Physical Exam    Vitals:   Vitals:    03/21/18 0900 03/21/18 1100 03/21/18 1500 03/21/18 1636   BP: 129/56 119/44 118/58 113/55   Pulse: 76 76 72 73   Resp: 23 22 20 19   Temp:    97.8 °F (36.6 °C)   SpO2: 94%   93%       General:    Alert, cooperative, no distress, appears stated age. Neck:   Supple, no carotid bruit and no JVD. Back:     Symmetric, normal curvature. Lungs:     clear to auscultation bilaterally. Heart[de-identified]    Regular rate and rhythm, S1, S2 normal, no murmur, click, rub or gallop. Abdomen:     Soft, non-tender. Bowel sounds normal.    Extremities:   Extremities normal, atraumatic, no cyanosis or edema.    Vascular:   Pulses - 2+ radials Skin:   Skin color normal. No rashes or lesions   Neurologic:   CN II-XII grossly intact. Telemetry: normal sinus rhythm    ECG:   EKG Results     Procedure 720 Value Units Date/Time    EKG, 12 LEAD, INITIAL [[id number]]     Order Status:  Sent           Data Review:     Radiology:   XR Results (most recent):    Results from Hospital Encounter encounter on 03/20/18   XR CHEST SNGL V   Narrative INDICATION: Abdominal pain and nausea. Fever. COMPARISON: March 1, 2018    FINDINGS: AP portable imaging of the chest performed at 1:40 PM demonstrates a  stable cardiomediastinal silhouette. Left pleural thickening and small left  pleural effusion appear stable. There is unchanged left basilar atelectasis. . No  significant osseous abnormalities are seen. Impression IMPRESSION: Unchanged left pleural thickening and small left pleural effusion. Unchanged left basilar atelectasis. No new airspace disease. No results for input(s): CPK, TROIQ in the last 72 hours. No lab exists for component: CKQMB, CPKMB, BMPP  Recent Labs      03/21/18   0527  03/20/18   1243   NA  139  139   K  3.5  4.1   CL  102  99   CO2  26  30   BUN  20  17   CREA  1.87*  1.70*   GLU  87  104*   CA  9.0  9.7     Recent Labs      03/21/18   0527  03/20/18   1243   WBC  7.0  11.5*   HGB  9.2*  10.7*   HCT  28.5*  33.4*   PLT  263  338     Recent Labs      03/20/18   1243   SGOT  27   AP  94     No results for input(s): CHOL, LDLC in the last 72 hours. No lab exists for component: TGL, HDLC,  HBA1C  No results for input(s): CRP, TSH, TSHEXT, TSHEXT in the last 72 hours. No lab exists for component: ESR    Blessing Lees M.D. [facility] [address] [place] [address] Rd[address] TrDeaconess Incarnate Word Health SystemjavierOK Center for Orth[facility] Citybenedict [address], [address] [facility] [address]     HammondAlisha nobleSaint Luke's Health System     [phone]    CC: Franco Morgan MD

## 2018-03-21 NOTE — PROGRESS NOTES
Heme/ONC   Pt seen in ER this am  Consult to follow  Ordered more labs  All this is likely progression of newly dx transformation of NHL to a DLBCL  Pt's pleural fluid was high grade NHL so ascitic fluid needs cytology also.   Discussed with pt again this am that she really needs chemo (pt has not wanted chemo)  Chemo would be CHOP only as pt refuses rituxan (had reaction in past)  Pt has cardiac history and we need cardiology to see pt here also  Pt will need a port for chemo also  Pt wants to think about chemo today  Hopefully can get pt on 6 E  We will follow

## 2018-03-21 NOTE — PROGRESS NOTES
Admission Medication Reconciliation:    Information obtained from: Patient    Significant PMH/Disease States:   Past Medical History:   Diagnosis Date    Anemia NEC     Arrhythmia     VSD; TRICUSPID REGURG    [facility] ([facility] [address]     low grade NHL    GERD (gastroesophageal reflux disease)     Hypertension     NHL (non-Hodgkin's lymphoma) ([facility] [address] 2010    received chemo and radiation. not in remission.  Pleural effusion 2018    LEFT    Pulmonary hypertension 2018    PER CARDIOLOGY NOTES FROM PTS VISIT ON 18    Thyroid disease        Chief Complaint for this Admission:  Abdominal pain    Allergies:  Rituxan [rituximab]    Prior to Admission Medications:   Prior to Admission Medications   Prescriptions Last Dose Informant Patient Reported? Taking? DIPHENHYDRAMINE HCL ([place] PO) 3/19/2018 at Unknown time  Yes Yes   Sig: Take 50 mg by mouth nightly. Indications: Equate Sleep Aid   KLOR-CON M20 20 mEq tablet 3/19/2018 at Unknown time  Yes Yes   Sig: Take 20 mEq by mouth daily. atenolol (TENORMIN) 25 mg tablet 3/20/2018 at Unknown time  Yes Yes   Sig: Take 25 mg by mouth daily. bumetanide (BUMEX) 2 mg tablet 3/19/2018 at Unknown time  Yes Yes   Sig: Take 2 mg by mouth as needed. cholecalciferol (VITAMIN D3) 1,000 unit tablet 3/19/2018 at Unknown time  Yes Yes   Sig: Take  by mouth daily. cyanocobalamin (VITAMIN B-12) 1,000 mcg/mL injection 2018  Yes No   Si,000 mcg by IntraMUSCular route every month. docusate sodium (COLACE) 100 mg capsule 3/19/2018 at Unknown time  Yes Yes   Sig: DAILY PRN   gabapentin (NEURONTIN) 300 mg capsule 3/13/2018 at Unknown time  No Yes   Sig: Take 1 Cap by mouth three (3) times daily. Indications: NEUROPATHIC PAIN, POSTHERPETIC NEURALGIA   Patient taking differently: Take 300 mg by mouth daily.  Tapering off  Indications: NEUROPATHIC PAIN, POSTHERPETIC NEURALGIA   levothyroxine (SYNTHROID) 150 mcg tablet 3/20/2018 at Unknown time  Yes Yes Si mcg Daily (before breakfast). raNITIdine (ZANTAC) 150 mg tablet 3/19/2018 at Unknown time  Yes Yes   Sig: Take 150 mg by mouth nightly. Facility-Administered Medications: None         Comments/Recommendations: Patient is a reliable historian. Allergies were reviewed. Please note:  1. Gabapentin: prescribed when patient had shingles, she is tapering off now    Thank you for allowing me to participate in the care of your patient.     Yanick Stoddard PharmD, RN #[id number]

## 2018-03-21 NOTE — ED NOTES
Assumed care of patient. In NAD. Pt reports abdominal fullness and pain in her LEFT hip area. Abd distended with ascities. Plan is to have IR drain tomorrow. Denies SOB. Pt placed on CM x3. Ammonia drawn and sent. Medicated as ordered. Pt ambulated to restroom with steady gait and provided urine specimen. Pt to US via [facility]. Will place pt on hospital bed upon her return.

## 2018-03-21 NOTE — PROGRESS NOTES
CC NHL Dx  10/10  Transformation 3/18 with pleural effusion    TREATMENT COURSE:  Orbit radiation  Chlorambucil x 1 cycle 4/16. Cycle 2 3/17. Cycle 3 4/17   Cycle 4 1/18. Attempted one dose of rituxan. INTERIM HISTORY:  Ms. Mercy Khan is seen today in consult for high grade lymphoma, admitted yesterday after presenting to the ED with abdominal swelling and low grade fever. CT completed yesterday showed progression of her disease with new peritoneal carcinomatosis, bulky abdominal and pelvic soft tissue masses and retroperitoneal lymphadenopahy, moderate amount of free intraperitoneal fluid, and new mild left hydronephrosis. Patient underwent thoracentesis on 3/1/18 and pathology showed high grade lymphoma/malignant B cell neoplasm, concerning for transformation from her known low grade follicular lymphoma. She also had a biopsy of the vaginal wall with gyn onc (Dr. Dianne Dotson) on 3/16/18, showing high grade follicular lymphoma. Patient has been resistant to chemotherapy in the past. Starting chemotherapy with CHOP (minus rituxumab as patient declines due to previous reaction) has been discussed but patient has been hesitant to start. Patient remains in the ED this afternoon, waiting for a bed. She underwent US guided paracentesis but fluid on US was low volume. She remains distended and uncomfortable. She reports her appetite has been fair, she was able to eat some lunch today. She has not felt well for several days now. She has been running low grade fevers at home and experiencing night sweats. Her lower legs have itched and she noticed a rash. Past Medical History:   Diagnosis Date    Anemia NEC     Arrhythmia     VSD; TRICUSPID REGURG    Cancer ([address] 2010    low grade NHL    GERD (gastroesophageal reflux disease)     Hypertension     NHL (non-Hodgkin's lymphoma) ([address] 2010    received chemo and radiation. not in remission.     Pleural effusion 02/21/2018    LEFT    Pulmonary hypertension 02/21/2018    PER CARDIOLOGY NOTES FROM PTS VISIT ON 2/21/18    Thyroid disease      Past Surgical History:   Procedure Laterality Date    HX CATARACT REMOVAL  6/2015 & 7/2015    MERRY    HX CHOLECYSTECTOMY  1982    HX DILATION AND CURETTAGE      HX HEENT  2005    RIGHT EAR     HX OTHER SURGICAL      LEFT PAROTIDECTOMY    TX RPLCMT PROST AORTIC VALVE OPEN XCP HOMOGRF/STENT  1972    STENT PLACED TO STRAIGHTEN THE AORTA     Social History     Social History    Marital status:      Spouse name: N/A    Number of children: N/A    Years of education: N/A     Social History Main Topics    Smoking status: Current Every Day Smoker     Packs/day: 0.50     Years: 45.00     Types: Cigarettes    Smokeless tobacco: Former User     Quit date: 12/1/2015    Alcohol use Yes      Comment: Rare    Drug use: No    Sexual activity: No      Comment:  has one child     Other Topics Concern    None     Social History Narrative     Family History   Problem Relation Age of Onset    Heart Disease Mother     Kidney Disease Mother     Alcohol abuse Father     Liver Disease Father     Cancer Sister      Breast    No Known Problems Brother     Cancer Sister      Colon    No Known Problems Sister     No Known Problems Sister     No Known Problems Daughter     Anesth Problems Neg Hx        Current Facility-Administered Medications   Medication Dose Route Frequency Provider Last Rate Last Dose    acetaminophen (TYLENOL) tablet 650 mg  650 mg Oral Q6H PRN Disha Munguia MD   650 mg at 03/22/18 0706    atenolol (TENORMIN) tablet 25 mg  25 mg Oral DAILY Octavio Saini MD   25 mg at 03/21/18 1056    0.9% sodium chloride infusion  100 mL/hr IntraVENous CONTINUOUS Karrie Ponce  mL/hr at 03/22/18 0508 100 mL/hr at 03/22/18 0508    [START ON 3/26/2018] cyanocobalamin (VITAMIN B12) injection 1,000 mcg  1,000 mcg IntraMUSCular EVERY MONTH Alvarado Caballero MD        gabapentin (NEURONTIN) capsule 300 mg 300 mg Oral DAILY Daisy Nieves MD   300 mg at 03/21/18 1140    levothyroxine (SYNTHROID) tablet 150 mcg  150 mcg Oral ACB Daisy Nieves MD   150 mcg at 03/22/18 0706    famotidine (PEPCID) tablet 20 mg  20 mg Oral QHS Daisy Nieves MD   20 mg at 03/21/18 2200    sodium chloride (NS) flush 5-10 mL  5-10 mL IntraVENous Q8H Daisy Nieves MD   10 mL at 03/22/18 0708    sodium chloride (NS) flush 5-10 mL  5-10 mL IntraVENous PRN Daisy Nieves MD        ondansetron (ZOFRAN) injection 4 mg  4 mg IntraVENous Q4H PRN Daisy Nieves MD        cefepime (MAXIPIME) 1 g in 0.9% sodium chloride (MBP/ADV) 50 mL ADV  1 g IntraVENous Q24H Daisy Nieves MD   1 g at 03/21/18 2200    metroNIDAZOLE (FLAGYL) IVPB premix 500 mg  500 mg IntraVENous Q12H Daisy Nieves  mL/hr at 03/21/18 2249 500 mg at 03/21/18 2249       Allergies   Allergen Reactions    Rituxan [Rituximab] Hives       Review of Systems    A comprehensive review of systems was negative except for: per HPI and sheet    Objective:  Visit Vitals    /67 (BP 1 Location: Right arm, BP Patient Position: Supine; At rest)    Pulse 77    Temp 98.7 °F (37.1 °C)    Resp 18    Wt 149 lb (67.6 kg)    LMP  (LMP Unknown)    SpO2 97%    BMI 27.25 kg/m2     Physical Exam:   General appearance - Fatigued, lying in the bed, conversant  Mental status - alert, oriented   Head - superficial masses right temporal area / scalp on right   EYE-conj clear no swelling in eyes. Right eyelid droop noted. Neck - supple  CV - regular rate, murmur noted  Resp - Diminished at the bases  GI  -Round, distended, non-tender. Bowel sounds present. Ext-No pedal edema noted bilaterally  Skin-Warm and dry. Pink, maculopapular rash noted to bilateral calfs  Neuro -  Awake, alert, oriented. Exam is non-focal.    Diagnostic Imaging   reviewed    2/21/18 PET:  IMPRESSION:   1. Increasing right parietal soft tissue activity.   2. Progression in the bilateral areas of hypermetabolic pleural thickening. 3. New bilateral hypermetabolic axillary lymph nodes and subcarinal lymph node  and progression of retroperitoneal and left pelvic lymphadenopathy. 4. Improvement in the left inguinal lymphadenopathy  5. Improvement in the right parieto-occipital scalp activity. 6. New hypermetabolic soft tissue abnormality left iliac fossa. 7. Stable left cervical hypermetabolic lymph nodes. 2/17/18 PET:  MPRESSION:   1. Findings suspicious for a uterine mass with a soft tissue mass in the left  pelvis and extending into the retroperitoneum concerning for malignancy. Lymphoma is a differential consideration given the patient's history. The mass  encases but does not narrow the aortic bifurcation and IVC. 2. Enlarged left inguinal lymph node is decreased in size since 8/30/2017.  3. Increased large left pleural effusion with left lower lung atelectasis. Lab Results    reviewed  Lab Results   Component Value Date/Time    WBC 7.0 03/21/2018 05:27 AM    HGB 9.2 (L) 03/21/2018 05:27 AM    HCT 28.5 (L) 03/21/2018 05:27 AM    PLATELET 628 07/22/4876 05:27 AM    MCV 95.6 03/21/2018 05:27 AM       Lab Results   Component Value Date/Time    Sodium 139 03/21/2018 05:27 AM    Potassium 3.5 03/21/2018 05:27 AM    Chloride 102 03/21/2018 05:27 AM    CO2 26 03/21/2018 05:27 AM    Anion gap 11 03/21/2018 05:27 AM    Glucose 87 03/21/2018 05:27 AM    BUN 20 03/21/2018 05:27 AM    Creatinine 1.87 (H) 03/21/2018 05:27 AM    BUN/Creatinine ratio 11 (L) 03/21/2018 05:27 AM    GFR est AA 32 (L) 03/21/2018 05:27 AM    GFR est non-AA 27 (L) 03/21/2018 05:27 AM    Calcium 9.0 03/21/2018 05:27 AM    AST (SGOT) 27 03/20/2018 12:43 PM    Alk. phosphatase 94 03/20/2018 12:43 PM    Protein, total 7.2 03/20/2018 12:43 PM    Albumin 3.4 (L) 03/20/2018 12:43 PM    Globulin 3.8 03/20/2018 12:43 PM    A-G Ratio 0.9 (L) 03/20/2018 12:43 PM    ALT (SGPT) 12 03/20/2018 12:43 PM     Assessment/Plan:    1.   NHL low grade follicular stage 3 dx in 2010   Hx of one dose of rituxan in past with side effects and pt did not want any further therapy. PET 5/16 good post chlorambucil 4/16. PET 10/2016 was stable. Most recent PET scan 2/22/17 showed progression of disease both above and below the level of the diaphragm. Pt had repeat chlorambucil 5/17 due to progressive disease on CTs. Back of head mass/ left groin mass was almost gone. PET 5/17 and good response to tx. Over summer, pt experienced more scalp lesions with high SUV activity. PET 8/17 showed progressive disease throughout. Pt wanted to try treatment / acyclovir first to see if this treatment affects scalp/head LNs. Acyclovir did not help. Last chlorambucil chemo was 1/18. PET 2/18 now shows diffuse progressive disease/ some mixed. Pt is post thoracentesis on 3/1/18 and cytology now shows likely transformation to high grade / DLBCL. She experienced vaginal bleeding/uterine mass and biopsy of the vaginal wall showed high grade follicular lymphoma. CT's yesterday show rapid progression of disease, now with carcinomatosis, abdominal/pelvic masses, and lymphadenopathy. Discussed chemotherapy again today at length with patient and family. Recommended R-CHOP. Patient declines further Rituxumab due to history of reaction in the past. Discussed starting CHOP and recommended chemotherapy start as soon as possible. Patient states she would like to continue to think about this and discuss with her family. Discussed that life expectancy without chemotherapy would be short due to extent of disease burden at this time. Reviewed schedule, dosing, and potential side effects of CHOP with patient and family today. If she is agreeable, will plan to start as soon as tomorrow. 2.  Abdominal distention. Secondary to malignancy. US guided paracentesis yielded low volume of fluid.  Discussed that bloating is secondary to malignancy and that chemotherapy may help with symptoms. 3. Left pleural effusion post thoracentesis with + cytology. Monitor for re accumulation. 4. Renal insufficiency. Creatinine elevated to 1.8 today. Unclear if this is secondary to disease, hydronephrosis, or potential tumor lysis. IV fluids ordered (NS at 100 mL/hr). 5. Tumor lysis prophylaxis. Concern for spontaneous TLS as well as tumor lysis induced by chemotherapy. IV fluids ordered as noted above. Discussed case with pharmacy as well. Due to renal insufficiency, will plan to give a dose of rasburicase (flat dose of 3 mg) tomorrow if we are moving to chemotherapy as well as allopurinol (100 mg every 8 hours). Will needto monitor uric acid, LDH, CMP, phosphorus, and magnesium closely if chemotherapy is given. 6. History of VSD. ECHO completed at outside facility is pending, will need to review prior to chemotherapy due to anthracycline component of CHOP. Cardiology consult placed today as well. We will continue to follow along while patient is admitted. Please call with any questions.    Pt seen today in conjunction with CHIQUI Pearl  Case d/w ER, pt and family    Bryce Bowen, DO

## 2018-03-21 NOTE — ED NOTES
Bedside and Verbal shift change report given to [address] (oncoming nurse) by Samantha Chacon (offgoing nurse). Report included the following information SBAR, Kardex, ED Summary, Recent Results and Cardiac Rhythm NSR.

## 2018-03-22 NOTE — PROGRESS NOTES
Problem: Discharge Planning  Goal: *Discharge to safe environment  Outcome: Progressing Towards Goal  See CM notes     NORMAN Herrera

## 2018-03-22 NOTE — PROGRESS NOTES
TRANSFER - IN REPORT:    Verbal report received from [facility], rn(name) on Saumya Schneider  being received from Observation unit(unit) for routine progression of care      Report consisted of patients Situation, Background, Assessment and   Recommendations(SBAR). Information from the following report(s) SBAR was reviewed with the receiving nurse. Opportunity for questions and clarification was provided. Assessment completed upon patients arrival to unit and care assumed.

## 2018-03-22 NOTE — CONSULTS
Consult noted   Will see the pt [facility]  Office :[phone]  Fax: [phone]

## 2018-03-22 NOTE — PROGRESS NOTES
Reason for Admission:         Patient was admitted on 3/20/2018  for Ascites; Ascites, malignant. RRAT Score:     17  Ask Me Three:         What is my (patient's) main problem? I need chemotherapy     What is it that I (patient) need to do? To continue treatment. Review risks, benefits and alternatives of CHOP chemotherapy   Why is it important for me (patient) to do this? To get better   Do you (patient/family) have any concerns for transition/discharge? None identified   Plan for utilizing home health:     TBD- pending patient's disposition  Likelihood of readmission? [facility]       Met with patient and her sister at bedside to introduce self and discuss cm role and discharge plan. Patient was alert and oriented. Patient confirmed demographics and insurance coverage. Patient lives with in a private residence in [place], [place]. Patient is able to complete ADLs and IADLs independently and denies the use of assistance devices at this time. Confirmed PCP is Dr. Lloyd Quijano MD and fills prescription at [facility] in [address] [place] [address]. Patient is expected to start chemotherapy today. No other needs were identified. Case management will continue to follow patient's disposition and assess needs.    Dustin Mccarthy

## 2018-03-22 NOTE — PROGRESS NOTES
CC NHL Dx  10/10  Transformation 3/18 with pleural effusion    TREATMENT COURSE:  Orbit radiation  Chlorambucil x 1 cycle 4/16. Cycle 2 3/17. Cycle 3 4/17   Cycle 4 1/18. Attempted one dose of rituxan. INTERIM HISTORY:  Ms. Willian Johns is seen today in consult for high grade lymphoma, admitted yesterday after presenting to the ED with abdominal swelling and low grade fever. CT completed yesterday showed progression of her disease with new peritoneal carcinomatosis, bulky abdominal and pelvic soft tissue masses and retroperitoneal lymphadenopahy, moderate amount of free intraperitoneal fluid, and new mild left hydronephrosis. Patient underwent thoracentesis on 3/1/18 and pathology showed high grade lymphoma/malignant B cell neoplasm, concerning for transformation from her known low grade follicular lymphoma. She also had a biopsy of the vaginal wall with gyn onc (Dr. Pia Pedersen) on 3/16/18, showing high grade follicular lymphoma. Patient has been resistant to chemotherapy in the past. Starting chemotherapy with CHOP (minus rituxumab as patient declines due to previous reaction) has been discussed but patient has been hesitant to start. She underwent US guided paracentesis but fluid on US was low volume. She remains distended and uncomfortable. She reports her appetite has been fair, she was able to eat some lunch today. She has not felt well for several days now. She has been running low grade fevers at home and experiencing night sweats. Her lower legs have itched and she noticed a rash. Interval history: Ms. Willian Johns has decided to move forward with chemotherapy (CHOP) after further discussion with her family last night. She is agreeable to having PICC or port placed for chemotherapy during admission. She continues to feel abdominal bloating, no pain. Appetite is good, she was able to eat breakfast this morning.      Past Medical History:   Diagnosis Date    Anemia NEC     Arrhythmia     VSD; TRICUSPID REGURG    [facility] 2010    low grade NHL    GERD (gastroesophageal reflux disease)     Hypertension     NHL (non-Hodgkin's lymphoma) ([facility] 2010    received chemo and radiation. not in remission.     Pleural effusion 02/21/2018    LEFT    Pulmonary hypertension 02/21/2018    PER CARDIOLOGY NOTES FROM PTS VISIT ON 2/21/18    Thyroid disease      Past Surgical History:   Procedure Laterality Date    HX CATARACT REMOVAL  6/2015 & 7/2015    MERRY    HX CHOLECYSTECTOMY  1982    HX DILATION AND CURETTAGE      HX HEENT  2005    RIGHT EAR     HX OTHER SURGICAL      LEFT PAROTIDECTOMY    SD RPLCMT PROST AORTIC VALVE OPEN XCP HOMOGRF/STENT  1972    STENT PLACED TO STRAIGHTEN THE AORTA     Social History     Social History    Marital status:      Spouse name: N/A    Number of children: N/A    Years of education: N/A     Social History Main Topics    Smoking status: Current Every Day Smoker     Packs/day: 0.50     Years: 45.00     Types: Cigarettes    Smokeless tobacco: Former User     Quit date: 12/1/2015    Alcohol use Yes      Comment: Rare    Drug use: No    Sexual activity: No      Comment:  has one child     Other Topics Concern    None     Social History Narrative     Family History   Problem Relation Age of Onset    Heart Disease Mother     Kidney Disease Mother     Alcohol abuse Father     Liver Disease Father     Cancer Sister      Breast    No Known Problems Brother     Cancer Sister      Colon    No Known Problems Sister     No Known Problems Sister     No Known Problems Daughter     Anesth Problems Neg Hx        Current Facility-Administered Medications   Medication Dose Route Frequency Provider Last Rate Last Dose    allopurinol (ZYLOPRIM) tablet 100 mg  100 mg Oral Q8H Jacksonganrafaela Baker, NP   100 mg at 03/22/18 1142    rasburicase (ELITEK) 3 mg in 0.9% sodium chloride 50 mL IVPB  3 mg IntraVENous ONCE Dmitriy Baker NP        sodium chloride (NS) flush 20 mL  20 mL InterCATHeter PRN Ailin Snare, NP        sodium chloride (NS) flush 10 mL  10 mL InterCATHeter Q24H Ailin Snare, NP        sodium chloride (NS) flush 10 mL  10 mL InterCATHeter PRN Ailin Snare, NP        sodium chloride (NS) flush 10 mL  10 mL InterCATHeter Q8H Ailin Snare, NP        bacitracin 500 unit/gram packet 1 Packet  1 Packet Topical PRN Ailin Snare, NP        sodium chloride (NS) flush 20 mL  20 mL InterCATHeter PRN Ailin Snare, NP        sodium chloride (NS) flush 10 mL  10 mL InterCATHeter Q24H Ailin Snare, NP        sodium chloride (NS) flush 10 mL  10 mL InterCATHeter PRN Ailin Snare, NP        sodium chloride (NS) flush 10 mL  10 mL InterCATHeter Q8H Ailin Snare, NP        alteplase (CATHFLO) 1 mg in sterile water (preservative free) 1 mL injection  1 mg InterCATHeter PRN Ailin Snare, NP        acetaminophen (TYLENOL) tablet 650 mg  650 mg Oral Q6H PRN Demarcus Arteaga MD   650 mg at 03/22/18 0706    atenolol (TENORMIN) tablet 25 mg  25 mg Oral DAILY Laurence Calvillo MD   25 mg at 03/22/18 0908    0.9% sodium chloride infusion  100 mL/hr IntraVENous CONTINUOUS Ailin Snare,  mL/hr at 03/22/18 0508 100 mL/hr at 03/22/18 0508    [START ON 3/26/2018] cyanocobalamin (VITAMIN B12) injection 1,000 mcg  1,000 mcg IntraMUSCular EVERY MONTH Chiqui Rios MD        gabapentin (NEURONTIN) capsule 300 mg  300 mg Oral DAILY Chiqui Rios MD   300 mg at 03/22/18 0908    levothyroxine (SYNTHROID) tablet 150 mcg  150 mcg Oral ACB Chiqui Rios MD   150 mcg at 03/22/18 0706    famotidine (PEPCID) tablet 20 mg  20 mg Oral QHS Chiqui Rios MD   20 mg at 03/21/18 2200    sodium chloride (NS) flush 5-10 mL  5-10 mL IntraVENous Kamar Mcdonnell MD   10 mL at 03/22/18 0708    sodium chloride (NS) flush 5-10 mL  5-10 mL IntraVENous PRN Chiqui Rios MD        ondansetron (ZOFRAN) injection 4 mg  4 mg IntraVENous Q4H PRN Chiqui Rios MD  cefepime (MAXIPIME) 1 g in 0.9% sodium chloride (MBP/ADV) 50 mL ADV  1 g IntraVENous Q24H José Taylor MD   1 g at 03/21/18 2200    metroNIDAZOLE (FLAGYL) IVPB premix 500 mg  500 mg IntraVENous Q12H José Taylor  mL/hr at 03/22/18 0908 500 mg at 03/22/18 0908       Allergies   Allergen Reactions    Rituxan [Rituximab] Hives       Review of Systems    A comprehensive review of systems was negative except for: per HPI and sheet    Objective:  Visit Vitals    /56 (BP 1 Location: Left arm, BP Patient Position: Sitting)    Pulse 64    Temp 97.6 °F (36.4 °C)    Resp 18    Wt 149 lb (67.6 kg)    LMP  (LMP Unknown)    SpO2 95%    BMI 27.25 kg/m2     Physical Exam:   General appearance - Fatigued, lying in the bed, conversant  Mental status - alert, oriented   Head - superficial masses right temporal area / scalp on right   EYE-conj clear no swelling in eyes. Right eyelid droop noted. Neck - supple  CV - regular rate, murmur noted  Resp - Diminished at the bases  GI  -Round, distended, non-tender. Ext-No pedal edema noted bilaterally  Skin-Warm and dry. Neuro -  Awake, alert, oriented. Exam is non-focal.    Diagnostic Imaging   reviewed    2/21/18 PET:  IMPRESSION:   1. Increasing right parietal soft tissue activity. 2. Progression in the bilateral areas of hypermetabolic pleural thickening. 3. New bilateral hypermetabolic axillary lymph nodes and subcarinal lymph node  and progression of retroperitoneal and left pelvic lymphadenopathy. 4. Improvement in the left inguinal lymphadenopathy  5. Improvement in the right parieto-occipital scalp activity. 6. New hypermetabolic soft tissue abnormality left iliac fossa. 7. Stable left cervical hypermetabolic lymph nodes. 2/17/18 PET:  MPRESSION:   1. Findings suspicious for a uterine mass with a soft tissue mass in the left  pelvis and extending into the retroperitoneum concerning for malignancy.   Lymphoma is a differential consideration given the patient's history. The mass  encases but does not narrow the aortic bifurcation and IVC. 2. Enlarged left inguinal lymph node is decreased in size since 8/30/2017.  3. Increased large left pleural effusion with left lower lung atelectasis. Lab Results    reviewed  Lab Results   Component Value Date/Time    WBC 7.8 03/22/2018 09:45 AM    HGB 9.5 (L) 03/22/2018 09:45 AM    HCT 29.4 (L) 03/22/2018 09:45 AM    PLATELET 598 59/59/9076 09:45 AM    MCV 94.2 03/22/2018 09:45 AM       Lab Results   Component Value Date/Time    Sodium 138 03/22/2018 09:45 AM    Potassium 3.3 (L) 03/22/2018 09:45 AM    Chloride 104 03/22/2018 09:45 AM    CO2 24 03/22/2018 09:45 AM    Anion gap 10 03/22/2018 09:45 AM    Glucose 141 (H) 03/22/2018 09:45 AM    BUN 21 (H) 03/22/2018 09:45 AM    Creatinine 1.82 (H) 03/22/2018 09:45 AM    BUN/Creatinine ratio 12 03/22/2018 09:45 AM    GFR est AA 33 (L) 03/22/2018 09:45 AM    GFR est non-AA 28 (L) 03/22/2018 09:45 AM    Calcium 8.9 03/22/2018 09:45 AM    AST (SGOT) 17 03/22/2018 09:45 AM    Alk. phosphatase 73 03/22/2018 09:45 AM    Protein, total 6.1 (L) 03/22/2018 09:45 AM    Albumin 3.1 (L) 03/22/2018 09:45 AM    Globulin 3.0 03/22/2018 09:45 AM    A-G Ratio 1.0 (L) 03/22/2018 09:45 AM    ALT (SGPT) 7 (L) 03/22/2018 09:45 AM     Assessment/Plan:    1. NHL low grade follicular stage 3 dx in 2010   Hx of one dose of rituxan in past with side effects and pt did not want any further therapy. PET 5/16 good post chlorambucil 4/16. PET 10/2016 was stable. Most recent PET scan 2/22/17 showed progression of disease both above and below the level of the diaphragm. Pt had repeat chlorambucil 5/17 due to progressive disease on CTs. Back of head mass/ left groin mass was almost gone. PET 5/17 and good response to tx. Over summer, pt experienced more scalp lesions with high SUV activity. PET 8/17 showed progressive disease throughout.   Pt wanted to try treatment / acyclovir first to see if this treatment affects scalp/head LNs. Acyclovir did not help. Last chlorambucil chemo was 1/18. PET 2/18 now shows diffuse progressive disease/ some mixed. Pt is post thoracentesis on 3/1/18 and cytology now shows likely transformation to high grade / DLBCL. She experienced vaginal bleeding/uterine mass and biopsy of the vaginal wall showed high grade follicular lymphoma. CT's 3/20/18 show rapid progression of disease, now with carcinomatosis, abdominal/pelvic masses, and lymphadenopathy. Patient has been hesitant to start chemotherapy. Long discussion was had with patient and family yesterday regarding poor prognosis without chemotherapy. She discussed further with her family last night and is now agreeable to moving forward with chemotherapy (CHOP). We discussed the risks and benefits of CHOP chemotherapy. The potential side effects include, but are not limited to: nausea, vomiting, diarrhea, constipation, allergic reaction, taste changes, increased risk of infection, anemia, fatigue, alopecia, neuropathy, nail changes, cardiac damage, mucositis, myelosuppression, infertility and rarely, death. The patient has consented to begin therapy. She will need repeat ECHO which has been ordered and central line placed prior to chemotherapy. She ate this morning, so will move forward with PICC for first cycle of chemotherapy and plan to place port outpatient after discharge. She will need to be transferred to a private room as well. Cardiology and nephrology have been consulted. Will plan to start chemotherapy as soon as possible, but logistically this may be tomorrow morning. She is at risk for tumor lysis syndrome and uric acid is elevated. IV fluids ordered, will order rasburicase and allopurinol to start today as well. Discussed risk of tumor lysis with patient and family as well. 2.  Abdominal distention. Secondary to malignancy.  US guided paracentesis yielded low volume of fluid. Symptoms secondary to malignancy and that chemotherapy may help with symptoms. 3. Left pleural effusion post thoracentesis with + cytology. Monitor for re accumulation. 4. Renal insufficiency. Creatinine elevated to 1.8 again today. Unclear if this is secondary to disease, hydronephrosis, or potential tumor lysis. IV fluids ordered (NS at 100 mL/hr). Nephrology consulted today. 5. Tumor lysis prophylaxis. Concern for spontaneous TLS as well as tumor lysis induced by chemotherapy. IV fluids ordered as noted above. Discussed case with pharmacy as well. Due to renal insufficiency, ordered rasburicase (flat dose of 3 mg) today as well as allopurinol (100 mg every 8 hours). Will need to monitor uric acid, LDH, CMP, phosphorus, and magnesium closely. 6. History of VSD. ECHO completed at outside facility is pending, records have been requested. Repeat ECHO ordered by cardiology and will need to see this prior to chemotherapy. Cardiology has been consulted and appreciate their input. We will continue to follow along while patient is admitted. Please call with any questions. Pt seen today in conjunction with  [address] with pt / family today that all issues now are related to transformation of NHL to high grade disease. Pt has extensive tumor burden at present with pleural fluid + and vaginal biopsy +. Ascites fluid pending. We discussed that based on how rapidly the cancer is progressing, if pt does not do chemo then her life expectancy is very limited. Pt had rituxan once in the past and had a reaction and pt refuses further rituxan. We reviewed risks, benefits and alternatives of CHOP chemo today. Pt and family agreeable to chemo. We are planning to get chemo with CHOP set up. Need ECHO prior. Need PICC/ port. Need transfer to 6 floor for chemo. Plan is to give chemo, monitor then send home for outpt f/u. We will follow.   Call if questions [facility] Ana Bee, DO

## 2018-03-22 NOTE — PROGRESS NOTES
Primary Nurse Jose Clements and Colt Sykes, RN performed a dual skin assessment on this patient No impairment noted

## 2018-03-22 NOTE — CONSULTS
[facility]   [address] [facility], [address] [place] [address], [facility]  Phone: [phone] NOTE     Patient: Yuri Fontaine MRN: [id number]  PCP: Clay Summers MD   :     1950  Age:   75 y.o. Sex:  female      Referring physician: Tod Clark MD  Reason for consultation: 75 y.o. female with Ascites  Ascites, malignant complicated by SINDHU   Admission Date: 3/20/2018  7:38 PM  LOS: 2 days      ASSESSMENT and PLAN :   SINDHU :  · Suspect largely from contrast nephropathy   · Tumor Lysis is a possibility given the UA of > 8  · R hydro from Obstruction from Lymphoma , likely not contributing to renal dysfunction and should improve with Chemo   · Needs to remain of Bumex for now as she is clinically dry on exam   · Increased rate of IVF to 150 cc/ hr and after chemo starts will need 200 cc/ hr     Diffuse Large B cell Lymphoma - High Grade  · Transformation from NHL to DLBCL  · Extensive disease and starting chemo - CHOP and no Ritux due to hx of Hypersensitivity reaction     Hyperuricemia :  · Would suggest a dose of Rasburicase now and not wait till starting chemo as UA is already upto 8   · Continue current dose of allopurinol     Mild Hypokalemia :  - do not replete     Cardiac :  - Hx of VSD + Aortic Valve + Root replacement   - Mild TR and Pulm HTN   - s/p Repair of AMAYA  - EF 60%     Anemia       Thank you for asking me to see Ms Franco Jackson   Will follow        Subjective:   HPI: Yuri Fontaine is a 75 y.o.  female who has been admitted to the hospital for abdominal pain and increasing abdominal girth  From Lymphoma. on admisison was found to have renal insufficiency and we were asked to see her for evaluation ands management. She has developed mild R hydronephrosis on imaging which is new as her tumor progressed. Ms Franco Jackson was diagnosed with non-Hodgkin's lymphoma in 2010 and was treated. Did not tolerate Rituximab due to HS reaction.  She has developed worsening abdominal distension and pedal edema. Her cardiologist had put her on PRN Bumex 2 mg daily. She has been taking that with improvement in edema but came to ED on 2/16 with Vaginal bleeding. Was seen by Dr Ash Walter and had Bx of vaginal growth. Path showed High Grade BCL. She received IV contrast with CT in ER. Subsequently had PET scan 02/22/2017 which showed progression of disease, both above and below the level of the diaphragm. Patient was restarted on chemotherapy, but continued to have progressive disease, developed some pleural effusion, had thoracocentesis done. She started experiencing some abdominal pain associated with poor appetite, increasing abdominal girth, nausea, some episodes of vomiting and was sent to ER by Oncologist         Past Medical Hx:   Past Medical History:   Diagnosis Date    Anemia NEC     Arrhythmia     VSD; TRICUSPID REGURG    Cancer ([facility] [address] 2010    low grade NHL    GERD (gastroesophageal reflux disease)     Hypertension     NHL (non-Hodgkin's lymphoma) ([facility] [address] 2010    received chemo and radiation. not in remission.  Pleural effusion 02/21/2018    LEFT    Pulmonary hypertension 02/21/2018    PER CARDIOLOGY NOTES FROM PTS VISIT ON 2/21/18    Thyroid disease         Past Surgical Hx:     Past Surgical History:   Procedure Laterality Date    HX CATARACT REMOVAL  6/2015 & 7/2015    MERRY    HX CHOLECYSTECTOMY  1982    HX DILATION AND CURETTAGE      HX HEENT  2005    RIGHT EAR     HX OTHER SURGICAL      LEFT PAROTIDECTOMY    MS RPLCMT PROST AORTIC VALVE OPEN XCP HOMOGRF/STENT  1972    STENT PLACED TO STRAIGHTEN THE AORTA         Allergies   Allergen Reactions    Rituxan [Rituximab] Hives       Social Hx:  reports that she has been smoking Cigarettes. She has a 22.50 pack-year smoking history. She quit smokeless tobacco use about 2 years ago. She reports that she drinks alcohol. She reports that she does not use illicit drugs.      Family History   Problem Relation Age of Onset    Heart Disease Mother     Kidney Disease Mother     Alcohol abuse Father     Liver Disease Father     Cancer Sister      Breast    No Known Problems Brother     Cancer Sister      Colon    No Known Problems Sister     No Known Problems Sister     No Known Problems Daughter     Anesth Problems Neg Hx        Review of Systems:  A twelve point review of system was performed today. Pertinent positives and negatives are mentioned in the HPI. The reminder of the ROS is negative and noncontributory. Objective:    Vitals:    Vitals:    03/22/18 0506 03/22/18 0900 03/22/18 1144 03/22/18 1610   BP: 123/67 119/76 127/56 119/67   Pulse: 77 72 64 75   Resp: 18 18 18 18   Temp: 98.7 °F (37.1 °C) 97.5 °F (36.4 °C) 97.6 °F (36.4 °C) 98.8 °F (37.1 °C)   SpO2: 97% 98% 95% 95%   Weight:         I&O's:  03/21 0701 - 03/22 0700  In: 2020 [P.O.:480; I.V.:1540]  Out: -   Visit Vitals    /67 (BP 1 Location: Left arm, BP Patient Position: Sitting)    Pulse 75    Temp 98.8 °F (37.1 °C)    Resp 18    Wt 67.6 kg (149 lb)    LMP  (LMP Unknown)    SpO2 95%    BMI 27.25 kg/m2       Physical Exam:  General:ill appearing   HEENT:pale ans anicteric  Neck: No JVD   Lungs : CTA  CVS: RRR, murmur+  Abdomen: distended,diffusely tender, BS+  Extremities: No edema   Skin: No rash or lesions.   MS: No joint swelling, erythema, warmth  Neurologic: non focal, AAO x 3  Psych: normal affect    Laboratory Results:    Recent Labs      03/22/18   0945  03/21/18   0527  03/20/18   1243   NA  138  139  139   K  3.3*  3.5  4.1   CL  104  102  99   CO2  24  26  30   GLU  141*  87  104*   BUN  21*  20  17   CREA  1.82*  1.87*  1.70*   CA  8.9  9.0  9.7   MG  1.7   --    --    PHOS  2.8   --    --    ALB  3.1*   --   3.4*   SGOT  17   --   27   ALT  7*   --   12   INR  1.1   --    --      Recent Labs      03/22/18   0945  03/21/18   0527  03/20/18   1243   WBC  7.8  7.0  11.5*   HGB  9.5*  9.2*  10.7*   HCT  29.4* 28.5*  33.4*   PLT  290  263  338     No results found for: SDES  Lab Results   Component Value Date/Time    Culture result: NO GROWTH AFTER 19 HOURS 03/21/2018 12:20 PM    Culture result: MIXED UROGENITAL THAD ISOLATED 03/20/2018 10:21 PM    Culture result: NO GROWTH 2 DAYS 02/16/2018 11:22 PM     Recent Results (from the past 24 hour(s))   EKG, 12 LEAD, INITIAL    Collection Time: 03/21/18 10:45 PM   Result Value Ref Range    Ventricular Rate 84 BPM    Atrial Rate 84 BPM    P-R Interval 156 ms    QRS Duration 140 ms    Q-T Interval 392 ms    QTC Calculation (Bezet) 463 ms    Calculated P Axis 70 degrees    Calculated R Axis -68 degrees    Calculated T Axis 42 degrees    Diagnosis       Normal sinus rhythm  Left axis deviation  Right bundle branch block  When compared with ECG of 21-MAR-2018 22:45,  No significant change was found  Confirmed by Rudy Gtz MD, Michael Clear ([id number]) on 3/22/2018 9:36:36 AM     CBC WITH AUTOMATED DIFF    Collection Time: 03/22/18  9:45 AM   Result Value Ref Range    WBC 7.8 3.6 - 11.0 K/uL    RBC 3.12 (L) 3.80 - 5.20 M/uL    HGB 9.5 (L) 11.5 - 16.0 g/dL    HCT 29.4 (L) 35.0 - 47.0 %    MCV 94.2 80.0 - 99.0 FL    MCH 30.4 26.0 - 34.0 PG    MCHC 32.3 30.0 - 36.5 g/dL    RDW 12.5 11.5 - 14.5 %    PLATELET 506 726 - 062 K/uL    MPV 8.9 8.9 - 12.9 FL    NRBC 0.0 0  WBC    ABSOLUTE NRBC 0.00 0.00 - 0.01 K/uL    NEUTROPHILS 70 32 - 75 %    LYMPHOCYTES 13 12 - 49 %    MONOCYTES 12 5 - 13 %    EOSINOPHILS 4 0 - 7 %    BASOPHILS 1 0 - 1 %    IMMATURE GRANULOCYTES 1 (H) 0.0 - 0.5 %    ABS. NEUTROPHILS 5.5 1.8 - 8.0 K/UL    ABS. LYMPHOCYTES 1.0 0.8 - 3.5 K/UL    ABS. MONOCYTES 0.9 0.0 - 1.0 K/UL    ABS. EOSINOPHILS 0.3 0.0 - 0.4 K/UL    ABS. BASOPHILS 0.1 0.0 - 0.1 K/UL    ABS. IMM.  GRANS. 0.0 0.00 - 0.04 K/UL    DF AUTOMATED     METABOLIC PANEL, COMPREHENSIVE    Collection Time: 03/22/18  9:45 AM   Result Value Ref Range    Sodium 138 136 - 145 mmol/L    Potassium 3.3 (L) 3.5 - 5.1 mmol/L Chloride 104 97 - 108 mmol/L    CO2 24 21 - 32 mmol/L    Anion gap 10 5 - 15 mmol/L    Glucose 141 (H) 65 - 100 mg/dL    BUN 21 (H) 6 - 20 MG/DL    Creatinine 1.82 (H) 0.55 - 1.02 MG/DL    BUN/Creatinine ratio 12 12 - 20      GFR est AA 33 (L) >60 ml/min/1.73m2    GFR est non-AA 28 (L) >60 ml/min/1.73m2    Calcium 8.9 8.5 - 10.1 MG/DL    Bilirubin, total 0.3 0.2 - 1.0 MG/DL    ALT (SGPT) 7 (L) 12 - 78 U/L    AST (SGOT) 17 15 - 37 U/L    Alk.  phosphatase 73 45 - 117 U/L    Protein, total 6.1 (L) 6.4 - 8.2 g/dL    Albumin 3.1 (L) 3.5 - 5.0 g/dL    Globulin 3.0 2.0 - 4.0 g/dL    A-G Ratio 1.0 (L) 1.1 - 2.2     URIC ACID    Collection Time: 03/22/18  9:45 AM   Result Value Ref Range    Uric acid 8.3 (H) 2.6 - 6.0 MG/DL   PHOSPHORUS    Collection Time: 03/22/18  9:45 AM   Result Value Ref Range    Phosphorus 2.8 2.6 - 4.7 MG/DL   MAGNESIUM    Collection Time: 03/22/18  9:45 AM   Result Value Ref Range    Magnesium 1.7 1.6 - 2.4 mg/dL   LD    Collection Time: 03/22/18  9:45 AM   Result Value Ref Range     (H) 81 - 246 U/L   PROTHROMBIN TIME + INR    Collection Time: 03/22/18  9:45 AM   Result Value Ref Range    INR 1.1 0.9 - 1.1      Prothrombin time 11.3 (H) 9.0 - 11.1 sec   URIC ACID    Collection Time: 03/22/18  5:20 PM   Result Value Ref Range    Uric acid 5.1 2.6 - 6.0 MG/DL           Urine dipstick:   Lab Results   Component Value Date/Time    Color YELLOW/STRAW 03/20/2018 12:36 PM    Appearance CLEAR 03/20/2018 12:36 PM    Specific gravity 1.025 03/20/2018 12:36 PM    Specific gravity 1.025 02/16/2018 11:22 PM    pH (UA) 6.0 03/20/2018 12:36 PM    Protein 30 (A) 03/20/2018 12:36 PM    Glucose NEGATIVE  03/20/2018 12:36 PM    Ketone TRACE (A) 03/20/2018 12:36 PM    Bilirubin NEGATIVE  02/16/2018 11:22 PM    Urobilinogen 0.2 03/20/2018 12:36 PM    Nitrites NEGATIVE  03/20/2018 12:36 PM    Leukocyte Esterase NEGATIVE  03/20/2018 12:36 PM    Epithelial cells FEW 03/20/2018 12:36 PM    Bacteria 1+ (A) 03/20/2018 12:36 PM    WBC 5-10 03/20/2018 12:36 PM    RBC 10-20 03/20/2018 12:36 PM        Medications list Personally Reviewed   [x]      Yes     []               No       Medications:  Prior to Admission medications    Medication Sig Start Date End Date Taking? Authorizing Provider   DIPHENHYDRAMINE HCL (NITETIME SLEEP AID PO) Take 50 mg by mouth nightly. Indications: Equate Sleep Aid   Yes Historical Provider   levothyroxine (SYNTHROID) 150 mcg tablet 150 mcg Daily (before breakfast). 6/22/17  Yes Historical Provider   raNITIdine (ZANTAC) 150 mg tablet Take 150 mg by mouth nightly. 6/20/17  Yes Historical Provider   docusate sodium (COLACE) 100 mg capsule DAILY PRN 7/20/09  Yes Historical Provider   gabapentin (NEURONTIN) 300 mg capsule Take 1 Cap by mouth three (3) times daily. Indications: NEUROPATHIC PAIN, POSTHERPETIC NEURALGIA  Patient taking differently: Take 300 mg by mouth daily. Tapering off  Indications: NEUROPATHIC PAIN, POSTHERPETIC NEURALGIA 3/2/17  Yes Maria Del Carmen Cornejo NP   cholecalciferol (VITAMIN D3) 1,000 unit tablet Take  by mouth daily. Yes Historical Provider   KLOR-CON M20 20 mEq tablet Take 20 mEq by mouth daily. 4/13/15  Yes Historical Provider   bumetanide (BUMEX) 2 mg tablet Take 2 mg by mouth as needed. 4/9/15  Yes Historical Provider   atenolol (TENORMIN) 25 mg tablet Take 25 mg by mouth daily. Yes Historical Provider   cyanocobalamin (VITAMIN B-12) 1,000 mcg/mL injection 1,000 mcg by IntraMUSCular route every month. Historical Provider        Thank you for allowing us to participate in the care of this patient. We will follow patient. Please dont hesitate to call with any questions    Meme Miller MD  [facility]   [id number] [place] [address] [address]  Phone - [phone]   Fax - [phone]  [email or web address].

## 2018-03-22 NOTE — PROGRESS NOTES
Hospitalist Progress Note  Tono Fulton MD  Office: [phone]      Date of Service:  3/22/2018  NAME:  Mace Barthel  :    MRN:  [id number]      Admission Summary: This is a 75 y.o female with significant medical history of NH lymphoma, anemia, hypertension, tachyarrhythmia, history of aortic valve repair (at age of 24), VSD, mild TR,  thyroid disease, and history of pulmonary hypertension presented to the hospital with a chief complaint of shortness of breath and abdominal pain which she associated with after undergoing a uterine biopsy one week prior to this presentation. Associated with above patient noticed abdominal girth increment with low grade fever.    Cardiology and Oncology consulted    Interval history / Subjective:   Seen early in am, not in distress, answers to questions appropriately      Assessment & Plan:     Abdominal distension likely secondary to malignant ascites in a patient with history of NHL (not on chemotherapy)   For paracentesis and fluid analysis   Ascitic fluid analysis: pending   CT abdomen: with finding consistent with rapid progression of disease with carcinomatosis, abdominal pelvic mass and lymphadenopathy   Oncology consultation and follow up appreciated: Recommendation noted   On preparation to start chemotherapy Patient agreeable with starting treatment   Treatment plan as per nephrology     NHL  Treatment plan as per oncology   Oncology recommendations noted     Acute kidney insufficieny   Base line creatinine (18) around 1.08   Current BUN/Creatinine trending  1.70>1.87>1.8    Bun/creatinine trending up secondary to dehydration/pre-renal vs TLS   Nephrology consulted for further evaluation and management     Hypokalemia   Will replace with one dose of KCL     UTI  Will trend WBC and fever   Urine culture growing mixed urogenital organisms   Will cover with broad spectrum antibiotics on cefepime and metronidazole   Follow ascitic fluid culture   Started on broad spectrum antibiotics   Will de-escalate antibiotics as per culture result     History of VSD, mild TR, and aortic valve replacement   Cardiology on board: recommendation noted   Echocardiogram (03/22) with LVEF of 55 %, no WMA, No VSD noted on current echo with moderate TR   Continue with home medications     History of tachyarrhythmia   Continue with Atenolol     Hypertension   Continue with Atenolol    Hypothyroidism   Continue with home medications     Anemia of chronic illness   Normocytic anemia   Will trend H and H   Oncology on board     Code status: Full code   DVT prophylaxis: SCD    Care Plan discussed with: Patient/Family and Nurse  Disposition: TBD     Hospital Problems  Date Reviewed: 3/20/2018          Codes Class Noted POA    * (Principal)Ascites ICD-10-CM: R18.8  ICD-9-CM: 789.59  3/20/2018 Unknown        Ascites, malignant ICD-10-CM: R18.0  ICD-9-CM: 789.51  3/20/2018 Unknown                Review of Systems:   A comprehensive review of systems was negative except for that written in the HPI. Vital Signs:    Last 24hrs VS reviewed since prior progress note. Most recent are:  Visit Vitals    /67 (BP 1 Location: Left arm, BP Patient Position: Sitting)    Pulse 75    Temp 98.8 °F (37.1 °C)    Resp 18    Wt 67.6 kg (149 lb)    SpO2 95%    BMI 27.25 kg/m2         Intake/Output Summary (Last 24 hours) at 03/22/18 1750  Last data filed at 03/22/18 0908   Gross per 24 hour   Intake             2120 ml   Output                0 ml   Net             2120 ml        Physical Examination:             Constitutional:  No acute distress, cooperative, pleasant    ENT:  Oral mucous moist, oropharynx benign. Neck supple,    Resp:  CTA bilaterally. No wheezing/rhonchi/rales. No accessory muscle use   CV:  Regular rhythm, normal rate, no murmurs, gallops, rubs    GI:  mid line surgical incision, distended abdomen.  Positive for FT and SD. Musculoskeletal:  No edema, warm, 2+ pulses throughout    Neurologic:  Moves all extremities. AAOx3, CN II-XII reviewed     Psych:  Good insight, Not anxious nor agitated. Data Review:    Review and/or order of clinical lab test  Review and/or order of tests in the radiology section of CPT  Review and/or order of tests in the medicine section of [facility]      Labs:     Recent Labs      03/22/18   0945  03/21/18   0527   WBC  7.8  7.0   HGB  9.5*  9.2*   HCT  29.4*  28.5*   PLT  290  263     Recent Labs      03/22/18   0945  03/21/18   0744  03/21/18   0527  03/20/18   1243   NA  138   --   139  139   K  3.3*   --   3.5  4.1   CL  104   --   102  99   CO2  24   --   26  30   BUN  21*   --   20  17   CREA  1.82*   --   1.87*  1.70*   GLU  141*   --   87  104*   CA  8.9   --   9.0  9.7   MG  1.7   --    --    --    PHOS  2.8   --    --    --    URICA  8.3*  8.9*   --    --      Recent Labs      03/22/18   0945  03/20/18   1243   SGOT  17  27   ALT  7*  12   AP  73  94   TBILI  0.3  0.5   TP  6.1*  7.2   ALB  3.1*  3.4*   GLOB  3.0  3.8   LPSE   --   85     Recent Labs      03/22/18   0945   INR  1.1   PTP  11.3*      Recent Labs      03/21/18   0744   TIBC  215*   PSAT  9*   FERR  185      No results found for: FOL, RBCF   No results for input(s): PH, PCO2, PO2 in the last 72 hours. No results for input(s): CPK, CKNDX, TROIQ in the last 72 hours.     No lab exists for component: CPKMB  No results found for: CHOL, CHOLX, CHLST, CHOLV, HDL, LDL, LDLC, DLDLP, TGLX, TRIGL, TRIGP, CHHD, CHHDX  No results found for: [facility]  Lab Results   Component Value Date/Time    Color YELLOW/STRAW 03/20/2018 12:36 PM    Appearance CLEAR 03/20/2018 12:36 PM    Specific gravity 1.025 03/20/2018 12:36 PM    Specific gravity 1.025 02/16/2018 11:22 PM    pH (UA) 6.0 03/20/2018 12:36 PM    Protein 30 (A) 03/20/2018 12:36 PM    Glucose NEGATIVE  03/20/2018 12:36 PM    Ketone TRACE (A) 03/20/2018 12:36 PM    Bilirubin NEGATIVE  02/16/2018 11:22 PM    Urobilinogen 0.2 03/20/2018 12:36 PM    Nitrites NEGATIVE  03/20/2018 12:36 PM    Leukocyte Esterase NEGATIVE  03/20/2018 12:36 PM    Epithelial cells FEW 03/20/2018 12:36 PM    Bacteria 1+ (A) 03/20/2018 12:36 PM    WBC 5-10 03/20/2018 12:36 PM    RBC 10-20 03/20/2018 12:36 PM         Medications Reviewed:     Current Facility-Administered Medications   Medication Dose Route Frequency    allopurinol (ZYLOPRIM) tablet 100 mg  100 mg Oral Q8H    sodium chloride (NS) flush 20 mL  20 mL InterCATHeter PRN    sodium chloride (NS) flush 10 mL  10 mL InterCATHeter Q24H    sodium chloride (NS) flush 10 mL  10 mL InterCATHeter PRN    sodium chloride (NS) flush 10 mL  10 mL InterCATHeter Q8H    bacitracin 500 unit/gram packet 1 Packet  1 Packet Topical PRN    sodium chloride (NS) flush 20 mL  20 mL InterCATHeter PRN    sodium chloride (NS) flush 10 mL  10 mL InterCATHeter Q24H    sodium chloride (NS) flush 10 mL  10 mL InterCATHeter PRN    sodium chloride (NS) flush 10 mL  10 mL InterCATHeter Q8H    alteplase (CATHFLO) 1 mg in sterile water (preservative free) 1 mL injection  1 mg InterCATHeter PRN    influenza vaccine 2017-18 (3 yrs+)(PF) (FLUZONE QUAD/FLUARIX QUAD) injection 0.5 mL  0.5 mL IntraMUSCular PRIOR TO DISCHARGE    acetaminophen (TYLENOL) tablet 650 mg  650 mg Oral Q6H PRN    atenolol (TENORMIN) tablet 25 mg  25 mg Oral DAILY    0.9% sodium chloride infusion  100 mL/hr IntraVENous CONTINUOUS    [START ON 3/26/2018] cyanocobalamin (VITAMIN B12) injection 1,000 mcg  1,000 mcg IntraMUSCular EVERY MONTH    gabapentin (NEURONTIN) capsule 300 mg  300 mg Oral DAILY    levothyroxine (SYNTHROID) tablet 150 mcg  150 mcg Oral ACB    famotidine (PEPCID) tablet 20 mg  20 mg Oral QHS    sodium chloride (NS) flush 5-10 mL  5-10 mL IntraVENous Q8H    sodium chloride (NS) flush 5-10 mL  5-10 mL IntraVENous PRN    ondansetron (ZOFRAN) injection 4 mg  4 mg IntraVENous Q4H PRN  cefepime (MAXIPIME) 1 g in 0.9% sodium chloride (MBP/ADV) 50 mL ADV  1 g IntraVENous Q24H    metroNIDAZOLE (FLAGYL) IVPB premix 500 mg  500 mg IntraVENous Q12H     ______________________________________________________________________  EXPECTED LENGTH OF STAY: 2d 16h  ACTUAL LENGTH OF STAY:          2                 Tono Fulton MD

## 2018-03-22 NOTE — PROGRESS NOTES
22:45- EKG obtained - NSR, RBBB    O2 sats 88% while resting in bed - 2L NC applied, sats 96%, lungs diminished in left base    07:00- Pt c/o left side and left hip pain that radiate down left thigh - medicated with prn tylenol     Bedside and Verbal shift change report given to Jase RN (oncoming nurse) by Nanci Araujo RN (offgoing nurse). Report included the following information SBAR, ED Summary, Procedure Summary, Intake/Output, MAR and Cardiac Rhythm NSR, BBB.

## 2018-03-22 NOTE — PROGRESS NOTES
TRANSFER - OUT REPORT:    Verbal report given to Mony Oviedo (name) on Marylee [facility]  being transferred to  [address] (unit) for routine progression of care       Report consisted of patients Situation, Background, Assessment and   Recommendations(SBAR). Information from the following report(s) SBAR, Kardex, MAR, Recent Results, Med Rec Status and Cardiac Rhythm NSR w/ BBB was reviewed with the receiving nurse. Lines:   PICC Triple Lumen 03/22/18 Right (Active)   Criteria for Appropriate Use Irritant/vesicant medication 3/22/2018 11:51 AM   Site Assessment Clean, dry, & intact 3/22/2018 11:51 AM   Phlebitis Assessment 0 3/22/2018 11:51 AM   Infiltration Assessment 0 3/22/2018 11:51 AM   Arm Circumference (cm) 27 cm 3/22/2018 11:51 AM   Date of Last Dressing Change 03/22/18 3/22/2018 11:51 AM   Dressing Status Clean, dry, & intact 3/22/2018 11:51 AM   External Catheter Length (cm) 2 centimeters 3/22/2018 11:51 AM   Dressing Type Transparent;Immobilizer;Disk with Chlorhexadine gluconate (CHG) 3/22/2018 11:51 AM   Positive Blood Return (Site #1) Yes 3/22/2018 11:51 AM   Positive Blood Return (Site #2) Yes 3/22/2018 11:51 AM   Positive Blood Return (Site #3) Yes 3/22/2018 11:51 AM   Alcohol Cap Used Yes 3/22/2018 11:51 AM       Peripheral IV 03/21/18 Left Hand (Active)   Site Assessment Clean, dry, & intact 3/22/2018  9:00 AM   Phlebitis Assessment 0 3/22/2018  9:00 AM   Infiltration Assessment 0 3/22/2018  9:00 AM   Dressing Status Clean, dry, & intact 3/22/2018  9:00 AM   Dressing Type Transparent 3/22/2018  9:00 AM   Hub Color/Line Status Patent; Infusing 3/22/2018  9:00 AM   Action Taken Open ports on tubing capped 3/22/2018  9:00 AM   Alcohol Cap Used Yes 3/22/2018  9:00 AM        Opportunity for questions and clarification was provided.

## 2018-03-22 NOTE — PROCEDURES
PICC Placement Note    PRE-PROCEDURE VERIFICATION  Correct Procedure: yes  Correct Site:  yes  Temperature: Temp: 97.5 °F (36.4 °C), Temperature Source: Temp Source: Oral  Recent Labs      03/22/18   0945   BUN  21*   CREA  1.82*   PLT  290   INR  1.1   WBC  7.8     Allergies: Rituxan [rituximab]  Education materials, including PICC Booklet, for PICC Care given to patient: yes. See Patient Education activity for further details. PROCEDURE DETAIL  A triple lumen PICC line was started for chemotherapy and  access. The following documentation is in addition to the PICC properties in the lines/airways flowsheet :  Lot #: [id number]  Was xylocaine 1% used intradermally:  yes  Catheter Length: 36 at 2 cm mrk (cm)  Vein Selection for PICC:right basilic  Central Line Bundle followed yes  Complication Related to Insertion: none    The placement was verified by ECG/Sapiens technology: The  tip location is on the right side and the tip is in the  superior vena cava. See ECG results for PICC tip placement. Report given to nurse Rosalind Sidhu. Line is okay to use.     Mayda Martinez RN

## 2018-03-23 NOTE — PROGRESS NOTES
Bedside and Verbal shift change report given to Jimenez Maya (oncoming nurse) by Breann Xiong (offgoing nurse). Report included the following information SBAR and Kardex.

## 2018-03-23 NOTE — PROGRESS NOTES
ONCOLOGY NURSE NAVIGATOR    Met Alysha Pinzon, and her daughter, Noman Reed 2yo daughter, Kelsie Gomez, and Maude's sister at bedside  Appreciate referral from Dr. Elam Sour was admitted yesterday after presenting to the ED on 3/20 with abdominal swelling and low grade fever. Has been unable to eat or drink much for several days. 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 She received RT to left orbit in . Had reaction to rituxan given one time. CT completed yesterday showed progression of her disease with new peritoneal carcinomatosis, bulky abdominal and pelvic soft tissue masses and retroperitoneal lymphadenopathy, moderate amount of free intraperitoneal fluid, and new mild left hydronephrosis. Alysha Pinzon had a PET scan 2017 which showed progression of disease, both above and below the level of the diaphragm. She had thoracentesis on 3/1/18; pathology showed transformed  lymphoma:  high grade lymphoma/malignant B cell neoplasm. She had a biopsy of the vaginal wall per Dr. Sheba Hill on 3/16/1p pathology  high grade follicular lymphoma. Alysha Pinzon states she feels a little better since admission. She's waiting transfer to ast to commence chemo:  CHOP. Has refused rituxan. PICC has been placed. She's anxious to commence chemo as she understands she may appreciate improvement in uncomfortable symptoms. She tells me about the difficulty in making decision to receive chemo. Family has known several people who have done poorly with chemo, suffered, or . She's decided on chemo this time because of the transformed and progressed lymphoma and because Favio Reyes has told her that if she didn't have chemo and , she \"would always wonder\" about the what if's. Normalized their understandable concerns --  We talked in general terms about what to expect during chemo.   Alysha Pinzon feels very supported by her care team.  Fidelia Kaur that cardiology and renal docs are working closely with Dr. Sergei Prado. Knows there will be close monitoring and attention to chemo side effects and protecting heart and kidney function. Radha Nunes lives in [address]. Daughter and sister live in [place]. Sister will stay at [facility] bedside. Macy Powers is staying at [facility] house with her child. Discussed [employer] accommodations if needed and desired. Will follow and continue support and monitor for barriers to care throughout the continuum.     Annabella Maria MS RN AOCNS

## 2018-03-23 NOTE — PROGRESS NOTES
[facility]   [address] [facility], [address], [facility]  Phone: [phone]   [phone]       Nephrology Progress Note  Dmitri Dougherty     [birth year]     [id number]  Date of Admission : 3/20/2018  03/23/18    CC: Follow up for SINDHU       Assessment and Plan   SINDHU :  · Spontaneous TLS +/- contrast nephropathy. 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     Current Medications:   Current Facility-Administered Medications   Medication Dose Route Frequency    metroNIDAZOLE (FLAGYL) tablet 500 mg  500 mg Oral Q12H    granisetron (KYTRIL) injection 1 mg  1 mg IntraVENous Q24H    vinCRIStine (VINCASAR PFS) 2 mg in 0.9% sodium chloride 50 mL, overfill volume 5 mL chemo IVPB  2 mg IntraVENous ONCE    DOXOrubicin (ADRIAMYCIN) chemo syringe 43 mg (Syringe 1 of 2  Total dose: 87 mg)  43 mg IntraVENous ONCE    And    DOXOrubicin (ADRIAMYCIN) chemo syringe 44 mg (Syringe 2 of 2  Total dose: 87 mg)  44 mg IntraVENous ONCE    cyclophosphamide (CYTOXAN) 1,300 mg in 0.9% sodium chloride 250 mL, overfill volume 25 mL chemo infusion  1,300 mg IntraVENous ONCE    predniSONE (DELTASONE) tablet 100 mg  100 mg Oral DAILY    allopurinol (ZYLOPRIM) tablet 100 mg  100 mg Oral Q8H    sodium chloride (NS) flush 20 mL  20 mL InterCATHeter PRN    sodium chloride (NS) flush 10 mL  10 mL InterCATHeter Q24H  sodium chloride (NS) flush 10 mL  10 mL InterCATHeter PRN    sodium chloride (NS) flush 10 mL  10 mL InterCATHeter Q8H    bacitracin 500 unit/gram packet 1 Packet  1 Packet Topical PRN    sodium chloride (NS) flush 20 mL  20 mL InterCATHeter PRN    sodium chloride (NS) flush 10 mL  10 mL InterCATHeter Q24H    sodium chloride (NS) flush 10 mL  10 mL InterCATHeter PRN    sodium chloride (NS) flush 10 mL  10 mL InterCATHeter Q8H    alteplase (CATHFLO) 1 mg in sterile water (preservative free) 1 mL injection  1 mg InterCATHeter PRN    influenza vaccine 2017-18 (3 yrs+)(PF) (FLUZONE QUAD/FLUARIX QUAD) injection 0.5 mL  0.5 mL IntraMUSCular PRIOR TO DISCHARGE    cefepime (MAXIPIME) 1 g in 0.9% sodium chloride (MBP/ADV) 50 mL ADV  1 g IntraVENous Q24H    acetaminophen (TYLENOL) tablet 650 mg  650 mg Oral Q6H PRN    atenolol (TENORMIN) tablet 25 mg  25 mg Oral DAILY    0.9% sodium chloride infusion  150 mL/hr IntraVENous CONTINUOUS    [START ON 3/26/2018] cyanocobalamin (VITAMIN B12) injection 1,000 mcg  1,000 mcg IntraMUSCular EVERY MONTH    gabapentin (NEURONTIN) capsule 300 mg  300 mg Oral DAILY    levothyroxine (SYNTHROID) tablet 150 mcg  150 mcg Oral ACB    famotidine (PEPCID) tablet 20 mg  20 mg Oral QHS    sodium chloride (NS) flush 5-10 mL  5-10 mL IntraVENous Q8H    sodium chloride (NS) flush 5-10 mL  5-10 mL IntraVENous PRN    ondansetron (ZOFRAN) injection 4 mg  4 mg IntraVENous Q4H PRN      Allergies   Allergen Reactions    Rituxan [Rituximab] Hives       Objective:  Vitals:    Vitals:    03/22/18 2139 03/23/18 0326 03/23/18 0341 03/23/18 0840   BP: 142/78  127/71 131/61   Pulse: 81  74 79   Resp: 18  16 16   Temp: 98.7 °F (37.1 °C)  98.8 °F (37.1 °C) 98.6 °F (37 °C)   SpO2: 94%  95% 93%   Weight:  72.2 kg (159 lb 1.6 oz)       Intake and Output:     03/21 1901 - 03/23 0700  In: 7375 [P.O.:480; I.V.:4854]  Out: 250 [Urine:250]    Physical Examination:  General:ill appearing   HEENT:pale ans anicteric  Neck: No JVD   Lungs : CTA  CVS: RRR, murmur+  Abdomen: distended,diffusely tender, BS+  Extremities: No edema   Skin: No rash or lesions. MS: No joint swelling, erythema, warmth  Neurologic: non focal, AAO x 3  Psych: normal affect     []    High complexity decision making was performed  []    Patient is at high-risk of decompensation with multiple organ involvement    Lab Data Personally Reviewed: I have reviewed all the pertinent labs, microbiology data and radiology studies during assessment.     Recent Labs      03/23/18 0244 03/22/18   0945  03/21/18   0527   NA  139  138  139   K  4.0  3.3*  3.5   CL  107  104  102   CO2  25  24  26   GLU  93  141*  87   BUN  19  21*  20   CREA  1.64*  1.82*  1.87*   CA  8.7  8.9  9.0   MG  1.6  1.7   --    PHOS  2.5*  2.8   --    ALB  2.8*  3.1*   --    SGOT  20  17   --    ALT  8*  7*   --    INR   --   1.1   --      Recent Labs      03/23/18 0244 03/22/18   0945  03/21/18   0527   WBC  7.4  7.8  7.0   HGB  8.9*  9.5*  9.2*   HCT  27.5*  29.4*  28.5*   PLT  268  290  263     No results found for: SDES  Lab Results   Component Value Date/Time    Culture result: NO GROWTH 2 DAYS 03/21/2018 12:20 PM    Culture result: MIXED UROGENITAL THAD ISOLATED 03/20/2018 10:21 PM    Culture result: NO GROWTH 2 DAYS 02/16/2018 11:22 PM     Recent Results (from the past 24 hour(s))   URIC ACID    Collection Time: 03/22/18  5:20 PM   Result Value Ref Range    Uric acid 5.1 2.6 - 6.0 MG/DL   CBC WITH AUTOMATED DIFF    Collection Time: 03/23/18  2:44 AM   Result Value Ref Range    WBC 7.4 3.6 - 11.0 K/uL    RBC 2.88 (L) 3.80 - 5.20 M/uL    HGB 8.9 (L) 11.5 - 16.0 g/dL    HCT 27.5 (L) 35.0 - 47.0 %    MCV 95.5 80.0 - 99.0 FL    MCH 30.9 26.0 - 34.0 PG    MCHC 32.4 30.0 - 36.5 g/dL    RDW 12.3 11.5 - 14.5 %    PLATELET 838 828 - 136 K/uL    MPV 8.7 (L) 8.9 - 12.9 FL    NRBC 0.0 0  WBC    ABSOLUTE NRBC 0.00 0.00 - 0.01 K/uL    NEUTROPHILS 66 32 - 75 %    LYMPHOCYTES 16 12 - 49 %    MONOCYTES 13 5 - 13 %    EOSINOPHILS 4 0 - 7 %    BASOPHILS 1 0 - 1 %    IMMATURE GRANULOCYTES 1 (H) 0.0 - 0.5 %    ABS. NEUTROPHILS 4.9 1.8 - 8.0 K/UL    ABS. LYMPHOCYTES 1.2 0.8 - 3.5 K/UL    ABS. MONOCYTES 1.0 0.0 - 1.0 K/UL    ABS. EOSINOPHILS 0.3 0.0 - 0.4 K/UL    ABS. BASOPHILS 0.1 0.0 - 0.1 K/UL    ABS. IMM. GRANS. 0.1 (H) 0.00 - 0.04 K/UL    DF AUTOMATED     METABOLIC PANEL, COMPREHENSIVE    Collection Time: 03/23/18  2:44 AM   Result Value Ref Range    Sodium 139 136 - 145 mmol/L    Potassium 4.0 3.5 - 5.1 mmol/L    Chloride 107 97 - 108 mmol/L    CO2 25 21 - 32 mmol/L    Anion gap 7 5 - 15 mmol/L    Glucose 93 65 - 100 mg/dL    BUN 19 6 - 20 MG/DL    Creatinine 1.64 (H) 0.55 - 1.02 MG/DL    BUN/Creatinine ratio 12 12 - 20      GFR est AA 38 (L) >60 ml/min/1.73m2    GFR est non-AA 31 (L) >60 ml/min/1.73m2    Calcium 8.7 8.5 - 10.1 MG/DL    Bilirubin, total 0.3 0.2 - 1.0 MG/DL    ALT (SGPT) 8 (L) 12 - 78 U/L    AST (SGOT) 20 15 - 37 U/L    Alk. phosphatase 67 45 - 117 U/L    Protein, total 6.1 (L) 6.4 - 8.2 g/dL    Albumin 2.8 (L) 3.5 - 5.0 g/dL    Globulin 3.3 2.0 - 4.0 g/dL    A-G Ratio 0.8 (L) 1.1 - 2.2     LD    Collection Time: 03/23/18  2:44 AM   Result Value Ref Range     (H) 81 - 246 U/L   URIC ACID    Collection Time: 03/23/18  2:44 AM   Result Value Ref Range    Uric acid 2.2 (L) 2.6 - 6.0 MG/DL   PHOSPHORUS    Collection Time: 03/23/18  2:44 AM   Result Value Ref Range    Phosphorus 2.5 (L) 2.6 - 4.7 MG/DL   MAGNESIUM    Collection Time: 03/23/18  2:44 AM   Result Value Ref Range    Magnesium 1.6 1.6 - 2.4 mg/dL           Total time spent with patient:  xxx   min. Care Plan discussed with:  Patient     Family      RN      Consulting Physician [id number]        I have reviewed the flowsheets. Chart and Pertinent Notes have been reviewed. No change in PMH ,family and social history from Consult note.       Sofia Hodges MD

## 2018-03-23 NOTE — PROGRESS NOTES
Hospitalist Progress Note  Yris Vergara MD  Office: [phone]      Date of Service:  3/23/2018  NAME:  Sander Rosales  :    MRN:  [id number]      Admission Summary: This is a 75 y.o female with significant medical history of NH lymphoma, anemia, hypertension, tachyarrhythmia, history of aortic valve repair (at age of 24), VSD, mild TR,  thyroid disease, and history of pulmonary hypertension presented to the hospital with a chief complaint of shortness of breath and abdominal pain which she associated with after undergoing a uterine biopsy one week prior to this presentation. Associated with above patient noticed abdominal girth increment with low grade fever. Cardiology and Oncology consulted.      Interval history / Subjective:   Seen at the bed side   No new complaint      Assessment & Plan:     Abdominal distension likely secondary to malignant ascites in a patient with history of NHL (not on chemotherapy)   S/P paracentesis   Follow up with ascitic fluid analysis   CT abdomen: with finding consistent with rapid progression of disease with carcinomatosis, abdominal pelvic mass and lymphadenopathy   Oncology consultation and follow up appreciated: Recommendation noted with plan to start on CHOP and no Ritux (secondary to hypersensitivity reaction)   On preparation to start chemotherapy Patient agreeable with starting treatment   Nephrology consultation and f/u reviewed with plan to start on Rasburicase     NHL  Diffuse Large B Cell lymphoma- high grade   Transformation of NHL to DLBCL    Treatment plan as per oncology   To tart on CHOP and no Ritux (secondary to hypersensitivity reaction)     Acute kidney insufficieny   Base line creatinine (18) around 1.08   Current BUN/Creatinine trending  1.70>1.87>1.8 >1.64   Renal function trending in the right direction   Continue with IV fluids as per recommendation of Nephrology Appreciate nephrology help     Hypokalemia   Replaced and corrected   No need to replace     UTI  Will trend WBC and fever   Urine culture growing mixed urogenital organisms   Will cover with broad spectrum antibiotics on cefepime and metronidazole   Follow ascitic fluid culture: NGTD   Started on broad spectrum antibiotics   Will de-escalate antibiotics as per culture result      History of VSD, mild TR, and aortic valve replacement   Cardiology on board: recommendation noted   Echocardiogram (03/22) with LVEF of 55 %, no WMA, No VSD noted on current echo with moderate TR   Continue with home medications     History of tachyarrhythmia   Continue with Atenolol     Hypertension   Continue with Atenolol    Hypothyroidism   Continue with home medications     Anemia of chronic illness   Normocytic anemia   Will trend H and H   Oncology on board     Code status: Full code   DVT prophylaxis: SCD    Care Plan discussed with: Patient/Family and Nurse  Disposition: TBD     Hospital Problems  Date Reviewed: 3/20/2018          Codes Class Noted POA    * (Principal)Ascites ICD-10-CM: R18.8  ICD-9-CM: 789.59  3/20/2018 Unknown        Ascites, malignant ICD-10-CM: R18.0  ICD-9-CM: 789.51  3/20/2018 Unknown                Review of Systems:   A comprehensive review of systems was negative except for that written in the HPI. Vital Signs:    Last 24hrs VS reviewed since prior progress note.  Most recent are:  Visit Vitals    /61 (BP 1 Location: Left arm, BP Patient Position: At rest)    Pulse 79    Temp 98.6 °F (37 °C)    Resp 16    Wt 72.2 kg (159 lb 1.6 oz)    SpO2 93%    BMI 29.1 kg/m2         Intake/Output Summary (Last 24 hours) at 03/23/18 1002  Last data filed at 03/23/18 7101   Gross per 24 hour   Intake             3214 ml   Output              250 ml   Net             2964 ml        Physical Examination:             Constitutional:  No acute distress, cooperative, pleasant    ENT:  Oral mucous moist, oropharynx benign. Neck supple,    Resp:  CTA bilaterally. No wheezing/rhonchi/rales. No accessory muscle use   CV:  Regular rhythm, normal rate, no murmurs, gallops, rubs    GI:  mid line surgical incision, distended abdomen. Positive for FT and SD. Musculoskeletal:  No edema, warm, 2+ pulses throughout    Neurologic:  Moves all extremities. AAOx3, CN II-XII reviewed     Psych:  Good insight, Not anxious nor agitated. Data Review:    Review and/or order of clinical lab test  Review and/or order of tests in the radiology section of CPT  Review and/or order of tests in the medicine section of CPT      Labs:     Recent Labs      03/23/18 0244 03/22/18   0945   WBC  7.4  7.8   HGB  8.9*  9.5*   HCT  27.5*  29.4*   PLT  268  290     Recent Labs      03/23/18 0244 03/22/18   1720  03/22/18   0945   03/21/18   0527   NA  139   --   138   --   139   K  4.0   --   3.3*   --   3.5   CL  107   --   104   --   102   CO2  25   --   24   --   26   BUN  19   --   21*   --   20   CREA  1.64*   --   1.82*   --   1.87*   GLU  93   --   141*   --   87   CA  8.7   --   8.9   --   9.0   MG  1.6   --   1.7   --    --    PHOS  2.5*   --   2.8   --    --    URICA  2.2*  5.1  8.3*   < >   --     < > = values in this interval not displayed. Recent Labs      03/23/18   0244 03/22/18   0945  03/20/18   1243   SGOT  20  17  27   ALT  8*  7*  12   AP  67  73  94   TBILI  0.3  0.3  0.5   TP  6.1*  6.1*  7.2   ALB  2.8*  3.1*  3.4*   GLOB  3.3  3.0  3.8   LPSE   --    --   85     Recent Labs      03/22/18   0945   INR  1.1   PTP  11.3*      Recent Labs      03/21/18   0744   TIBC  215*   PSAT  9*   FERR  185      No results found for: FOL, RBCF   No results for input(s): PH, PCO2, PO2 in the last 72 hours. No results for input(s): CPK, CKNDX, TROIQ in the last 72 hours.     No lab exists for component: CPKMB  No results found for: CHOL, CHOLX, CHLST, CHOLV, HDL, LDL, LDLC, DLDLP, TGLX, TRIGL, TRIGP, CHHD, CHHDX  No results found for: [facility]  Lab Results   Component Value Date/Time    Color YELLOW/STRAW 03/20/2018 12:36 PM    Appearance CLEAR 03/20/2018 12:36 PM    Specific gravity 1.025 03/20/2018 12:36 PM    Specific gravity 1.025 02/16/2018 11:22 PM    pH (UA) 6.0 03/20/2018 12:36 PM    Protein 30 (A) 03/20/2018 12:36 PM    Glucose NEGATIVE  03/20/2018 12:36 PM    Ketone TRACE (A) 03/20/2018 12:36 PM    Bilirubin NEGATIVE  02/16/2018 11:22 PM    Urobilinogen 0.2 03/20/2018 12:36 PM    Nitrites NEGATIVE  03/20/2018 12:36 PM    Leukocyte Esterase NEGATIVE  03/20/2018 12:36 PM    Epithelial cells FEW 03/20/2018 12:36 PM    Bacteria 1+ (A) 03/20/2018 12:36 PM    WBC 5-10 03/20/2018 12:36 PM    RBC 10-20 03/20/2018 12:36 PM         Medications Reviewed:     Current Facility-Administered Medications   Medication Dose Route Frequency    metroNIDAZOLE (FLAGYL) tablet 500 mg  500 mg Oral Q12H    fosaprepitant (EMEND) 150 mg in 0.9% sodium chloride 150 mL IVPB  150 mg IntraVENous ONCE    granisetron (KYTRIL) injection 1 mg  1 mg IntraVENous Q24H    vinCRIStine (VINCASAR PFS) 2 mg in 0.9% sodium chloride 50 mL, overfill volume 5 mL chemo IVPB  2 mg IntraVENous ONCE    DOXOrubicin (ADRIAMYCIN) chemo syringe 43 mg (Syringe 1 of 2  Total dose: 87 mg)  43 mg IntraVENous ONCE    And    DOXOrubicin (ADRIAMYCIN) chemo syringe 44 mg (Syringe 2 of 2  Total dose: 87 mg)  44 mg IntraVENous ONCE    cyclophosphamide (CYTOXAN) 1,300 mg in 0.9% sodium chloride 250 mL, overfill volume 25 mL chemo infusion  1,300 mg IntraVENous ONCE    predniSONE (DELTASONE) tablet 100 mg  100 mg Oral DAILY    allopurinol (ZYLOPRIM) tablet 100 mg  100 mg Oral Q8H    sodium chloride (NS) flush 20 mL  20 mL InterCATHeter PRN    sodium chloride (NS) flush 10 mL  10 mL InterCATHeter Q24H    sodium chloride (NS) flush 10 mL  10 mL InterCATHeter PRN    sodium chloride (NS) flush 10 mL  10 mL InterCATHeter Q8H    bacitracin 500 unit/gram packet 1 Packet  1 Packet Topical PRN    sodium chloride (NS) flush 20 mL  20 mL InterCATHeter PRN    sodium chloride (NS) flush 10 mL  10 mL InterCATHeter Q24H    sodium chloride (NS) flush 10 mL  10 mL InterCATHeter PRN    sodium chloride (NS) flush 10 mL  10 mL InterCATHeter Q8H    alteplase (CATHFLO) 1 mg in sterile water (preservative free) 1 mL injection  1 mg InterCATHeter PRN    influenza vaccine 2017-18 (3 yrs+)(PF) (FLUZONE QUAD/FLUARIX QUAD) injection 0.5 mL  0.5 mL IntraMUSCular PRIOR TO DISCHARGE    cefepime (MAXIPIME) 1 g in 0.9% sodium chloride (MBP/ADV) 50 mL ADV  1 g IntraVENous Q24H    acetaminophen (TYLENOL) tablet 650 mg  650 mg Oral Q6H PRN    atenolol (TENORMIN) tablet 25 mg  25 mg Oral DAILY    0.9% sodium chloride infusion  150 mL/hr IntraVENous CONTINUOUS    [START ON 3/26/2018] cyanocobalamin (VITAMIN B12) injection 1,000 mcg  1,000 mcg IntraMUSCular EVERY MONTH    gabapentin (NEURONTIN) capsule 300 mg  300 mg Oral DAILY    levothyroxine (SYNTHROID) tablet 150 mcg  150 mcg Oral ACB    famotidine (PEPCID) tablet 20 mg  20 mg Oral QHS    sodium chloride (NS) flush 5-10 mL  5-10 mL IntraVENous Q8H    sodium chloride (NS) flush 5-10 mL  5-10 mL IntraVENous PRN    ondansetron (ZOFRAN) injection 4 mg  4 mg IntraVENous Q4H PRN     ______________________________________________________________________  EXPECTED LENGTH OF STAY: 2d 16h  ACTUAL LENGTH OF STAY:          3                 Lay Montoya MD

## 2018-03-23 NOTE — PROGRESS NOTES
11:33 AM  Patient reviewed in IDR rounds. There are no CM consults or needs at this time. Patient receiving treatment. CM will continue to follow and assist with disposition needs as they arise. Mode of transport at time of discharge to be provided by patient's family.     NORMAN Rivera/KARI

## 2018-03-23 NOTE — PROGRESS NOTES
CC NHL Dx  10/10  Transformation 3/18 with pleural effusion    TREATMENT COURSE:  Orbit radiation  Chlorambucil x 1 cycle 4/16. Cycle 2 3/17. Cycle 3 4/17   Cycle 4 1/18. Attempted one dose of rituxan. INTERIM HISTORY:  Ms. Sarah Driscoll is seen today in consult for high grade lymphoma, admitted yesterday after presenting to the ED with abdominal swelling and low grade fever. CT completed yesterday showed progression of her disease with new peritoneal carcinomatosis, bulky abdominal and pelvic soft tissue masses and retroperitoneal lymphadenopahy, moderate amount of free intraperitoneal fluid, and new mild left hydronephrosis. Patient underwent thoracentesis on 3/1/18 and pathology showed high grade lymphoma/malignant B cell neoplasm, concerning for transformation from her known low grade follicular lymphoma. She also had a biopsy of the vaginal wall with gyn onc (Dr. Inocencio Sneed) on 3/16/18, showing high grade follicular lymphoma. Patient has been resistant to chemotherapy in the past. Starting chemotherapy with CHOP (minus rituxumab as patient declines due to previous reaction) has been discussed but patient has been hesitant to start. She underwent US guided paracentesis but fluid on US was low volume. She remains distended and uncomfortable. She reports her appetite has been fair, she was able to eat some lunch today. She has not felt well for several days now. She has been running low grade fevers at home and experiencing night sweats. Her lower legs have itched and she noticed a rash. Interval history: ECHO completed yesterday shows EF of 55 to 60%. She was seen by Dr. Eliezer Cramer and remains on IV fluids, now at 150 mL/hr. Creatinine better today (1.64), uric acid is down to 2.2. She reports she is feeling well. She has some back pain and abdomen remains distended. She denies other pain at this time. She ate some breakfast this morning. She is starting chemotherapy with CHOP today.     Past Medical History:   Diagnosis Date    Anemia NEC     Arrhythmia     VSD; TRICUSPID REGURG    Cancer ([facility] [address] 2010    low grade NHL    GERD (gastroesophageal reflux disease)     Hypertension     NHL (non-Hodgkin's lymphoma) ([facility] [address] 2010    received chemo and radiation. not in remission.     Pleural effusion 02/21/2018    LEFT    Pulmonary hypertension 02/21/2018    PER CARDIOLOGY NOTES FROM PTS VISIT ON 2/21/18    Thyroid disease      Past Surgical History:   Procedure Laterality Date    HX CATARACT REMOVAL  6/2015 & 7/2015    MERRY    HX CHOLECYSTECTOMY  1982    HX DILATION AND CURETTAGE      HX HEENT  2005    RIGHT EAR     HX OTHER SURGICAL      LEFT PAROTIDECTOMY    DE RPLCMT PROST AORTIC VALVE OPEN XCP HOMOGRF/STENT  1972    STENT PLACED TO STRAIGHTEN THE AORTA     Social History     Social History    Marital status:      Spouse name: N/A    Number of children: N/A    Years of education: N/A     Social History Main Topics    Smoking status: Current Every Day Smoker     Packs/day: 0.50     Years: 45.00     Types: Cigarettes    Smokeless tobacco: Former User     Quit date: 12/1/2015    Alcohol use Yes      Comment: Rare    Drug use: No    Sexual activity: No      Comment:  has one child     Other Topics Concern    None     Social History Narrative     Family History   Problem Relation Age of Onset    Heart Disease Mother     Kidney Disease Mother     Alcohol abuse Father     Liver Disease Father     Cancer Sister      Breast    No Known Problems Brother     Cancer Sister      Colon    No Known Problems Sister     No Known Problems Sister     No Known Problems Daughter     Anesth Problems Neg Hx        Current Facility-Administered Medications   Medication Dose Route Frequency Provider Last Rate Last Dose    metroNIDAZOLE (FLAGYL) tablet 500 mg  500 mg Oral Q12H Geoff Godoy MD   500 mg at 03/23/18 0843    granisetron (KYTRIL) injection 1 mg  1 mg IntraVENous Q24H Ej Chan Jacob, DO   1 mg at 03/23/18 1231    predniSONE (DELTASONE) tablet 100 mg  100 mg Oral DAILY Ashley Barbone, DO   100 mg at 03/23/18 1224    allopurinol (ZYLOPRIM) tablet 100 mg  100 mg Oral Q8H Lang Leon, NP   100 mg at 03/23/18 0509    sodium chloride (NS) flush 20 mL  20 mL InterCATHeter PRN Lang Leon, NP   20 mL at 03/23/18 0237    sodium chloride (NS) flush 10 mL  10 mL InterCATHeter Q24H Lang Leon, NP   10 mL at 03/22/18 1315    sodium chloride (NS) flush 10 mL  10 mL InterCATHeter PRN Lang Leon, NP   10 mL at 03/23/18 0236    sodium chloride (NS) flush 10 mL  10 mL InterCATHeter Q8H Lang Leon, NP   10 mL at 03/23/18 1434    bacitracin 500 unit/gram packet 1 Packet  1 Packet Topical PRN Lang Leon, NP        sodium chloride (NS) flush 20 mL  20 mL InterCATHeter PRN Lang Leon, NP   20 mL at 03/23/18 0236    sodium chloride (NS) flush 10 mL  10 mL InterCATHeter Q24H Lang Leon, NP   10 mL at 03/23/18 1434    sodium chloride (NS) flush 10 mL  10 mL InterCATHeter PRN Lang Leon, NP   10 mL at 03/23/18 0236    sodium chloride (NS) flush 10 mL  10 mL InterCATHeter Q8H Lang Leon, NP   10 mL at 03/23/18 1434    alteplase (CATHFLO) 1 mg in sterile water (preservative free) 1 mL injection  1 mg InterCATHeter PRN Lang Leon, NP        influenza vaccine 2017-18 (3 yrs+)(PF) (FLUZONE QUAD/FLUARIX QUAD) injection 0.5 mL  0.5 mL IntraMUSCular PRIOR TO DISCHARGE Kaylee Beltrán MD        cefepime (MAXIPIME) 1 g in 0.9% sodium chloride (MBP/ADV) 50 mL ADV  1 g IntraVENous Q24H Kaylee Beltárn MD   1 g at 03/22/18 2338    acetaminophen (TYLENOL) tablet 650 mg  650 mg Oral Q6H PRN Orion Alejo MD   650 mg at 03/23/18 1340    atenolol (TENORMIN) tablet 25 mg  25 mg Oral DAILY Kaylee Beltrán MD   25 mg at 03/23/18 0843    0.9% sodium chloride infusion  200 mL/hr IntraVENous CONTINUOUS Beulah Sandifer,  mL/hr at 03/23/18 0622 150 mL/hr at 03/23/18 0622    [START ON 3/26/2018] cyanocobalamin (VITAMIN B12) injection 1,000 mcg  1,000 mcg IntraMUSCular EVERY MONTH Alpa Sharp MD        gabapentin (NEURONTIN) capsule 300 mg  300 mg Oral DAILY Alpa Sharp MD   300 mg at 03/23/18 0843    levothyroxine (SYNTHROID) tablet 150 mcg  150 mcg Oral ACB Alpa Sharp MD   150 mcg at 03/23/18 0631    famotidine (PEPCID) tablet 20 mg  20 mg Oral QHS Alpa Sharp MD   20 mg at 03/22/18 2143    sodium chloride (NS) flush 5-10 mL  5-10 mL IntraVENous Q8H Alpa Sharp MD   10 mL at 03/22/18 2145    sodium chloride (NS) flush 5-10 mL  5-10 mL IntraVENous PRN Alpa Sharp MD        ondansetron (ZOFRAN) injection 4 mg  4 mg IntraVENous Q4H PRN Alpa Sharp MD           Allergies   Allergen Reactions    Rituxan [Rituximab] Hives       Review of Systems    A comprehensive review of systems was negative except for: per HPI and sheet    Objective:  Visit Vitals    /78 (BP 1 Location: Left arm, BP Patient Position: At rest)    Pulse 76    Temp 98 °F (36.7 °C)    Resp 17    Wt 159 lb 1.6 oz (72.2 kg)    LMP  (LMP Unknown)    SpO2 92%    BMI 29.1 kg/m2     Physical Exam:   General appearance - Fatigued, lying in the bed, conversant  Mental status - alert, oriented   Head - superficial masses right temporal area / scalp on right   EYE-conj clear no swelling in eyes. Right eyelid droop noted. Neck - supple  CV - regular rate, murmur noted  Resp - Diminished at the bases  GI  -Round, distended, non-tender. Ext-No pedal edema noted bilaterally  Skin-Warm and dry. Neuro -  Awake, alert, oriented. Exam is non-focal.    Diagnostic Imaging   reviewed    2/21/18 PET:  IMPRESSION:   1. Increasing right parietal soft tissue activity. 2. Progression in the bilateral areas of hypermetabolic pleural thickening.   3. New bilateral hypermetabolic axillary lymph nodes and subcarinal lymph node  and progression of retroperitoneal and left pelvic lymphadenopathy. 4. Improvement in the left inguinal lymphadenopathy  5. Improvement in the right parieto-occipital scalp activity. 6. New hypermetabolic soft tissue abnormality left iliac fossa. 7. Stable left cervical hypermetabolic lymph nodes. 2/17/18 PET:  MPRESSION:   1. Findings suspicious for a uterine mass with a soft tissue mass in the left  pelvis and extending into the retroperitoneum concerning for malignancy. Lymphoma is a differential consideration given the patient's history. The mass  encases but does not narrow the aortic bifurcation and IVC. 2. Enlarged left inguinal lymph node is decreased in size since 8/30/2017.  3. Increased large left pleural effusion with left lower lung atelectasis. Lab Results    reviewed  Lab Results   Component Value Date/Time    WBC 7.4 03/23/2018 02:44 AM    HGB 8.9 (L) 03/23/2018 02:44 AM    HCT 27.5 (L) 03/23/2018 02:44 AM    PLATELET 666 11/75/8990 02:44 AM    MCV 95.5 03/23/2018 02:44 AM       Lab Results   Component Value Date/Time    Sodium 139 03/23/2018 02:44 AM    Potassium 4.0 03/23/2018 02:44 AM    Chloride 107 03/23/2018 02:44 AM    CO2 25 03/23/2018 02:44 AM    Anion gap 7 03/23/2018 02:44 AM    Glucose 93 03/23/2018 02:44 AM    BUN 19 03/23/2018 02:44 AM    Creatinine 1.64 (H) 03/23/2018 02:44 AM    BUN/Creatinine ratio 12 03/23/2018 02:44 AM    GFR est AA 38 (L) 03/23/2018 02:44 AM    GFR est non-AA 31 (L) 03/23/2018 02:44 AM    Calcium 8.7 03/23/2018 02:44 AM    AST (SGOT) 20 03/23/2018 02:44 AM    Alk. phosphatase 67 03/23/2018 02:44 AM    Protein, total 6.1 (L) 03/23/2018 02:44 AM    Albumin 2.8 (L) 03/23/2018 02:44 AM    Globulin 3.3 03/23/2018 02:44 AM    A-G Ratio 0.8 (L) 03/23/2018 02:44 AM    ALT (SGPT) 8 (L) 03/23/2018 02:44 AM     Assessment/Plan:    1. NHL low grade follicular stage 3 dx in 2010   Hx of one dose of rituxan in past with side effects and pt did not want any further therapy.    PET 5/16 good post chlorambucil 4/16. PET 10/2016 was stable. Most recent PET scan 2/22/17 showed progression of disease both above and below the level of the diaphragm. Pt had repeat chlorambucil 5/17 due to progressive disease on CTs. Back of head mass/ left groin mass was almost gone. PET 5/17 and good response to tx. Over summer, pt experienced more scalp lesions with high SUV activity. PET 8/17 showed progressive disease throughout. Pt wanted to try treatment / acyclovir first to see if this treatment affects scalp/head LNs. Acyclovir did not help. Last chlorambucil chemo was 1/18. PET 2/18 now shows diffuse progressive disease/ some mixed. Pt is post thoracentesis on 3/1/18 and cytology now shows likely transformation to high grade / DLBCL. She experienced vaginal bleeding/uterine mass and biopsy of the vaginal wall showed high grade follicular lymphoma. CT's 3/20/18 show rapid progression of disease, now with carcinomatosis, abdominal/pelvic masses, and lymphadenopathy. She has agreed to start chemotherapy with CHOP. Scheduled to start today. She has been followed by nephrology and remains on allopurinol and IV fluids at 150 mL/hr. Received 1 dose of rasburicase yesterday. She remains at risk for tumor lysis syndrome, will check uric acid, magnesium, phosphorus, BMP, and LD this afternoon and tomorrow morning. Frequency of labs over the weekend to be ordered by rounding provider (Dr. Lizzette Amin). Neulasta is scheduled to be given outpatient 3/26/18 AM. If she is not discharged prior to this appointment, will plan to give Neupogen. ECHO completed yesterday shows EF 55 to 60% pre-chemotherapy. 2.  Abdominal distention. Secondary to malignancy. US guided paracentesis yielded low volume of fluid. Symptoms secondary to malignancy and that chemotherapy may help with symptoms. 3. Left pleural effusion post thoracentesis with + cytology. Monitor for re accumulation.  Breathing is stable at this time. 4. Renal insufficiency. Creatinine improved to 1.64 today. Followed by nephrology and felt to be secondary to spontaneous TLS +/- contrast nephropathy. She continues on NS at 150 mL/hr. 5. Tumor lysis prophylaxis. Concern for spontaneous TLS as well as tumor lysis induced by chemotherapy. IV fluids continued as noted above. Discussed case with pharmacy as well. Due to renal insufficiency, she received a dose of rasburicase yesterday (flat dose of 3 mg) and now continues on allopurinol (100 mg every 8 hours). Will need to monitor uric acid, calcium, phosphorus, and magnesium closely. Ordered repeat labs this afternoon as well as early AM. Frequency of labs over the weekend to be determined by on-call provider. 6. History of VSD. ECHO yesterday shows EF 55 to 60%. Cardiology following, appreciate their input. We will continue to follow along while patient is admitted. Please call with any questions. We will continue to follow. Pt seen today in conjunction with NP La Foss  Will have my partner follow over weekend.      Jack Cameron DO

## 2018-03-23 NOTE — PROGRESS NOTES
Clinical Pharmacy Note: Re: IV to PO Automatic Conversion - Antibiotic    Please note: Sarah Dotys medication(s) (metronidazole) hasbeen changed from IV to PO based on the following criteria:    The patient:  1. Has received IV therapy for at least 48 hours   2. Has a functioning GI tract  - Taking scheduled oral medications  - Tolerating tube feeds at goal rate or a full liquid, soft, or regular diet         3. Is clinically stable        - Temperature < 100.4F for at least 24 hours        - WBC is trending down    This IV to PO conversion is based on the [employer] approved automatic conversion policy for eligible patients. Please call with questions.

## 2018-03-24 NOTE — PROGRESS NOTES
Problem: Falls - Risk of  Goal: *Absence of Falls  Document Nikky Fall Risk and appropriate interventions in the flowsheet.    Outcome: Progressing Towards Goal  Fall Risk Interventions:            Medication Interventions: Teach patient to arise slowly

## 2018-03-24 NOTE — PROGRESS NOTES
Hospitalist Progress Note  Og Dhillon MD  Office: [phone]      Date of Service:  3/24/2018  NAME:  Yuri Fontaine  :    MRN:  [id number]      Admission Summary: This is a 75 y.o female with significant medical history of NH lymphoma, anemia, hypertension, tachyarrhythmia, history of aortic valve repair (at age of 24), VSD, mild TR,  thyroid disease, and history of pulmonary hypertension presented to the hospital with a chief complaint of shortness of breath and abdominal pain which she associated with after undergoing a uterine biopsy one week prior to this presentation. Associated with above patient noticed abdominal girth increment with low grade fever. Cardiology and Oncology consulted. Interval history / Subjective:   Ms Franco Jackson complains of worsening abdominal distension and sob. Assessment & Plan:     Abdominal distension likely secondary to malignant ascites in a patient with history of NHL (not on chemotherapy)   S/P diagnostic paracentesis,3/21,fluid said to be deep and small to place catheter  CT abdomen: with finding consistent with rapid progression of disease with carcinomatosis, abdominal pelvic mass and lymphadenopathy   Oncology consultation and follow up appreciated: Recommendation noted with plan to start on CHOP and no Ritux (secondary to hypersensitivity reaction)   On preparation to start chemotherapy Patient agreeable with starting treatment   Nephrology consultation and f/u reviewed with plan to start on Rasburicase   -3/24: abdominal distension worse,she has been getting a lot of fluids. Check US,may need therapeutic paracentesis     NHL  Diffuse Large B Cell lymphoma- high grade   Transformation of NHL to DLBCL    Treatment plan as per oncology   To tart on CHOP and no Ritux (secondary to hypersensitivity reaction)     Acute kidney insufficieny,probably due to TLS and contrast nephropathy  Mild L hydro on CT abdomen. Base line creatinine (2/16/18) around 1.08,creatinine peaked at 1.87,creatinine now close to baseline. Renal adjusting fluids      Hypokalemia   Replaced and corrected   No need to replace     Abnormal urine. Urien cx grew mixed ugf  -Ascitic fluid culture ,negative. fever and leukocytosis resolved,suspect this could have been from TLS. -3/24: stop antibiotics and watch. History of VSD, mild TR, and aortic valve replacement   Cardiology on board: recommendation noted   Echocardiogram (03/22) with LVEF of 55 %, no WMA, No VSD noted on current echo with moderate TR   Continue with home medications     History of tachyarrhythmia   Continue with Atenolol     Hypertension   Continue with Atenolol    Hypothyroidism   Continue with home medications     Anemia of chronic illness   Normocytic anemia   Will trend H and H   Oncology on board     Code status: Full code   DVT prophylaxis: SCD    Care Plan discussed with: Patient/Family and Nurse  Disposition: D     Hospital Problems  Date Reviewed: 3/20/2018          Codes Class Noted POA    * (Principal)Ascites ICD-10-CM: R18.8  ICD-9-CM: 789.59  3/20/2018 Unknown        Ascites, malignant ICD-10-CM: R18.0  ICD-9-CM: 789.51  3/20/2018 Unknown                Review of Systems:   A comprehensive review of systems was negative except for that written in the HPI. Vital Signs:    Last 24hrs VS reviewed since prior progress note.  Most recent are:  Visit Vitals    /68 (BP 1 Location: Left arm, BP Patient Position: At rest)    Pulse 79    Temp 97.7 °F (36.5 °C)    Resp 17    Wt 75.9 kg (167 lb 4.8 oz)    SpO2 96%    BMI 30.6 kg/m2         Intake/Output Summary (Last 24 hours) at 03/24/18 1132  Last data filed at 03/24/18 7818   Gross per 24 hour   Intake             4445 ml   Output              400 ml   Net             4045 ml        Physical Examination:             Constitutional:  No acute distress, cooperative, pleasant    ENT:  Oral mucous moist, oropharynx benign. Neck supple,    Resp:  Decreased air entry left lower lung field,posteriorly    CV:  Regular rhythm, normal rate, no murmurs, gallops, rubs    GI:  Distended,+shifting dullness,mid line surgical incision, distended abdomen. Positive for FT and SD. Musculoskeletal:  No edema, warm, 2+ pulses throughout    Neurologic:  Moves all extremities. AAOx3, CN II-XII reviewed     Psych:  Good insight, Not anxious nor agitated. Data Review:    Review and/or order of clinical lab test  Review and/or order of tests in the radiology section of CPT  Review and/or order of tests in the medicine section of CPT      Labs:     Recent Labs      03/24/18   0541  03/23/18   0244   WBC  6.5  7.4   HGB  8.2*  8.9*   HCT  25.4*  27.5*   PLT  245  268     Recent Labs      03/24/18   0541  03/23/18   1607  03/23/18   0244   NA  141  139  139   K  3.9  4.2  4.0   CL  110*  107  107   CO2  22  22  25   BUN  22*  18  19   CREA  1.12*  1.53*  1.64*   GLU  131*  132*  93   CA  8.2*  8.4*  8.7   MG  1.7  1.7  1.6   PHOS  3.3  2.7  2.5*   URICA  1.3*  1.2*  2.2*     Recent Labs      03/24/18   0541  03/23/18   0244  03/22/18   0945   SGOT  25  20  17   ALT  8*  8*  7*   AP  61  67  73   TBILI  0.2  0.3  0.3   TP  5.4*  6.1*  6.1*   ALB  2.5*  2.8*  3.1*   GLOB  2.9  3.3  3.0     Recent Labs      03/22/18   0945   INR  1.1   PTP  11.3*      No results for input(s): FE, TIBC, PSAT, FERR in the last 72 hours. No results found for: FOL, RBCF   No results for input(s): PH, PCO2, PO2 in the last 72 hours. No results for input(s): CPK, CKNDX, TROIQ in the last 72 hours.     No lab exists for component: CPKMB  No results found for: CHOL, CHOLX, CHLST, CHOLV, HDL, LDL, LDLC, DLDLP, TGLX, TRIGL, TRIGP, CHHD, CHHDX  No results found for: [facility]  Lab Results   Component Value Date/Time    Color YELLOW/STRAW 03/20/2018 12:36 PM    Appearance CLEAR 03/20/2018 12:36 PM    Specific gravity 1.025 03/20/2018 12:36 PM Specific gravity 1.025 02/16/2018 11:22 PM    pH (UA) 6.0 03/20/2018 12:36 PM    Protein 30 (A) 03/20/2018 12:36 PM    Glucose NEGATIVE  03/20/2018 12:36 PM    Ketone TRACE (A) 03/20/2018 12:36 PM    Bilirubin NEGATIVE  02/16/2018 11:22 PM    Urobilinogen 0.2 03/20/2018 12:36 PM    Nitrites NEGATIVE  03/20/2018 12:36 PM    Leukocyte Esterase NEGATIVE  03/20/2018 12:36 PM    Epithelial cells FEW 03/20/2018 12:36 PM    Bacteria 1+ (A) 03/20/2018 12:36 PM    WBC 5-10 03/20/2018 12:36 PM    RBC 10-20 03/20/2018 12:36 PM         Medications Reviewed:     Current Facility-Administered Medications   Medication Dose Route Frequency    metroNIDAZOLE (FLAGYL) tablet 500 mg  500 mg Oral Q12H    granisetron (KYTRIL) injection 1 mg  1 mg IntraVENous Q24H    predniSONE (DELTASONE) tablet 100 mg  100 mg Oral DAILY    allopurinol (ZYLOPRIM) tablet 100 mg  100 mg Oral Q8H    sodium chloride (NS) flush 20 mL  20 mL InterCATHeter PRN    sodium chloride (NS) flush 10 mL  10 mL InterCATHeter Q24H    sodium chloride (NS) flush 10 mL  10 mL InterCATHeter PRN    sodium chloride (NS) flush 10 mL  10 mL InterCATHeter Q8H    bacitracin 500 unit/gram packet 1 Packet  1 Packet Topical PRN    sodium chloride (NS) flush 20 mL  20 mL InterCATHeter PRN    sodium chloride (NS) flush 10 mL  10 mL InterCATHeter Q24H    sodium chloride (NS) flush 10 mL  10 mL InterCATHeter PRN    sodium chloride (NS) flush 10 mL  10 mL InterCATHeter Q8H    alteplase (CATHFLO) 1 mg in sterile water (preservative free) 1 mL injection  1 mg InterCATHeter PRN    influenza vaccine 2017-18 (3 yrs+)(PF) (FLUZONE QUAD/FLUARIX QUAD) injection 0.5 mL  0.5 mL IntraMUSCular PRIOR TO DISCHARGE    cefepime (MAXIPIME) 1 g in 0.9% sodium chloride (MBP/ADV) 50 mL ADV  1 g IntraVENous Q24H    acetaminophen (TYLENOL) tablet 650 mg  650 mg Oral Q6H PRN    atenolol (TENORMIN) tablet 25 mg  25 mg Oral DAILY    0.9% sodium chloride infusion  100 mL/hr IntraVENous CONTINUOUS    [START ON 3/26/2018] cyanocobalamin (VITAMIN B12) injection 1,000 mcg  1,000 mcg IntraMUSCular EVERY MONTH    gabapentin (NEURONTIN) capsule 300 mg  300 mg Oral DAILY    levothyroxine (SYNTHROID) tablet 150 mcg  150 mcg Oral ACB    famotidine (PEPCID) tablet 20 mg  20 mg Oral QHS    sodium chloride (NS) flush 5-10 mL  5-10 mL IntraVENous Q8H    sodium chloride (NS) flush 5-10 mL  5-10 mL IntraVENous PRN    ondansetron (ZOFRAN) injection 4 mg  4 mg IntraVENous Q4H PRN     ______________________________________________________________________  EXPECTED LENGTH OF STAY: 2d 16h  ACTUAL LENGTH OF STAY:          4                 Maliha Soto MD

## 2018-03-24 NOTE — PROGRESS NOTES
CC NHL Dx  10/10  Transformation 3/18 with pleural effusion    TREATMENT COURSE:  Orbit radiation  Chlorambucil x 1 cycle 4/16. Cycle 2 3/17. Cycle 3 4/17   Cycle 4 1/18. Attempted one dose of rituxan. HISTORY OF PRESENT ILLNESS:  Ms. Corinn Bloch is a 74 y/o female with high grade lymphoma, admitted 3/20/18 after presenting to the ED with abdominal swelling and low grade fever. CT showed progression of her disease with new peritoneal carcinomatosis, bulky abdominal and pelvic soft tissue masses and retroperitoneal lymphadenopahy, moderate amount of free intraperitoneal fluid, and new mild left hydronephrosis. Patient underwent thoracentesis on 3/1/18 and pathology showed high grade lymphoma/malignant B cell neoplasm, concerning for transformation from her known low grade follicular lymphoma. She also had a biopsy of the vaginal wall with gyn onc (Dr. Bri Menjivar) on 3/16/18, showing high grade follicular lymphoma. Patient has been resistant to chemotherapy in the past. Starting chemotherapy with CHOP (minus rituxumab as patient declines due to previous reaction) has been discussed but patient has been hesitant to start. She underwent US guided paracentesis but fluid on US was low volume. She remains distended and uncomfortable. She reports her appetite has been fair. She was running low grade fevers at home and experiencing night sweats. Her lower legs have itched and she noticed a rash. Interval history: ECHO performed 3/22/18 shows EF of 55 to 60%. She was seen by Dr. Sarah Gallegos and remains on IV fluids, now at 200 mL/hr. Creatinine better today (1.12), uric acid is down to 1.3. She reports she is feeling well. She feels like her abdomen is so tight it might explode and has resulting shortness of breath. She walked the halls this morning and felt well. She denies other pain at this time. She started CHOP chemotherapy regimen on 3/23/18 and tolerated well thus far.      Past Medical History:   Diagnosis Date    Anemia NEC     Arrhythmia     VSD; TRICUSPID REGURG    Cancer ([facility] [address] 2010    low grade NHL    GERD (gastroesophageal reflux disease)     Hypertension     NHL (non-Hodgkin's lymphoma) ([facility] [address] 2010    received chemo and radiation. not in remission.     Pleural effusion 02/21/2018    LEFT    Pulmonary hypertension 02/21/2018    PER CARDIOLOGY NOTES FROM PTS VISIT ON 2/21/18    Thyroid disease      Past Surgical History:   Procedure Laterality Date    HX CATARACT REMOVAL  6/2015 & 7/2015    MERRY    HX CHOLECYSTECTOMY  1982    HX DILATION AND CURETTAGE      HX HEENT  2005    RIGHT EAR     HX OTHER SURGICAL      LEFT PAROTIDECTOMY    NC RPLCMT PROST AORTIC VALVE OPEN XCP HOMOGRF/STENT  1972    STENT PLACED TO STRAIGHTEN THE AORTA     Social History     Social History    Marital status:      Spouse name: N/A    Number of children: N/A    Years of education: N/A     Social History Main Topics    Smoking status: Current Every Day Smoker     Packs/day: 0.50     Years: 45.00     Types: Cigarettes    Smokeless tobacco: Former User     Quit date: 12/1/2015    Alcohol use Yes      Comment: Rare    Drug use: No    Sexual activity: No      Comment:  has one child     Other Topics Concern    None     Social History Narrative     Family History   Problem Relation Age of Onset    Heart Disease Mother     Kidney Disease Mother     Alcohol abuse Father     Liver Disease Father     Cancer Sister      Breast    No Known Problems Brother     Cancer Sister      Colon    No Known Problems Sister     No Known Problems Sister     No Known Problems Daughter     Anesth Problems Neg Hx        Current Facility-Administered Medications   Medication Dose Route Frequency Provider Last Rate Last Dose    metroNIDAZOLE (FLAGYL) tablet 500 mg  500 mg Oral Q12H Jarad Osullivan MD   500 mg at 03/24/18 2813    granisetron (KYTRIL) injection 1 mg  1 mg IntraVENous Q24H Gurinder James DO   1 mg at 03/23/18 1231    predniSONE (DELTASONE) tablet 100 mg  100 mg Oral DAILY Barronmelony Montelongo DO   100 mg at 03/23/18 1224    allopurinol (ZYLOPRIM) tablet 100 mg  100 mg Oral Q8H Benito Cho, NP   100 mg at 03/24/18 0532    sodium chloride (NS) flush 20 mL  20 mL InterCATHeter PRN Benito Cho, NP   20 mL at 03/23/18 0237    sodium chloride (NS) flush 10 mL  10 mL InterCATHeter Q24H Benito Cho, NP   10 mL at 03/22/18 1315    sodium chloride (NS) flush 10 mL  10 mL InterCATHeter PRN Benito Cho, NP   10 mL at 03/23/18 0236    sodium chloride (NS) flush 10 mL  10 mL InterCATHeter Q8H Benito Cho, NP   10 mL at 03/24/18 0531    bacitracin 500 unit/gram packet 1 Packet  1 Packet Topical PRN Benito Cho, NP        sodium chloride (NS) flush 20 mL  20 mL InterCATHeter PRN Benito Cho, NP   20 mL at 03/23/18 0236    sodium chloride (NS) flush 10 mL  10 mL InterCATHeter Q24H Benito Cho, NP   10 mL at 03/23/18 1434    sodium chloride (NS) flush 10 mL  10 mL InterCATHeter PRN Benito Cho, NP   10 mL at 03/23/18 0236    sodium chloride (NS) flush 10 mL  10 mL InterCATHeter Q8H Benito Cho, NP   10 mL at 03/24/18 0531    alteplase (CATHFLO) 1 mg in sterile water (preservative free) 1 mL injection  1 mg InterCATHeter PRN Benito Cho, NP        influenza vaccine 2017-18 (3 yrs+)(PF) (FLUZONE QUAD/FLUARIX QUAD) injection 0.5 mL  0.5 mL IntraMUSCular PRIOR TO DISCHARGE Geoff Godoy MD        cefepime (MAXIPIME) 1 g in 0.9% sodium chloride (MBP/ADV) 50 mL ADV  1 g IntraVENous Q24H Geoff Godoy MD   1 g at 03/23/18 2350    acetaminophen (TYLENOL) tablet 650 mg  650 mg Oral Q6H PRN Shannan Green MD   650 mg at 03/23/18 2671    atenolol (TENORMIN) tablet 25 mg  25 mg Oral DAILY Geoff Godoy MD   25 mg at 03/24/18 0925    0.9% sodium chloride infusion  200 mL/hr IntraVENous CONTINUOUS Jorge Guerrero  mL/hr at 03/24/18 0811 200 mL/hr at 03/24/18 0811    [START ON 3/26/2018] cyanocobalamin (VITAMIN B12) injection 1,000 mcg  1,000 mcg IntraMUSCular EVERY MONTH Aileen Bonilla MD        gabapentin (NEURONTIN) capsule 300 mg  300 mg Oral DAILY Aileen Bonilla MD   300 mg at 03/24/18 0924    levothyroxine (SYNTHROID) tablet 150 mcg  150 mcg Oral ACB Aileen Bonilla MD   150 mcg at 03/24/18 0727    famotidine (PEPCID) tablet 20 mg  20 mg Oral QHS Aileen Bonilla MD   20 mg at 03/23/18 2216    sodium chloride (NS) flush 5-10 mL  5-10 mL IntraVENous Q8H Aileen Bonilla MD   10 mL at 03/24/18 0531    sodium chloride (NS) flush 5-10 mL  5-10 mL IntraVENous PRN Aileen Bonilla MD        ondansetron (ZOFRAN) injection 4 mg  4 mg IntraVENous Q4H PRN Aileen Bonilla MD           Allergies   Allergen Reactions    Rituxan [Rituximab] Hives       Review of Systems    A comprehensive review of systems was negative except for: per HPI and sheet    Objective:  Visit Vitals    /68 (BP 1 Location: Left arm, BP Patient Position: At rest)    Pulse 79    Temp 97.7 °F (36.5 °C)    Resp 17    Wt 167 lb 4.8 oz (75.9 kg)    LMP  (LMP Unknown)    SpO2 96%    BMI 30.6 kg/m2     Physical Exam:   General appearance - Fatigued, lying in the bed, conversant  Mental status - alert, oriented   Head - superficial masses right temporal area / scalp on right   EYE-conj clear no swelling in eyes. Right eyelid droop noted. Neck - supple  CV - regular rate, murmur noted  Resp - Diminished at the bases  GI  -Round, distended, non-tender. Ext-No pedal edema noted bilaterally  Skin-Warm and dry. Neuro -  Awake, alert, oriented. Exam is non-focal.    Diagnostic Imaging   reviewed    2/21/18 PET:  IMPRESSION:   1. Increasing right parietal soft tissue activity. 2. Progression in the bilateral areas of hypermetabolic pleural thickening.   3. New bilateral hypermetabolic axillary lymph nodes and subcarinal lymph node  and progression of retroperitoneal and left pelvic lymphadenopathy. 4. Improvement in the left inguinal lymphadenopathy  5. Improvement in the right parieto-occipital scalp activity. 6. New hypermetabolic soft tissue abnormality left iliac fossa. 7. Stable left cervical hypermetabolic lymph nodes. 2/17/18 PET:  MPRESSION:   1. Findings suspicious for a uterine mass with a soft tissue mass in the left  pelvis and extending into the retroperitoneum concerning for malignancy. Lymphoma is a differential consideration given the patient's history. The mass  encases but does not narrow the aortic bifurcation and IVC. 2. Enlarged left inguinal lymph node is decreased in size since 8/30/2017.  3. Increased large left pleural effusion with left lower lung atelectasis. Lab Results    reviewed  Lab Results   Component Value Date/Time    WBC 6.5 03/24/2018 05:41 AM    HGB 8.2 (L) 03/24/2018 05:41 AM    HCT 25.4 (L) 03/24/2018 05:41 AM    PLATELET 671 12/82/7513 05:41 AM    MCV 94.4 03/24/2018 05:41 AM       Lab Results   Component Value Date/Time    Sodium 141 03/24/2018 05:41 AM    Potassium 3.9 03/24/2018 05:41 AM    Chloride 110 (H) 03/24/2018 05:41 AM    CO2 22 03/24/2018 05:41 AM    Anion gap 9 03/24/2018 05:41 AM    Glucose 131 (H) 03/24/2018 05:41 AM    BUN 22 (H) 03/24/2018 05:41 AM    Creatinine 1.12 (H) 03/24/2018 05:41 AM    BUN/Creatinine ratio 20 03/24/2018 05:41 AM    GFR est AA 59 (L) 03/24/2018 05:41 AM    GFR est non-AA 48 (L) 03/24/2018 05:41 AM    Calcium 8.2 (L) 03/24/2018 05:41 AM    AST (SGOT) 25 03/24/2018 05:41 AM    Alk. phosphatase 61 03/24/2018 05:41 AM    Protein, total 5.4 (L) 03/24/2018 05:41 AM    Albumin 2.5 (L) 03/24/2018 05:41 AM    Globulin 2.9 03/24/2018 05:41 AM    A-G Ratio 0.9 (L) 03/24/2018 05:41 AM    ALT (SGPT) 8 (L) 03/24/2018 05:41 AM     Assessment/Plan:    1. NHL low grade follicular stage 3 dx in 2010   Hx of one dose of rituxan in past with side effects and pt did not want any further therapy.    PET 5/16 good post chlorambucil 4/16. PET 10/2016 was stable. Most recent PET scan 2/22/17 showed progression of disease both above and below the level of the diaphragm. Pt had repeat chlorambucil 5/17 due to progressive disease on CTs. Back of head mass/ left groin mass was almost gone. PET 5/17 and good response to tx. Over summer, pt experienced more scalp lesions with high SUV activity. PET 8/17 showed progressive disease throughout. Pt wanted to try treatment / acyclovir first to see if this treatment affects scalp/head LNs. Acyclovir did not help. Last chlorambucil chemo was 1/18. PET 2/18 now shows diffuse progressive disease/ some mixed. Pt is post thoracentesis on 3/1/18 and cytology now shows likely transformation to high grade / DLBCL. She experienced vaginal bleeding/uterine mass and biopsy of the vaginal wall showed high grade follicular lymphoma. CT's 3/20/18 show rapid progression of disease, now with carcinomatosis, abdominal/pelvic masses, and lymphadenopathy. She agreed to start chemotherapy with CHOP, which was started 3/23/18. S/p Rasburicase x 1 on 3/23/18.    -- Continue allopurinol and IV fluids at 150-200 mL/hr.   -- Check daily uric acid, magnesium, phosphorus, BMP, and LD.   -- I will plan to check labs in am and if labs, VS and symptoms are stable, I anticipate possible discharge home tomorrow. Patient will need antiemetics at home. -- Neulasta is scheduled to be given outpatient 3/26/18 AM. If she is not discharged prior to this appointment, will plan to give Neupogen. 2. Use of cardiotoxic medications: ECHO completed 3/22/18 shows EF 55 to 60% pre-chemotherapy. 3.  Abdominal distention. Secondary to malignancy. US guided paracentesis yielded low volume of fluid. Symptoms secondary to malignancy and that chemotherapy may help with symptoms. 4. Malignant left pleural effusion: s/p thoracentesis with + cytology. Monitor for re accumulation.  Breathing is stable at this time.    5. Renal insufficiency. Creatinine continues to improve and is 1.12 today. Followed by nephrology and felt to be secondary to spontaneous TLS +/- contrast nephropathy. She continues on NS at 200 mL/hr. 6. Tumor lysis prophylaxis. Concern for spontaneous TLS as well as tumor lysis induced by chemotherapy. IV fluids continued as noted above. Discussed case with pharmacy as well. Due to renal insufficiency, she received a dose of rasburicase 3/23/18 (flat dose of 3 mg) and now continues on allopurinol (100 mg every 8 hours). Will need to monitor uric acid, calcium, phosphorus, and magnesium closely. Ordered repeat labs this afternoon as well as early AM. Frequency of labs over the weekend to be determined by on-call provider. 7. History of VSD. ECHO shows EF 55 to 60%. Cardiology following, appreciate input. We will continue to follow along while patient is admitted. Please call with any questions.      Elis Thurman MD

## 2018-03-24 NOTE — PROGRESS NOTES
Bedside shift change report given to Jase RN (oncoming nurse) by Lucio Mosqueda RN (offgoing nurse). Report included the following information SBAR, Kardex, ED Summary, [facility] and Recent Results.

## 2018-03-24 NOTE — PROGRESS NOTES
[facility]   [address] [facility], [address], [facility]  Phone: [phone]   [phone]       Nephrology Progress Note  Keri Babb     [birth year]     [id number]  Date of Admission : 3/20/2018  03/24/18    CC: Follow up for SINDHU       Assessment and Plan   SINDHU :  · Spontaneous TLS +/- contrast nephropathy. 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     Current Medications:   Current Facility-Administered Medications   Medication Dose Route Frequency    furosemide (LASIX) injection 40 mg  40 mg IntraVENous BID    metroNIDAZOLE (FLAGYL) tablet 500 mg  500 mg Oral Q12H    granisetron (KYTRIL) injection 1 mg  1 mg IntraVENous Q24H    predniSONE (DELTASONE) tablet 100 mg  100 mg Oral DAILY    allopurinol (ZYLOPRIM) tablet 100 mg  100 mg Oral Q8H    sodium chloride (NS) flush 20 mL  20 mL InterCATHeter PRN    sodium chloride (NS) flush 10 mL  10 mL InterCATHeter Q24H    sodium chloride (NS) flush 10 mL  10 mL InterCATHeter PRN    sodium chloride (NS) flush 10 mL  10 mL InterCATHeter Q8H    bacitracin 500 unit/gram packet 1 Packet  1 Packet Topical PRN    sodium chloride (NS) flush 20 mL  20 mL InterCATHeter PRN    sodium chloride (NS) flush 10 mL  10 mL InterCATHeter Q24H    sodium chloride (NS) flush 10 mL  10 mL InterCATHeter PRN    sodium chloride (NS) flush 10 mL  10 mL InterCATHeter Q8H    alteplase (CATHFLO) 1 mg in sterile water (preservative free) 1 mL injection  1 mg InterCATHeter PRN    influenza vaccine 2017-18 (3 yrs+)(PF) (FLUZONE QUAD/FLUARIX QUAD) injection 0.5 mL  0.5 mL IntraMUSCular PRIOR TO DISCHARGE    cefepime (MAXIPIME) 1 g in 0.9% sodium chloride (MBP/ADV) 50 mL ADV  1 g IntraVENous Q24H    acetaminophen (TYLENOL) tablet 650 mg  650 mg Oral Q6H PRN    atenolol (TENORMIN) tablet 25 mg  25 mg Oral DAILY    0.9% sodium chloride infusion  200 mL/hr IntraVENous CONTINUOUS    [START ON 3/26/2018] cyanocobalamin (VITAMIN B12) injection 1,000 mcg  1,000 mcg IntraMUSCular EVERY MONTH    gabapentin (NEURONTIN) capsule 300 mg  300 mg Oral DAILY    levothyroxine (SYNTHROID) tablet 150 mcg  150 mcg Oral ACB    famotidine (PEPCID) tablet 20 mg  20 mg Oral QHS    sodium chloride (NS) flush 5-10 mL  5-10 mL IntraVENous Q8H    sodium chloride (NS) flush 5-10 mL  5-10 mL IntraVENous PRN    ondansetron (ZOFRAN) injection 4 mg  4 mg IntraVENous Q4H PRN      Allergies   Allergen Reactions    Rituxan [Rituximab] Hives       Objective:  Vitals:    Vitals:    03/23/18 2058 03/24/18 0207 03/24/18 0611 03/24/18 0752   BP: 133/70 135/68  139/68   Pulse: 77 82  79   Resp: 16 16  17   Temp: 98.3 °F (36.8 °C) 97.4 °F (36.3 °C)  97.7 °F (36.5 °C)   SpO2: 98% 97%  96%   Weight:   75.9 kg (167 lb 4.8 oz)      Intake and Output:  03/24 0701 - 03/24 1900  In: 4179 [I.V.:3393]  Out: -   03/22 1901 - 03/24 0700  In: 4266 [P.O.:240; I.V.:4026]  Out: 650 [Urine:650]    Physical Examination:  General: A+O, not SOBHEENT:pale ans anicteric  Neck: (+) JVD   Lungs : CTA  CVS: RRR, murmur+  Abdomen: soft, BS+  Extremities: 2+ edema   Skin: No rash or lesions.   MS: No joint swelling, erythema, warmth  Neurologic: non focal, AAO x 3  Psych: normal affect     []    High complexity decision making was performed  []    Patient is at high-risk of decompensation with multiple organ involvement    Lab Data Personally Reviewed: I have reviewed all the pertinent labs, microbiology data and radiology studies during assessment.     Recent Labs      03/24/18   0541  03/23/18   1607  03/23/18   0244  03/22/18   0945   NA  141  139  139  138   K  3.9  4.2  4.0  3.3*   CL  110*  107  107  104   CO2  22  22  25  24   GLU  131*  132*  93  141*   BUN  22*  18  19  21*   CREA  1.12*  1.53*  1.64*  1.82*   CA  8.2*  8.4*  8.7  8.9   MG  1.7  1.7  1.6  1.7   PHOS  3.3  2.7  2.5*  2.8   ALB  2.5*   --   2.8*  3.1*   SGOT  25   --   20  17   ALT  8*   --   8*  7*   INR   --    --    --   1.1     Recent Labs      03/24/18   0541  03/23/18   0244  03/22/18   0945   WBC  6.5  7.4  7.8   HGB  8.2*  8.9*  9.5*   HCT  25.4*  27.5*  29.4*   PLT  245  268  290     No results found for: SDES  Lab Results   Component Value Date/Time    Culture result: NO GROWTH 3 DAYS 03/21/2018 12:20 PM    Culture result: MIXED UROGENITAL THAD ISOLATED 03/20/2018 10:21 PM    Culture result: NO GROWTH 2 DAYS 02/16/2018 11:22 PM     Recent Results (from the past 24 hour(s))   PHOSPHORUS    Collection Time: 03/23/18  4:07 PM   Result Value Ref Range    Phosphorus 2.7 2.6 - 4.7 MG/DL   METABOLIC PANEL, BASIC    Collection Time: 03/23/18  4:07 PM   Result Value Ref Range    Sodium 139 136 - 145 mmol/L    Potassium 4.2 3.5 - 5.1 mmol/L    Chloride 107 97 - 108 mmol/L    CO2 22 21 - 32 mmol/L    Anion gap 10 5 - 15 mmol/L    Glucose 132 (H) 65 - 100 mg/dL    BUN 18 6 - 20 MG/DL    Creatinine 1.53 (H) 0.55 - 1.02 MG/DL    BUN/Creatinine ratio 12 12 - 20      GFR est AA 41 (L) >60 ml/min/1.73m2    GFR est non-AA 34 (L) >60 ml/min/1.73m2    Calcium 8.4 (L) 8.5 - 10.1 MG/DL   LD    Collection Time: 03/23/18  4:07 PM   Result Value Ref Range     (H) 81 - 246 U/L   MAGNESIUM    Collection Time: 03/23/18  4:07 PM   Result Value Ref Range    Magnesium 1.7 1.6 - 2.4 mg/dL   URIC ACID    Collection Time: 03/23/18  4:07 PM   Result Value Ref Range    Uric acid 1.2 (L) 2.6 - 6.0 MG/DL   CBC WITH AUTOMATED DIFF    Collection Time: 03/24/18  5:41 AM   Result Value Ref Range    WBC 6.5 3.6 - 11.0 K/uL    RBC 2.69 (L) 3.80 - 5.20 M/uL    HGB 8.2 (L) 11.5 - 16.0 g/dL    HCT 25.4 (L) 35.0 - 47.0 %    MCV 94.4 80.0 - 99.0 FL    MCH 30.5 26.0 - 34.0 PG    MCHC 32.3 30.0 - 36.5 g/dL    RDW 12.3 11.5 - 14.5 %    PLATELET 730 207 - 826 K/uL    MPV 9.1 8.9 - 12.9 FL    NRBC 0.0 0  WBC    ABSOLUTE NRBC 0.00 0.00 - 0.01 K/uL    NEUTROPHILS 80 (H) 32 - 75 %    LYMPHOCYTES 13 12 - 49 %    MONOCYTES 6 5 - 13 %    EOSINOPHILS 0 0 - 7 %    BASOPHILS 0 0 - 1 %    IMMATURE GRANULOCYTES 1 (H) 0.0 - 0.5 %    ABS. NEUTROPHILS 5.2 1.8 - 8.0 K/UL    ABS. LYMPHOCYTES 0.9 0.8 - 3.5 K/UL    ABS. MONOCYTES 0.4 0.0 - 1.0 K/UL    ABS. EOSINOPHILS 0.0 0.0 - 0.4 K/UL    ABS. BASOPHILS 0.0 0.0 - 0.1 K/UL    ABS. IMM. GRANS. 0.1 (H) 0.00 - 0.04 K/UL    DF AUTOMATED     METABOLIC PANEL, COMPREHENSIVE    Collection Time: 03/24/18  5:41 AM   Result Value Ref Range    Sodium 141 136 - 145 mmol/L    Potassium 3.9 3.5 - 5.1 mmol/L    Chloride 110 (H) 97 - 108 mmol/L    CO2 22 21 - 32 mmol/L    Anion gap 9 5 - 15 mmol/L    Glucose 131 (H) 65 - 100 mg/dL    BUN 22 (H) 6 - 20 MG/DL    Creatinine 1.12 (H) 0.55 - 1.02 MG/DL    BUN/Creatinine ratio 20 12 - 20      GFR est AA 59 (L) >60 ml/min/1.73m2    GFR est non-AA 48 (L) >60 ml/min/1.73m2    Calcium 8.2 (L) 8.5 - 10.1 MG/DL    Bilirubin, total 0.2 0.2 - 1.0 MG/DL    ALT (SGPT) 8 (L) 12 - 78 U/L    AST (SGOT) 25 15 - 37 U/L    Alk.  phosphatase 61 45 - 117 U/L    Protein, total 5.4 (L) 6.4 - 8.2 g/dL    Albumin 2.5 (L) 3.5 - 5.0 g/dL    Globulin 2.9 2.0 - 4.0 g/dL    A-G Ratio 0.9 (L) 1.1 - 2.2     URIC ACID    Collection Time: 03/24/18  5:41 AM   Result Value Ref Range    Uric acid 1.3 (L) 2.6 - 6.0 MG/DL   PHOSPHORUS    Collection Time: 03/24/18  5:41 AM   Result Value Ref Range    Phosphorus 3.3 2.6 - 4.7 MG/DL   MAGNESIUM    Collection Time: 03/24/18  5:41 AM Result Value Ref Range    Magnesium 1.7 1.6 - 2.4 mg/dL   LD    Collection Time: 03/24/18  5:41 AM   Result Value Ref Range     (H) 81 - 246 U/L           Total time spent with patient:  xxx   min. Care Plan discussed with:  Patient     Family      RN      Consulting Physician [facility],         I have reviewed the flowsheets. Chart and Pertinent Notes have been reviewed. No change in PMH ,family and social history from Consult note.       Jessica Coronel MD

## 2018-03-25 NOTE — PROGRESS NOTES
[facility]   [address] [facility], [facility] [address], [facility] [place]  Phone: [phone]   [phone]       Nephrology Progress Note  Jennyfer Padilla     [id number]     [id number]  Date of Admission : 3/20/2018  03/25/18    CC: Follow up for SINDHU       Assessment and Plan   SINDHU :  · Spontaneous TLS +/- contrast nephropathy. 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     Current Medications:   Current Facility-Administered Medications   Medication Dose Route Frequency    potassium chloride SR (KLOR-CON 10) tablet 40 mEq  40 mEq Oral DAILY    furosemide (LASIX) injection 40 mg  40 mg IntraVENous BID    granisetron (KYTRIL) injection 1 mg  1 mg IntraVENous Q24H    predniSONE (DELTASONE) tablet 100 mg  100 mg Oral DAILY    allopurinol (ZYLOPRIM) tablet 100 mg  100 mg Oral Q8H    sodium chloride (NS) flush 20 mL  20 mL InterCATHeter PRN    sodium chloride (NS) flush 10 mL  10 mL InterCATHeter Q24H    sodium chloride (NS) flush 10 mL  10 mL InterCATHeter PRN    sodium chloride (NS) flush 10 mL  10 mL InterCATHeter Q8H    bacitracin 500 unit/gram packet 1 Packet  1 Packet Topical PRN    sodium chloride (NS) flush 20 mL  20 mL InterCATHeter PRN    sodium chloride (NS) flush 10 mL  10 mL InterCATHeter Q24H    sodium chloride (NS) flush 10 mL  10 mL InterCATHeter PRN    sodium chloride (NS) flush 10 mL  10 mL InterCATHeter Q8H    alteplase (CATHFLO) 1 mg in sterile water (preservative free) 1 mL injection  1 mg InterCATHeter PRN    influenza vaccine 2017-18 (3 yrs+)(PF) (FLUZONE QUAD/FLUARIX QUAD) injection 0.5 mL  0.5 mL IntraMUSCular PRIOR TO DISCHARGE    acetaminophen (TYLENOL) tablet 650 mg  650 mg Oral Q6H PRN    atenolol (TENORMIN) tablet 25 mg  25 mg Oral DAILY    [START ON 3/26/2018] cyanocobalamin (VITAMIN B12) injection 1,000 mcg  1,000 mcg IntraMUSCular EVERY MONTH    gabapentin (NEURONTIN) capsule 300 mg  300 mg Oral DAILY    levothyroxine (SYNTHROID) tablet 150 mcg  150 mcg Oral ACB    famotidine (PEPCID) tablet 20 mg  20 mg Oral QHS    sodium chloride (NS) flush 5-10 mL  5-10 mL IntraVENous Q8H    sodium chloride (NS) flush 5-10 mL  5-10 mL IntraVENous PRN    ondansetron (ZOFRAN) injection 4 mg  4 mg IntraVENous Q4H PRN      Allergies   Allergen Reactions    Rituxan [Rituximab] Hives       Objective:  Vitals:    Vitals:    03/24/18 2037 03/25/18 0241 03/25/18 0733 03/25/18 0747   BP: 138/69 144/75  155/78   Pulse: 80 64  66   Resp: 18 16  18   Temp: 97.3 °F (36.3 °C) 97.6 °F (36.4 °C)  98.3 °F (36.8 °C)   SpO2: 97% 98%  97%   Weight:   76 kg (167 lb 8.8 oz)      Intake and Output:  03/25 0701 - 03/25 1900  In: -   Out: 850 [Urine:850]  03/23 1901 - 03/25 0700  In: 5249 [P.O.:480; I.V.:4769]  Out: 1700 [Urine:1700]    Physical Examination:  General: A+O, not SOB  HEENT: mosit membranes  Neck: (+) JVD   Lungs : CTA  CVS: RRR, murmur+  Abdomen: soft, BS+  Extremities: 1-2+ edema   Skin: No rash or lesions. MS: No joint swelling, erythema, warmth  Neurologic: non focal, AAO x 3  Psych: normal affect     []    High complexity decision making was performed  []    Patient is at high-risk of decompensation with multiple organ involvement    Lab Data Personally Reviewed: I have reviewed all the pertinent labs, microbiology data and radiology studies during assessment.     Recent Labs      03/25/18   0317  03/24/18   0547 03/23/18   1607  03/23/18   0244  03/22/18   0945   NA  142  141  139  139  138   K  3.3*  3.9  4.2  4.0  3.3*   CL  111*  110*  107  107  104   CO2  22  22  22  25  24   GLU  154*  131*  132*  93  141*   BUN  25*  22*  18  19  21*   CREA  1.07*  1.12*  1.53*  1.64*  1.82*   CA  8.8  8.2*  8.4*  8.7  8.9   MG   --   1.7  1.7  1.6  1.7   PHOS  3.6  3.3  2.7  2.5*  2.8   ALB  2.8*  2.5*   --   2.8*  3.1*   SGOT  19  25   --   20  17   ALT  8*  8*   --   8*  7*   INR   --    --    --    --   1.1     Recent Labs      03/24/18   0541  03/23/18   0244  03/22/18   0945   WBC  6.5  7.4  7.8   HGB  8.2*  8.9*  9.5*   HCT  25.4*  27.5*  29.4*   PLT  245  268  290     No results found for: SDES  Lab Results   Component Value Date/Time    Culture result: NO GROWTH 3 DAYS 03/21/2018 12:20 PM    Culture result: MIXED UROGENITAL THAD ISOLATED 03/20/2018 10:21 PM    Culture result: NO GROWTH 2 DAYS 02/16/2018 11:22 PM     Recent Results (from the past 24 hour(s))   LD    Collection Time: 03/25/18  3:17 AM   Result Value Ref Range     (H) 81 - 246 U/L   URIC ACID    Collection Time: 03/25/18  3:17 AM   Result Value Ref Range    Uric acid 1.9 (L) 2.6 - 6.0 MG/DL   METABOLIC PANEL, COMPREHENSIVE    Collection Time: 03/25/18  3:17 AM   Result Value Ref Range    Sodium 142 136 - 145 mmol/L    Potassium 3.3 (L) 3.5 - 5.1 mmol/L    Chloride 111 (H) 97 - 108 mmol/L    CO2 22 21 - 32 mmol/L    Anion gap 9 5 - 15 mmol/L    Glucose 154 (H) 65 - 100 mg/dL    BUN 25 (H) 6 - 20 MG/DL    Creatinine 1.07 (H) 0.55 - 1.02 MG/DL    BUN/Creatinine ratio 23 (H) 12 - 20      GFR est AA >60 >60 ml/min/1.73m2    GFR est non-AA 51 (L) >60 ml/min/1.73m2    Calcium 8.8 8.5 - 10.1 MG/DL    Bilirubin, total 0.3 0.2 - 1.0 MG/DL    ALT (SGPT) 8 (L) 12 - 78 U/L    AST (SGOT) 19 15 - 37 U/L    Alk.  phosphatase 61 45 - 117 U/L    Protein, total 5.7 (L) 6.4 - 8.2 g/dL    Albumin 2.8 (L) 3.5 - 5.0 g/dL    Globulin 2.9 2.0 - 4.0 g/dL    A-G Ratio 1.0 (L) 1.1 - 2. 2     URIC ACID    Collection Time: 03/25/18  3:17 AM   Result Value Ref Range    Uric acid 1.9 (L) 2.6 - 6.0 MG/DL   BILIRUBIN, DIRECT    Collection Time: 03/25/18  3:17 AM   Result Value Ref Range    Bilirubin, direct <0.1 0.0 - 0.2 MG/DL   PHOSPHORUS    Collection Time: 03/25/18  3:17 AM   Result Value Ref Range    Phosphorus 3.6 2.6 - 4.7 MG/DL           Total time spent with patient:  xxx   min. Care Plan discussed with:  Patient     Family      RN      Consulting Physician [address] [place] [id number]        I have reviewed the flowsheets. Chart and Pertinent Notes have been reviewed. No change in PMH ,family and social history from Consult note.       Maricruz Atkins MD

## 2018-03-25 NOTE — PROGRESS NOTES
Bedside and Verbal shift change report given to [facility] RN/[employer] student (oncoming nurse) by Amarjit Silva (offgoing nurse). Report included the following information SBAR, Kardex and MAR.

## 2018-03-25 NOTE — PROGRESS NOTES
CC NHL Dx  10/10  Transformation 3/18 with pleural effusion    TREATMENT COURSE:  Orbit radiation  Chlorambucil x 1 cycle 4/16. Cycle 2 3/17. Cycle 3 4/17   Cycle 4 1/18. Attempted one dose of rituxan. HISTORY OF PRESENT ILLNESS:  Ms. Franco Jackson is a 76 y/o female with high grade lymphoma, admitted 3/20/18 after presenting to the ED with abdominal swelling and low grade fever. CT showed progression of her disease with new peritoneal carcinomatosis, bulky abdominal and pelvic soft tissue masses and retroperitoneal lymphadenopahy, moderate amount of free intraperitoneal fluid, and new mild left hydronephrosis. Patient underwent thoracentesis on 3/1/18 and pathology showed high grade lymphoma/malignant B cell neoplasm, concerning for transformation from her known low grade follicular lymphoma. She also had a biopsy of the vaginal wall with gyn onc (Dr. Segun Trejo) on 3/16/18, showing high grade follicular lymphoma. Patient has been resistant to chemotherapy in the past. Starting chemotherapy with CHOP (minus rituxumab as patient declines due to previous reaction) has been discussed but patient has been hesitant to start. She underwent US guided paracentesis but fluid on US was low volume. She remains distended and uncomfortable. She reports her appetite has been fair. She was running low grade fevers at home and experiencing night sweats. Her lower legs have itched and she noticed a rash. ECHO performed 3/22/18 shows EF of 55 to 60%. Interval history: Since last night, IVF were discontinued, and she was started on Lasix diuresis. She states her abdomen feels less tight today. Her Cr is further improved to 1.07. Uric acid is up a little bit today to 1.9. She reports she is feeling well. She had some loose stools as well, but no n/v. She has been walking the halls and doing well with this. She denies other pain at this time. She started CHOP chemotherapy regimen on 3/23/18 and tolerated well thus far.      Past Medical History:   Diagnosis Date    Anemia NEC     Arrhythmia     VSD; TRICUSPID REGURG    Cancer ([facility] [address] 2010    low grade NHL    GERD (gastroesophageal reflux disease)     Hypertension     NHL (non-Hodgkin's lymphoma) ([facility] [address] 2010    received chemo and radiation. not in remission.     Pleural effusion 02/21/2018    LEFT    Pulmonary hypertension 02/21/2018    PER CARDIOLOGY NOTES FROM PTS VISIT ON 2/21/18    Thyroid disease      Past Surgical History:   Procedure Laterality Date    HX CATARACT REMOVAL  6/2015 & 7/2015    MERRY    HX CHOLECYSTECTOMY  1982    HX DILATION AND CURETTAGE      HX HEENT  2005    RIGHT EAR     HX OTHER SURGICAL      LEFT PAROTIDECTOMY    DC RPLCMT PROST AORTIC VALVE OPEN XCP HOMOGRF/STENT  1972    STENT PLACED TO STRAIGHTEN THE AORTA     Social History     Social History    Marital status:      Spouse name: N/A    Number of children: N/A    Years of education: N/A     Social History Main Topics    Smoking status: Current Every Day Smoker     Packs/day: 0.50     Years: 45.00     Types: Cigarettes    Smokeless tobacco: Former User     Quit date: 12/1/2015    Alcohol use Yes      Comment: Rare    Drug use: No    Sexual activity: No      Comment:  has one child     Other Topics Concern    None     Social History Narrative     Family History   Problem Relation Age of Onset    Heart Disease Mother     Kidney Disease Mother     Alcohol abuse Father     Liver Disease Father     Cancer Sister      Breast    No Known Problems Brother     Cancer Sister      Colon    No Known Problems Sister     No Known Problems Sister     No Known Problems Daughter     Anesth Problems Neg Hx        Current Facility-Administered Medications   Medication Dose Route Frequency Provider Last Rate Last Dose    potassium chloride SR (KLOR-CON 10) tablet 40 mEq  40 mEq Oral DAILY Stone Tipton MD   40 mEq at 03/25/18 0904    furosemide (LASIX) injection 40 mg  40 mg IntraVENous BID [address] [facility] MD Phi   40 mg at 03/25/18 0904    predniSONE (DELTASONE) tablet 100 mg  100 mg Oral DAILY Han Vera DO   100 mg at 03/24/18 1236    allopurinol (ZYLOPRIM) tablet 100 mg  100 mg Oral Q8H Hutto Modest, NP   100 mg at 03/25/18 0704    sodium chloride (NS) flush 20 mL  20 mL InterCATHeter PRN Hutto Modest, NP   20 mL at 03/25/18 0319    sodium chloride (NS) flush 10 mL  10 mL InterCATHeter Q24H Hutto Modest, NP   10 mL at 03/25/18 1118    sodium chloride (NS) flush 10 mL  10 mL InterCATHeter PRN Hutto Modest, NP   10 mL at 03/23/18 0236    sodium chloride (NS) flush 10 mL  10 mL InterCATHeter Q8H Hutto Modest, NP   10 mL at 03/25/18 0656    bacitracin 500 unit/gram packet 1 Packet  1 Packet Topical PRN Hutto Modest, NP        sodium chloride (NS) flush 20 mL  20 mL InterCATHeter PRN Hutto Modest, NP   20 mL at 03/25/18 0319    sodium chloride (NS) flush 10 mL  10 mL InterCATHeter Q24H Hutto Modest, NP   10 mL at 03/25/18 1118    sodium chloride (NS) flush 10 mL  10 mL InterCATHeter PRN Hutto Modest, NP   10 mL at 03/23/18 0236    sodium chloride (NS) flush 10 mL  10 mL InterCATHeter Q8H Hutto Modest, NP   10 mL at 03/25/18 0655    alteplase (CATHFLO) 1 mg in sterile water (preservative free) 1 mL injection  1 mg InterCATHeter PRN Hutto Modest, NP        influenza vaccine 2017-18 (3 yrs+)(PF) (FLUZONE QUAD/FLUARIX QUAD) injection 0.5 mL  0.5 mL IntraMUSCular PRIOR TO DISCHARGE Carley Maravilla MD        acetaminophen (TYLENOL) tablet 650 mg  650 mg Oral Q6H PRN Eric Obrien MD   650 mg at 03/25/18 0736    atenolol (TENORMIN) tablet 25 mg  25 mg Oral DAILY Carley Maravilla MD   25 mg at 03/25/18 0905    [START ON 3/26/2018] cyanocobalamin (VITAMIN B12) injection 1,000 mcg  1,000 mcg IntraMUSCular EVERY MONTH Morelia Jaime MD        gabapentin (NEURONTIN) capsule 300 mg  300 mg Oral DAILY Morelia Jaime MD   300 mg at 03/25/18 9677  levothyroxine (SYNTHROID) tablet 150 mcg  150 mcg Oral ACB Chiqui Rios MD   150 mcg at 03/25/18 0704    famotidine (PEPCID) tablet 20 mg  20 mg Oral QHS Chiqui Rios MD   20 mg at 03/24/18 2156    sodium chloride (NS) flush 5-10 mL  5-10 mL IntraVENous Q8H Chiqui Rios MD   10 mL at 03/25/18 0656    sodium chloride (NS) flush 5-10 mL  5-10 mL IntraVENous PRN Chiqui Rios MD        ondansetron (ZOFRAN) injection 4 mg  4 mg IntraVENous Q4H PRN Chiqui Rios MD           Allergies   Allergen Reactions    Rituxan [Rituximab] Hives       Review of Systems    A comprehensive review of systems was negative except for: per HPI and sheet    Objective:  Visit Vitals    /78 (BP 1 Location: Left arm, BP Patient Position: At rest)    Pulse 66    Temp 98.3 °F (36.8 °C)    Resp 18    Wt 167 lb 8.8 oz (76 kg)    LMP  (LMP Unknown)    SpO2 97%    BMI 30.65 kg/m2     Physical Exam:   General appearance - Well developed, conversant  Mental status - alert, oriented   Head - superficial masses right temporal area / scalp on right   EYE-conj clear no swelling in eyes. Right eyelid droop noted. Neck - supple  CV - regular rate, murmur noted  Resp - Diminished at the bases  GI  -Round, less distended compared to prior, non-tender. Ext-Mild BLE edema noted bilaterally  Skin-Warm and dry. Neuro -  Awake, alert, oriented. Exam is non-focal.    Diagnostic Imaging   reviewed    2/21/18 PET:  IMPRESSION:   1. Increasing right parietal soft tissue activity. 2. Progression in the bilateral areas of hypermetabolic pleural thickening. 3. New bilateral hypermetabolic axillary lymph nodes and subcarinal lymph node  and progression of retroperitoneal and left pelvic lymphadenopathy. 4. Improvement in the left inguinal lymphadenopathy  5. Improvement in the right parieto-occipital scalp activity. 6. New hypermetabolic soft tissue abnormality left iliac fossa.   7. Stable left cervical hypermetabolic lymph nodes.    2/17/18 PET:  MPRESSION:   1. Findings suspicious for a uterine mass with a soft tissue mass in the left  pelvis and extending into the retroperitoneum concerning for malignancy. Lymphoma is a differential consideration given the patient's history. The mass  encases but does not narrow the aortic bifurcation and IVC. 2. Enlarged left inguinal lymph node is decreased in size since 8/30/2017.  3. Increased large left pleural effusion with left lower lung atelectasis. Lab Results    reviewed  Lab Results   Component Value Date/Time    WBC 6.5 03/24/2018 05:41 AM    HGB 8.2 (L) 03/24/2018 05:41 AM    HCT 25.4 (L) 03/24/2018 05:41 AM    PLATELET 509 82/94/5259 05:41 AM    MCV 94.4 03/24/2018 05:41 AM       Lab Results   Component Value Date/Time    Sodium 142 03/25/2018 03:17 AM    Potassium 3.3 (L) 03/25/2018 03:17 AM    Chloride 111 (H) 03/25/2018 03:17 AM    CO2 22 03/25/2018 03:17 AM    Anion gap 9 03/25/2018 03:17 AM    Glucose 154 (H) 03/25/2018 03:17 AM    BUN 25 (H) 03/25/2018 03:17 AM    Creatinine 1.07 (H) 03/25/2018 03:17 AM    BUN/Creatinine ratio 23 (H) 03/25/2018 03:17 AM    GFR est AA >60 03/25/2018 03:17 AM    GFR est non-AA 51 (L) 03/25/2018 03:17 AM    Calcium 8.8 03/25/2018 03:17 AM    AST (SGOT) 19 03/25/2018 03:17 AM    Alk. phosphatase 61 03/25/2018 03:17 AM    Protein, total 5.7 (L) 03/25/2018 03:17 AM    Albumin 2.8 (L) 03/25/2018 03:17 AM    Globulin 2.9 03/25/2018 03:17 AM    A-G Ratio 1.0 (L) 03/25/2018 03:17 AM    ALT (SGPT) 8 (L) 03/25/2018 03:17 AM     Assessment/Plan:    1. NHL low grade follicular stage 3 dx in 2010   Hx of one dose of rituxan in past with side effects and pt did not want any further therapy. PET 5/16 good post chlorambucil 4/16. PET 10/2016 was stable. Most recent PET scan 2/22/17 showed progression of disease both above and below the level of the diaphragm. Pt had repeat chlorambucil 5/17 due to progressive disease on CTs.    Back of head mass/ left groin mass was almost gone. PET 5/17 and good response to tx. Over summer, pt experienced more scalp lesions with high SUV activity. PET 8/17 showed progressive disease throughout. Pt wanted to try treatment / acyclovir first to see if this treatment affects scalp/head LNs. Acyclovir did not help. Last chlorambucil chemo was 1/18. PET 2/18 now shows diffuse progressive disease/ some mixed. Pt is post thoracentesis on 3/1/18 and cytology now shows likely transformation to high grade / DLBCL. She experienced vaginal bleeding/uterine mass and biopsy of the vaginal wall showed high grade follicular lymphoma. CT's 3/20/18 show rapid progression of disease, now with carcinomatosis, abdominal/pelvic masses, and lymphadenopathy. She agreed to start chemotherapy with CHOP, which was started 3/23/18. S/p Rasburicase x 1 on 3/23/18.    -- Check daily uric acid, magnesium, phosphorus, BMP, and LD.   -- Can switch Allopurinol to 300mg PO daily upon discharge. -- Continue Prednisone 100mg PO daily. She is currently on day 3 out of 5. (She will need a script for this). -- Patient can likely be discharged home tomorrow. Patient will need antiemetics at home. -- Neulasta is scheduled to be given outpatient 3/26/18 AM. If she is not discharged prior to this appointment, will plan to give Neupogen. 2. Use of cardiotoxic medications: ECHO completed 3/22/18 shows EF 55 to 60% pre-chemotherapy. 3.  Abdominal distention. Secondary to malignancy. US guided paracentesis yielded low volume of fluid. Symptoms secondary to malignancy and that chemotherapy may help with symptoms. 4. Malignant left pleural effusion: s/p thoracentesis with + cytology. Monitor for re accumulation. Breathing is stable at this time. 5. Renal insufficiency. Creatinine continues to improve and is 1.12 today. Followed by nephrology and felt to be secondary to spontaneous TLS +/- contrast nephropathy.  She continues on NS at 200 mL/hr.    6. Tumor lysis prophylaxis. Concern for spontaneous TLS as well as tumor lysis induced by chemotherapy. IV fluids continued as noted above. Discussed case with pharmacy as well. Due to renal insufficiency, she received a dose of rasburicase 3/23/18 (flat dose of 3 mg) and now continues on allopurinol (100 mg every 8 hours). Will need to monitor uric acid, calcium, phosphorus, and magnesium closely. Ordered repeat labs this afternoon as well as early AM. Frequency of labs over the weekend to be determined by on-call provider. 7. History of VSD. ECHO shows EF 55 to 60%. Cardiology following, appreciate input. We will continue to follow along while patient is admitted. Please call with any questions.      Thejeronimo Gaytan MD

## 2018-03-25 NOTE — PROGRESS NOTES
Hospitalist Progress Note  Asim Ross MD  Office: [phone]      Date of Service:  3/25/2018  NAME:  Alex Nguyen  :    MRN:  [id number]      Admission Summary: This is a 75 y.o female with significant medical history of NH lymphoma, anemia, hypertension, tachyarrhythmia, history of aortic valve repair (at age of 24), VSD, mild TR,  thyroid disease, and history of pulmonary hypertension presented to the hospital with a chief complaint of shortness of breath and abdominal pain which she associated with after undergoing a uterine biopsy one week prior to this presentation. Associated with above patient noticed abdominal girth increment with low grade fever. Cardiology and Oncology consulted. Interval history / Subjective:   Ms Americo Lopez is feeling much less distended today after we stopped the iv fluids and started diuresis. Assessment & Plan:     Abdominal distension likely secondary to malignant ascites in a patient with history of NHL (not on chemotherapy)   S/P diagnostic paracentesis,3/21,fluid said to be deep and small to place catheter  CT abdomen: with finding consistent with rapid progression of disease with carcinomatosis, abdominal pelvic mass and lymphadenopathy   Oncology consultation and follow up appreciated: Recommendation noted with plan to start on CHOP and no Ritux (secondary to hypersensitivity reaction)   On preparation to start chemotherapy Patient agreeable with starting treatment   Nephrology consultation and f/u reviewed with plan to start on Rasburicase   -3/24: abdominal distension worse,she has been getting a lot of fluids.  -Repeat US,no significant fluid. Discussed with renal, we stopped the iv fluids and started lasix. Swelling and edema improved much.     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 Base line creatinine (2/16/18) around 1.08,creatinine peaked at 1.87,creatinine now close to baseline. Off iv fluids,being diuresed. Creatinien improved. Hypokalemia   Replaced and corrected   No need to replace     Abnormal urine. Urine cx grew mixed ugf  -Ascitic fluid culture ,negative. fever and leukocytosis resolved,suspect this could have been from TLS. -3/24: stop antibiotics and watch. History of VSD, mild TR, and aortic valve replacement   Cardiology on board: recommendation noted   Echocardiogram (03/22) with LVEF of 55 %, no WMA, No VSD noted on current echo with moderate TR   Continue with home medications     History of tachyarrhythmia   Continue with Atenolol     Hypertension   Continue with Atenolol    Hypothyroidism   Continue with home medications     Anemia of chronic illness   Normocytic anemia   Will trend H and H   Oncology on board     Code status: Full code   DVT prophylaxis: SCD    Care Plan discussed with: Patient/Family and Nurse  Disposition: TBD     Hospital Problems  Date Reviewed: 3/20/2018          Codes Class Noted POA    * (Principal)Ascites ICD-10-CM: R18.8  ICD-9-CM: 789.59  3/20/2018 Unknown        Ascites, malignant ICD-10-CM: R18.0  ICD-9-CM: 789.51  3/20/2018 Unknown                Review of Systems:   A comprehensive review of systems was negative except for that written in the HPI. Vital Signs:    Last 24hrs VS reviewed since prior progress note.  Most recent are:  Visit Vitals    /78 (BP 1 Location: Left arm, BP Patient Position: At rest)    Pulse 66    Temp 98.3 °F (36.8 °C)    Resp 18    Wt 76 kg (167 lb 8.8 oz)    SpO2 97%    BMI 30.65 kg/m2         Intake/Output Summary (Last 24 hours) at 03/25/18 0908  Last data filed at 03/25/18 0737   Gross per 24 hour   Intake             2085 ml   Output 2350 ml   Net             -265 ml        Physical Examination:             Constitutional:  No acute distress, cooperative, pleasant    ENT:  Oral mucous moist, oropharynx benign. Neck supple,    Resp:  Decreased air entry left lower lung field,posteriorly    CV:  Regular rhythm, normal rate, no murmurs, gallops, rubs    GI:  Less distended,mid line surgical incision, distended abdomen. Positive for FT and SD. Musculoskeletal:  No edema, warm, 2+ pulses throughout    Neurologic:  Moves all extremities. AAOx3, CN II-XII reviewed     Psych:  Good insight, Not anxious nor agitated. Data Review:    Review and/or order of clinical lab test  Review and/or order of tests in the radiology section of CPT  Review and/or order of tests in the medicine section of CPT      Labs:     Recent Labs      03/24/18   0541  03/23/18   0244   WBC  6.5  7.4   HGB  8.2*  8.9*   HCT  25.4*  27.5*   PLT  245  268     Recent Labs      03/25/18   0317  03/24/18   0541  03/23/18   1607  03/23/18   0244   NA  142  141  139  139   K  3.3*  3.9  4.2  4.0   CL  111*  110*  107  107   CO2  22  22  22  25   BUN  25*  22*  18  19   CREA  1.07*  1.12*  1.53*  1.64*   GLU  154*  131*  132*  93   CA  8.8  8.2*  8.4*  8.7   MG   --   1.7  1.7  1.6   PHOS  3.6  3.3  2.7  2.5*   URICA  1.9*  1.9*  1.3*  1.2*  2.2*     Recent Labs      03/25/18 0317 03/24/18   0541  03/23/18   0244   SGOT  19  25  20   ALT  8*  8*  8*   AP  61  61  67   TBILI  0.3  0.2  0.3   TP  5.7*  5.4*  6.1*   ALB  2.8*  2.5*  2.8*   GLOB  2.9  2.9  3.3     Recent Labs      03/22/18   0945   INR  1.1   PTP  11.3*      No results for input(s): FE, TIBC, PSAT, FERR in the last 72 hours. No results found for: FOL, RBCF   No results for input(s): PH, PCO2, PO2 in the last 72 hours. No results for input(s): CPK, CKNDX, TROIQ in the last 72 hours.     No lab exists for component: CPKMB  No results found for: CHOL, CHOLX, CHLST, CHOLV, HDL, LDL, LDLC, DLDLP, TGLX, TRIGL, TRIGP, CHHD, CHHDX  No results found for: [facility]  Lab Results   Component Value Date/Time    Color YELLOW/STRAW 03/20/2018 12:36 PM    Appearance CLEAR 03/20/2018 12:36 PM    Specific gravity 1.025 03/20/2018 12:36 PM    Specific gravity 1.025 02/16/2018 11:22 PM    pH (UA) 6.0 03/20/2018 12:36 PM    Protein 30 (A) 03/20/2018 12:36 PM    Glucose NEGATIVE  03/20/2018 12:36 PM    Ketone TRACE (A) 03/20/2018 12:36 PM    Bilirubin NEGATIVE  02/16/2018 11:22 PM    Urobilinogen 0.2 03/20/2018 12:36 PM    Nitrites NEGATIVE  03/20/2018 12:36 PM    Leukocyte Esterase NEGATIVE  03/20/2018 12:36 PM    Epithelial cells FEW 03/20/2018 12:36 PM    Bacteria 1+ (A) 03/20/2018 12:36 PM    WBC 5-10 03/20/2018 12:36 PM    RBC 10-20 03/20/2018 12:36 PM         Medications Reviewed:     Current Facility-Administered Medications   Medication Dose Route Frequency    potassium chloride SR (KLOR-CON 10) tablet 40 mEq  40 mEq Oral DAILY    furosemide (LASIX) injection 40 mg  40 mg IntraVENous BID    granisetron (KYTRIL) injection 1 mg  1 mg IntraVENous Q24H    predniSONE (DELTASONE) tablet 100 mg  100 mg Oral DAILY    allopurinol (ZYLOPRIM) tablet 100 mg  100 mg Oral Q8H    sodium chloride (NS) flush 20 mL  20 mL InterCATHeter PRN    sodium chloride (NS) flush 10 mL  10 mL InterCATHeter Q24H    sodium chloride (NS) flush 10 mL  10 mL InterCATHeter PRN    sodium chloride (NS) flush 10 mL  10 mL InterCATHeter Q8H    bacitracin 500 unit/gram packet 1 Packet  1 Packet Topical PRN    sodium chloride (NS) flush 20 mL  20 mL InterCATHeter PRN    sodium chloride (NS) flush 10 mL  10 mL InterCATHeter Q24H    sodium chloride (NS) flush 10 mL  10 mL InterCATHeter PRN    sodium chloride (NS) flush 10 mL  10 mL InterCATHeter Q8H    alteplase (CATHFLO) 1 mg in sterile water (preservative free) 1 mL injection  1 mg InterCATHeter PRN    influenza vaccine 2017-18 (3 yrs+)(PF) (FLUZONE QUAD/FLUARIX QUAD) injection 0.5 mL  0.5 mL IntraMUSCular PRIOR TO DISCHARGE    acetaminophen (TYLENOL) tablet 650 mg  650 mg Oral Q6H PRN    atenolol (TENORMIN) tablet 25 mg  25 mg Oral DAILY    [START ON 3/26/2018] cyanocobalamin (VITAMIN B12) injection 1,000 mcg  1,000 mcg IntraMUSCular EVERY MONTH    gabapentin (NEURONTIN) capsule 300 mg  300 mg Oral DAILY    levothyroxine (SYNTHROID) tablet 150 mcg  150 mcg Oral ACB    famotidine (PEPCID) tablet 20 mg  20 mg Oral QHS    sodium chloride (NS) flush 5-10 mL  5-10 mL IntraVENous Q8H    sodium chloride (NS) flush 5-10 mL  5-10 mL IntraVENous PRN    ondansetron (ZOFRAN) injection 4 mg  4 mg IntraVENous Q4H PRN     ______________________________________________________________________  EXPECTED LENGTH OF STAY: 2d 16h  ACTUAL LENGTH OF STAY:          5                 Sherryle Negri, MD

## 2018-03-25 NOTE — PROGRESS NOTES
Bedside and Verbal shift change report given to Johnson Dhillon RN/Crystal student  (oncoming nurse) by Siomara Lynne (offgoing nurse). Report included the following information SBAR, Kardex and MAR.

## 2018-03-26 NOTE — PROGRESS NOTES
11:57 AM  Patient reviewed in IDR rounds. CM informed that discharge is anticipated in the next 1-2 days. Per hospitalist notes patient is currently being aggressively diuresed and there is a possibility she may need therapeutic thoracentesis. CM will continue to follow and assist with disposition needs as they arise. Currently their are no CM consults or needs.      NORMAN Herrera/KARI

## 2018-03-26 NOTE — PROGRESS NOTES
Bedside shift change report given to [address] [place] [address] (oncoming nurse) by Rebecca Shaw RN (offgoing nurse). Report included the following information SBAR, Kardex, Intake/Output, MAR and Recent Results.

## 2018-03-26 NOTE — PROGRESS NOTES
CC NHL Dx  10/10  Transformation 3/18 with pleural effusion    TREATMENT COURSE:  Orbit radiation  Chlorambucil x 1 cycle 4/16. Cycle 2 3/17. Cycle 3 4/17   Cycle 4 1/18. Attempted one dose of rituxan. HISTORY OF PRESENT ILLNESS:  Ms. Jamel Seay is a 76 y/o female with high grade lymphoma, admitted 3/20/18 after presenting to the ED with abdominal swelling and low grade fever. CT showed progression of her disease with new peritoneal carcinomatosis, bulky abdominal and pelvic soft tissue masses and retroperitoneal lymphadenopahy, moderate amount of free intraperitoneal fluid, and new mild left hydronephrosis. Patient underwent thoracentesis on 3/1/18 and pathology showed high grade lymphoma/malignant B cell neoplasm, concerning for transformation from her known low grade follicular lymphoma. She also had a biopsy of the vaginal wall with gyn onc (Dr. Geri Powell) on 3/16/18, showing high grade follicular lymphoma. Patient has been resistant to chemotherapy in the past. Starting chemotherapy with CHOP (minus rituxumab as patient declines due to previous reaction) has been discussed but patient has been hesitant to start. She underwent US guided paracentesis but fluid on US was low volume. She remains distended and uncomfortable. She reports her appetite has been fair. She was running low grade fevers at home and experiencing night sweats. Her lower legs have itched and she noticed a rash. ECHO performed 3/22/18 shows EF of 55 to 60%. Interval history: Patient remains short of breath today. IV fluids have been stopped and she is on Lasix. She denies nausea or vomiting. She has had diarrhea for the past several days. She feels her abdomen is softer.     Past Medical History:   Diagnosis Date    Anemia NEC     Arrhythmia     VSD; TRICUSPID REGURG    Cancer ([address] 2010    low grade NHL    GERD (gastroesophageal reflux disease)     Hypertension     NHL (non-Hodgkin's lymphoma) ([address] 2010    received chemo and radiation. not in remission.     Pleural effusion 02/21/2018    LEFT    Pulmonary hypertension 02/21/2018    PER CARDIOLOGY NOTES FROM PTS VISIT ON 2/21/18    Thyroid disease      Past Surgical History:   Procedure Laterality Date    HX CATARACT REMOVAL  6/2015 & 7/2015    MERRY    HX CHOLECYSTECTOMY  1982    HX DILATION AND CURETTAGE      HX HEENT  2005    RIGHT EAR     HX OTHER SURGICAL      LEFT PAROTIDECTOMY    SC RPLCMT PROST AORTIC VALVE OPEN XCP HOMOGRF/STENT  1972    STENT PLACED TO STRAIGHTEN THE AORTA     Social History     Social History    Marital status:      Spouse name: N/A    Number of children: N/A    Years of education: N/A     Social History Main Topics    Smoking status: Current Every Day Smoker     Packs/day: 0.50     Years: 45.00     Types: Cigarettes    Smokeless tobacco: Former User     Quit date: 12/1/2015    Alcohol use Yes      Comment: Rare    Drug use: No    Sexual activity: No      Comment:  has one child     Other Topics Concern    None     Social History Narrative     Family History   Problem Relation Age of Onset    Heart Disease Mother     Kidney Disease Mother     Alcohol abuse Father     Liver Disease Father     Cancer Sister      Breast    No Known Problems Brother     Cancer Sister      Colon    No Known Problems Sister     No Known Problems Sister     No Known Problems Daughter     Anesth Problems Neg Hx        Current Facility-Administered Medications   Medication Dose Route Frequency Provider Last Rate Last Dose    allopurinol (ZYLOPRIM) tablet 200 mg  200 mg Oral Q8H Haroon Shi MD   200 mg at 03/26/18 1458    furosemide (LASIX) injection 40 mg  40 mg IntraVENous TID Heather Ivy MD   40 mg at 03/26/18 1630    filgrastim (NEUPOGEN) injection 372 mcg  5 mcg/kg SubCUTAneous DAILY Tariq Morfin NP        potassium chloride SR (KLOR-CON 10) tablet 40 mEq  40 mEq Oral DAILY Stone Tipton MD   40 mEq at 03/26/18 1101    predniSONE (DELTASONE) tablet 100 mg  100 mg Oral DAILY Andres James DO   100 mg at 03/26/18 1459    sodium chloride (NS) flush 20 mL  20 mL InterCATHeter PRN Carolyne Cele, NP   20 mL at 03/25/18 0319    sodium chloride (NS) flush 10 mL  10 mL InterCATHeter Q24H Carolyne Kirby, NP   10 mL at 03/25/18 1118    sodium chloride (NS) flush 10 mL  10 mL InterCATHeter PRN Carolyne Kirby, NP   10 mL at 03/23/18 0236    sodium chloride (NS) flush 10 mL  10 mL InterCATHeter Q8H Carolyne Kirby, NP   10 mL at 03/26/18 1501    bacitracin 500 unit/gram packet 1 Packet  1 Packet Topical PRN Carolyne Kirby, NP        sodium chloride (NS) flush 20 mL  20 mL InterCATHeter PRN Carolyne Kirby, NP   20 mL at 03/25/18 0319    sodium chloride (NS) flush 10 mL  10 mL InterCATHeter Q24H Carolyne Kirby, NP   10 mL at 03/25/18 1118    sodium chloride (NS) flush 10 mL  10 mL InterCATHeter PRN Carolyne Kirby, NP   10 mL at 03/26/18 1631    sodium chloride (NS) flush 10 mL  10 mL InterCATHeter Q8H Carolyne Kirby, NP   10 mL at 03/26/18 1500    alteplase (CATHFLO) 1 mg in sterile water (preservative free) 1 mL injection  1 mg InterCATHeter PRN Carolyne Kirby, NP        influenza vaccine 2017-18 (3 yrs+)(PF) (FLUZONE QUAD/FLUARIX QUAD) injection 0.5 mL  0.5 mL IntraMUSCular PRIOR TO DISCHARGE Loulou Ledbetter MD        acetaminophen (TYLENOL) tablet 650 mg  650 mg Oral Q6H PRN Thad Palomo MD   650 mg at 03/26/18 7674    atenolol (TENORMIN) tablet 25 mg  25 mg Oral DAILY Loulou Ledbetter MD   25 mg at 03/26/18 1101    cyanocobalamin (VITAMIN B12) injection 1,000 mcg  1,000 mcg IntraMUSCular EVERY MONTH Keyla Mccarthy MD   1,000 mcg at 03/26/18 1451    gabapentin (NEURONTIN) capsule 300 mg  300 mg Oral DAILY Keyla Mccarthy MD   300 mg at 03/26/18 1101    levothyroxine (SYNTHROID) tablet 150 mcg  150 mcg Oral ACB Keyla Mccarthy MD   150 mcg at 03/26/18 0633    famotidine (PEPCID) tablet 20 mg  20 mg Oral QHS Daisy Nieves MD   20 mg at 03/25/18 2142    sodium chloride (NS) flush 5-10 mL  5-10 mL IntraVENous Q8H Daisy Nieves MD   10 mL at 03/26/18 0528    sodium chloride (NS) flush 5-10 mL  5-10 mL IntraVENous PRN Daisy Nieves MD        ondansetron (ZOFRAN) injection 4 mg  4 mg IntraVENous Q4H PRN Daisy Nieves MD           Allergies   Allergen Reactions    Rituxan [Rituximab] Hives       Review of Systems    A comprehensive review of systems was negative except for as noted above. Objective:  Visit Vitals    /54    Pulse 72    Temp 98 °F (36.7 °C)    Resp 16    Wt 163 lb 8 oz (74.2 kg)    LMP  (LMP Unknown)    SpO2 97%    BMI 29.9 kg/m2     Physical Exam:   General appearance - Well developed, conversant  Mental status - alert, oriented   Head - superficial masses right temporal area / scalp on right   EYE-conj clear no swelling in eyes. Right eyelid droop noted. Neck - supple  CV - regular rate, murmur noted  Resp - Clear on anterior exam  GI  -Round, non-tender, abdomen is softer  Ext- Trace bilateral lower extremity edema noted  Skin-Warm and dry. Intact, no rash. Neuro -  Awake, alert, oriented. Exam is non-focal.    Diagnostic Imaging   reviewed    2/21/18 PET:  IMPRESSION:   1. Increasing right parietal soft tissue activity. 2. Progression in the bilateral areas of hypermetabolic pleural thickening. 3. New bilateral hypermetabolic axillary lymph nodes and subcarinal lymph node  and progression of retroperitoneal and left pelvic lymphadenopathy. 4. Improvement in the left inguinal lymphadenopathy  5. Improvement in the right parieto-occipital scalp activity. 6. New hypermetabolic soft tissue abnormality left iliac fossa. 7. Stable left cervical hypermetabolic lymph nodes. 2/17/18 PET:  MPRESSION:   1.  Findings suspicious for a uterine mass with a soft tissue mass in the left  pelvis and extending into the retroperitoneum concerning for malignancy. Lymphoma is a differential consideration given the patient's history. The mass  encases but does not narrow the aortic bifurcation and IVC. 2. Enlarged left inguinal lymph node is decreased in size since 8/30/2017.  3. Increased large left pleural effusion with left lower lung atelectasis. Lab Results    reviewed  Lab Results   Component Value Date/Time    WBC 6.4 03/26/2018 05:17 AM    HGB 8.4 (L) 03/26/2018 05:17 AM    HCT 26.0 (L) 03/26/2018 05:17 AM    PLATELET 349 10/52/9732 05:17 AM    MCV 92.9 03/26/2018 05:17 AM       Lab Results   Component Value Date/Time    Sodium 141 03/26/2018 05:17 AM    Potassium 3.3 (L) 03/26/2018 05:17 AM    Chloride 107 03/26/2018 05:17 AM    CO2 25 03/26/2018 05:17 AM    Anion gap 9 03/26/2018 05:17 AM    Glucose 133 (H) 03/26/2018 05:17 AM    BUN 25 (H) 03/26/2018 05:17 AM    Creatinine 0.86 03/26/2018 05:17 AM    BUN/Creatinine ratio 29 (H) 03/26/2018 05:17 AM    GFR est AA >60 03/26/2018 05:17 AM    GFR est non-AA >60 03/26/2018 05:17 AM    Calcium 9.1 03/26/2018 05:17 AM    AST (SGOT) 19 03/25/2018 03:17 AM    Alk. phosphatase 61 03/25/2018 03:17 AM    Protein, total 5.7 (L) 03/25/2018 03:17 AM    Albumin 2.8 (L) 03/25/2018 03:17 AM    Globulin 2.9 03/25/2018 03:17 AM    A-G Ratio 1.0 (L) 03/25/2018 03:17 AM    ALT (SGPT) 8 (L) 03/25/2018 03:17 AM     Assessment/Plan:    1. NHL low grade follicular stage 3 dx in 2010   Hx of one dose of rituxan in past with side effects and pt did not want any further therapy. PET 5/16 good post chlorambucil 4/16. PET 10/2016 was stable. Most recent PET scan 2/22/17 showed progression of disease both above and below the level of the diaphragm. Pt had repeat chlorambucil 5/17 due to progressive disease on CTs. Back of head mass/ left groin mass was almost gone. PET 5/17 and good response to tx. Over summer, pt experienced more scalp lesions with high SUV activity.    PET 8/17 showed progressive disease throughout. Pt wanted to try treatment / acyclovir first to see if this treatment affects scalp/head LNs. Acyclovir did not help. Last chlorambucil chemo was 1/18. PET 2/18 now shows diffuse progressive disease/ some mixed. Pt is post thoracentesis on 3/1/18 and cytology now shows likely transformation to high grade / DLBCL. She experienced vaginal bleeding/uterine mass and biopsy of the vaginal wall showed high grade follicular lymphoma. CT's 3/20/18 show rapid progression of disease, now with carcinomatosis, abdominal/pelvic masses, and lymphadenopathy. She agreed to start chemotherapy with CHOP, which was started 3/23/18. S/p Rasburicase x 1 on 3/23/18.    -- Check daily uric acid, magnesium, phosphorus, BMP, and LD. --Allopurinol increased to 200 mg every 8 hours per nephrology   -- Continue Prednisone 100mg PO daily. She is currently on day 4 out of 5.  --Unable to give Neulasta as patient remains in the hospital. Will plan to start filgrastin 5 mcg/kg daily while hospitalized. 2. Use of cardiotoxic medications: ECHO completed 3/22/18 shows EF 55 to 60% pre-chemotherapy. 3.  Abdominal distention. Secondary to malignancy. US guided paracentesis yielded low volume of fluid. Symptoms secondary to malignancy and chemotherapy may help with symptoms. Abdomen is softer after Lasix. 4. Shortness of breath: s/p thoracentesis with + cytology. Chest x-ray today shows stable pleural effusion. She remains short of breath but reports this has improved today. She continues on aggressive diuresis. 5. Renal insufficiency. Creatinine continues to improve, down to 0.86 today. Followed by nephrology and felt to be secondary to spontaneous TLS +/- contrast nephropathy. 6. Tumor lysis prophylaxis. Concern for spontaneous TLS as well as tumor lysis induced by chemotherapy. She received a dose of rasburicase 3/23/18 (flat dose of 3 mg) and now continues on allopurinol.  Uric acid 2.6 today, creatinine improved. 7. History of VSD. ECHO shows EF 55 to 60%. Cardiology following. We will continue to follow along while patient is admitted. Please call with any questions. Pt seen today in conjunction with NP Rod Lopez  Reviewed everything with pt and family at bedside today.      Sheldon Dove, DO

## 2018-03-26 NOTE — PROGRESS NOTES
ONCOLOGY NURSE NAVIGATOR    Supportive visit at bedside with Gisell Cordero and daughter, Kasey Mathis and Maude's sister who has been with her ATC since admission    She completed CHOP -- \"that went well and seemed okay\" but she's concerned that all doctors may not be talking and wonders who is in charge  Knows her case is complicated by renal insufficiency and history of VSD. Need for close monitoring for TLS related to chemo. She feels less puffy, although her left leg remains quite edematous despite diuresis. Says stomach feels a little less distended. Feels a little SOB and knows she may need thoracentesis. No IVF now, but continuing to receive diuretics. Had diarrhea, which has not lessened. Now on enteric precautions. Maude's concern is about getting well enough to go home and then getting back to [facility] for needed appts and treatments. Gisell Cordero and family all live in [place], [place].  1 1/2 - 2 hour drive to [facility]. Gisell Cordero cares for Dorie Luna daughter, Matthias Morales, in her home. Childcare services are scant in their area. Kasey Mathis has taken days off work to be with mom. Gisell Cordero also cares for an elder family member with dementia in the home. Gisell Cordero and Kasey Mathis both work for [employer] and know that resources in the area are limited. They are fretful that there may be no  services for PICC care. Gisell Cordero wants to discuss getting a port placed and discontinuing the PICC. Focus of visit became coordinating info from providers and resources for discharge home, as well as anticipating appts and needs for continuing chemo. Moses Herzog 912 feels better now that weekend is past and is not seeing primarily on call providers. Has seen nephrology and med onc providers today. 2.  Discussed with Dr. Fanny Vasquez. Plan to remove PICC before discharge. Port to be placed before next CHOP. 3.  Will aim to coordinate appt scheduling to minimize trips to [facility]  4. Discussed using [facility] as needed and desired when necessary. [facility] brochure. I can make referral anytime.   5.  Will discuss with CRM who is also following    Dani Shaw MS RN [facility]OCNS

## 2018-03-26 NOTE — PROGRESS NOTES
[facility]   [address] [facility], [facility] [address], [facility]  Phone: [phone]   [phone]       Nephrology Progress Note  Saumya Schneider     [birth year]     [id number]  Date of Admission : 3/20/2018  03/26/18    CC: Follow up for SINDHU       Assessment and Plan   SINDHU :  · Spontaneous TLS +/- contrast nephropathy. 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  · Transformation from NHL to DLBCL  · On CHOP regimen     Hyperuricemia :  · Increased allopurinol dose      Acute On chronic HFpEF   · Continue aggressive diuresis for another day     Cardiac :  - Hx of VSD + Aortic Valve + Root replacement   - Mild TR and Pulm HTN   - s/p Repair of AMAYA  - EF 60%      Anemia      Interval History:  She had an eventful weekend with resp distress and worsening leg edema   She diuresed w/ lasix 40 mg BID   UOP 4.4 L   She is still significantly SOB and her left leg edema although slightly better is  very edematous     Review of Systems: Pertinent items are noted in HPI.     Current Medications:   Current Facility-Administered Medications   Medication Dose Route Frequency    allopurinol (ZYLOPRIM) tablet 200 mg  200 mg Oral Q8H    furosemide (LASIX) injection 40 mg  40 mg IntraVENous TID    potassium chloride SR (KLOR-CON 10) tablet 40 mEq  40 mEq Oral DAILY    predniSONE (DELTASONE) tablet 100 mg  100 mg Oral DAILY    sodium chloride (NS) flush 20 mL  20 mL InterCATHeter PRN    sodium chloride (NS) flush 10 mL  10 mL InterCATHeter Q24H    sodium chloride (NS) flush 10 mL  10 mL InterCATHeter PRN    sodium chloride (NS) flush 10 mL  10 mL InterCATHeter Q8H    bacitracin 500 unit/gram packet 1 Packet  1 Packet Topical PRN  sodium chloride (NS) flush 20 mL  20 mL InterCATHeter PRN    sodium chloride (NS) flush 10 mL  10 mL InterCATHeter Q24H    sodium chloride (NS) flush 10 mL  10 mL InterCATHeter PRN    sodium chloride (NS) flush 10 mL  10 mL InterCATHeter Q8H    alteplase (CATHFLO) 1 mg in sterile water (preservative free) 1 mL injection  1 mg InterCATHeter PRN    influenza vaccine 2017-18 (3 yrs+)(PF) (FLUZONE QUAD/FLUARIX QUAD) injection 0.5 mL  0.5 mL IntraMUSCular PRIOR TO DISCHARGE    acetaminophen (TYLENOL) tablet 650 mg  650 mg Oral Q6H PRN    atenolol (TENORMIN) tablet 25 mg  25 mg Oral DAILY    cyanocobalamin (VITAMIN B12) injection 1,000 mcg  1,000 mcg IntraMUSCular EVERY MONTH    gabapentin (NEURONTIN) capsule 300 mg  300 mg Oral DAILY    levothyroxine (SYNTHROID) tablet 150 mcg  150 mcg Oral ACB    famotidine (PEPCID) tablet 20 mg  20 mg Oral QHS    sodium chloride (NS) flush 5-10 mL  5-10 mL IntraVENous Q8H    sodium chloride (NS) flush 5-10 mL  5-10 mL IntraVENous PRN    ondansetron (ZOFRAN) injection 4 mg  4 mg IntraVENous Q4H PRN      Allergies   Allergen Reactions    Rituxan [Rituximab] Hives       Objective:  Vitals:    Vitals:    03/25/18 1412 03/25/18 2015 03/26/18 0355 03/26/18 0424   BP: 125/71 150/66 132/72    Pulse: 66 70 73    Resp: 17 18 18    Temp: 97.8 °F (36.6 °C) 98.1 °F (36.7 °C) 97.6 °F (36.4 °C)    SpO2: 98% 97% 95%    Weight:    74.2 kg (163 lb 8 oz)     Intake and Output:     03/24 1901 - 03/26 0700  In: 930 [P.O.:600;  I.V.:330]  Out: 5650 [Urine:5650]    Physical Examination:  General: A+O, not SOB  HEENT: mosit membranes  Neck: (+) JVD   Lungs : CTA  CVS: RRR, murmur+  Abdomen: soft, BS+  Extremities: left > r edema   Neurologic: non focal, AAO x 3  Psych: normal affect     []    High complexity decision making was performed  []    Patient is at high-risk of decompensation with multiple organ involvement    Lab Data Personally Reviewed: I have reviewed all the pertinent labs, microbiology data and radiology studies during assessment.     Recent Labs      03/26/18 0517 03/25/18   0317  03/24/18   0541  03/23/18   1607   NA  141  142  141  139   K  3.3*  3.3*  3.9  4.2   CL  107  111*  110*  107   CO2  25  22  22  22   GLU  133*  154*  131*  132*   BUN  25*  25*  22*  18   CREA  0.86  1.07*  1.12*  1.53*   CA  9.1  8.8  8.2*  8.4*   MG  1.7   --   1.7  1.7   PHOS   --   3.6  3.3  2.7   ALB   --   2.8*  2.5*   --    SGOT   --   19  25   --    ALT   --   8*  8*   --      Recent Labs      03/26/18 0517 03/24/18   0541   WBC  6.4  6.5   HGB  8.4*  8.2*   HCT  26.0*  25.4*   PLT  269  245     No results found for: SDES  Lab Results   Component Value Date/Time    Culture result: NO GROWTH 4 DAYS 03/21/2018 12:20 PM    Culture result: MIXED UROGENITAL THAD ISOLATED 03/20/2018 10:21 PM    Culture result: NO GROWTH 2 DAYS 02/16/2018 11:22 PM     Recent Results (from the past 24 hour(s))   MAGNESIUM    Collection Time: 03/26/18  5:17 AM   Result Value Ref Range    Magnesium 1.7 1.6 - 2.4 mg/dL   METABOLIC PANEL, BASIC    Collection Time: 03/26/18  5:17 AM   Result Value Ref Range    Sodium 141 136 - 145 mmol/L    Potassium 3.3 (L) 3.5 - 5.1 mmol/L    Chloride 107 97 - 108 mmol/L    CO2 25 21 - 32 mmol/L    Anion gap 9 5 - 15 mmol/L    Glucose 133 (H) 65 - 100 mg/dL    BUN 25 (H) 6 - 20 MG/DL    Creatinine 0.86 0.55 - 1.02 MG/DL    BUN/Creatinine ratio 29 (H) 12 - 20      GFR est AA >60 >60 ml/min/1.73m2    GFR est non-AA >60 >60 ml/min/1.73m2    Calcium 9.1 8.5 - 10.1 MG/DL   CBC WITH AUTOMATED DIFF    Collection Time: 03/26/18  5:17 AM   Result Value Ref Range    WBC 6.4 3.6 - 11.0 K/uL    RBC 2.80 (L) 3.80 - 5.20 M/uL    HGB 8.4 (L) 11.5 - 16.0 g/dL    HCT 26.0 (L) 35.0 - 47.0 %    MCV 92.9 80.0 - 99.0 FL    MCH 30.0 26.0 - 34.0 PG    MCHC 32.3 30.0 - 36.5 g/dL    RDW 12.7 11.5 - 14.5 %    PLATELET 801 663 - 227 K/uL    MPV 9.2 8.9 - 12.9 FL    NRBC 0.0 0  WBC    ABSOLUTE NRBC 0.00 0.00 - 0.01 K/uL    NEUTROPHILS 87 (H) 32 - 75 %    LYMPHOCYTES 8 (L) 12 - 49 %    MONOCYTES 4 (L) 5 - 13 %    EOSINOPHILS 0 0 - 7 %    BASOPHILS 0 0 - 1 %    IMMATURE GRANULOCYTES 1 (H) 0.0 - 0.5 %    ABS. NEUTROPHILS 5.5 1.8 - 8.0 K/UL    ABS. LYMPHOCYTES 0.5 (L) 0.8 - 3.5 K/UL    ABS. MONOCYTES 0.3 0.0 - 1.0 K/UL    ABS. EOSINOPHILS 0.0 0.0 - 0.4 K/UL    ABS. BASOPHILS 0.0 0.0 - 0.1 K/UL    ABS. IMM. GRANS. 0.1 (H) 0.00 - 0.04 K/UL    DF SMEAR SCANNED      RBC COMMENTS NORMOCYTIC, NORMOCHROMIC     URIC ACID    Collection Time: 03/26/18  5:17 AM   Result Value Ref Range    Uric acid 2.6 2.6 - 6.0 MG/DL         I have reviewed the flowsheets. Chart and Pertinent Notes have been reviewed. No change in PMH ,family and social history from Consult note.       Chris Huitron MD

## 2018-03-26 NOTE — PROGRESS NOTES
Hospitalist Progress Note  Sheldon Pink MD  Office: [phone]      Date of Service:  3/26/2018  NAME:  Sander Rosales  :    MRN:  [id number]      Admission Summary: This is a 75 y.o female with significant medical history of NH lymphoma, anemia, hypertension, tachyarrhythmia, history of aortic valve repair (at age of 24), VSD, mild TR,  thyroid disease, and history of pulmonary hypertension presented to the hospital with a chief complaint of shortness of breath and abdominal pain which she associated with after undergoing a uterine biopsy one week prior to this presentation. Associated with above patient noticed abdominal girth increment with low grade fever. Cardiology and Oncology consulted. Interval history / Subjective:   Ms Jovana Fam over all better,but still has exertional shortness of breath even walking to the bath room     Assessment & Plan:     Abdominal distension likely secondary to malignant ascites in a patient with history of NHL (not on chemotherapy)   S/P diagnostic paracentesis,3/21,fluid said to be deep and small to place catheter  CT abdomen: with finding consistent with rapid progression of disease with carcinomatosis, abdominal pelvic mass and lymphadenopathy   Oncology consultation and follow up appreciated: Recommendation noted with plan to start on CHOP and no Ritux (secondary to hypersensitivity reaction)   On preparation to start chemotherapy Patient agreeable with starting treatment   Nephrology consultation and f/u reviewed with plan to start on Rasburicase   -3/24: abdominal distension worse,she has been getting a lot of fluids.  -Repeat US,no significant fluid. Discussed with renal, we stopped the iv fluids and started lasix. Swelling and edema improved much.  -Lasix increased and CXR request per renal     NHL  Diffuse Large B Cell lymphoma- high grade   Transformation of NHL to DLBCL    Treatment plan as per oncology   To tart on CHOP and no Ritux (secondary to hypersensitivity reaction)   Oncology recommendations noted. Acute kidney insufficieny,probably due to TLS and contrast nephropathy  Mild L hydro on CT abdomen. Base line creatinine (2/16/18) around 1.08,creatinine peaked at 1.87,creatinine now close to baseline. Off iv fluids,being diuresed. Creatinien improved. Hypokalemia   Replaced and corrected   No need to replace     Abnormal urine. Urine cx grew mixed ugf  -Ascitic fluid culture ,negative. fever and leukocytosis resolved,suspect this could have been from TLS. -3/24: stop antibiotics and watch. History of VSD, mild TR, and aortic valve replacement   Cardiology on board: recommendation noted   Echocardiogram (03/22) with LVEF of 55 %, no WMA, No VSD noted on current echo with moderate TR   Continue with home medications     History of tachyarrhythmia   Continue with Atenolol     Hypertension   Continue with Atenolol    Hypothyroidism   Continue with home medications     Anemia of chronic illness   Normocytic anemia   Will trend H and H   Oncology on board     Code status: Full code   DVT prophylaxis: SCD    Care Plan discussed with: Patient/Family and Nurse  Disposition:Anticipate d/c home in 1-2 days. Currently she is being aggressively diuresed and there is a possibility she may need therapeutic thoracentesis. Hospital Problems  Date Reviewed: 3/20/2018          Codes Class Noted POA    * (Principal)Ascites ICD-10-CM: R18.8  ICD-9-CM: 789.59  3/20/2018 Unknown        Ascites, malignant ICD-10-CM: R18.0  ICD-9-CM: 789.51  3/20/2018 Unknown                Review of Systems:   A comprehensive review of systems was negative except for that written in the HPI. Vital Signs:    Last 24hrs VS reviewed since prior progress note.  Most recent are:  Visit Vitals    /90 (BP 1 Location: Left arm, BP Patient Position: At rest)    Pulse 69    Temp 98.1 °F (36.7 °C)    Resp 17    Wt 74.2 kg (163 lb 8 oz)    SpO2 97%    BMI 29.9 kg/m2         Intake/Output Summary (Last 24 hours) at 03/26/18 1147  Last data filed at 03/26/18 4538   Gross per 24 hour   Intake              480 ml   Output             3200 ml   Net            -2720 ml        Physical Examination:             Constitutional:  No acute distress, cooperative, pleasant    ENT:  Oral mucous moist, oropharynx benign. Neck supple,    Resp:  Decreased air entry left lower lung field,posteriorly    CV:  Regular rhythm, normal rate, no murmurs, gallops, rubs    GI:  Less distended,mid line surgical incision, distended abdomen. Positive for FT and SD. Musculoskeletal:  No edema, warm, 2+ pulses throughout    Neurologic:  Moves all extremities. AAOx3, CN II-XII reviewed     Psych:  Good insight, Not anxious nor agitated. Data Review:    Review and/or order of clinical lab test  Review and/or order of tests in the radiology section of CPT  Review and/or order of tests in the medicine section of CPT      Labs:     Recent Labs      03/26/18   0517  03/24/18   0541   WBC  6.4  6.5   HGB  8.4*  8.2*   HCT  26.0*  25.4*   PLT  269  245     Recent Labs      03/26/18   0517 03/25/18 0317 03/24/18   0541  03/23/18   1607   NA  141  142  141  139   K  3.3*  3.3*  3.9  4.2   CL  107  111*  110*  107   CO2  25  22  22  22   BUN  25*  25*  22*  18   CREA  0.86  1.07*  1.12*  1.53*   GLU  133*  154*  131*  132*   CA  9.1  8.8  8.2*  8.4*   MG  1.7   --   1.7  1.7   PHOS   --   3.6  3.3  2.7   URICA  2.6  1.9*  1.9*  1.3*  1.2*     Recent Labs      03/25/18 0317  03/24/18   0541   SGOT  19  25   ALT  8*  8*   AP  61  61   TBILI  0.3  0.2   TP  5.7*  5.4*   ALB  2.8*  2.5*   GLOB  2.9  2.9     No results for input(s): INR, PTP, APTT in the last 72 hours. No lab exists for component: INREXT, INREXT   No results for input(s): FE, TIBC, PSAT, FERR in the last 72 hours.    No results found for: FOL, RBCF   No results for input(s): PH, PCO2, PO2 in the last 72 hours. No results for input(s): CPK, CKNDX, TROIQ in the last 72 hours.     No lab exists for component: CPKMB  No results found for: CHOL, CHOLX, CHLST, CHOLV, HDL, LDL, LDLC, DLDLP, TGLX, TRIGL, TRIGP, CHHD, CHHDX  No results found for: [facility]  Lab Results   Component Value Date/Time    Color YELLOW/STRAW 03/20/2018 12:36 PM    Appearance CLEAR 03/20/2018 12:36 PM    Specific gravity 1.025 03/20/2018 12:36 PM    Specific gravity 1.025 02/16/2018 11:22 PM    pH (UA) 6.0 03/20/2018 12:36 PM    Protein 30 (A) 03/20/2018 12:36 PM    Glucose NEGATIVE  03/20/2018 12:36 PM    Ketone TRACE (A) 03/20/2018 12:36 PM    Bilirubin NEGATIVE  02/16/2018 11:22 PM    Urobilinogen 0.2 03/20/2018 12:36 PM    Nitrites NEGATIVE  03/20/2018 12:36 PM    Leukocyte Esterase NEGATIVE  03/20/2018 12:36 PM    Epithelial cells FEW 03/20/2018 12:36 PM    Bacteria 1+ (A) 03/20/2018 12:36 PM    WBC 5-10 03/20/2018 12:36 PM    RBC 10-20 03/20/2018 12:36 PM         Medications Reviewed:     Current Facility-Administered Medications   Medication Dose Route Frequency    allopurinol (ZYLOPRIM) tablet 200 mg  200 mg Oral Q8H    furosemide (LASIX) injection 40 mg  40 mg IntraVENous TID    potassium chloride SR (KLOR-CON 10) tablet 40 mEq  40 mEq Oral DAILY    predniSONE (DELTASONE) tablet 100 mg  100 mg Oral DAILY    sodium chloride (NS) flush 20 mL  20 mL InterCATHeter PRN    sodium chloride (NS) flush 10 mL  10 mL InterCATHeter Q24H    sodium chloride (NS) flush 10 mL  10 mL InterCATHeter PRN    sodium chloride (NS) flush 10 mL  10 mL InterCATHeter Q8H    bacitracin 500 unit/gram packet 1 Packet  1 Packet Topical PRN    sodium chloride (NS) flush 20 mL  20 mL InterCATHeter PRN    sodium chloride (NS) flush 10 mL  10 mL InterCATHeter Q24H    sodium chloride (NS) flush 10 mL  10 mL InterCATHeter PRN    sodium chloride (NS) flush 10 mL  10 mL InterCATHeter Q8H    alteplase (CATHFLO) 1 mg in sterile water (preservative free) 1 mL injection  1 mg InterCATHeter PRN    influenza vaccine 2017-18 (3 yrs+)(PF) (FLUZONE QUAD/FLUARIX QUAD) injection 0.5 mL  0.5 mL IntraMUSCular PRIOR TO DISCHARGE    acetaminophen (TYLENOL) tablet 650 mg  650 mg Oral Q6H PRN    atenolol (TENORMIN) tablet 25 mg  25 mg Oral DAILY    cyanocobalamin (VITAMIN B12) injection 1,000 mcg  1,000 mcg IntraMUSCular EVERY MONTH    gabapentin (NEURONTIN) capsule 300 mg  300 mg Oral DAILY    levothyroxine (SYNTHROID) tablet 150 mcg  150 mcg Oral ACB    famotidine (PEPCID) tablet 20 mg  20 mg Oral QHS    sodium chloride (NS) flush 5-10 mL  5-10 mL IntraVENous Q8H    sodium chloride (NS) flush 5-10 mL  5-10 mL IntraVENous PRN    ondansetron (ZOFRAN) injection 4 mg  4 mg IntraVENous Q4H PRN     ______________________________________________________________________  EXPECTED LENGTH OF STAY: 2d 16h  ACTUAL LENGTH OF STAY:          6                 Russell Beebe MD

## 2018-03-27 PROBLEM — R18.8 ASCITES: Status: RESOLVED | Noted: 2018-01-01 | Resolved: 2018-01-01

## 2018-03-27 NOTE — PROGRESS NOTES
Problem: Falls - Risk of  Goal: *Absence of Falls  Document Nikky Fall Risk and appropriate interventions in the flowsheet. Outcome: Progressing Towards Goal  Fall Risk Interventions:            Medication Interventions: Evaluate medications/consider consulting pharmacy              Patient uses assistance from either family member or health care team to help with ambulation.

## 2018-03-27 NOTE — DISCHARGE INSTRUCTIONS
Discharge Instructions for Chemotherapy/Biotherapy    Chemotherapy has the potential to cause many side effects. The following are general precautions that chemo patients should take:   1. Practice good hand washing:    Use soap and water for at least 15 seconds, covering all areas of hands.  Always wash hands before eating.  Wash hands after contact with public surfaces such as door knobs and handles, shopping carts, telephones and elevator buttons. 2. Get plenty of rest:    You will likely experience fatigue three to five days following your treatment. It may last as long as seven days. 3. Drink plenty of fluids. Water is best.   4. Eat a well balanced diet:    Small frequent meals may help if you are having trouble with nausea or your appetite. Some people also do well with nutritional supplements. 5. Pace yourself with daily activities:    Take frequent breaks and ask for help if you need it. 6. Exercise is very important:    It will increase circulation and will help the fatigue. Do what you can each day. 7. If your regimen results in hair loss:    You will likely notice effects between two and three weeks following your first treatment. Some lose all hair while others only experience thinning. 8. Practice good oral hygiene:   Rasheed Belts your M.D. immediately if any mouth sores or discomfort develop. 9. Protect yourself from the sun. Signs/Symptoms of an allergic reaction and/or some side effects may require immediate medical attention. Notify your physician if you develop one or more of the following:   Temperature of 100.5 degrees or greater;   Skin redness, itching, swelling, blistering, weeping, crusting, rash, or hives;    Wheezing, chest tightness, cough, or shortness of breath;   Swelling of the face, eyelids, lips, tongue, or throat;   Severe, persistent headache;   Stuffy nose, runny nose, sneezing;   Red (bloodshot), itchy, swollen, or watery eyes;   Stomach pain, nausea, vomiting, diarrhea, or bloody stools;   Mouth sores  Your physician should also be aware of the following symptoms:   Persistent and unresolved nausea and/or vomiting;   Persistent and unresolved diarrhea or constipation;   Numbness/tingling/burning of the extremities, including the fingers and toes; Bleeding or unexplained bruising; Unexplained redness/swelling/pain in the arms or legs; Shortness of breath or fatigue that worsens;   Pain with urination or blood in the urine; Chills;   Cough, especially a productive cough;   Mouth sores or a white coating of the tongue; Redness, swelling, pain or drainage at the port-a-cath or IV site; Increased feeling of bloating or water retention; Excessive weight loss or gain;   Ringing in the ears; Difficulty swallowing;   Dizziness, vertigo, lightheadedness, or fainting. Chemotherapy can cause birth defects. It is very important not to become pregnant   or father a child while undergoing chemotherapy. During chemotherapy and for 48 hours after chemotherapy, the drugs may still be in   your urine and other body fluids. You should use the following precautions to reduce   exposure to others:  * Both men and women should sit on the toilet to cut down on splashing. Flush   toilets with the lid down. Always wash your hands well with soap and water after using the toilet. You may want to use a separate toilet if possible. * Caregivers should wear disposable gloves to handle body wastes. Dispose of   gloves after each use and wash hands. * Any sheets or clothes soiled with bodily fluids should be machine washed separately from other laundry. Wash twice with regular laundry detergent in hot water. If they cannot be washed right away they can be stored in a sealed plastic  bag.    * If disposable adult diapers, underwear, or sanitary pads are used, they can be sealed in a plastic bag, and thrown away with regular trash.              Discharge Instructions       PATIENT ID: Stephanie Bradford  MRN: [id number]   YOB: 1950    DATE OF ADMISSION: 3/20/2018  7:38 PM    DATE OF DISCHARGE: 3/27/2018    PRIMARY CARE PROVIDER: Belkis Ba MD     ATTENDING PHYSICIAN: Mohsen Mejia MD  DISCHARGING PROVIDER: Mohsen Mejia MD    To contact this individual call [phone] and ask the  to page. If unavailable ask to be transferred the Adult Hospitalist Department. DISCHARGE DIAGNOSES Malignant ascitics from [facility]    CONSULTATIONS: IP CONSULT TO INTERVENTIONAL RADIOLOGY  IP CONSULT TO NEPHROLOGY  IP CONSULT TO CARDIOLOGY    PROCEDURES/SURGERIES: * No surgery found *    PENDING TEST RESULTS:   At the time of discharge the following test results are still pending:     FOLLOW UP APPOINTMENTS:   Follow-up Information     Follow up With Details Comments Contact Info    Janneth Garza MD In 1 week to reasses for renal function [address] [facility] [address] Rubi Macias DO In 3 days for [facility] shot [address] [facility] [facility]      Belkis Ba MD In 2 weeks  [address] [facility]  [phone]             ADDITIONAL CARE RECOMMENDATIONS:     DIET: Cardiac Diet  ACTIVITY: Activity as tolerated    WOUND CARE:     EQUIPMENT needed:       DISCHARGE MEDICATIONS:   See Medication Reconciliation Form    · It is important that you take the medication exactly as they are prescribed. · Keep your medication in the bottles provided by the pharmacist and keep a list of the medication names, dosages, and times to be taken in your wallet. · Do not take other medications without consulting your doctor. NOTIFY YOUR PHYSICIAN FOR ANY OF THE FOLLOWING:   Fever over 101 degrees for 24 hours. Chest pain, shortness of breath, fever, chills, nausea, vomiting, diarrhea, change in mentation, falling, weakness, bleeding.  Severe pain or pain not relieved by medications. Or, any other signs or symptoms that you may have questions about. DISPOSITION:  x  Home With:   OT  PT  HH  RN       SNF/Inpatient Rehab/LTAC    Independent/assisted living    Hospice    Other:     CDMP Checked:   Yes x     PROBLEM LIST Updated:  Yes x       Signed:   Cari Maher MD  3/27/2018  8:31 AM    I have reviewed discharge instructions with the patient. The patient verbalized understanding. [employer] Activation    Thank you for requesting access to [employer]. Please follow the instructions below to securely access and download your online medical record. [employer] allows you to send messages to your doctor, view your test results, renew your prescriptions, schedule appointments, and more. How Do I Sign Up? 1. In your internet browser, go to [email or web address]  2. Click on the First Time User? Click Here link in the Sign In box. You will be redirect to the New Member Sign Up page. 3. Enter your [employer] Access Code exactly as it appears below. You will not need to use this code after youve completed the sign-up process. If you do not sign up before the expiration date, you must request a new code. [employer] Access Code: [email or web address]  Expires: 2018  3:45 PM (This is the date your [employer] access code will )    4. Enter the last four digits of your Social Security Number (xxxx) and Date of Birth (mm/dd/yyyy) as indicated and click Submit. You will be taken to the next sign-up page. 5. Create a [employer] ID. This will be your [employer] login ID and cannot be changed, so think of one that is secure and easy to remember. 6. Create a [employer] password. You can change your password at any time. 7. Enter your Password Reset Question and Answer. This can be used at a later time if you forget your password. 8. Enter your e-mail address. You will receive e-mail notification when new information is available in [address] 9. Click Sign Up.  You can now view and download portions of your medical record. 10. Click the Download Summary menu link to download a portable copy of your medical information. Additional Information    If you have questions, please visit the Frequently Asked Questions section of the [employer] website at [email or web address] Remember, [employer] is NOT to be used for urgent needs. For medical emergencies, dial 911.

## 2018-03-27 NOTE — DISCHARGE SUMMARY
Discharge Summary       PATIENT ID: Mace Barthel  MRN: [id number]   YOB: 1950    DATE OF ADMISSION: 3/20/2018  7:38 PM    DATE OF DISCHARGE: 3/27/18   PRIMARY CARE PROVIDER: Angelika Hawley MD     ATTENDING PHYSICIAN: Antwan Marcelo  DISCHARGING PROVIDER: Lavelle Cheadle, MD    To contact this individual call [phone] and ask the  to page. If unavailable ask to be transferred the Adult Hospitalist Department. CONSULTATIONS: IP CONSULT TO INTERVENTIONAL RADIOLOGY  IP CONSULT TO NEPHROLOGY  IP CONSULT TO CARDIOLOGY    PROCEDURES/SURGERIES: * No surgery found *    ADMITTING DIAGNOSES & HOSPITAL COURSE:   This is a 75 y.o female with significant medical history of NH lymphoma, anemia, hypertension, tachyarrhythmia, history of aortic valve repair (at age of 24), VSD, mild TR,  thyroid disease, and history of pulmonary hypertension presented to the hospital with a chief complaint of shortness of breath and abdominal pain which she associated with after undergoing a uterine biopsy one week prior to this presentation. Associated with above patient noticed abdominal girth increment with low grade fever.    Cardiology and Oncology consulted.       Assessment & Plan:      Abdominal distension likely secondary to malignant ascites in a patient with history of NHL (not on chemotherapy)   S/P diagnostic paracentesis,3/21, fluid said to be deep and small to place catheter  CT abdomen: with finding consistent with rapid progression of disease with carcinomatosis, abdominal pelvic mass and lymphadenopathy   Oncology consultation and follow up appreciated: Recommendation noted with plan to start on CHOP and no Ritux (secondary to hypersensitivity reaction)   On preparation to start chemotherapy Patient agreeable with starting treatment   Nephrology consultation and f/u reviewed with plan to start on Rasburicase      NHL  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     Acute kidney insufficieny,probably due to TLS and contrast nephropathy  Mild L hydro on CT abdomen. Base line creatinine (2/16/18) around 1.08,creatinine peaked at 1.87,creatinine now close to baseline. Off iv fluids,being diuresed. Creatinien improved. No lasix on d/c as per nephro recommendation f/u as out pt      Hypokalemia   Replaced and corrected   No need to replace      Abnormal urine. Urine cx grew mixed ugf  -Ascitic fluid culture ,negative. fever and leukocytosis resolved,suspect this could have been from TLS.   -3/24: stop antibiotics     History of VSD, mild TR, and aortic valve replacement   Cardiology on board: recommendation noted   Echocardiogram (03/22) with LVEF of 55 %, no WMA, No VSD noted on current echo with moderate TR   Continue with home medications      History of tachyarrhythmia   Continue with Atenolol      Hypertension   Continue with Atenolol     Hypothyroidism   Continue with home medications      Anemia of chronic illness   Normocytic anemia   Will trend H and H   Oncology on board     Elevated wbc today  -  No sign of infection probably from filgrastim             PENDING TEST RESULTS:   At the time of discharge the following test results are still pending:     FOLLOW UP APPOINTMENTS:    Follow-up Information     Follow up With Details Comments [address] Torri Zheng MD In 1 week to reasses for renal function [address] [facility] [address]  Jake Gardiner, DO In 3 days for [facility] shot [address] [facility] [facility]      Roshan Escobedo MD In 2 weeks  [address] [facility]  [phone]             ADDITIONAL CARE RECOMMENDATIONS:     DIET: Regular Diet    ACTIVITY: Activity as tolerated    WOUND CARE:     EQUIPMENT needed: DISCHARGE MEDICATIONS:  Current Discharge Medication List      START taking these medications    Details   allopurinol (ZYLOPRIM) 100 mg tablet Take 2 Tabs by mouth every eight (8) hours for 30 days. Qty: 180 Tab, Refills: 0         CONTINUE these medications which have CHANGED    Details   gabapentin (NEURONTIN) 300 mg capsule Take 1 Cap by mouth daily for 30 days. Indications: NEUROPATHIC PAIN, POSTHERPETIC NEURALGIA  Qty: 30 Cap, Refills: 0         CONTINUE these medications which have NOT CHANGED    Details   levothyroxine (SYNTHROID) 150 mcg tablet 150 mcg Daily (before breakfast). raNITIdine (ZANTAC) 150 mg tablet Take 150 mg by mouth nightly. docusate sodium (COLACE) 100 mg capsule DAILY PRN      cholecalciferol (VITAMIN D3) 1,000 unit tablet Take  by mouth daily. atenolol (TENORMIN) 25 mg tablet Take 25 mg by mouth daily. cyanocobalamin (VITAMIN B-12) 1,000 mcg/mL injection 1,000 mcg by IntraMUSCular route every month. STOP taking these medications       DIPHENHYDRAMINE HCL (NITETIME SLEEP AID PO) Comments:   Reason for Stopping:         KLOR-CON M20 20 mEq tablet Comments:   Reason for Stopping:         bumetanide (BUMEX) 2 mg tablet Comments:   Reason for Stopping:                 NOTIFY YOUR PHYSICIAN FOR ANY OF THE FOLLOWING:   Fever over 101 degrees for 24 hours. Chest pain, shortness of breath, fever, chills, nausea, vomiting, diarrhea, change in mentation, falling, weakness, bleeding. Severe pain or pain not relieved by medications. Or, any other signs or symptoms that you may have questions about.     DISPOSITION:   x Home With:   OT  PT  HH  RN       Long term SNF/Inpatient Rehab    Independent/assisted living    Hospice    Other:       PATIENT CONDITION AT DISCHARGE:     Functional status    Poor    x Deconditioned     Independent      Cognition   x  Lucid     Forgetful     Dementia      Catheters/lines (plus indication)    Nelson     PICC     PEG    x None Code status   x  Full code     DNR      PHYSICAL EXAMINATION AT DISCHARGE:     Constitutional:  No acute distress, cooperative, pleasant    ENT:  Oral mucous moist, oropharynx benign. Neck supple,    Resp:  CTA decreased air entry   CV:  Regular rhythm, normal rate, no murmurs, gallops, rubs    GI:  Less distended,mid line surgical incision, mild distension but non tender     Musculoskeletal:  No edema, warm, 2+ pulses throughout    Neurologic:  Moves all extremities. AAOx3, CN II-XII reviewed                                             Psych:  Good insight, Not anxious nor agitated.         CHRONIC MEDICAL DIAGNOSES:  Problem List as of 3/27/2018  Date Reviewed: 3/27/2018          Codes Class Noted - Resolved    Ascites, malignant ICD-10-CM: R18.0  ICD-9-CM: 789.51  3/20/2018 - Present        High grade B-cell lymphoma ([facility] [address] ICD-10-CM: C85.10  ICD-9-CM: 202.80  3/12/2018 - Present        Postmenopausal bleeding ICD-10-CM: N95.0  ICD-9-CM: 627.1  2/26/2018 - Present        Uterine mass ICD-10-CM: N85.9  ICD-9-CM: 625.8  2/26/2018 - Present        Pleural effusion, left ICD-10-CM: J90  ICD-9-CM: 511.9  2/26/2018 - Present        Cough ICD-10-CM: R05  ICD-9-CM: 786.2  2/26/2018 - Present        Pneumonia due to infectious organism ICD-10-CM: J18.9  ICD-9-CM: 136.9, 484.8  11/16/2017 - Present        Healed perforation of right ear drum ICD-10-CM: H73.811  ICD-9-CM: 384.81  10/9/2017 - Present        Herpes zoster with complication GMQ-49-KI: Y31.4  ICD-9-CM: 053.8  9/6/2017 - Present        Chemotherapy follow-up examination ICD-10-CM: T24  ICD-9-CM: V67.2  4/3/2017 - Present        Upper respiratory tract infection ICD-10-CM: J06.9  ICD-9-CM: 465.9  6/7/2016 - Present        Currently attempting to quit using tobacco ICD-10-CM: Z78.9  ICD-9-CM: 305.1  4/6/2016 - Present        PAZ (dyspnea on exertion) ICD-10-CM: R06.09  ICD-9-CM: 786.09  4/6/2016 - Present        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  ICD-9-CM: 053.13  4/6/2016 - Present        Mass of head ICD-10-CM: R22.0  ICD-9-CM: 784.2  2/17/2016 - Present        Herpes zoster without complication VPL-06-PK: S29.3  ICD-9-CM: 053.9  2/3/2016 - Present        Tobacco abuse ICD-10-CM: Z72.0  ICD-9-CM: 305.1  11/30/2015 - Present        Headache(784.0) ICD-10-CM: R51  ICD-9-CM: 784.0  2/27/2015 - Present        Renal insufficiency ICD-10-CM: N28.9  ICD-9-CM: 593.9  2/11/2015 - Present        Poor vision ICD-10-CM: H54.7  ICD-9-CM: 369.9  12/9/2014 - Present        Left eye pain ICD-10-CM: H57.12  ICD-9-CM: 379.91  12/9/2014 - Present        Orbital swelling ICD-10-CM: H57.8  ICD-9-CM: 379.92  6/13/2013 - Present        [facility] ICD-10-CM: C85.81  ICD-9-CM: 202.81  6/13/2013 - Present        Fatigue ICD-10-CM: R53.83  ICD-9-CM: 780.79  12/5/2012 - Present        HTN (hypertension) ICD-10-CM: I10  ICD-9-CM: 401.9  6/6/2012 - Present        Follicular lymphoma grade I ([facility] ICD-10-CM: C82.00  ICD-9-CM: 202.00  6/6/2012 - Present        Heart murmur ICD-10-CM: R01.1  ICD-9-CM: 785.2  6/6/2012 - Present        Hypothyroid ICD-10-CM: E03.9  ICD-9-CM: 244.9  6/6/2012 - Present        Asthma ICD-10-CM: J45.909  ICD-9-CM: 493.90  6/6/2012 - Present        PUD (peptic ulcer disease) ICD-10-CM: K27.9  ICD-9-CM: 533.90  6/6/2012 - Present        Ear infection ICD-10-CM: H66.90  ICD-9-CM: 382.9  6/6/2012 - Present        * (Principal)RESOLVED: Ascites ICD-10-CM: R18.8  ICD-9-CM: 789.59  3/20/2018 - 3/27/2018              Greater than 30  minutes were spent with the patient on counseling and coordination of care    Signed:   Paulo Beal MD  3/27/2018  9:14 AM

## 2018-03-27 NOTE — PROGRESS NOTES
Contacted Dr. Arrieta Brain office Nicci Gary)  re timing of neupogen today. Okay to give at 1400. May dc order for cdiff.

## 2018-03-27 NOTE — PROGRESS NOTES
Order for neupogen faxed complete to [facility] Scheduling for 3/28/18 dose post hospital stay.

## 2018-03-27 NOTE — TELEPHONE ENCOUNTER
Request to clarify orders:  - Patient recd neupogen at 6:40p 3/26/18. Wanted to know when patient should receive injection today. Per Abdullahi Esquivel ok to give injection in afternoon. -C Diff ordered. Patient no longer has liquid stool, but remains on enteric precautions. Per Jeana o to DC order for C Diff. Repeated and verified.

## 2018-03-27 NOTE — PROGRESS NOTES
ONCOLOGY NURSE NAVIGATOR    Brief visit with Radha Wang who is napping and anticipating discharge home today  PICC has been removed  [facility]    She has info about [employer] as desired    Will help coordinate port placement and next chemo to minimize trips to [facility]

## 2018-03-27 NOTE — PROGRESS NOTES
Hospital follow-up PCP transitional care appointment has been scheduled with Dr. Tia Cummins for Wednesday, 3/28/18 at 3:00 p.m. Pending patient discharge.   Elena Fragoso, Care Management Specialist.

## 2018-03-27 NOTE — PROGRESS NOTES
CC NHL Dx  10/10  Transformation 3/18 with pleural effusion    TREATMENT COURSE:  Orbit radiation  Chlorambucil x 1 cycle 4/16. Cycle 2 3/17. Cycle 3 4/17   Cycle 4 1/18. Attempted one dose of rituxan. HISTORY OF PRESENT ILLNESS:  Ms. Shelley Madrigal is a 76 y/o female with high grade lymphoma, admitted 3/20/18 after presenting to the ED with abdominal swelling and low grade fever. CT showed progression of her disease with new peritoneal carcinomatosis, bulky abdominal and pelvic soft tissue masses and retroperitoneal lymphadenopahy, moderate amount of free intraperitoneal fluid, and new mild left hydronephrosis. Patient underwent thoracentesis on 3/1/18 and pathology showed high grade lymphoma/malignant B cell neoplasm, concerning for transformation from her known low grade follicular lymphoma. She also had a biopsy of the vaginal wall with gyn onc (Dr. Rebekah Contreras) on 3/16/18, showing high grade follicular lymphoma. Patient has been resistant to chemotherapy in the past. Starting chemotherapy with CHOP (minus rituxumab as patient declines due to previous reaction) has been discussed but patient has been hesitant to start. She underwent US guided paracentesis but fluid on US was low volume. She remains distended and uncomfortable. She reports her appetite has been fair. She was running low grade fevers at home and experiencing night sweats. Her lower legs have itched and she noticed a rash. ECHO performed 3/22/18 shows EF of 55 to 60%. Interval history: Patient reports breathing is better today, she has been up walking in the [place]. She continues to experience abdominal bloating/swelling. She feels ready to go home today. Diarrhea has stopped. Past Medical History:   Diagnosis Date    Anemia NEC     Arrhythmia     VSD; TRICUSPID REGURG    Cancer ([address] 2010    low grade NHL    GERD (gastroesophageal reflux disease)     Hypertension     NHL (non-Hodgkin's lymphoma) ([facility] [address] 2010    received chemo and radiation.  not in remission.     Pleural effusion 02/21/2018    LEFT    Pulmonary hypertension 02/21/2018    PER CARDIOLOGY NOTES FROM PTS VISIT ON 2/21/18    Thyroid disease      Past Surgical History:   Procedure Laterality Date    HX CATARACT REMOVAL  6/2015 & 7/2015    MERRY    HX CHOLECYSTECTOMY  1982    HX DILATION AND CURETTAGE      HX HEENT  2005    RIGHT EAR     HX OTHER SURGICAL      LEFT PAROTIDECTOMY    MD RPLCMT PROST AORTIC VALVE OPEN XCP HOMOGRF/STENT  1972    STENT PLACED TO STRAIGHTEN THE AORTA     Social History     Social History    Marital status:      Spouse name: N/A    Number of children: N/A    Years of education: N/A     Social History Main Topics    Smoking status: Current Every Day Smoker     Packs/day: 0.50     Years: 45.00     Types: Cigarettes    Smokeless tobacco: Former User     Quit date: 12/1/2015    Alcohol use Yes      Comment: Rare    Drug use: No    Sexual activity: No      Comment:  has one child     Other Topics Concern    None     Social History Narrative     Family History   Problem Relation Age of Onset    Heart Disease Mother     Kidney Disease Mother     Alcohol abuse Father     Liver Disease Father     Cancer Sister      Breast    No Known Problems Brother     Cancer Sister      Colon    No Known Problems Sister     No Known Problems Sister     No Known Problems Daughter     Anesth Problems Neg Hx        Current Facility-Administered Medications   Medication Dose Route Frequency Provider Last Rate Last Dose    potassium chloride SR (KLOR-CON 10) tablet 40 mEq  40 mEq Oral BID Lucretia Primrose, MD        calcium carbonate (TUMS) chewable tablet 400 mg [elemental]  400 mg Oral PRN Lucretia Primrose, MD   400 mg at 03/27/18 1237    allopurinol (ZYLOPRIM) tablet 200 mg  200 mg Oral Q8H Haroon Oliver MD   200 mg at 03/27/18 1238    furosemide (LASIX) injection 40 mg  40 mg IntraVENous TID Garrett Whitt MD   40 mg at 03/27/18 0875    tbo-filgrastim (GRANIX) injection 300 mcg  300 mcg SubCUTAneous Q24H Micky Wilson, NP   300 mcg at 03/27/18 1520    sodium chloride (NS) flush 20 mL  20 mL InterCATHeter PRN Micky Wilson, NP   20 mL at 03/25/18 0319    sodium chloride (NS) flush 10 mL  10 mL InterCATHeter Q24H Micky Wilson, NP   10 mL at 03/25/18 1118    sodium chloride (NS) flush 10 mL  10 mL InterCATHeter PRN Micky Wilson, NP   10 mL at 03/23/18 0236    sodium chloride (NS) flush 10 mL  10 mL InterCATHeter Q8H Micky Wilson, NP   10 mL at 03/27/18 0703    bacitracin 500 unit/gram packet 1 Packet  1 Packet Topical PRN Micky Wilson, NP   1 Packet at 03/27/18 1346    sodium chloride (NS) flush 20 mL  20 mL InterCATHeter PRN Micky Wilson, NP   20 mL at 03/25/18 0319    sodium chloride (NS) flush 10 mL  10 mL InterCATHeter Q24H Micky Wilson, NP   10 mL at 03/25/18 1118    sodium chloride (NS) flush 10 mL  10 mL InterCATHeter PRN Micky Wilson, NP   10 mL at 03/26/18 1631    sodium chloride (NS) flush 10 mL  10 mL InterCATHeter Q8H Micky Wilson, NP   10 mL at 03/27/18 2852    alteplase (CATHFLO) 1 mg in sterile water (preservative free) 1 mL injection  1 mg InterCATHeter PRN Micky Wilson NP        influenza vaccine 2017-18 (3 yrs+)(PF) (FLUZONE QUAD/FLUARIX QUAD) injection 0.5 mL  0.5 mL IntraMUSCular PRIOR TO DISCHARGE Sandra Bertrand MD        acetaminophen (TYLENOL) tablet 650 mg  650 mg Oral Q6H PRN Prince Kaleb MD   650 mg at 03/27/18 1346    atenolol (TENORMIN) tablet 25 mg  25 mg Oral DAILY Sandra Bertrand MD   25 mg at 03/27/18 0859    cyanocobalamin (VITAMIN B12) injection 1,000 mcg  1,000 mcg IntraMUSCular EVERY MONTH Demi Botello MD   1,000 mcg at 03/26/18 1451    gabapentin (NEURONTIN) capsule 300 mg  300 mg Oral DAILY Demi Botello MD   300 mg at 03/27/18 0859    levothyroxine (SYNTHROID) tablet 150 mcg  150 mcg Oral ACB Demi Botello MD   150 mcg at 03/27/18 0702    famotidine (PEPCID) tablet 20 mg  20 mg Oral QHS Morelia Jaime MD   20 mg at 03/26/18 2109    sodium chloride (NS) flush 5-10 mL  5-10 mL IntraVENous Q8H Morelia Jaime MD   10 mL at 03/27/18 0703    sodium chloride (NS) flush 5-10 mL  5-10 mL IntraVENous PRN Morelia Jaime MD        ondansetron (ZOFRAN) injection 4 mg  4 mg IntraVENous Q4H PRN Morelia Jaime MD         Facility-Administered Medications Ordered in Other Encounters   Medication Dose Route Frequency Provider Last Rate Last Dose    [START ON 3/28/2018] tbo-filgrastim (GRANIX) injection 300 mcg  300 mcg SubCUTAneous ONCE [place] Modest, NP           Allergies   Allergen Reactions    Rituxan [Rituximab] Hives       Review of Systems    A comprehensive review of systems was negative except for as noted above. Objective:  Visit Vitals    /67 (BP 1 Location: Left arm, BP Patient Position: At rest)    Pulse 74    Temp 98 °F (36.7 °C)    Resp 16    Wt 160 lb (72.6 kg)    LMP  (LMP Unknown)    SpO2 99%    BMI 29.26 kg/m2     Physical Exam:   General appearance - Well developed, conversant  Mental status - alert, oriented   Head - superficial masses right temporal area / scalp on right   EYE-conj clear no swelling in eyes. Right eyelid droop noted. Neck - supple  Skin-Warm and dry. Intact, no rash. Neuro -  Awake, alert, oriented. Exam is non-focal.    Diagnostic Imaging   reviewed    2/21/18 PET:  IMPRESSION:   1. Increasing right parietal soft tissue activity. 2. Progression in the bilateral areas of hypermetabolic pleural thickening. 3. New bilateral hypermetabolic axillary lymph nodes and subcarinal lymph node  and progression of retroperitoneal and left pelvic lymphadenopathy. 4. Improvement in the left inguinal lymphadenopathy  5. Improvement in the right parieto-occipital scalp activity. 6. New hypermetabolic soft tissue abnormality left iliac fossa. 7. Stable left cervical hypermetabolic lymph nodes.     2/17/18 PET:  MPRESSION:   1. Findings suspicious for a uterine mass with a soft tissue mass in the left  pelvis and extending into the retroperitoneum concerning for malignancy. Lymphoma is a differential consideration given the patient's history. The mass  encases but does not narrow the aortic bifurcation and IVC. 2. Enlarged left inguinal lymph node is decreased in size since 8/30/2017.  3. Increased large left pleural effusion with left lower lung atelectasis. Lab Results    reviewed  Lab Results   Component Value Date/Time    WBC 19.8 (H) 03/27/2018 01:15 AM    HGB 9.4 (L) 03/27/2018 01:15 AM    HCT 28.4 (L) 03/27/2018 01:15 AM    PLATELET 638 75/51/4897 01:15 AM    MCV 93.1 03/27/2018 01:15 AM       Lab Results   Component Value Date/Time    Sodium 139 03/27/2018 01:15 AM    Potassium 3.4 (L) 03/27/2018 01:15 AM    Chloride 103 03/27/2018 01:15 AM    CO2 30 03/27/2018 01:15 AM    Anion gap 6 03/27/2018 01:15 AM    Glucose 167 (H) 03/27/2018 01:15 AM    BUN 31 (H) 03/27/2018 01:15 AM    Creatinine 1.09 (H) 03/27/2018 01:15 AM    BUN/Creatinine ratio 28 (H) 03/27/2018 01:15 AM    GFR est AA >60 03/27/2018 01:15 AM    GFR est non-AA 50 (L) 03/27/2018 01:15 AM    Calcium 9.5 03/27/2018 01:15 AM    AST (SGOT) 19 03/25/2018 03:17 AM    Alk. phosphatase 61 03/25/2018 03:17 AM    Protein, total 5.7 (L) 03/25/2018 03:17 AM    Albumin 2.8 (L) 03/25/2018 03:17 AM    Globulin 2.9 03/25/2018 03:17 AM    A-G Ratio 1.0 (L) 03/25/2018 03:17 AM    ALT (SGPT) 8 (L) 03/25/2018 03:17 AM     Assessment/Plan:    1. NHL low grade follicular stage 3 dx in 2010   Hx of one dose of rituxan in past with side effects and pt did not want any further therapy. PET 5/16 good post chlorambucil 4/16. PET 10/2016 was stable. Most recent PET scan 2/22/17 showed progression of disease both above and below the level of the diaphragm. Pt had repeat chlorambucil 5/17 due to progressive disease on CTs.    Back of head mass/ left groin mass was almost gone. PET 5/17 and good response to tx. Over summer, pt experienced more scalp lesions with high SUV activity. PET 8/17 showed progressive disease throughout. Pt wanted to try treatment / acyclovir first to see if this treatment affects scalp/head LNs. Acyclovir did not help. Last chlorambucil chemo was 1/18. PET 2/18 now shows diffuse progressive disease/ some mixed. Pt is post thoracentesis on 3/1/18 and cytology now shows likely transformation to high grade / DLBCL. She experienced vaginal bleeding/uterine mass and biopsy of the vaginal wall showed high grade follicular lymphoma. CT's 3/20/18 show rapid progression of disease, now with carcinomatosis, abdominal/pelvic masses, and lymphadenopathy. She agreed to start chemotherapy with CHOP, which was started 3/23/18. S/p Rasburicase x 1 on 3/23/18. --Allopurinol increased to 200 mg every 8 hours per nephrology. Uric acid pending from today. -- Continue Prednisone 100mg PO daily. She is currently on day 5 out of 5 today. --She started filgrastim 5 mcg/kg daily for 3 days yesterday. She can receive today's dose this afternoon prior to discharge and will order one additional dose to be given tomorrow in the infusion center. Will set patient up for port placement prior to cycle 2 chemotherapy. PICC can be removed prior to discharge. 2. Use of cardiotoxic medications: ECHO completed 3/22/18 shows EF 55 to 60% pre-chemotherapy. Followed by cardiology. 3.  Abdominal distention. Secondary to malignancy. US guided paracentesis yielded low volume of fluid. Symptoms secondary to malignancy and chemotherapy may help with symptoms. Some improvement with diuretics. 4. Shortness of breath: s/p thoracentesis with + cytology. Chest x-ray today shows stable pleural effusion. She remains short of breath but reports this is better. Likely due to combination of volume overload and disease. 5. Renal insufficiency.  Creatinine stable at 1.09 today.  Followed by nephrology and felt to be secondary to spontaneous TLS +/- contrast nephropathy. 6. Tumor lysis prophylaxis. Concern for spontaneous TLS as well as tumor lysis induced by chemotherapy. She received a dose of rasburicase 3/23/18 (flat dose of 3 mg) and now continues on allopurinol. Uric acid pending from today. 7. History of VSD. ECHO shows EF 55 to 60%. Cardiology following. Patient is to be discharged today. Will will see her outpatient. Please call with any questions. .   Pt seen today in conjunction with CHIQUI Daniels  Case dw hospitalist today   Pt for d/c today and outpt f/u with us.      Shannan Gastelum,

## 2018-03-27 NOTE — PROGRESS NOTES
Spiritual Care Partner Volunteer visited patient in [facility] [id number] on 3/27/2018.   Documented by:  Chaplain Wilson MDiv, MS, [address] ([id number])

## 2018-03-27 NOTE — PROGRESS NOTES
10:37 AM  CM notified that patient has orders for discharge. There are no CM consults or needs. Patient to discharge home with family assistance. Mode of transport at time of discharge to be provided by patient's family.     Clearance NORMAN Mccarthy/KARI

## 2018-03-27 NOTE — TELEPHONE ENCOUNTER
Call to clarify that patient has appt for next dose of neupogen. Advised order scanned to media and has appt 3/28/18 for injection with [facility].

## 2018-03-28 NOTE — TELEPHONE ENCOUNTER
Patient presented to clinic for appt with NP post hospital DC. Per NP patient will be seen in [facility]. Patient directed.

## 2018-03-28 NOTE — PROGRESS NOTES
Stopped by to see patient in the infusion center. She is sitting in the waiting room, just received her filgrastim injection. She reports she is feeling well, no new symptoms; however, she has noticed left foot swelling does not seem to improve. Left foot is now more swollen than the right. Doppler ordered to be completed this afternoon to r/o DVT. Reviewed medication list and appointments. Encouraged patient to reschedule hospital follow-up appt with PCP since she missed this today. Advised she can stop potassium for now as directed by hospitalist at discharge. Advised she can stop allopurinol as uric acid was good and she is now 5 days out from chemotherapy. She denies additional questions at this time. Will plan to see patient in the office next week.

## 2018-03-28 NOTE — PROGRESS NOTES
Mariel[facility] [address] VISIT NOTE    2848 Patient arrived ambulatory for Granix without any acute problems, Patient tolerated medication/treatment well. Please see connect care for full assessments. Medications Administered:  1. Granix    Patient discharged ambulatory without incident. Patient is aware she has no future appointments scheduled in the \[facility]\"" at this time.     Vital Signs:   Patient Vitals for the past 4 hrs:   Temp Pulse Resp BP   03/28/18 1414 97.9 °F (36.6 °C) 68 18 148/65

## 2018-03-28 NOTE — PROGRESS NOTES
Problem: Knowledge Deficit  Goal: *Verbalizes understanding of procedures and medications  Outcome: Progressing Towards Goal  Granix

## 2018-03-29 NOTE — PROGRESS NOTES
HIPAA verified. Advised of provider note. Aware that port to be inserted. Stated leg is less swollen and is feeling better. Thanked for call.

## 2018-03-30 NOTE — PROGRESS NOTES
2:10 PM    Pt chart accessed to review all areas including but not limited to pt history, test results, labs, and orders in preparation for possible Angio/Cath/EP procedure.

## 2018-04-02 NOTE — TELEPHONE ENCOUNTER
Pain may be secondary to malignancy as patient had bulky disease. Can add hydrocodone if needed for pain control. Patient would need to  this prescription. Patient should call with new shortness of breath, fevers, or worsened pain, or chest pain.

## 2018-04-02 NOTE — PROGRESS NOTES
CHOP orders for cycle 2-6 faxed complete to [facility] Scheduling for 4/13/18 appointment. Port insert 4/2/18.

## 2018-04-02 NOTE — PROGRESS NOTES
1:28 PM Pt's. Chart reviewed possibly including viewing of labs, test results, imaging results and history and physical for possible cath/angio/EP procedure.

## 2018-04-02 NOTE — TELEPHONE ENCOUNTER
HIPAA verified. Reviewed meds scripted in 3/27/18. Patient verbalized understanding. Stated dull aching pain abd and under shoulder blade/chest since hospital DC. Stated taking tylenol every 6 hours with some relief. Rates pain 7-8/10. Also stated is not sleeping well. Advised would forward to provider for recommendation.

## 2018-04-03 NOTE — TELEPHONE ENCOUNTER
Hydrocodone-acetaminophen prescribed. Patient should avoid additional Tylenol while taking this. She should call if pain continues or worsens.

## 2018-04-03 NOTE — IP AVS SNAPSHOT
[address] [facility], [place], operated by [facility] 
 
 
 [address] [facility] 
[phone] Patient: Ami Slade MRN: [id number] About your hospitalization You were admitted on:  April 3, 2018 You last received care in the:  [facility] RAD ANGIO IR You were discharged on:  April 3, 2018 Why you were hospitalized Your primary diagnosis was:  Not on File Follow-up Information None Your Scheduled Appointments Thursday April 05, 2018 12:30 PM EDT Follow Up with Angella Green  [facility] On[facility] at [facility] YU) [address] [place] [address]  
[phone] Friday April 13, 2018  8:00 AM EDT Infusion with RM 102B - CHAIR [facility] - [address] ([address]) [address] [phone]  
[phone] Go to [address], ground floor. Saturday April 14, 2018 11:00 AM EDT Infusion with RM 102C - CHAIR BREMO [address] [place] ([address]) [address] [phone] Go to [address], ground floor. Friday May 04, 2018 10:00 AM EDT Infusion with RM 102C - CHAIR BRE[facility] [address] [place] ([address]) [address] [facility] [address] [phone] Go to [address], ground floor. Saturday May 05, 2018 11:00 AM EDT Infusion with RM 102A- CHAIR BREMO [facility] - [address] ([address]) [address] [phone] Go to [address], ground floor. Friday May 25, 2018 10:00 AM EDT Infusion with RM 102C - CHAIR BREMO [address] [place] ([address]) [address] [address]  
[phone] Go to [address], ground floor. Saturday May 26, 2018 11:00 AM EDT Infusion with RM 102A- CHAIR [address] ([address]) [address] [phone] Go to [address], ground floor. Discharge Orders None A check amol indicates which time of day the medication should be taken. My Medications ASK your doctor about these medications Instructions Each Dose to Equal  
 Morning Noon Evening Bedtime  
 allopurinol 100 mg tablet Commonly known as:  Andrae Morales Your last dose was: Your next dose is: Take 2 Tabs by mouth every eight (8) hours for 30 days. 200 mg  
    
   
   
   
  
 atenolol 25 mg tablet Commonly known as:  TENORMIN Your last dose was: Your next dose is: Take 25 mg by mouth daily. 25 mg  
    
   
   
   
  
 cholecalciferol 1,000 unit tablet Commonly known as:  VITAMIN D3 Your last dose was: Your next dose is: Take  by mouth daily. docusate sodium 100 mg capsule Commonly known as:  Neetu Second Your last dose was: Your next dose is:    
   
   
 DAILY PRN  
     
   
   
   
  
 gabapentin 300 mg capsule Commonly known as:  NEURONTIN Your last dose was: Your next dose is: Take 1 Cap by mouth daily for 30 days. Indications: NEUROPATHIC PAIN, POSTHERPETIC NEURALGIA  
 300 mg  
    
   
   
   
  
 levothyroxine 150 mcg tablet Commonly known as:  SYNTHROID Your last dose was: Your next dose is:    
   
   
 150 mcg Daily (before breakfast). 150 mcg  
    
   
   
   
  
 ondansetron 8 mg disintegrating tablet Commonly known as:  ZOFRAN ODT  
 Your last dose was: Your next dose is: Take 1 Tab by mouth every eight (8) hours as needed for Nausea. 8 mg  
    
   
   
   
  
 raNITIdine 150 mg tablet Commonly known as:  ZANTAC Your last dose was: Your next dose is: Take 150 mg by mouth nightly. 150 mg  
    
   
   
   
  
 VITAMIN B-12 1,000 mcg/mL injection Generic drug:  cyanocobalamin Your last dose was: Your next dose is:    
   
   
 1,000 mcg by IntraMUSCular route every month. 1000 mcg Discharge Instructions None Introducing [facility] SERVICES! [employer] introduces [employer] patient portal. Now you can access parts of your medical record, email your doctor's office, and request medication refills online. 1. In your internet browser, go to [email or web address] 2. Click on the First Time User? Click Here link in the Sign In box. You will see the New Member Sign Up page. 3. Enter your [employer] Access Code exactly as it appears below. You will not need to use this code after youve completed the sign-up process. If you do not sign up before the expiration date, you must request a new code. · [employer] Access Code: [email or web address] Expires: 5/17/2018  3:45 PM 
 
4. Enter the last four digits of your Social Security Number (xxxx) and Date of Birth (mm/dd/yyyy) as indicated and click Submit. You will be taken to the next sign-up page. 5. Create a [employer] ID. This will be your [employer] login ID and cannot be changed, so think of one that is secure and easy to remember. 6. Create a [employer] password. You can change your password at any time. 7. Enter your Password Reset Question and Answer. This can be used at a later time if you forget your password. 8. Enter your e-mail address. You will receive e-mail notification when new information is available in [address] 9. Click Sign Up. You can now view and download portions of your medical record. 10. Click the Download Summary menu link to download a portable copy of your medical information. If you have questions, please visit the Frequently Asked Questions section of the [employer] website. Remember, [employer] is NOT to be used for urgent needs. For medical emergencies, dial 911. Now available from your iPhone and Android! Introducing Kieran Mckay As a [facility] patient, I wanted to make you aware of our electronic visit tool called Kieran Mckay. [facility] allows you to connect within minutes with a medical provider 24 hours a day, seven days a week via a mobile device or tablet or logging into a secure website from your computer. You can access Kieran Mcaky from anywhere in the [place]. A virtual visit might be right for you when you have a simple condition and feel like you just dont want to get out of bed, or cant get away from work for an appointment, when your regular [facility] [facility] provider is not available (evenings, weekends or holidays), or when youre out of town and need minor care. Electronic visits cost only $49 and if the [facility] provider determines a prescription is needed to treat your condition, one can be electronically transmitted to a nearby pharmacy*. Please take a moment to enroll today if you have not already done so. The enrollment process is free and takes just a few minutes. To enroll, please download the [employer] toy to your tablet or phone, or visit [email or web address] to enroll on your computer. And, as an [address] [place] [address] patient with a [employer] It account, the results of your visits will be scanned into your electronic medical record and your primary care provider will be able to view the scanned results.    
We urge you to continue to see your regular [facility] provider for your ongoing medical care. And while your primary care provider may not be the one available when you seek a Kieran Mckay virtual visit, the peace of mind you get from getting a real diagnosis real time can be priceless. For more information on Kieran Mckay, view our Frequently Asked Questions (FAQs) at [email or web address]. Sincerely, 
 
Shahzad Islas MD 
Chief Medical Officer Mely Ovalle *:  certain medications cannot be prescribed via Kieran Liulewiscecilia Unresulted Labs-Please follow up with your PCP about these lab tests Order Current Status IR INSERT TUNL CVC W PORT OVER 5 YEARS In process Providers Seen During Your Hospitalization Provider Specialty Primary office phone Ian East NP Nurse Practitioner [phone] Isael Felton MD Radiology [phone] Your Primary Care Physician (PCP) Primary Care Physician Office Phone Office Fax Balta Esquivel [phone] You are allergic to the following Allergen Reactions Rituxan (Rituximab) Hives Recent Documentation Height Weight Breastfeeding? BMI OB Status Smoking Status 1.575 m 67.6 kg No 27.26 kg/m2 Postmenopausal Current Every Day Smoker Emergency Contacts Name Discharge Info Relation Home Work Mobile [facility] DISCHARGE CAREGIVER [3] Daughter [21] [phone] Patient Belongings The following personal items are in your possession at time of discharge: 
     Visual Aid: None Discharge Instructions Attachments/References PORT: IMPLANTED: POST-OP (ENGLISH) Patient Handouts Implanted Port: What to Expect at [facility] Your Recovery You have had a procedure to implant a port. A port is a device placed, in most cases, under the skin of your chest below your collarbone. It is made of plastic, stainless steel, or titanium.  It's about the size of a quarter, but thicker. It looks like a small bump under your skin. A thin, flexible tube called a catheter runs under the skin from the port into a large vein. With the port, you will be able to get medicines (such as chemotherapy) with more comfort. You also can get blood, nutrients, or other fluids. Blood can be taken through the port for tests. You will probably have some discomfort and bruising at the port site. This will go away in a few days. The port can be used right away. You may have the port for weeks, months, or longer. Your port will need to be flushed out regularly to keep it open. A nurse or other health professional will do this for you. This care sheet gives you a general idea about how long it will take for you to recover. But each person recovers at a different pace. Follow the steps below to feel better as quickly as possible. How can you care for yourself at home? Activity ? · Avoid arm and upper body movements that may pull on the catheter. These movements include heavy weight lifting and vigorous use of your arms. ? · You will probably need to take 1 day off from work and will be able to return to normal activities shortly after. This depends on the type of work you do, why you have the catheter, and how you feel. ? · You probably will be able to take baths and swim. But you may need to avoid some activities. Talk to your doctor about any limits on your activity. ? · Ask your doctor when you can drive again. Be careful when you pull your seat belt across your chest so it doesn't pull out the catheter. It's okay if the seat belt lays over the catheter. Medicines ? · Your doctor will tell you if and when you can restart your medicines. He or she will also give you instructions about taking any new medicines. ? · If you take blood thinners, such as warfarin (Coumadin), clopidogrel (Plavix), or aspirin, be sure to talk to your doctor.  He or she will tell you if and when to start taking those medicines again. Make sure that you understand exactly what your doctor wants you to do. ? · Be safe with medicines. Read and follow all instructions on the label. ¨ If the doctor gave you a prescription medicine for pain, take it as prescribed. ¨ If you are not taking a prescription pain medicine, ask your doctor if you can take an over-the-counter medicine. Incision care ? · If you have a bandage, your doctor will tell you when you can remove it. After you remove the bandage, you may shower. Wash the area with soap and water and pat it dry. Don't use hydrogen peroxide or alcohol, which can slow healing. You may cover the area with a gauze bandage if it weeps or rubs against clothing. Change the bandage every day. ? · If you have strips of tape on the cut (incision) the doctor made, leave the tape on for a week or until it falls off. Other instructions ? · Always carry the medical alert card that your doctor gives you. It contains information about your port. It will tell health care workers that you have a port in case you need emergency care. ? · When you get dressed, be careful not to rub the port. Do not wear a bra or suspenders that irritate your skin near the port. ? · Do not have your blood pressure taken on the arm with the port. Follow-up care is a key part of your treatment and safety. Be sure to make and go to all appointments, and call your doctor if you are having problems. It's also a good idea to know your test results and keep a list of the medicines you take. When should you call for help? Call your doctor now or seek immediate medical care if: 
? · You have signs of infection, such as: 
¨ Increased pain, swelling, warmth, or redness. ¨ Red streaks leading from the area. ¨ Pus draining from the area. ¨ A fever. ? · You have pain or swelling in your neck or arm. ? · You have trouble breathing. ?Watch closely for changes in your health, and be sure to contact your doctor if: 
? · You have any problems with your line or port. Where can you learn more? Go to http://henny-vicky.info/. Enter M256 in the search box to learn more about \"Implanted Port: What to Expect at Home. \" Current as of: March 20, 2017 Content Version: 11.4 © 7936-7697 Healthwise, [employer]orporated. Care instructions adapted under license by [employer] (which disclaims liability or warranty for this information). If you have questions about a medical condition or this instruction, always ask your healthcare professional. [address] any warranty or liability for your use of this information. Please provide this summary of care documentation to your next provider. Signatures-by signing, you are acknowledging that this After Visit Summary has been reviewed with you and you have received a copy. Patient Signature:  ____________________________________________________________ Date:  ____________________________________________________________  
  
Ami Veronica Provider Signature:  ____________________________________________________________ Date:  ____________________________________________________________

## 2018-04-03 NOTE — TELEPHONE ENCOUNTER
Dylan returned from daughter. HIPAA verified. Patient tolerated port insert well, but would like additional pain med. Reviewed provider note. OK for daughter to  script per patient. Advised would forward to provider. Daughter to proceed to our office to  script.

## 2018-04-03 NOTE — H&P
Interventional Radiology History and Physical (Outpatient)    4/3/2018    Patient: Aleena Terrell 75 y.o. female     Referring Physician:  Christoph Michaels NP    Chief Complaint: presents for Portacath placement    History of Present Illness: lymphoma    History:  Past Medical History:   Diagnosis Date    Anemia NEC     Arrhythmia     VSD; TRICUSPID REGURG    Cancer ([facility] 2010    low grade NHL    GERD (gastroesophageal reflux disease)     Hypertension     NHL (non-Hodgkin's lymphoma) ([facility] 2010    received chemo and radiation. not in remission.  Pleural effusion 02/21/2018    LEFT    Pulmonary hypertension ([facility] 02/21/2018    PER CARDIOLOGY NOTES FROM PTS VISIT ON 2/21/18    Thyroid disease      Family History   Problem Relation Age of Onset    Heart Disease Mother     Kidney Disease Mother     Alcohol abuse Father     Liver Disease Father     Cancer Sister      Breast    No Known Problems Brother     Cancer Sister      Colon    No Known Problems Sister     No Known Problems Sister     No Known Problems Daughter     Anesth Problems Neg Hx      Social History     Social History    Marital status:      Spouse name: N/A    Number of children: N/A    Years of education: N/A     Occupational History    Not on file. Social History Main Topics    Smoking status: Current Every Day Smoker     Packs/day: 0.50     Years: 45.00     Types: Cigarettes    Smokeless tobacco: Former User     Quit date: 12/1/2015    Alcohol use Yes      Comment: Rare    Drug use: No    Sexual activity: No      Comment:  has one child     Other Topics Concern    Not on file     Social History Narrative       Allergies: Allergies   Allergen Reactions    Rituxan [Rituximab] Hives       Prior to Admission Medications:  Prior to Admission medications    Medication Sig Start Date End Date Taking? Authorizing Provider   gabapentin (NEURONTIN) 300 mg capsule Take 1 Cap by mouth daily for 30 days. Indications: NEUROPATHIC PAIN, POSTHERPETIC NEURALGIA 3/27/18 4/26/18 Yes Jessica Colon MD   allopurinol (ZYLOPRIM) 100 mg tablet Take 2 Tabs by mouth every eight (8) hours for 30 days. 3/27/18 4/26/18 Yes Jessica Colon MD   ondansetron (ZOFRAN ODT) 8 mg disintegrating tablet Take 1 Tab by mouth every eight (8) hours as needed for Nausea. 3/27/18  Yes Isis Markham NP   levothyroxine (SYNTHROID) 150 mcg tablet 150 mcg Daily (before breakfast). 6/22/17  Yes Historical Provider   raNITIdine (ZANTAC) 150 mg tablet Take 150 mg by mouth nightly. 6/20/17  Yes Historical Provider   docusate sodium (COLACE) 100 mg capsule DAILY PRN 7/20/09  Yes Historical Provider   cholecalciferol (VITAMIN D3) 1,000 unit tablet Take  by mouth daily. Yes Historical Provider   cyanocobalamin (VITAMIN B-12) 1,000 mcg/mL injection 1,000 mcg by IntraMUSCular route every month. Yes Historical Provider   atenolol (TENORMIN) 25 mg tablet Take 25 mg by mouth daily. Yes Historical Provider       Physical Exam:    Blood pressure 159/52, pulse 74, temperature 98.2 °F (36.8 °C), resp. rate 16, height 5' 2\" (1.575 m), weight 67.6 kg (149 lb 0.5 oz), SpO2 98 %, not currently breastfeeding. General: alert, cooperative, no distress, appears stated age  Heart: Regular rate and rhythm. Systolic murmur. Lungs: clear to auscultation bilaterally    Plan of Care/Planned Procedure:  Risks, benefits, and alternatives reviewed with patient and she agrees to proceed with the procedure.      Nohelia Rawls MD

## 2018-04-03 NOTE — PROGRESS NOTES
1010    Patient arrived. ID and allergies verified verbally with patient. Pt voices understanding of procedure to be performed. Consent obtained. Pt prepped for procedure. Port placement    1130    TRANSFER - OUT REPORT:    Verbal report given to Verónica Yanes RN(name) on Noe Gun  being transferred to angio (unit) for routine progression of care       Report consisted of patients Situation, Background, Assessment and   Recommendations(SBAR). Information from the following report(s) SBAR was reviewed with the receiving nurse. Lines:   Peripheral IV 04/03/18 Right Antecubital (Active)        Opportunity for questions and clarification was provided. Patient transported with:   Registered Nurse        [place]lin REPORT:    Verbal report received from Nathan Hawley RN(name) on Noe Gun  being received from angio (unit) for routine progression of care      Report consisted of patients Situation, Background, Assessment and   Recommendations(SBAR). Information from the following report(s) SBAR was reviewed with the receiving nurse. Opportunity for questions and clarification was provided. Assessment completed upon patients arrival to unit and care assumed. 1325    . Pt discharged via wheeled chair with family. Personal belongings with patient upon discharge.

## 2018-04-05 NOTE — PROGRESS NOTES
CC NHL Dx  10/10  Transformation 3/18 with pleural effusion    TREATMENT COURSE:  Orbit radiation  Chlorambucil x 1 cycle 4/16. Cycle 2 3/17. Cycle 3 4/17   Cycle 4 1/18. Attempted one dose of rituxan. HISTORY OF PRESENT ILLNESS:  Ms. Karyle Lema is a 74 y/o female with high grade lymphoma admitted 3/20/18 and post CHOP cycle 1 in hospital.   Pt due for cycle 2 next week. Had bad hemorrhoids but better now. Pt is feeling better overall. SOB better. Leg edema better. Belly swelling better. Bumps on head better also. Just got port and has dressing. Past Medical History:   Diagnosis Date    Anemia NEC     Arrhythmia     VSD; TRICUSPID REGURG    Cancer ([address] 2010    low grade NHL    GERD (gastroesophageal reflux disease)     Hypertension     NHL (non-Hodgkin's lymphoma) ([address] 2010    received chemo and radiation. not in remission.     Pleural effusion 02/21/2018    LEFT    Pulmonary hypertension ([facility] [address] 02/21/2018    PER CARDIOLOGY NOTES FROM PTS VISIT ON 2/21/18    Thyroid disease      Past Surgical History:   Procedure Laterality Date    HX CATARACT REMOVAL  6/2015 & 7/2015    M[facility]Y    HX CHOLECYSTECTOMY  1982    HX DILATION AND CURETTAGE      HX HEENT  2005    RIGHT EAR     HX OTHER SURGICAL      LEFT PAROTIDECTOMY    NH RPLCMT PROST AORTIC VALVE OPEN XCP HOMOGRF/STENT  1972    STENT PLACED TO STRAIGHTEN THE AORTA     Social History     Social History    Marital status:      Spouse name: N/A    Number of children: N/A    Years of education: N/A     Social History Main Topics    Smoking status: Current Every Day Smoker     Packs/day: 0.50     Years: 45.00     Types: Cigarettes    Smokeless tobacco: Former User     Quit date: 12/1/2015    Alcohol use Yes      Comment: Rare    Drug use: No    Sexual activity: No      Comment:  has one child     Other Topics Concern    None     Social History Narrative     Family History   Problem Relation Age of Onset    Heart Disease Mother     Kidney Disease Mother     Alcohol abuse Father     Liver Disease Father     Cancer Sister      Breast    No Known Problems Brother     Cancer Sister      Colon    No Known Problems Sister     No Known Problems Sister     No Known Problems Daughter     Anesth Problems Neg Hx        Current Outpatient Prescriptions   Medication Sig Dispense Refill    gabapentin (NEURONTIN) 300 mg capsule Take 1 Cap by mouth daily for 30 days. Indications: NEUROPATHIC PAIN, POSTHERPETIC NEURALGIA 30 Cap 0    allopurinol (ZYLOPRIM) 100 mg tablet Take 2 Tabs by mouth every eight (8) hours for 30 days. 180 Tab 0    ondansetron (ZOFRAN ODT) 8 mg disintegrating tablet Take 1 Tab by mouth every eight (8) hours as needed for Nausea. 30 Tab 2    levothyroxine (SYNTHROID) 150 mcg tablet 150 mcg Daily (before breakfast).  raNITIdine (ZANTAC) 150 mg tablet Take 150 mg by mouth nightly.  docusate sodium (COLACE) 100 mg capsule DAILY PRN      cholecalciferol (VITAMIN D3) 1,000 unit tablet Take  by mouth daily.  cyanocobalamin (VITAMIN B-12) 1,000 mcg/mL injection 1,000 mcg by IntraMUSCular route every month.  atenolol (TENORMIN) 25 mg tablet Take 25 mg by mouth daily.  amoxicillin-clavulanate (AUGMENTIN) 875-125 mg per tablet       KLOR-CON M20 20 mEq tablet       HYDROcodone-acetaminophen (NORCO) 5-325 mg per tablet Take 1 Tab by mouth every six (6) hours as needed for Pain. Max Daily Amount: 4 Tabs. 50 Tab 0       Allergies   Allergen Reactions    Rituxan [Rituximab] Hives       Review of Systems    A comprehensive review of systems was negative except for as noted above.     Objective:  Visit Vitals    /76    Pulse 66    Temp 97.5 °F (36.4 °C) (Oral)    Resp 16    Ht 5' 2\" (1.575 m)    Wt 151 lb 12.8 oz (68.9 kg)    LMP  (LMP Unknown)    SpO2 97%    BMI 27.76 kg/m2     Physical Exam:   General appearance - Well developed, conversant  Mental status - alert, oriented EYE-conj clear   Neck - supple  CV regular with murmur  resp clear  GI soft NT less distention  Ext with edema better  Skin-Warm and dry. Intact, no rash. Neuro -  Awake, alert, oriented. Exam is non-focal.  Port site healing well with redness    Diagnostic Imaging   reviewed    2/21/18 PET:  IMPRESSION:   1. Increasing right parietal soft tissue activity. 2. Progression in the bilateral areas of hypermetabolic pleural thickening. 3. New bilateral hypermetabolic axillary lymph nodes and subcarinal lymph node  and progression of retroperitoneal and left pelvic lymphadenopathy. 4. Improvement in the left inguinal lymphadenopathy  5. Improvement in the right parieto-occipital scalp activity. 6. New hypermetabolic soft tissue abnormality left iliac fossa. 7. Stable left cervical hypermetabolic lymph nodes. 2/17/18 PET:  MPRESSION:   1. Findings suspicious for a uterine mass with a soft tissue mass in the left  pelvis and extending into the retroperitoneum concerning for malignancy. Lymphoma is a differential consideration given the patient's history. The mass  encases but does not narrow the aortic bifurcation and IVC. 2. Enlarged left inguinal lymph node is decreased in size since 8/30/2017.  3. Increased large left pleural effusion with left lower lung atelectasis.     Lab Results    reviewed  Lab Results   Component Value Date/Time    WBC 19.8 (H) 03/27/2018 01:15 AM    HGB 9.4 (L) 03/27/2018 01:15 AM    HCT 28.4 (L) 03/27/2018 01:15 AM    PLATELET 038 56/37/2894 01:15 AM    MCV 93.1 03/27/2018 01:15 AM       Lab Results   Component Value Date/Time    Sodium 139 03/27/2018 01:15 AM    Potassium 3.4 (L) 03/27/2018 01:15 AM    Chloride 103 03/27/2018 01:15 AM    CO2 30 03/27/2018 01:15 AM    Anion gap 6 03/27/2018 01:15 AM    Glucose 167 (H) 03/27/2018 01:15 AM    BUN 31 (H) 03/27/2018 01:15 AM    Creatinine 1.09 (H) 03/27/2018 01:15 AM    BUN/Creatinine ratio 28 (H) 03/27/2018 01:15 AM    GFR est AA >60 03/27/2018 01:15 AM    GFR est non-AA 50 (L) 03/27/2018 01:15 AM    Calcium 9.5 03/27/2018 01:15 AM    AST (SGOT) 19 03/25/2018 03:17 AM    Alk. phosphatase 61 03/25/2018 03:17 AM    Protein, total 5.7 (L) 03/25/2018 03:17 AM    Albumin 2.8 (L) 03/25/2018 03:17 AM    Globulin 2.9 03/25/2018 03:17 AM    A-G Ratio 1.0 (L) 03/25/2018 03:17 AM    ALT (SGPT) 8 (L) 03/25/2018 03:17 AM     Assessment/Plan:    1. 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 Pt is post hospital stay for first cycle of CHOP. Pt did well with chemo. Feeling better overall now. Will check labs. For cycle 2 chemo next week. 2. Has port now. Dressing removed. Site looks good. 3.  Abdominal distention. Secondary to malignancy. Better. 4. Shortness of breath: s/p thoracentesis with + cytology. Better now. 5. Renal insufficiency. Creatinine better. 6. Tumor lysis prophylaxis. had rasburicase 3/23/18 (flat dose of 3 mg) and allopurinol. Uric acid normal at dc/    7. History of VSD. ECHO shows EF 55 to 60%. Cardiology following. Call if questions.    F/u next chemo next week      Kari Wiggins, DO

## 2018-04-13 NOTE — PROGRESS NOTES
Outpatient Infusion Center - Chemotherapy Progress Note    7764 Pt admit to [facility] for C2 CHOP ambulatory in stable condition. Assessment completed. No new concerns voiced. This is pts first treatment at [facility], (first cycle administered in hospital). Pt oriented to [facility] and infusion process. Port accessed with positive blood return. Labs drawn per order and sent. Line flushed, clamped, Curos Cap applied to end clave. PT departs to [facility] to MD office upon returning line flushed and connected to NS.    1020 Pt labs have resulted. Informed by Dena Byrd, RN pt is okay to treat. Chemo ordered. Chemotherapy Flowsheet 4/13/2018   Cycle C2   Date 4/13/2018   Drug / Regimen CHOP   Dosage -   Pre Hydration 500 ml   Pre Meds given   Notes given       Visit Vitals    /79    Pulse 82    Temp 97.4 °F (36.3 °C)    Resp 16    Ht 5' 2.01\" (1.575 m)    Wt 67.1 kg (148 lb)    LMP  (LMP Unknown)    BMI 27.06 kg/m2       Medications:  500 ml NS bolus  Prednisone 100 mg PO  Emend  Aloxi  Vincristine over 10 min  Doxorubicin IVP  Cytoxan over 30 min    1345 Pt tolerated treatment well. Port maintained positive blood return throughout treatment, flushed with positive blood return at conclusion and de-accessed. D/c home ambulatory in no distress. Pt aware of next [facility] appointment scheduled for 04/14/18 at 1100.     Recent Results (from the past 12 hour(s))   CBC WITH AUTOMATED DIFF    Collection Time: 04/13/18  8:46 AM   Result Value Ref Range    WBC 5.6 3.6 - 11.0 K/uL    RBC 2.98 (L) 3.80 - 5.20 M/uL    HGB 9.0 (L) 11.5 - 16.0 g/dL    HCT 28.6 (L) 35.0 - 47.0 %    MCV 96.0 80.0 - 99.0 FL    MCH 30.2 26.0 - 34.0 PG    MCHC 31.5 30.0 - 36.5 g/dL    RDW 14.5 11.5 - 14.5 %    PLATELET 981 096 - 206 K/uL    MPV 8.9 8.9 - 12.9 FL    NRBC 0.4 (H) 0  WBC    ABSOLUTE NRBC 0.02 (H) 0.00 - 0.01 K/uL    NEUTROPHILS 66 32 - 75 %    LYMPHOCYTES 15 12 - 49 %    MONOCYTES 14 (H) 5 - 13 %    EOSINOPHILS 2 0 - 7 %    BASOPHILS 1 0 - 1 % IMMATURE GRANULOCYTES 2 (H) 0.0 - 0.5 %    ABS. NEUTROPHILS 3.7 1.8 - 8.0 K/UL    ABS. LYMPHOCYTES 0.8 0.8 - 3.5 K/UL    ABS. MONOCYTES 0.8 0.0 - 1.0 K/UL    ABS. EOSINOPHILS 0.1 0.0 - 0.4 K/UL    ABS. BASOPHILS 0.1 0.0 - 0.1 K/UL    ABS. IMM. GRANS. 0.1 (H) 0.00 - 0.04 K/UL    DF AUTOMATED     METABOLIC PANEL, BASIC    Collection Time: 04/13/18  8:46 AM   Result Value Ref Range    Sodium 143 136 - 145 mmol/L    Potassium 3.9 3.5 - 5.1 mmol/L    Chloride 109 (H) 97 - 108 mmol/L    CO2 27 21 - 32 mmol/L    Anion gap 7 5 - 15 mmol/L    Glucose 129 (H) 65 - 100 mg/dL    BUN 14 6 - 20 MG/DL    Creatinine 1.06 (H) 0.55 - 1.02 MG/DL    BUN/Creatinine ratio 13 12 - 20      GFR est AA >60 >60 ml/min/1.73m2    GFR est non-AA 52 (L) >60 ml/min/1.73m2    Calcium 9.0 8.5 - 10.1 MG/DL   HEPATIC FUNCTION PANEL    Collection Time: 04/13/18  8:46 AM   Result Value Ref Range    Protein, total 6.2 (L) 6.4 - 8.2 g/dL    Albumin 3.5 3.5 - 5.0 g/dL    Globulin 2.7 2.0 - 4.0 g/dL    A-G Ratio 1.3 1.1 - 2.2      Bilirubin, total 0.2 0.2 - 1.0 MG/DL    Bilirubin, direct 0.1 0.0 - 0.2 MG/DL    Alk.  phosphatase 102 45 - 117 U/L    AST (SGOT) 12 (L) 15 - 37 U/L    ALT (SGPT) 12 12 - 78 U/L

## 2018-04-13 NOTE — PROGRESS NOTES
CC NHL Dx  10/10  Transformation 3/18 with pleural effusion    TREATMENT COURSE:  Orbit radiation  Chlorambucil x 1 cycle 4/16. Cycle 2 3/17. Cycle 3 4/17   Cycle 4 1/18. Attempted one dose of rituxan. HISTORY OF PRESENT ILLNESS:  Ms. Rommel Ugarte is a 74 y/o female with high grade lymphoma admitted 3/20/18 and post CHOP cycle 1 in hospital. She is here for cycle 2 CHOP today. Overall, she is feeling much better. She has had headaches and scalp pain. She has started to lose her hair. She noticed a rash to her back last week that has resolved. Rash looked like little bumps. She has noticed slight numbness/tingling to her finger tips. She has had minimal nausea. Appetite has been good. She had diarrhea in the hospital which has resolved. Breathing is better. Edema is better. Otherwise, complete ROS is per the symptom report form which has been scanned into the media section of the electronic medical record. Past Medical History:   Diagnosis Date    Anemia NEC     Arrhythmia     VSD; TRICUSPID REGURG    Cancer ([address] 2010    low grade NHL    GERD (gastroesophageal reflux disease)     Hypertension     NHL (non-Hodgkin's lymphoma) ([address] 2010    received chemo and radiation. not in remission.     Pleural effusion 02/21/2018    LEFT    Pulmonary hypertension ([address] 02/21/2018    PER CARDIOLOGY NOTES FROM PTS VISIT ON 2/21/18    Thyroid disease      Past Surgical History:   Procedure Laterality Date    HX CATARACT REMOVAL  6/2015 & 7/2015    MERRY    HX CHOLECYSTECTOMY  1982    HX DILATION AND CURETTAGE      HX HEENT  2005    RIGHT EAR     HX OTHER SURGICAL      LEFT PAROTIDECTOMY    OR RPLCMT PROST AORTIC VALVE OPEN XCP HOMOGRF/STENT  1972    STENT PLACED TO STRAIGHTEN THE AORTA     Social History     Social History    Marital status:      Spouse name: N/A    Number of children: N/A    Years of education: N/A     Social History Main Topics    Smoking status: Current Every Day Smoker Packs/day: 0.50     Years: 45.00     Types: Cigarettes    Smokeless tobacco: Former User     Quit date: 12/1/2015    Alcohol use Yes      Comment: Rare    Drug use: No    Sexual activity: No      Comment:  has one child     Other Topics Concern    None     Social History Narrative     Family History   Problem Relation Age of Onset    Heart Disease Mother     Kidney Disease Mother     Alcohol abuse Father     Liver Disease Father     Cancer Sister      Breast    No Known Problems Brother     Cancer Sister      Colon    No Known Problems Sister     No Known Problems Sister     No Known Problems Daughter     Anesth Problems Neg Hx        Current Outpatient Prescriptions   Medication Sig Dispense Refill    gabapentin (NEURONTIN) 300 mg capsule Take 1 Cap by mouth two (2) times a day for 30 days. Indications: NEUROPATHIC PAIN, POSTHERPETIC NEURALGIA 60 Cap 2    predniSONE (DELTASONE) 20 mg tablet Take 5 tablets (100 mg) once daily with breakfast for 4 days starting the day after chemotherapy. 100 Tab 0    KLOR-CON M20 20 mEq tablet       allopurinol (ZYLOPRIM) 100 mg tablet Take 2 Tabs by mouth every eight (8) hours for 30 days. 180 Tab 0    ondansetron (ZOFRAN ODT) 8 mg disintegrating tablet Take 1 Tab by mouth every eight (8) hours as needed for Nausea. 30 Tab 2    levothyroxine (SYNTHROID) 150 mcg tablet 150 mcg Daily (before breakfast).  raNITIdine (ZANTAC) 150 mg tablet Take 150 mg by mouth nightly.  docusate sodium (COLACE) 100 mg capsule DAILY PRN      cholecalciferol (VITAMIN D3) 1,000 unit tablet Take  by mouth daily.  cyanocobalamin (VITAMIN B-12) 1,000 mcg/mL injection 1,000 mcg by IntraMUSCular route every month.  atenolol (TENORMIN) 25 mg tablet Take 25 mg by mouth daily.       magic mouthwash solution Magic mouth wash   Maalox  Lidocaine 2% viscous   Diphenhydramine oral solution     Pharmacy to mix equal portions of ingredients to a total volume as indicated in the dispense amount. Swish and spit 5 mL (1 teaspoon) four times daily as needed for mouth soreness. 240 mL 1    amoxicillin-clavulanate (AUGMENTIN) 875-125 mg per tablet       HYDROcodone-acetaminophen (NORCO) 5-325 mg per tablet Take 1 Tab by mouth every six (6) hours as needed for Pain. Max Daily Amount: 4 Tabs. 50 Tab 0       Allergies   Allergen Reactions    Rituxan [Rituximab] Hives       Review of Systems    A comprehensive review of systems was negative except for as noted above. Objective:  Visit Vitals    /71    Pulse 89    Temp 98.5 °F (36.9 °C) (Oral)    Resp 16    Ht 5' 2\" (1.575 m)    Wt 148 lb (67.1 kg)    LMP  (LMP Unknown)    SpO2 97%    BMI 27.07 kg/m2     Physical Exam:   General appearance - Well developed, conversant  Mental status - alert, oriented   EYE-conj clear   Neck - supple  CV regular with murmur  Resp clear to auscultation bilaterally  GI Round, soft, non-tender  Ext Trace bilateral lower extremity edema  Skin-Warm and dry. Intact, no rash. Neuro -  Awake, alert, oriented. Exam is non-focal.  Port site healing well with redness    Diagnostic Imaging   reviewed    2/21/18 PET:  IMPRESSION:   1. Increasing right parietal soft tissue activity. 2. Progression in the bilateral areas of hypermetabolic pleural thickening. 3. New bilateral hypermetabolic axillary lymph nodes and subcarinal lymph node  and progression of retroperitoneal and left pelvic lymphadenopathy. 4. Improvement in the left inguinal lymphadenopathy  5. Improvement in the right parieto-occipital scalp activity. 6. New hypermetabolic soft tissue abnormality left iliac fossa. 7. Stable left cervical hypermetabolic lymph nodes. 2/17/18 PET:  MPRESSION:   1. Findings suspicious for a uterine mass with a soft tissue mass in the left  pelvis and extending into the retroperitoneum concerning for malignancy. Lymphoma is a differential consideration given the patient's history.  The mass  encases but does not narrow the aortic bifurcation and IVC. 2. Enlarged left inguinal lymph node is decreased in size since 8/30/2017.  3. Increased large left pleural effusion with left lower lung atelectasis. Lab Results    reviewed  Lab Results   Component Value Date/Time    WBC 5.6 04/13/2018 08:46 AM    HGB 9.0 (L) 04/13/2018 08:46 AM    HCT 28.6 (L) 04/13/2018 08:46 AM    PLATELET 080 67/51/1017 08:46 AM    MCV 96.0 04/13/2018 08:46 AM       Lab Results   Component Value Date/Time    Sodium 143 04/13/2018 08:46 AM    Potassium 3.9 04/13/2018 08:46 AM    Chloride 109 (H) 04/13/2018 08:46 AM    CO2 27 04/13/2018 08:46 AM    Anion gap 7 04/13/2018 08:46 AM    Glucose 129 (H) 04/13/2018 08:46 AM    BUN 14 04/13/2018 08:46 AM    Creatinine 1.06 (H) 04/13/2018 08:46 AM    BUN/Creatinine ratio 13 04/13/2018 08:46 AM    GFR est AA >60 04/13/2018 08:46 AM    GFR est non-AA 52 (L) 04/13/2018 08:46 AM    Calcium 9.0 04/13/2018 08:46 AM    AST (SGOT) 12 (L) 04/13/2018 08:46 AM    Alk. phosphatase 102 04/13/2018 08:46 AM    Protein, total 6.2 (L) 04/13/2018 08:46 AM    Albumin 3.5 04/13/2018 08:46 AM    Globulin 2.7 04/13/2018 08:46 AM    A-G Ratio 1.3 04/13/2018 08:46 AM    ALT (SGPT) 12 04/13/2018 08:46 AM     Assessment/Plan:    1. 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 Pt is post hospital stay for first cycle of CHOP. Reviewed overall chemo plan. She is here for cycle 2 today. Clinically, she has improved. Labs are stable. Will proceed with chemotherapy as ordered. PET ordered to be completed after cycle 2.    2.  Abdominal distention. Secondary to malignancy. Improved, no pain at this time. 4. Shortness of breath: s/p thoracentesis with + cytology. Breathing is better. 5. Renal insufficiency. Creatinine better, 1.06 today. 6. History of VSD. ECHO shows EF 55 to 60%. Cardiology following. Follow-up in 3 weeks with cycle 3.     Seen in conjunction with Javier Kaufman NP.     Huseyin Naranjo, DO

## 2018-04-13 NOTE — PROGRESS NOTES
Call from Farida/DAVIDA. Stated patient had labs done 4/9/18. Wanted to know if should be repeated. Advised to draw as ordered with chemo today.

## 2018-04-14 NOTE — PROGRESS NOTES
[facility] SHORT VISIT NOTE:    0172 Pt arrived at [facility] ambulatory and in no distress for Neulasta. Assessment completed, no new complaints voiced. Visit Vitals    /86    Pulse 78    Temp 97.9 °F (36.6 °C)    Resp 16    LMP  (LMP Unknown)    SpO2 98%       Medications received:  Neulasta subq in left arm    1100 Tolerated treatment well, no adverse reaction noted. D/Cd from [facility] ambulatory and in no distress accompanied by daughter.   Next appt 5/4/18

## 2018-04-19 NOTE — TELEPHONE ENCOUNTER
Can send prescription for magic mouthwash to the pharmacy for patient to try. This may be mucositis from chemotherapy; however, if throat is red and painful, she may need a swab to r/o strep throat and she would need to go to PCP or urgent care for this. Please advise patient to call with any fevers.
HIPAA verified. Advised of NP note. Patient will see PCP for eval and is aware magic mouthwash to be called in. Support given.
HIPAA verified. Stated saw PCP and did not think diagnosis was strep, but is on amoxicillin. Denied fever/chills. Stated ongoing joint aches. Educated may be side effect of chemo. Patient stated is resting, taking tylenol for aches and will call with update 4/20/18. PO fluids encouraged. Thanked for call.
HIPAA verified. Stated sore throat x 3-4 days and stated throat is red. Denied fever. Also reported \"feeling kind of achy\" and weak. Encouraged po fluids and salt water gargles. Patient stated is doing recommendations with no relief. Advised would forward to provider and return call.
Pt has sore throat and would like a call back to discuss
Spoke with patient. States she is seeing physician now for sore throat. Per patient, pharmacy preference is [facility] ([place]). V.O. for magic mouthwash called to pharmacy per written order by provider.
yes

## 2018-05-04 NOTE — PROGRESS NOTES
Outpatient Infusion Center - Chemotherapy Progress Note    1000 Pt admit to [facility] for C3 CHOP ambulatory in stable condition. Assessment completed. No new concerns voiced. Supportive family at the bedside. Port accessed with positive blood return. Labs drawn per order and sent. Line flushed, clamped, Curos Cap applied to end clave. PT departs to [facility] to MD office upon returning line flushed and connected to NS bolus      Chemotherapy Flowsheet 5/4/2018   Cycle C3   Date 5/4/2018   Drug / Regimen CHOP   Dosage -   Pre Hydration 500   Pre Meds given   Notes given       Visit Vitals    /75 (BP 1 Location: Left arm, BP Patient Position: At rest)    Pulse 82    Temp 97.9 °F (36.6 °C)    Resp 18    Ht 5' 2.21\" (1.58 m)    Wt 64.9 kg (143 lb)    LMP  (LMP Unknown)    Breastfeeding No    BMI 25.98 kg/m2       Medications:  500 ml NS bolus  Prednisone 100 mg PO  Emend  Aloxi  Vincristine over 10 min  Doxorubicin IVP  Cytoxan over 30 min    1430 Pt tolerated treatment well. Port maintained positive blood return throughout treatment, flushed with positive blood return at conclusion and de-accessed. D/c home ambulatory in no distress. Pt aware of next [facility] appointment scheduled for 5/6/18     Recent Results (from the past 12 hour(s))   CBC WITH AUTOMATED DIFF    Collection Time: 05/04/18 10:04 AM   Result Value Ref Range    WBC 8.1 3.6 - 11.0 K/uL    RBC 2.59 (L) 3.80 - 5.20 M/uL    HGB 8.1 (L) 11.5 - 16.0 g/dL    HCT 26.0 (L) 35.0 - 47.0 %    .4 (H) 80.0 - 99.0 FL    MCH 31.3 26.0 - 34.0 PG    MCHC 31.2 30.0 - 36.5 g/dL    RDW 19.6 (H) 11.5 - 14.5 %    PLATELET 790 943 - 306 K/uL    MPV 9.0 8.9 - 12.9 FL    NRBC 0.0 0  WBC    ABSOLUTE NRBC 0.00 0.00 - 0.01 K/uL    NEUTROPHILS 75 32 - 75 %    LYMPHOCYTES 9 (L) 12 - 49 %    MONOCYTES 13 5 - 13 %    EOSINOPHILS 1 0 - 7 %    BASOPHILS 1 0 - 1 %    IMMATURE GRANULOCYTES 1 (H) 0.0 - 0.5 %    ABS. NEUTROPHILS 6.0 1.8 - 8.0 K/UL    ABS.  LYMPHOCYTES 0.7 (L) 0.8 - 3.5 K/UL    ABS. MONOCYTES 1.1 (H) 0.0 - 1.0 K/UL    ABS. EOSINOPHILS 0.1 0.0 - 0.4 K/UL    ABS. BASOPHILS 0.1 0.0 - 0.1 K/UL    ABS. IMM. GRANS. 0.1 (H) 0.00 - 0.04 K/UL    DF SMEAR SCANNED      RBC COMMENTS ANISOCYTOSIS  2+        RBC COMMENTS MACROCYTOSIS  1+        RBC COMMENTS OVALOCYTES  PRESENT       METABOLIC PANEL, COMPREHENSIVE    Collection Time: 05/04/18 10:04 AM   Result Value Ref Range    Sodium 141 136 - 145 mmol/L    Potassium 4.1 3.5 - 5.1 mmol/L    Chloride 110 (H) 97 - 108 mmol/L    CO2 26 21 - 32 mmol/L    Anion gap 5 5 - 15 mmol/L    Glucose 81 65 - 100 mg/dL    BUN 20 6 - 20 MG/DL    Creatinine 0.99 0.55 - 1.02 MG/DL    BUN/Creatinine ratio 20 12 - 20      GFR est AA >60 >60 ml/min/1.73m2    GFR est non-AA 56 (L) >60 ml/min/1.73m2    Calcium 8.7 8.5 - 10.1 MG/DL    Bilirubin, total 0.3 0.2 - 1.0 MG/DL    ALT (SGPT) 13 12 - 78 U/L    AST (SGOT) 13 (L) 15 - 37 U/L    Alk.  phosphatase 108 45 - 117 U/L    Protein, total 6.4 6.4 - 8.2 g/dL    Albumin 3.4 (L) 3.5 - 5.0 g/dL    Globulin 3.0 2.0 - 4.0 g/dL    A-G Ratio 1.1 1.1 - 2.2     VITAMIN B12    Collection Time: 05/04/18 10:04 AM   Result Value Ref Range    Vitamin B12 >2000 (H) 193 - 986 pg/mL   FOLATE    Collection Time: 05/04/18 10:04 AM   Result Value Ref Range    Folate 15.0 5.0 - 21.0 ng/mL   IRON PROFILE    Collection Time: 05/04/18 10:04 AM   Result Value Ref Range    Iron 63 35 - 150 ug/dL    TIBC 282 250 - 450 ug/dL    Iron % saturation 22 20 - 50 %   FERRITIN    Collection Time: 05/04/18 10:04 AM   Result Value Ref Range    Ferritin 169 8 - 252 NG/ML

## 2018-05-04 NOTE — PROGRESS NOTES
CC NHL Dx  10/10  Transformation 3/18 with pleural effusion    TREATMENT COURSE:  Orbit radiation  Chlorambucil x 1 cycle 4/16. Cycle 2 3/17. Cycle 3 4/17   Cycle 4 1/18. Attempted one dose of rituxan. 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 Vianey Sanford is a 74 y/o female with high grade lymphoma admitted 3/20/18 and post CHOP cycle 1 in hospital. She is here for cycle 3 CHOP today. She is feeling well overall. She did have a \"vasovagal\" episode this morning which she has experienced in the past. She reports she almost passed out while standing in her closet. She reports she has had vasovagal episodes in the past and this felt similar. She then started sweating, and then had an episode of nausea/vomiting. She ate and symptoms resolved. She denies chest pain. She reports she has had issues with hypoglycemia in the past    She has had no mouth sores. Appetite has been good and she has been eating and drinking. She has occasional mild nausea. She denies headaches. No shortness of breath. She has had no further swelling. The scalp lesions have resolved. Otherwise, complete ROS is per the symptom report form which has been scanned into the media section of the electronic medical record. Past Medical History:   Diagnosis Date    Anemia NEC     Arrhythmia     VSD; TRICUSPID REGURG    Cancer ([address] 2010    low grade NHL    GERD (gastroesophageal reflux disease)     Hypertension     NHL (non-Hodgkin's lymphoma) ([address] 2010    received chemo and radiation. not in remission.     Pleural effusion 02/21/2018    LEFT    Pulmonary hypertension ([address] 02/21/2018    PER CARDIOLOGY NOTES FROM PTS VISIT ON 2/21/18    Thyroid disease      Past Surgical History:   Procedure Laterality Date    HX CATARACT REMOVAL  6/2015 & 7/2015    MERRY    HX CHOLECYSTECTOMY  1982    HX DILATION AND CURETTAGE      HX HEENT  2005    RIGHT EAR     HX OTHER SURGICAL      LEFT PAROTIDECTOMY    IN RPLCMT PROST AORTIC VALVE OPEN XCP HOMOGRF/STENT  1972    STENT PLACED TO STRAIGHTEN THE AORTA     Social History     Social History    Marital status:      Spouse name: N/A    Number of children: N/A    Years of education: N/A     Social History Main Topics    Smoking status: Current Every Day Smoker     Packs/day: 0.50     Years: 45.00     Types: Cigarettes    Smokeless tobacco: Former User     Quit date: 12/1/2015    Alcohol use Yes      Comment: Rare    Drug use: No    Sexual activity: No      Comment:  has one child     Other Topics Concern    None     Social History Narrative     Family History   Problem Relation Age of Onset    Heart Disease Mother     Kidney Disease Mother     Alcohol abuse Father     Liver Disease Father     Cancer Sister      Breast    No Known Problems Brother     Cancer Sister      Colon    No Known Problems Sister     No Known Problems Sister     No Known Problems Daughter     Anesth Problems Neg Hx        Current Outpatient Prescriptions   Medication Sig Dispense Refill    gabapentin (NEURONTIN) 300 mg capsule Take 1 Cap by mouth two (2) times a day for 30 days. Indications: NEUROPATHIC PAIN, POSTHERPETIC NEURALGIA 60 Cap 2    KLOR-CON M20 20 mEq tablet       HYDROcodone-acetaminophen (NORCO) 5-325 mg per tablet Take 1 Tab by mouth every six (6) hours as needed for Pain. Max Daily Amount: 4 Tabs. 50 Tab 0    levothyroxine (SYNTHROID) 150 mcg tablet 150 mcg Daily (before breakfast).  raNITIdine (ZANTAC) 150 mg tablet Take 150 mg by mouth nightly.  docusate sodium (COLACE) 100 mg capsule DAILY PRN      cholecalciferol (VITAMIN D3) 1,000 unit tablet Take  by mouth daily.  cyanocobalamin (VITAMIN B-12) 1,000 mcg/mL injection 1,000 mcg by IntraMUSCular route every month.  atenolol (TENORMIN) 25 mg tablet Take 25 mg by mouth daily.       magic mouthwash solution Magic mouth wash   Maalox  Lidocaine 2% viscous   Diphenhydramine oral solution     Pharmacy to mix equal portions of ingredients to a total volume as indicated in the dispense amount. Swish and spit 5 mL (1 teaspoon) four times daily as needed for mouth soreness. 240 mL 1    predniSONE (DELTASONE) 20 mg tablet Take 5 tablets (100 mg) once daily with breakfast for 4 days starting the day after chemotherapy. 100 Tab 0    ondansetron (ZOFRAN ODT) 8 mg disintegrating tablet Take 1 Tab by mouth every eight (8) hours as needed for Nausea. 30 Tab 2     Facility-Administered Medications Ordered in Other Visits   Medication Dose Route Frequency Provider Last Rate Last Dose    pegfilgrastim (NEULASTA) injection 6 mg  6 mg SubCUTAneous ONCE Shirlene langley, DO           Allergies   Allergen Reactions    Rituxan [Rituximab] Hives       Review of Systems    A comprehensive review of systems was negative except for as noted above. Objective:  Visit Vitals    /71    Pulse 69    Temp 98.1 °F (36.7 °C) (Oral)    Resp 16    Ht 5' 2\" (1.575 m)    Wt 141 lb (64 kg)    LMP  (LMP Unknown)    SpO2 98%    BMI 25.79 kg/m2     Physical Exam:   General appearance - Well developed, conversant  Mental status - alert, oriented   EYE-conj clear   Neck - supple  CV regular with murmur  Resp clear to auscultation bilaterally  GI Round, soft, non-tender  Ext No lower extremity edema noted bilaterally  Skin-Warm and dry. Intact, no rash. Neuro -  Awake, alert, oriented. Exam is non-focal.  Port - Currently accessed, no erythema or swelling noted    Diagnostic Imaging   reviewed    2/21/18 PET:  IMPRESSION:   1. Increasing right parietal soft tissue activity. 2. Progression in the bilateral areas of hypermetabolic pleural thickening. 3. New bilateral hypermetabolic axillary lymph nodes and subcarinal lymph node  and progression of retroperitoneal and left pelvic lymphadenopathy.   4. Improvement in the left inguinal lymphadenopathy  5. Improvement in the right parieto-occipital scalp activity. 6. New hypermetabolic soft tissue abnormality left iliac fossa. 7. Stable left cervical hypermetabolic lymph nodes. 2/17/18 PET:  MPRESSION:   1. Findings suspicious for a uterine mass with a soft tissue mass in the left  pelvis and extending into the retroperitoneum concerning for malignancy. Lymphoma is a differential consideration given the patient's history. The mass  encases but does not narrow the aortic bifurcation and IVC. 2. Enlarged left inguinal lymph node is decreased in size since 8/30/2017.  3. Increased large left pleural effusion with left lower lung atelectasis. Lab Results    reviewed  Lab Results   Component Value Date/Time    WBC 8.1 05/04/2018 10:04 AM    HGB 8.1 (L) 05/04/2018 10:04 AM    HCT 26.0 (L) 05/04/2018 10:04 AM    PLATELET 222 84/92/7608 10:04 AM    .4 (H) 05/04/2018 10:04 AM       Lab Results   Component Value Date/Time    Sodium 141 05/04/2018 10:04 AM    Potassium 4.1 05/04/2018 10:04 AM    Chloride 110 (H) 05/04/2018 10:04 AM    CO2 26 05/04/2018 10:04 AM    Anion gap 5 05/04/2018 10:04 AM    Glucose 81 05/04/2018 10:04 AM    BUN 20 05/04/2018 10:04 AM    Creatinine 0.99 05/04/2018 10:04 AM    BUN/Creatinine ratio 20 05/04/2018 10:04 AM    GFR est AA >60 05/04/2018 10:04 AM    GFR est non-AA 56 (L) 05/04/2018 10:04 AM    Calcium 8.7 05/04/2018 10:04 AM    AST (SGOT) 13 (L) 05/04/2018 10:04 AM    Alk. phosphatase 108 05/04/2018 10:04 AM    Protein, total 6.4 05/04/2018 10:04 AM    Albumin 3.4 (L) 05/04/2018 10:04 AM    Globulin 3.0 05/04/2018 10:04 AM    A-G Ratio 1.1 05/04/2018 10:04 AM    ALT (SGPT) 13 05/04/2018 10:04 AM     Assessment/Plan:    1. 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 Pt is post hospital stay for first cycle of CHOP. She is here for cycle 3 CHOP today.     · Proceed  with C#3 of CHOP (Cyclophosphamide 750mg/m2, Doxorubicin 50mg/m2, Vincristine 1.4mg/m2 on day 1 and Prednisone 100mg on days 1-5) every 3 weeks with plans for 6 cycles. · Labs: CBC on day 1 with each cycle, CMP day 1  · Antiemetic Prophylaxis: Fosaprepitant, Dexamethasone and Palonosetron prior to chemotherapy  · PRN antiemetics at home: Ondansetron, Prochlorperazine  · Neulasta 6mg 24-72 hours after each cycle  · Return to clinic in 3 weeks    Labs and VS reviewed and are stable. PET ordered but not completed after cycle 2. She will schedule following today's cycle. 2.  Abdominal distention. Secondary to malignancy. Improved. 4. Shortness of breath: s/p thoracentesis with + cytology. Resolved. 5. Renal insufficiency. Creatinine 0.99 today. Will continue to monitor. 6. History of VSD. ECHO shows EF 55 to 60%. Cardiology following. 7. Vasovagal episode at home. She verbalizes that symptoms are consistent with prior episodes of low blood glucose. Advised to call with any further episodes. VS are stable today. 8. Anemia. Secondary to chemotherapy. She does have a history of iron deficiency anemia, will check B12 and folate levels today as well as iron studies. Hemoglobin 8.1 and stable for chemotherapy. Advised to call with increased shortness of breath or fatigue and will evaluate for need for transfusion. Follow-up in 3 weeks with cycle 4. Seen in conjunction with Kendall Teresa NP.     Kierra Lee DO

## 2018-05-06 NOTE — PROGRESS NOTES
[facility] SHORT VISIT NOTE:    1029 Pt arrived at [facility] ambulatory and in no distress for Neulasta. Assessment completed, no new complaints voiced. Visit Vitals    /77 (BP 1 Location: Right arm, BP Patient Position: At rest)    Pulse 85    Temp 96.8 °F (36 °C)    Resp 18    LMP  (LMP Unknown)       Medications received:  Neulasta subq in left arm    1045 Tolerated treatment well, no adverse reaction noted. D/Cd from [facility] ambulatory and in no distress accompanied by daughter.   Next appt 5/25/18

## 2018-05-14 NOTE — TELEPHONE ENCOUNTER
HIPAA verified. Stated PET/CT has not been scheduled. Rn offiered to schedule and patient would like call from Scheduling. Advised would forward to Scheduling. Also stated \"passed out\" Friday. \"My brother caught me. \"  Stated stood up too fast.  Last Hg 8.1 on 5/4/18. Also reported increasing abd distention and bloating over past 5 days worse with eating. Advised would forward to provider for recommendation and Scheduling for PET/CT.

## 2018-05-14 NOTE — TELEPHONE ENCOUNTER
We should check labs to see if blood transfusion needed since she has been anemic on chemotherapy. Patient should also follow-up with her cardiologist due to episode of \"passing out. \"     Move forward with PET as planned to evaluate abdominal bloating/distention. Call if symptoms worsen before then. If she is experiencing constipation, can take stool softener or Miralax. If she is having diarrhea, she can take imodium PRN.

## 2018-05-14 NOTE — TELEPHONE ENCOUNTER
Call to patient. HIPAA verified. Advised of NP recommendations. Patient verbalized understanding and will have labs done today. Will call RN if PET not scheduled. Denied constipation or diarrhea. Support given.

## 2018-05-17 NOTE — PROGRESS NOTES
Call to patient. HIPAA verified. Advised of provider note. Patient declined symptoms or need for transfusion. Will call if symptoms develop.

## 2018-05-17 NOTE — PROGRESS NOTES
Hemoglobin is a bit lower at 7.7. Can set her up for blood transfusion if she is symptomatic (shortness of breath, fatigue).

## 2018-05-25 NOTE — PROGRESS NOTES
1415: pt to angio holding for port site check. R port site cdi, per pt was used for chemotherapy today with positive blood return and no pain during treatment. Slight pink areas on right and left sides of port incision, which pt states is itchy and hurts to lay on. Tried to remove stitches, but stitches were too deep to access. Site cleaned and scab removed from R side of incision. Told pt to follow up with surgeon regarding itching, as we cannot remove stitches at this time. Port site does not look infected, no drainage, not warm to the touch, no report of fever. Pt to discharge area ambulatory.

## 2018-05-25 NOTE — TELEPHONE ENCOUNTER
Call placed to [address] [place] [address] IR, spoke with Lucia solano. Advised of need to assess port site per provider request. Appointment scheduled for today at 2:00 pm. Patient should report to admitting at front of hospital to register. Call placed to daughter. Advised of scheduled appointment.   Daughter to provide update to this office if infusion is running late & unable to make it to IR at 2:00 pm.

## 2018-05-25 NOTE — PROGRESS NOTES
Bryson Ambrosio is a 75 y.o. female here today for follow up of lymphoma. She is voicing complaints about port site. States it is uncomfortable. Reports site is red, itching. Would like area assessed today.

## 2018-05-25 NOTE — PROGRESS NOTES
[facility] - Chemotherapy Progress Note    1100 Pt admit to [facility] for C4 CHOP ambulatory in stable condition. Assessment completed. Pt complained of itching and tenderness at port incision site. There is slight redness on left side of port with one stitch remaining. Supportive family at the bedside. Port accessed with positive blood return. Labs drawn per order and sent. Line flushed, clamped, Curos Cap applied to end clave. PT departs to [facility] to MD office upon returning line flushed and connected to NS bolus      Chemotherapy Flowsheet 5/25/2018   Cycle C4   Date 5/25/2018   Drug / Regimen Chop   Dosage -   Pre Hydration 500   Pre Meds given   Notes given       Visit Vitals    /66    Pulse 78    Temp 97.1 °F (36.2 °C)    Resp 18    Ht 5' 2\" (1.575 m)    Wt 66.1 kg (145 lb 12.8 oz)    LMP  (LMP Unknown)    BMI 26.67 kg/m2       Medications:  500 ml NS bolus  Prednisone 100 mg PO  Emend  Aloxi  Vincristine over 10 min  Doxorubicin IVP  Cytoxan over 30 min    1345 Pt tolerated treatment well. Port maintained positive blood return throughout treatment, flushed with positive blood return at conclusion and de-accessed. D/c home ambulatory in no distress. Pt aware of next [facility] appointment scheduled for 5/26/18     Recent Results (from the past 12 hour(s))   CBC WITH AUTOMATED DIFF    Collection Time: 05/25/18 10:08 AM   Result Value Ref Range    WBC 7.4 3.6 - 11.0 K/uL    RBC 2.60 (L) 3.80 - 5.20 M/uL    HGB 8.4 (L) 11.5 - 16.0 g/dL    HCT 26.6 (L) 35.0 - 47.0 %    .3 (H) 80.0 - 99.0 FL    MCH 32.3 26.0 - 34.0 PG    MCHC 31.6 30.0 - 36.5 g/dL    RDW 19.7 (H) 11.5 - 14.5 %    PLATELET 581 580 - 535 K/uL    MPV 8.9 8.9 - 12.9 FL    NRBC 0.0 0  WBC    ABSOLUTE NRBC 0.00 0.00 - 0.01 K/uL    NEUTROPHILS 72 32 - 75 %    LYMPHOCYTES 12 12 - 49 %    MONOCYTES 15 (H) 5 - 13 %    EOSINOPHILS 1 0 - 7 %    BASOPHILS 0 0 - 1 %    IMMATURE GRANULOCYTES 1 (H) 0.0 - 0.5 %    ABS.  NEUTROPHILS 5. 3 1.8 - 8.0 K/UL    ABS. LYMPHOCYTES 0.9 0.8 - 3.5 K/UL    ABS. MONOCYTES 1.1 (H) 0.0 - 1.0 K/UL    ABS. EOSINOPHILS 0.1 0.0 - 0.4 K/UL    ABS. BASOPHILS 0.0 0.0 - 0.1 K/UL    ABS. IMM. GRANS. 0.1 (H) 0.00 - 0.04 K/UL    DF AUTOMATED     METABOLIC PANEL, BASIC    Collection Time: 05/25/18 10:08 AM   Result Value Ref Range    Sodium 141 136 - 145 mmol/L    Potassium 4.4 3.5 - 5.1 mmol/L    Chloride 108 97 - 108 mmol/L    CO2 26 21 - 32 mmol/L    Anion gap 7 5 - 15 mmol/L    Glucose 82 65 - 100 mg/dL    BUN 20 6 - 20 MG/DL    Creatinine 0.89 0.55 - 1.02 MG/DL    BUN/Creatinine ratio 22 (H) 12 - 20      GFR est AA >60 >60 ml/min/1.73m2    GFR est non-AA >60 >60 ml/min/1.73m2    Calcium 8.8 8.5 - 10.1 MG/DL   HEPATIC FUNCTION PANEL    Collection Time: 05/25/18 10:08 AM   Result Value Ref Range    Protein, total 6.3 (L) 6.4 - 8.2 g/dL    Albumin 3.7 3.5 - 5.0 g/dL    Globulin 2.6 2.0 - 4.0 g/dL    A-G Ratio 1.4 1.1 - 2.2      Bilirubin, total 0.2 0.2 - 1.0 MG/DL    Bilirubin, direct 0.1 0.0 - 0.2 MG/DL    Alk.  phosphatase 128 (H) 45 - 117 U/L    AST (SGOT) 11 (L) 15 - 37 U/L    ALT (SGPT) 11 (L) 12 - 78 U/L

## 2018-05-25 NOTE — PROGRESS NOTES
CC NHL Dx  10/10  Transformation 3/18 with pleural effusion    TREATMENT COURSE:  Orbit radiation  Chlorambucil x 1 cycle 4/16. Cycle 2 3/17. Cycle 3 4/17   Cycle 4 1/18. Attempted one dose of rituxan. 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 Vianey Sanford is a 76 y/o female with high grade lymphoma admitted 3/20/18 and post CHOP cycle 1 in hospital. She is here for cycle 4 CHOP today. Pt saw cardio for passing out and pt states all was good. Pt states cardio did not have any problems with chemo. Pt c/o port site pain with stitch irritating.   hgb up to 8.4 today. Pt c/o fatigue but doing ok. PET is good with decreased disease. Otherwise, complete ROS is per the symptom report form which has been scanned into the media section of the electronic medical record. Past Medical History:   Diagnosis Date    Anemia NEC     Arrhythmia     VSD; TRICUSPID REGURG    Cancer ([address] 2010    low grade NHL    GERD (gastroesophageal reflux disease)     Hypertension     NHL (non-Hodgkin's lymphoma) ([address] 2010    received chemo and radiation. not in remission.     Pleural effusion 02/21/2018    LEFT    Pulmonary hypertension ([address] 02/21/2018    PER CARDIOLOGY NOTES FROM PTS VISIT ON 2/21/18    Thyroid disease      Past Surgical History:   Procedure Laterality Date    HX CATARACT REMOVAL  6/2015 & 7/2015    MERRY    HX CHOLECYSTECTOMY  1982    HX DILATION AND CURETTAGE      HX HEENT  2005    RIGHT EAR     HX OTHER SURGICAL      LEFT PAROTIDECTOMY    [place] RPLCMT PROST AORTIC VALVE OPEN XCP HOMOGRF/STENT  1972    STENT PLACED TO STRAIGHTEN THE AORTA     Social History     Social History    Marital status:      Spouse name: N/A    Number of children: N/A    Years of education: N/A     Social History Main Topics    Smoking status: Current Every Day Smoker     Packs/day: 0.50     Years: 45.00 Types: Cigarettes    Smokeless tobacco: Former User     Quit date: 12/1/2015    Alcohol use Yes      Comment: Rare    Drug use: No    Sexual activity: No      Comment:  has one child     Other Topics Concern    None     Social History Narrative     Family History   Problem Relation Age of Onset    Heart Disease Mother     Kidney Disease Mother     Alcohol abuse Father     Liver Disease Father     Cancer Sister      Breast    No Known Problems Brother     Cancer Sister      Colon    No Known Problems Sister     No Known Problems Sister     No Known Problems Daughter     Anesth Problems Neg Hx        Current Outpatient Prescriptions   Medication Sig Dispense Refill    cephALEXin (KEFLEX) 250 mg capsule Take 1 Cap by mouth four (4) times daily. 28 Cap 0    predniSONE (DELTASONE) 20 mg tablet Take 5 tablets (100 mg) once daily with breakfast for 4 days starting the day after chemotherapy. 100 Tab 0    KLOR-CON M20 20 mEq tablet       ondansetron (ZOFRAN ODT) 8 mg disintegrating tablet Take 1 Tab by mouth every eight (8) hours as needed for Nausea. 30 Tab 2    levothyroxine (SYNTHROID) 150 mcg tablet 150 mcg Daily (before breakfast).  raNITIdine (ZANTAC) 150 mg tablet Take 150 mg by mouth nightly.  docusate sodium (COLACE) 100 mg capsule DAILY PRN      cholecalciferol (VITAMIN D3) 1,000 unit tablet Take  by mouth daily.  cyanocobalamin (VITAMIN B-12) 1,000 mcg/mL injection 1,000 mcg by IntraMUSCular route every month.  atenolol (TENORMIN) 25 mg tablet Take 25 mg by mouth daily.  magic mouthwash solution Magic mouth wash   Maalox  Lidocaine 2% viscous   Diphenhydramine oral solution     Pharmacy to mix equal portions of ingredients to a total volume as indicated in the dispense amount. Swish and spit 5 mL (1 teaspoon) four times daily as needed for mouth soreness.  240 mL 1    HYDROcodone-acetaminophen (NORCO) 5-325 mg per tablet Take 1 Tab by mouth every six (6) hours as needed for Pain. Max Daily Amount: 4 Tabs. 50 Tab 0     Facility-Administered Medications Ordered in Other Visits   Medication Dose Route Frequency Provider Last Rate Last Dose    sodium chloride (NS) flush 5-10 mL  5-10 mL IntraVENous PRN Burnie Eisenmenger, DO        heparin (porcine) pf 500 Units  500 Units IntraVENous PRN Curly Edivijay James DO        DOXOrubicin (ADRIAMYCIN) chemo syringe 43 mg +++Syringe 1 of 2; Total Dose = 87 mg+++  43 mg IntraVENous ONCE [facility], DO        And    DOXOrubicin (ADRIAMYCIN) chemo syringe 44 mg +++Syringe 2 of 2; Total dose = 87 mg+++  44 mg IntraVENous ONCE [facility], DO        fosaprepitant (EMEND) 150 mg in 0.9% sodium chloride 150 mL IVPB  150 mg IntraVENous ONCE [facility], DO        vinCRIStine (VINCASAR PFS) 2 mg in 0.9% sodium chloride 50 mL, overfill volume 5 mL chemo IVPB  2 mg IntraVENous ONCE [facility], DO        cyclophosphamide (CYTOXAN) 1,300 mg in 0.9% sodium chloride 250 mL, overfill volume 25 mL chemo infusion  1,300 mg IntraVENous ONCE [facility], DO        predniSONE (DELTASONE) tablet 100 mg  100 mg Oral ONCE [facility], DO        palonosetron HCl (ALOXI) injection 0.25 mg  0.25 mg IntraVENous ONCE [facility], DO        sodium chloride 0.9 % bolus infusion 500 mL  500 mL IntraVENous ONCE [facility], DO        0.9% sodium chloride infusion  100 mL/hr IntraVENous CONTINUOUS [facility], DO        [START ON 5/26/2018] pegfilgrastim (NEULASTA) injection 6 mg  6 mg SubCUTAneous ONCE [facility], DO           Allergies   Allergen Reactions    Rituxan [Rituximab] Hives       Review of Systems    A comprehensive review of systems was negative except for as noted above.     Objective:  Visit Vitals    /70    Pulse 67    Temp 98.1 °F (36.7 °C) (Oral)    Resp 16    Ht 5' 2\" (1.575 m)    Wt 145 lb (65.8 kg)    LMP  (LMP Unknown)    SpO2 98%    BMI 26.52 kg/m2     Physical Exam:   General appearance - Well developed, conversant  Mental status - alert, oriented   EYE-conj clear   Neck - supple  CV regular with murmur  Resp clear to auscultation bilaterally  GI Round, soft, non-tender  Ext No lower extremity edema noted bilaterally  Skin-Warm and dry. Intact, no rash. Neuro -  Awake, alert, oriented. Exam is non-focal.  Port - Currently accessed, slight redness at stitches site, slight tender at site    Diagnostic Imaging   reviewed    2/21/18 PET:  IMPRESSION:   1. Increasing right parietal soft tissue activity. 2. Progression in the bilateral areas of hypermetabolic pleural thickening. 3. New bilateral hypermetabolic axillary lymph nodes and subcarinal lymph node  and progression of retroperitoneal and left pelvic lymphadenopathy. 4. Improvement in the left inguinal lymphadenopathy  5. Improvement in the right parieto-occipital scalp activity. 6. New hypermetabolic soft tissue abnormality left iliac fossa. 7. Stable left cervical hypermetabolic lymph nodes. 2/17/18 PET:  MPRESSION:   1. Findings suspicious for a uterine mass with a soft tissue mass in the left  pelvis and extending into the retroperitoneum concerning for malignancy. Lymphoma is a differential consideration given the patient's history. The mass  encases but does not narrow the aortic bifurcation and IVC. 2. Enlarged left inguinal lymph node is decreased in size since 8/30/2017.  3. Increased large left pleural effusion with left lower lung atelectasis.     Lab Results    reviewed  Lab Results   Component Value Date/Time    WBC 7.4 05/25/2018 10:08 AM    HGB 8.4 (L) 05/25/2018 10:08 AM    HCT 26.6 (L) 05/25/2018 10:08 AM    PLATELET 272 69/78/1956 10:08 AM    .3 (H) 05/25/2018 10:08 AM       Lab Results   Component Value Date/Time    Sodium 139 05/14/2018 03:41 PM    Potassium 4.5 05/14/2018 03:41 PM    Chloride 100 05/14/2018 03:41 PM    CO2 25 05/14/2018 03:41 PM    Anion gap 5 05/04/2018 10:04 AM    Glucose 98 05/14/2018 03:41 PM    BUN 10 05/14/2018 03:41 PM    Creatinine 1.00 05/14/2018 03:41 PM    BUN/Creatinine ratio 10 (L) 05/14/2018 03:41 PM    GFR est AA 67 05/14/2018 03:41 PM    GFR est non-AA 58 (L) 05/14/2018 03:41 PM    Calcium 9.2 05/14/2018 03:41 PM    AST (SGOT) 8 05/14/2018 03:41 PM    Alk. phosphatase 105 05/14/2018 03:41 PM    Protein, total 5.7 (L) 05/14/2018 03:41 PM    Albumin 3.9 05/14/2018 03:41 PM    Globulin 3.0 05/04/2018 10:04 AM    A-G Ratio 2.2 05/14/2018 03:41 PM    ALT (SGPT) 5 05/14/2018 03:41 PM     Assessment/Plan:    1. 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 Pt is post hospital stay for first cycle of CHOP. She is here for cycle 4 CHOP today. Pt had a PET which shows good response to chemo with decreased disease. Reviewed PET with pt and daughter today. Pt clinically doing well and tolerating chemo well. Plan for 6 cycles. · Proceed  with C#4 of CHOP (Cyclophosphamide 750mg/m2, Doxorubicin 50mg/m2, Vincristine 1.4mg/m2 on day 1 and Prednisone 100mg on days 1-5) every 3 weeks with plans for 6 cycles. · Labs: CBC on day 1 with each cycle, CMP day 1  · Antiemetic Prophylaxis: Fosaprepitant, Dexamethasone and Palonosetron prior to chemotherapy  · PRN antiemetics at home: Ondansetron, Prochlorperazine  · Neulasta 6mg 24-72 hours after each cycle  · Return to clinic in 3 weeks    2. Abdominal distention. Secondary to malignancy. Improved. 4. Shortness of breath: s/p thoracentesis with + cytology. Resolved. 5. Renal insufficiency. Better. 6. History of VSD. ECHO shows EF 55 to 60%. Cardiology following. 7. Anemia. Secondary to chemotherapy. Better today. No prbc tx. She does have a history of iron deficiency anemia, will check B12 and folate levels today as well as iron studies.      8.  Redness at port site/ tender. Send back to IR for eval.  Start keflex. Follow-up in 3 weeks with cycle 4.   Call if questions    Edi Clark, DO

## 2018-05-26 NOTE — PROGRESS NOTES
OPIC SHORT VISIT NOTE:    0515 Pt arrived at [facility] ambulatory and in no distress for Neulasta. Assessment completed, no new complaints voiced. Visit Vitals    /75    Pulse 100    Temp 97 °F (36.1 °C)    Resp 18    LMP  (LMP Unknown)       Medications received:  Neulasta subq in left arm    1100 Tolerated treatment well, no adverse reaction noted. D/Cd from [facility] ambulatory and in no distress accompanied by daughter.   Next appt 6/15/18

## 2018-06-15 NOTE — PROGRESS NOTES
CC NHL Dx  10/10  Transformation 3/18 with pleural effusion    TREATMENT COURSE:  Orbit radiation  Chlorambucil x 1 cycle 4/16. Cycle 2 3/17. Cycle 3 4/17   Cycle 4 1/18. Attempted one dose of rituxan. 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 Derrick Davis is a 76 y/o female with high grade lymphoma admitted 3/20/18 and post CHOP cycle 1 in hospital. She is here for cycle 5 CHOP today. She has overall been feeling well. She does endorse numbness and tingling in her fingers or toes. Numbness in her fingers is not new, though, it has gone from intermittent to persistent between chemotherapy. Neuropathy in her feet started after last chemo cycle (cycle 4). She describes this as \"knots\" in feet. Denies any falls or tripping over her feet. Her appetite has remained stable. No nausea or vomiting. Energy is good. No fevers or chills. Daughter present and supportive at visit. Otherwise, complete ROS is per the symptom report form which has been scanned into the media section of the electronic medical record. Past Medical History:   Diagnosis Date    Anemia NEC     Arrhythmia     VSD; TRICUSPID REGURG    Cancer ([address] 2010    low grade NHL    GERD (gastroesophageal reflux disease)     Hypertension     NHL (non-Hodgkin's lymphoma) ([address] 2010    received chemo and radiation. not in remission.     Pleural effusion 02/21/2018    LEFT    Pulmonary hypertension ([address] 02/21/2018    PER CARDIOLOGY NOTES FROM PTS VISIT ON 2/21/18    Thyroid disease      Past Surgical History:   Procedure Laterality Date    HX CATARACT REMOVAL  6/2015 & 7/2015    MERRY    HX CHOLECYSTECTOMY  1982    HX DILATION AND CURETTAGE      HX HEENT  2005    RIGHT EAR     HX OTHER SURGICAL      LEFT PAROTIDECTOMY    [place] RPLCMT PROST AORTIC VALVE OPEN XCP HOMOGRF/STENT  1972    STENT PLACED TO STRAIGHTEN THE AORTA     Social History     Social History    Marital status:      Spouse name: N/A    Number of children: N/A    Years of education: N/A     Social History Main Topics    Smoking status: Current Every Day Smoker     Packs/day: 0.50     Years: 45.00     Types: Cigarettes    Smokeless tobacco: Former User     Quit date: 12/1/2015    Alcohol use Yes      Comment: Rare    Drug use: No    Sexual activity: No      Comment:  has one child     Other Topics Concern    None     Social History Narrative     Family History   Problem Relation Age of Onset    Heart Disease Mother     Kidney Disease Mother     Alcohol abuse Father     Liver Disease Father     Cancer Sister      Breast    No Known Problems Brother     Cancer Sister      Colon    No Known Problems Sister     No Known Problems Sister     No Known Problems Daughter     Anesth Problems Neg Hx        Current Outpatient Prescriptions   Medication Sig Dispense Refill    predniSONE (DELTASONE) 20 mg tablet Take 5 tablets (100 mg) once daily with breakfast for 4 days starting the day after chemotherapy. 100 Tab 0    KLOR-CON M20 20 mEq tablet       levothyroxine (SYNTHROID) 150 mcg tablet 150 mcg Daily (before breakfast).  raNITIdine (ZANTAC) 150 mg tablet Take 150 mg by mouth nightly.  docusate sodium (COLACE) 100 mg capsule DAILY PRN      cholecalciferol (VITAMIN D3) 1,000 unit tablet Take  by mouth daily.  cyanocobalamin (VITAMIN B-12) 1,000 mcg/mL injection 1,000 mcg by IntraMUSCular route every month.  atenolol (TENORMIN) 25 mg tablet Take 25 mg by mouth daily.  magic mouthwash solution Magic mouth wash   Maalox  Lidocaine 2% viscous   Diphenhydramine oral solution     Pharmacy to mix equal portions of ingredients to a total volume as indicated in the dispense amount. Swish and spit 5 mL (1 teaspoon) four times daily as needed for mouth soreness.  240 mL 1    HYDROcodone-acetaminophen (NORCO) 5-325 mg per tablet Take 1 Tab by mouth every six (6) hours as needed for Pain. Max Daily Amount: 4 Tabs. 50 Tab 0    ondansetron (ZOFRAN ODT) 8 mg disintegrating tablet Take 1 Tab by mouth every eight (8) hours as needed for Nausea. 30 Tab 2       Allergies   Allergen Reactions    Rituxan [Rituximab] Hives       Review of Systems    A comprehensive review of systems was negative except for as noted above. Objective:  Visit Vitals    /76 (BP 1 Location: Left arm, BP Patient Position: Sitting)    Pulse 84    Temp 98.6 °F (37 °C) (Oral)    Resp 18    Ht 5' 2\" (1.575 m)    Wt 144 lb (65.3 kg)    LMP  (LMP Unknown)    SpO2 97%    BMI 26.34 kg/m2     Physical Exam:   General appearance - Well developed, conversant, no distress  Mental status - alert, oriented   EYE-conj clear   Neck - supple  CV: regular with murmur  Resp: clear to auscultation bilaterally  GI Round, soft, non-tender  Ext No lower extremity edema noted bilaterally  Skin-Warm and dry. Intact, no rash. Neuro -  Awake, alert, oriented. Exam is non-focal.    Diagnostic Imaging   reviewed    2/21/18 PET:  IMPRESSION:   1. Increasing right parietal soft tissue activity. 2. Progression in the bilateral areas of hypermetabolic pleural thickening. 3. New bilateral hypermetabolic axillary lymph nodes and subcarinal lymph node  and progression of retroperitoneal and left pelvic lymphadenopathy. 4. Improvement in the left inguinal lymphadenopathy  5. Improvement in the right parieto-occipital scalp activity. 6. New hypermetabolic soft tissue abnormality left iliac fossa. 7. Stable left cervical hypermetabolic lymph nodes. 2/17/18 PET:  MPRESSION:   1. Findings suspicious for a uterine mass with a soft tissue mass in the left  pelvis and extending into the retroperitoneum concerning for malignancy. Lymphoma is a differential consideration given the patient's history. The mass  encases but does not narrow the aortic bifurcation and IVC.   2. Enlarged left inguinal lymph node is decreased in size since 8/30/2017.  3. Increased large left pleural effusion with left lower lung atelectasis. Lab Results    reviewed  Lab Results   Component Value Date/Time    WBC 6.7 06/15/2018 10:22 AM    HGB 8.5 (L) 06/15/2018 10:22 AM    HCT 27.3 (L) 06/15/2018 10:22 AM    PLATELET 534 47/45/3436 10:22 AM    .2 (H) 06/15/2018 10:22 AM       Lab Results   Component Value Date/Time    Sodium 143 06/15/2018 10:22 AM    Potassium 4.0 06/15/2018 10:22 AM    Chloride 109 (H) 06/15/2018 10:22 AM    CO2 27 06/15/2018 10:22 AM    Anion gap 7 06/15/2018 10:22 AM    Glucose 98 06/15/2018 10:22 AM    BUN 17 06/15/2018 10:22 AM    Creatinine 0.88 06/15/2018 10:22 AM    BUN/Creatinine ratio 19 06/15/2018 10:22 AM    GFR est AA >60 06/15/2018 10:22 AM    GFR est non-AA >60 06/15/2018 10:22 AM    Calcium 8.8 06/15/2018 10:22 AM    AST (SGOT) 11 (L) 06/15/2018 10:22 AM    Alk. phosphatase 119 (H) 06/15/2018 10:22 AM    Protein, total 6.0 (L) 06/15/2018 10:22 AM    Albumin 3.4 (L) 06/15/2018 10:22 AM    Globulin 2.6 06/15/2018 10:22 AM    A-G Ratio 1.3 06/15/2018 10:22 AM    ALT (SGPT) 13 06/15/2018 10:22 AM     Assessment/Plan:    1. 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 Cycle 1 of CHOP completed in the hospital and treatment outpatient since that time with no difficulty  Pt had a PET 5/16/18 which shows good response to chemo with decreased disease. Reviewed PET with pt and daughter    Patient has overall continued to do well on therapy. Now with grade 2 neuropathy. Will dose reduce Vincristine for cycles 5 and 6  Labs are pending. VS stable. If labs stable, proceed with cycle 5 as planned. · Proceed  with C#5 of CHOP (Cyclophosphamide 750mg/m2, Doxorubicin 50mg/m2, Vincristine (dose reduced cycle 5 by 25% for neuropathy to 1.05mg/m2) on day 1 and Prednisone 100mg on days 1-5) every 3 weeks with plans for 6 cycles.   · Labs: CBC on day 1 with each cycle, CMP day 1  · Antiemetic Prophylaxis: Fosaprepitant, Dexamethasone and Palonosetron prior to chemotherapy  · PRN antiemetics at home: Ondansetron, Prochlorperazine  · Neulasta 6mg 24-72 hours after each cycle  · Return to clinic in 3 weeks for cycle 6    2. Abdominal distention. Secondary to malignancy. Improved. 4. Shortness of breath: S/p thoracentesis with + cytology. Resolved. 5. Renal insufficiency. This has been improved. Awaiting results from today. 6. History of VSD. ECHO shows EF 55 to 60%. Cardiology following. 7. Anemia. Secondary to chemotherapy. Awaiting CBC results from today. 8.  Redness at port site/ tender. Resolved. Completed antibiotics with Keflex. 9. Neuropathy. Grade 2. Secondary to Vincristine. Dose reduced by 25% for cycles 5 and 6. Will monitor at next visit. Follow-up in 3 weeks with cycle 6.   Pt seen today in conjunction with CHIQUI Howell jakob, DO

## 2018-06-15 NOTE — PROGRESS NOTES
Flora Memory 76 y/o female here for follow up lymphoma/cycle 5 CHOP. Reported ongoing numbness/tingling hands and new onset \"feeling like balls\" on bottom of feet since last treatment.

## 2018-06-15 NOTE — PROGRESS NOTES
Dose reduced chemo orders faxed complete to [facility] Scheduling.

## 2018-06-15 NOTE — PROGRESS NOTES
[facility] - Chemotherapy Progress Note    1985 Pt admit to [facility] for CHOP/C5 ambulatory in stable condition accompanied by family. Assessment completed. No new concerns voiced. Port accessed with positive blood return. Labs drawn per order and sent. Line flushed, clamped, Curos Cap applied to end clave. Pt upstairs to MD appt. 1100 Return to [facility]; line flushed, NS bolus infusing; s/w Lanning Bosworth, NP, okay to proceed w/ treatment; dose reduced orders faxed    Visit Vitals    /64    Pulse 87    Temp 97.7 °F (36.5 °C)    Resp 16    Ht 5' 2.21\" (1.58 m)    Wt 65.5 kg (144 lb 6.4 oz)    LMP  (LMP Unknown)    Breastfeeding No    BMI 26.24 kg/m2       Medications:  NS bolus  Emend 150 mg  Aloxi . 25 mg  Prednisone 100 mg  Vincristine   Doxorubicin x2 syringes  Cyclophosphamide      1435 Pt tolerated treatment well. Port maintained positive blood return throughout treatment, flushed with positive blood return at conclusion, heparinized and de-accessed. D/c home ambulatory in no distress. Pt aware of next [facility] appointment scheduled for 06/16/2018 for Neulasta. Recent Results (from the past 12 hour(s))   CBC WITH AUTOMATED DIFF    Collection Time: 06/15/18 10:22 AM   Result Value Ref Range    WBC 6.7 3.6 - 11.0 K/uL    RBC 2.57 (L) 3.80 - 5.20 M/uL    HGB 8.5 (L) 11.5 - 16.0 g/dL    HCT 27.3 (L) 35.0 - 47.0 %    .2 (H) 80.0 - 99.0 FL    MCH 33.1 26.0 - 34.0 PG    MCHC 31.1 30.0 - 36.5 g/dL    RDW 17.6 (H) 11.5 - 14.5 %    PLATELET 841 206 - 943 K/uL    MPV 9.2 8.9 - 12.9 FL    NRBC 0.0 0  WBC    ABSOLUTE NRBC 0.00 0.00 - 0.01 K/uL    NEUTROPHILS 72 32 - 75 %    LYMPHOCYTES 12 12 - 49 %    MONOCYTES 14 (H) 5 - 13 %    EOSINOPHILS 0 0 - 7 %    BASOPHILS 0 0 - 1 %    IMMATURE GRANULOCYTES 1 (H) 0.0 - 0.5 %    ABS. NEUTROPHILS 4.8 1.8 - 8.0 K/UL    ABS. LYMPHOCYTES 0.8 0.8 - 3.5 K/UL    ABS. MONOCYTES 1.0 0.0 - 1.0 K/UL    ABS. EOSINOPHILS 0.0 0.0 - 0.4 K/UL    ABS.  BASOPHILS 0.0 0.0 - 0.1 K/UL    ABS. IMM. GRANS. 0.1 (H) 0.00 - 0.04 K/UL    DF AUTOMATED     METABOLIC PANEL, BASIC    Collection Time: 06/15/18 10:22 AM   Result Value Ref Range    Sodium 143 136 - 145 mmol/L    Potassium 4.0 3.5 - 5.1 mmol/L    Chloride 109 (H) 97 - 108 mmol/L    CO2 27 21 - 32 mmol/L    Anion gap 7 5 - 15 mmol/L    Glucose 98 65 - 100 mg/dL    BUN 17 6 - 20 MG/DL    Creatinine 0.88 0.55 - 1.02 MG/DL    BUN/Creatinine ratio 19 12 - 20      GFR est AA >60 >60 ml/min/1.73m2    GFR est non-AA >60 >60 ml/min/1.73m2    Calcium 8.8 8.5 - 10.1 MG/DL   HEPATIC FUNCTION PANEL    Collection Time: 06/15/18 10:22 AM   Result Value Ref Range    Protein, total 6.0 (L) 6.4 - 8.2 g/dL    Albumin 3.4 (L) 3.5 - 5.0 g/dL    Globulin 2.6 2.0 - 4.0 g/dL    A-G Ratio 1.3 1.1 - 2.2      Bilirubin, total 0.2 0.2 - 1.0 MG/DL    Bilirubin, direct 0.1 0.0 - 0.2 MG/DL    Alk.  phosphatase 119 (H) 45 - 117 U/L    AST (SGOT) 11 (L) 15 - 37 U/L    ALT (SGPT) 13 12 - 78 U/L

## 2018-06-16 NOTE — PROGRESS NOTES
JUANA HIGUERA SHORT VISIT NOTE    5046  Pt arrived to [facility] ambulatory and in no distress for Neulasta. Denies any new complaints. Blood pressure 114/69, pulse 83, temperature 97 °F (36.1 °C), resp. rate 16. Medication given:  Neulasta SQ    1055  Discharged home ambulatory and in no distress. Tolerated treatment well. Next appointment 7/6/18 at 1000.

## 2018-07-06 NOTE — MR AVS SNAPSHOT
[address] 
[phone] Patient: Ilan Henson MRN: [id number] SMI:[id number] Visit Information Date & Time Provider Department Dept. Phone Encounter #  
 7/6/2018 10:30 AM Herlinda Talbert, [address]  Oncology at [facility] [phone] [id number] Upcoming Health Maintenance Date Due Hepatitis C Screening 1950 DTaP/Tdap/Td series (1 - Tdap) 11/19/1970 BREAST CANCER SCRN MAMMOGRAM 11/19/1999 FOBT Q 1 YEAR AGE 50-75 11/19/1999 ZOSTER VACCINE AGE 60> 9/19/2009 GLAUCOMA SCREENING Q2Y 11/19/2014 Bone Densitometry (Dexa) Screening 11/19/2014 Pneumococcal 65+ High/Highest Risk (1 of 2 - PCV13) 11/19/2014 MEDICARE YEARLY EXAM 3/14/2018 Influenza Age 5 to Adult 8/1/2018 Allergies as of 7/6/2018  Review Complete On: 7/6/2018 By: Kasia Ross NP Severity Noted Reaction Type Reactions Rituxan [Rituximab]  08/30/2017    Hives Current Immunizations  Reviewed on 7/6/2018 Name Date Influenza Vaccine 10/1/2015, 11/5/2014 Reviewed by Sacha Burns RN on 7/6/2018 at 10:02 AM  
 Reviewed by Giovana Singletary LPN on 7/0/7602 at 61:66 AM  
You Were Diagnosed With   
  
 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 
ICD-9-CM: 202.82 Vitals BP Pulse Temp Resp Height(growth percentile) Weight(growth percentile) 104/63 84 98 °F (36.7 °C) (Oral) 16 5' 2\" (1.575 m) 145 lb (65.8 kg) LMP SpO2 BMI OB Status Smoking Status (LMP Unknown) 97% 26.52 kg/m2 Postmenopausal Current Every Day Smoker Vitals History BMI and BSA Data Body Mass Index Body Surface Area 26.52 kg/m 2 1.7 m 2 Preferred Pharmacy Pharmacy Name Phone [address] [facility] [phone] Your Updated Medication List  
  
   
This list is accurate as of 7/6/18 11:26 AM.  Always use your most recent med list.  
  
  
  
  
 amoxicillin-clavulanate 875-125 mg per tablet Commonly known as:  AUGMENTIN Take 1 Tab by mouth every twelve (12) hours for 10 days. atenolol 25 mg tablet Commonly known as:  TENORMIN Take 25 mg by mouth daily. cholecalciferol 1,000 unit tablet Commonly known as:  VITAMIN D3 Take  by mouth daily. docusate sodium 100 mg capsule Commonly known as:  COLACE  
DAILY PRN  
  
 gabapentin 300 mg capsule Commonly known as:  NEURONTIN  
  
 KLOR-CON M20 20 mEq tablet Generic drug:  potassium chloride  
  
 levothyroxine 150 mcg tablet Commonly known as:  SYNTHROID  
150 mcg Daily (before breakfast). predniSONE 20 mg tablet Commonly known as:  Hawk Esters Take 5 tablets (100 mg) once daily with breakfast for 4 days starting the day after chemotherapy. raNITIdine 150 mg tablet Commonly known as:  ZANTAC Take 150 mg by mouth nightly. VITAMIN B-12 1,000 mcg/mL injection Generic drug:  cyanocobalamin  
1,000 mcg by IntraMUSCular route every month. Prescriptions Sent to Pharmacy Refills  
 amoxicillin-clavulanate (AUGMENTIN) 875-125 mg per tablet 0 Sig: Take 1 Tab by mouth every twelve (12) hours for 10 days. Class: Normal  
 Pharmacy: [facility] DR MEAGHAN CABELLO [address] [facility] Ph #: [phone] Route: Oral  
  
To-Do List   
 07/06/2018   Imaging:  PET/CT TUMOR IMAGE SKULL THIGH (SUB)   
  
 07/07/2018 11:00 AM  
  Appointment with [facility] ([phone])  
  
 07/27/2018 9:00 AM  
  Appointment with [facility] ([phone])  
  
 07/28/2018 11:00 AM  
 Appointment with [address] [facility] ([phone]) Introducing [facility]! [employer] introduces [employer] patient portal. Now you can access parts of your medical record, email your doctor's office, and request medication refills online. 1. In your internet browser, go to [email or web address] 2. Click on the First Time User? Click Here link in the Sign In box. You will see the New Member Sign Up page. 3. Enter your [employer] Access Code exactly as it appears below. You will not need to use this code after youve completed the sign-up process. If you do not sign up before the expiration date, you must request a new code. · [employer] Access Code: [email or web address] Expires: 8/24/2018  7:23 AM 
 
4. Enter the last four digits of your Social Security Number (xxxx) and Date of Birth (mm/dd/yyyy) as indicated and click Submit. You will be taken to the next sign-up page. 5. Create a [employer]ity ID. This will be your [employer] login ID and cannot be changed, so think of one that is secure and easy to remember. 6. Create a [employer] password. You can change your password at any time. 7. Enter your Password Reset Question and Answer. This can be used at a later time if you forget your password. 8. Enter your e-mail address. You will receive e-mail notification when new information is available in [address] 9. Click Sign Up. You can now view and download portions of your medical record. 10. Click the Download Summary menu link to download a portable copy of your medical information. If you have questions, please visit the Frequently Asked Questions section of the [employer] website. Remember, [employer] is NOT to be used for urgent needs. For medical emergencies, dial 911. Now available from your iPhone and Android! Please provide this summary of care documentation to your next provider. Your primary care clinician is listed as Mellisa Hopper. If you have any questions after today's visit, please call [phone].

## 2018-07-06 NOTE — PROGRESS NOTES
Patient to complete chemo today. Orders faxed to [facility] Scheduling and spoke with Verónica/Pharmacy and advised vincristine to be held. Port flush due in 1 month. Patient is aware. Support given.

## 2018-07-06 NOTE — PROGRESS NOTES
Call placed to [facility] Karl SAMPSON.   Spoke with pharmacist, canceled script for Augmentin as sent to another pharmacy per patient request.

## 2018-07-06 NOTE — PROGRESS NOTES
CC NHL Dx  10/10  Transformation 3/18 with pleural effusion    TREATMENT COURSE:  Orbit radiation  Chlorambucil x 1 cycle 4/16. Cycle 2 3/17. Cycle 3 4/17   Cycle 4 1/18. Attempted one dose of rituxan. 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 oMrgan Brewster is a 74 y/o female with high grade lymphoma admitted 3/20/18 and post CHOP cycle 1 in hospital. She is here for cycle 6 CHOP today. Feeling well overall. Neuropathy has worsened since last treatment. States no improvement with dose reduction last cycle. Feels that symptoms are overall worse in her feet. Heels of both feet are numb now. No falling in the home. Fingers are numb to the joint. Dropping things at times. Finds it difficult to button or  small items. No mouth sores. No fever, chills, SOB or chest pain. No dizziness or headache. No weight change. Is having some green tinged drainage from right ear that started last night. States this happens intermittently due to surgeries on that ear, she has been prone to infection. No pain noted to site. Accompanied by daughter today. Otherwise, complete ROS is per the symptom report form which has been scanned into the media section of the electronic medical record. Past Medical History:   Diagnosis Date    Anemia NEC     Arrhythmia     VSD; TRICUSPID REGURG    Cancer ([address] 2010    low grade NHL    GERD (gastroesophageal reflux disease)     Hypertension     NHL (non-Hodgkin's lymphoma) ([address] 2010    received chemo and radiation. not in remission.     Pleural effusion 02/21/2018    LEFT    Pulmonary hypertension ([address] 02/21/2018    PER CARDIOLOGY NOTES FROM PTS VISIT ON 2/21/18    Thyroid disease      Past Surgical History:   Procedure Laterality Date    HX CATARACT REMOVAL  6/2015 & 7/2015    MERRY    HX CHOLECYSTECTOMY  1982    HX DILATION AND CURETTAGE      HX HEENT  2005    RIGHT EAR     HX OTHER SURGICAL      LEFT PAROTIDECTOMY    CT RPLCMT PROST AORTIC VALVE OPEN XCP HOMOGRF/STENT  1972    STENT PLACED TO STRAIGHTEN THE AORTA     Social History     Social History    Marital status:      Spouse name: N/A    Number of children: N/A    Years of education: N/A     Social History Main Topics    Smoking status: Current Every Day Smoker     Packs/day: 0.50     Years: 45.00     Types: Cigarettes    Smokeless tobacco: Former User     Quit date: 12/1/2015    Alcohol use Yes      Comment: Rare    Drug use: No    Sexual activity: No      Comment:  has one child     Other Topics Concern    None     Social History Narrative     Family History   Problem Relation Age of Onset    Heart Disease Mother     Kidney Disease Mother     Alcohol abuse Father     Liver Disease Father     Cancer Sister      Breast    No Known Problems Brother     Cancer Sister      Colon    No Known Problems Sister     No Known Problems Sister     No Known Problems Daughter     Anesth Problems Neg Hx        Current Outpatient Prescriptions   Medication Sig Dispense Refill    gabapentin (NEURONTIN) 300 mg capsule       predniSONE (DELTASONE) 20 mg tablet Take 5 tablets (100 mg) once daily with breakfast for 4 days starting the day after chemotherapy. 100 Tab 0    KLOR-CON M20 20 mEq tablet       levothyroxine (SYNTHROID) 150 mcg tablet 150 mcg Daily (before breakfast).  raNITIdine (ZANTAC) 150 mg tablet Take 150 mg by mouth nightly.  docusate sodium (COLACE) 100 mg capsule DAILY PRN      cholecalciferol (VITAMIN D3) 1,000 unit tablet Take  by mouth daily.  cyanocobalamin (VITAMIN B-12) 1,000 mcg/mL injection 1,000 mcg by IntraMUSCular route every month.  atenolol (TENORMIN) 25 mg tablet Take 25 mg by mouth daily.        Facility-Administered Medications Ordered in Other Visits   Medication Dose Route Frequency Provider Last Rate Last Dose    DOXOrubicin (ADRIAMYCIN) chemo syringe 43 mg +++Syringe 1 of 2; Total Dose = 87 mg+++  43 mg IntraVENous ONCE [facility], DO        And    DOXOrubicin (ADRIAMYCIN) chemo syringe 44 mg +++Syringe 2 of 2; Total dose = 87 mg+++  44 mg IntraVENous ONCE [facility], DO        vinCRIStine (VINCASAR PFS) 1.8 mg in 0.9% sodium chloride 50 mL, overfill volume 5 mL chemo IVPB  1.8 mg IntraVENous ONCE [facility], DO        fosaprepitant (EMEND) 150 mg in 0.9% sodium chloride 150 mL IVPB  150 mg IntraVENous ONCE [facility], DO        cyclophosphamide (CYTOXAN) 1,300 mg in 0.9% sodium chloride 250 mL, overfill volume 25 mL chemo infusion  1,300 mg IntraVENous ONCE [facility], DO        predniSONE (DELTASONE) tablet 100 mg  100 mg Oral ONCE [facility], DO        palonosetron HCl (ALOXI) injection 0.25 mg  0.25 mg IntraVENous ONCE [facility], DO        sodium chloride 0.9 % bolus infusion 500 mL  500 mL IntraVENous ONCE [facility], DO        0.9% sodium chloride infusion  100 mL/hr IntraVENous CONTINUOUS [facility], DO        [START ON 7/7/2018] pegfilgrastim (NEULASTA) injection 6 mg  6 mg SubCUTAneous ONCE [facility], DO           Allergies   Allergen Reactions    Rituxan [Rituximab] Hives       Review of Systems    A comprehensive review of systems was negative except for as noted above.     Objective:  Visit Vitals    /63    Pulse 84    Temp 98 °F (36.7 °C) (Oral)    Resp 16    Ht 5' 2\" (1.575 m)    Wt 145 lb (65.8 kg)    LMP  (LMP Unknown)    SpO2 97%    BMI 26.52 kg/m2     Physical Exam:   General appearance - Well developed, conversant, no distress  Mental status - alert, oriented   EYE-conj clear   Neck - supple  CV: regular with murmur  Resp: clear to auscultation bilaterally  GI Round, soft, non-tender  Ext No lower extremity edema noted bilaterally  Skin-Warm and dry. Intact, no rash. Neuro -  Awake, alert, oriented. Exam is non-focal.    Diagnostic Imaging   reviewed    2/21/18 PET:  IMPRESSION:   1. Increasing right parietal soft tissue activity. 2. Progression in the bilateral areas of hypermetabolic pleural thickening. 3. New bilateral hypermetabolic axillary lymph nodes and subcarinal lymph node  and progression of retroperitoneal and left pelvic lymphadenopathy. 4. Improvement in the left inguinal lymphadenopathy  5. Improvement in the right parieto-occipital scalp activity. 6. New hypermetabolic soft tissue abnormality left iliac fossa. 7. Stable left cervical hypermetabolic lymph nodes. 2/17/18 PET:  MPRESSION:   1. Findings suspicious for a uterine mass with a soft tissue mass in the left  pelvis and extending into the retroperitoneum concerning for malignancy. Lymphoma is a differential consideration given the patient's history. The mass  encases but does not narrow the aortic bifurcation and IVC. 2. Enlarged left inguinal lymph node is decreased in size since 8/30/2017.  3. Increased large left pleural effusion with left lower lung atelectasis. Lab Results    reviewed  Lab Results   Component Value Date/Time    WBC 6.7 06/15/2018 10:22 AM    HGB 8.5 (L) 06/15/2018 10:22 AM    HCT 27.3 (L) 06/15/2018 10:22 AM    PLATELET 299 49/94/1860 10:22 AM    .2 (H) 06/15/2018 10:22 AM       Lab Results   Component Value Date/Time    Sodium 143 06/15/2018 10:22 AM    Potassium 4.0 06/15/2018 10:22 AM    Chloride 109 (H) 06/15/2018 10:22 AM    CO2 27 06/15/2018 10:22 AM    Anion gap 7 06/15/2018 10:22 AM    Glucose 98 06/15/2018 10:22 AM    BUN 17 06/15/2018 10:22 AM    Creatinine 0.88 06/15/2018 10:22 AM    BUN/Creatinine ratio 19 06/15/2018 10:22 AM    GFR est AA >60 06/15/2018 10:22 AM    GFR est non-AA >60 06/15/2018 10:22 AM    Calcium 8.8 06/15/2018 10:22 AM    AST (SGOT) 11 (L) 06/15/2018 10:22 AM    Alk.  phosphatase 119 (H) 06/15/2018 10:22 AM Protein, total 6.0 (L) 06/15/2018 10:22 AM    Albumin 3.4 (L) 06/15/2018 10:22 AM    Globulin 2.6 06/15/2018 10:22 AM    A-G Ratio 1.3 06/15/2018 10:22 AM    ALT (SGPT) 13 06/15/2018 10:22 AM     Assessment/Plan:    1. 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 Cycle 1 of CHOP completed in the hospital and treatment outpatient since that time with no difficulty  Pt had a PET 5/16/18 which shows good response to chemo with decreased disease. Patient has overall continued to do well on therapy. Grade 3 neuropathy. · Proceed  with C#6 of CHOP (Cyclophosphamide 750mg/m2, Doxorubicin 50mg/m2, Vincristine (dose reduced cycle 5 by 25% for neuropathy to 1.05mg/m2) on day 1 and Prednisone 100mg on days 1-5) every 3 weeks with plans for 6 cycles. HOLD Vincristine this cycle. · Labs: CBC on day 1 with each cycle, CMP day 1  · Antiemetic Prophylaxis: Fosaprepitant, Dexamethasone and Palonosetron prior to chemotherapy  · PRN antiemetics at home: Ondansetron, Prochlorperazine  · Neulasta 6mg 24-72 hours after each cycle    Discussed survivorship/surviellence now at completion of therapy as recommended by NCCN guidelines. Post treatment PET to assess for CR. If CR will move to surveillance with H&P every 3-6 months for the first 2 years, labs every 3 months, CT scans not more than every 6 - 12 months for the first 2 years post treatment. Given written and verbal instructions regarding these recommendations. 2. Renal insufficiency. Stable. Encouraged hydration in the home. 3. History of VSD. ECHO shows EF 55 to 60%. Cardiology following. 4. Anemia. Secondary to chemotherapy. Stable. No transfusion indicated. 5. Otitis externa. Augmentin called into pharmacy. She is to notify if symptoms do not improve. 6. Neuropathy. 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 Dose reduced by 25% with cycle 5, no improvement in symptoms. Will HOLD Vincristine this cycle. Follow-up in 4 weeks with post treatment PET.      Patient was seen with Angelica Sylvester NP    Covering for Dr. Thomas Garcia MD

## 2018-07-06 NOTE — PROGRESS NOTES
Chevis Closs is a 75 y.o. female here today for follow up of B cell lymphoma. She reports discomfort to right ear. States she's had 2 surgeries in past (has no eardrum) and gets occasional ear infections. States drainage from ear (green in color) started yesterday. States she has been ordered antibiotic in past for this. Is asking if something can be ordered today.

## 2018-07-07 NOTE — PROGRESS NOTES
[facility] Stay Note: 
Arrived - 0 Assessment - unchanged. Visit Vitals  /73 (BP 1 Location: Left arm, BP Patient Position: At rest)  Pulse 82  Temp 97 °F (36.1 °C)  Resp 16  
 LMP  (LMP Unknown) Neulasta 6mg SQ slowly in Left arm. 
 - Tolerated well. No reaction noted. Pt denies any acute problems/changes. Discharged from [facility] ambulatory. No distress.  Next appt: 8/3/18 @ 3 pm.

## 2018-07-24 NOTE — TELEPHONE ENCOUNTER
HIPAA verified. Advised of NP note. Verbalized understanding. Would like magic mouthwash scripted to [facility]. Advised to call with any concerns.   Thanked for assist.

## 2018-07-24 NOTE — TELEPHONE ENCOUNTER
Patients daughter Kenn Stephanie called because of change in symptoms with patient. Patient is now expriencing numbness in face and soreness in mouth. She can be called at [phone].

## 2018-07-24 NOTE — PROGRESS NOTES
FACUNDO.OTejinder for Magic Mouthwash called to [facility] DR MEAGHAN CABELLO per written order by provider.

## 2018-07-24 NOTE — TELEPHONE ENCOUNTER
Facial numbness not like secondary to chemotherapy. Does she have mouth sores? Can send in magic mouthwash. Recommend PCP evaluation for facial numbness. Please ensure no speech changes, unilateral weakness, or s/sx of stroke.

## 2018-07-24 NOTE — TELEPHONE ENCOUNTER
HIPAA verified. Stated left sided facial numbness x 2 days with some gum discomfort. Saw PCP Thursday for routine lab. Daughter denied fever/chills/facial swelling. Stated some fatigue, but no additional sx. Advised would forward to provider for review and return call.

## 2018-07-25 NOTE — TELEPHONE ENCOUNTER
HIPAA verified. Stated will not see PCP as does not know what is going on. Stated ongoing headache and fluid in left ear x 4 days. Stated pain is dull ache and constant 7/10. Stated hx of shingles and has itching scalp. Stated left side of tongue numb and teeth numb. Stated gums are pale and was started on iron tablets per PCP. Patient is seeing ENT next week. RN offered to try to secure sooner appt and patient declined. Offered office visit here and patient declined. Speech is clear and patient stated is eating and drinking well. Patient agreed to use magic mouthwash and return call Thursday am.  Reviewed sx to seek ED care: Progressive numbness to face/diffculty with speech or swallowing. Again referred to follow with PCP and declined. Support given.   (Hx of left parotid removed)

## 2018-07-25 NOTE — TELEPHONE ENCOUNTER
Pt is having itching on same side of head that numbness is on face. Pt has a headache. Also pt needs to mouthwash sent to [facility] where can be mix.  Pt daughter Marleny Munguia would like a call back to discuss pt

## 2018-07-25 NOTE — TELEPHONE ENCOUNTER
Pt stated that her daughter had spoken to Mrs. Ortiz Lawrence about pt numbness with her face as well as pain in her head. Pt stated that she wanted to speak with either Mrs. Ortiz Lawrence or Tejinder ChristiansonLauren Huong herself regarding these symptoms.  Good callback number: [phone]

## 2018-07-25 NOTE — TELEPHONE ENCOUNTER
Returned call to daughter, Robb Arriaga. States patient has itching to left side of head. States this is same area as shingles were in past.  She reports mild headache as well. Daughter states this could be from itching. Patient has scheduled an appointment with ENT as she is questioning if facial numbness could be related to glands removed in past.  Daughter states she will encourage patient to call PCP to update with current symptoms. States patient has started taking iron tabs once daily for new diagnosis of anemia. Advised daughter script for Akhil Avilez will be called to [address] in [place].

## 2018-07-31 NOTE — PROGRESS NOTES
Orders to change port flush appt to coincide with Dr. Lovelace Course visit faxed complete to [facility] Scheduling with confirmation. To PSR.

## 2018-07-31 NOTE — TELEPHONE ENCOUNTER
HIPAA verified. Stated ongoing left sided intermittent \"shock-type\" pain face and head. Stated inside of mouth remains sore, but is not read. Stated feels like had shot of novacaine. Is seeing ENT on Friday at [facility] and has port flush appt also @ 3p. Wanted to know if should be seen sooner by our office. Wanted to know if something could be prescribed for \"nerve pain\". Advised would forward to provider for review and plan.

## 2018-07-31 NOTE — TELEPHONE ENCOUNTER
Can be evaluated by our office Thursday if she would prefer though this really needs to be evaluated by ENT. Unclear if we will have anything to add. Do not want to prescribe anything for nerve pain at this time as unclear etiology of facial pain hence ENT evaluation. Happy to see Thursday if she would like. Can add to my schedule Thursday monring.

## 2018-07-31 NOTE — TELEPHONE ENCOUNTER
HIPAA verified. Advised of NP note. Appt given for 11:45 a 8/2/18/Shea. Patient verbalized understanding and thanked for call. To PSR for appt.

## 2018-07-31 NOTE — TELEPHONE ENCOUNTER
Pt is would like a call back to discuss her numbness on face.  She wants to know if she can be seen sooner than 8/16/2018 with Dr Maria C Lebron only

## 2018-08-02 NOTE — PROGRESS NOTES
CC NHL Dx  10/10  Transformation 3/18 with pleural effusion    TREATMENT COURSE:  Orbit radiation  Chlorambucil x 1 cycle 4/16. Cycle 2 3/17. Cycle 3 4/17   Cycle 4 1/18. Attempted one dose of rituxan. 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 Quynh Arreaga is a 76 y/o female with high grade lymphoma admitted 3/20/18 and post CHOP cycle 1 in hospital. She completed a total of 6 cycles of CHOP outpatient. She is here today for follow up of post-treatment PET results. She has developed left sided facial numbness that feels like \"novocaine\" was injected. She describes this as painful that feels like the neuropathy that is in her fingers and toes. She states that her teeth are numb. She is having difficulty eating secondary to this. Denies mouth sores or recent dental work. She is also having left ear pain. No change in hearing of left ear. No neck or shoulder pain. No vision changes or new visual deficits. No fevers or chills. Denies any headaches, dizziness, chest pain, or shortness of breath. She has follow up with Dr. Shira Coelho of [facility] tomorrow. Accompanied by daughter today. Otherwise, complete ROS is per the symptom report form which has been scanned into the media section of the electronic medical record. Past Medical History:   Diagnosis Date    Anemia NEC     Arrhythmia     VSD; TRICUSPID REGURG    Cancer ([address] 2010    low grade NHL    GERD (gastroesophageal reflux disease)     Hypertension     NHL (non-Hodgkin's lymphoma) ([address] 2010    received chemo and radiation. not in remission.     Pleural effusion 02/21/2018    LEFT    Pulmonary hypertension ([address] 02/21/2018    PER CARDIOLOGY NOTES FROM PTS VISIT ON 2/21/18    Thyroid disease      Past Surgical History:   Procedure Laterality Date    HX CATARACT REMOVAL  6/2015 & 7/2015    MERRY    HX CHOLECYSTECTOMY  1982    HX DILATION AND CURETTAGE      HX HEENT  2005    RIGHT EAR     HX OTHER SURGICAL      LEFT PAROTIDECTOMY    PA RPLCMT PROST AORTIC VALVE OPEN XCP HOMOGRF/STENT  1972    STENT PLACED TO STRAIGHTEN THE AORTA     Social History     Social History    Marital status:      Spouse name: N/A    Number of children: N/A    Years of education: N/A     Social History Main Topics    Smoking status: Current Every Day Smoker     Packs/day: 0.50     Years: 45.00     Types: Cigarettes    Smokeless tobacco: Former User     Quit date: 12/1/2015    Alcohol use Yes      Comment: Rare    Drug use: No    Sexual activity: No      Comment:  has one child     Other Topics Concern    None     Social History Narrative     Family History   Problem Relation Age of Onset    Heart Disease Mother     Kidney Disease Mother     Alcohol abuse Father     Liver Disease Father     Cancer Sister      Breast    No Known Problems Brother     Cancer Sister      Colon    No Known Problems Sister     No Known Problems Sister     No Known Problems Daughter     Anesth Problems Neg Hx        Current Outpatient Prescriptions   Medication Sig Dispense Refill    amoxicillin-clavulanate (AUGMENTIN) 875-125 mg per tablet       magic mouthwash solution Magic mouth wash   Maalox  Lidocaine 2% viscous   Diphenhydramine oral solution     Pharmacy to mix equal portions of ingredients to a total volume as indicated in the dispense amount. 280 mL 1    gabapentin (NEURONTIN) 300 mg capsule       levothyroxine (SYNTHROID) 150 mcg tablet 150 mcg Daily (before breakfast).  raNITIdine (ZANTAC) 150 mg tablet Take 150 mg by mouth nightly.  docusate sodium (COLACE) 100 mg capsule DAILY PRN      cholecalciferol (VITAMIN D3) 1,000 unit tablet Take  by mouth daily.  cyanocobalamin (VITAMIN B-12) 1,000 mcg/mL injection 1,000 mcg by IntraMUSCular route every month.  atenolol (TENORMIN) 25 mg tablet Take 25 mg by mouth daily.  methylPREDNISolone (MEDROL DOSEPACK) 4 mg tablet Follow instructions on dose pack 1 Dose Pack 0    KLOR-CON M20 20 mEq tablet          Allergies   Allergen Reactions    Rituxan [Rituximab] Hives       Review of Systems    A comprehensive review of systems was negative except for as noted above. Objective:  Visit Vitals    /74    Pulse 70    Temp 98.2 °F (36.8 °C) (Oral)    Resp 16    Ht 5' 2\" (1.575 m)    Wt 145 lb (65.8 kg)    LMP  (LMP Unknown)    SpO2 95%    BMI 26.52 kg/m2     Physical Exam:   General appearance - Well developed, conversant, no distress  Mental status - alert, oriented   Face - no facial droop  Eye - conj clear   Neck - supple  CV: regular with murmur  Resp: clear to auscultation bilaterally  GI: Round, soft, non-tender  Ext No lower extremity edema noted bilaterally  Skin-Warm and dry. Intact, no rash. Neuro -  Awake, alert, oriented. Exam is non-focal.    Diagnostic Imaging   reviewed    2/21/18 PET:  IMPRESSION:   1. Increasing right parietal soft tissue activity. 2. Progression in the bilateral areas of hypermetabolic pleural thickening. 3. New bilateral hypermetabolic axillary lymph nodes and subcarinal lymph node  and progression of retroperitoneal and left pelvic lymphadenopathy. 4. Improvement in the left inguinal lymphadenopathy  5. Improvement in the right parieto-occipital scalp activity. 6. New hypermetabolic soft tissue abnormality left iliac fossa. 7. Stable left cervical hypermetabolic lymph nodes. 2/17/18 PET:  MPRESSION:   1. Findings suspicious for a uterine mass with a soft tissue mass in the left  pelvis and extending into the retroperitoneum concerning for malignancy. Lymphoma is a differential consideration given the patient's history. The mass  encases but does not narrow the aortic bifurcation and IVC.   2. Enlarged left inguinal lymph node is decreased in size since 8/30/2017.  3. Increased large left pleural effusion with left lower lung atelectasis. 7/18/18 PET  IMPRESSION: New focus of increased tracer activity right iliac wing concerning  for osseous metastatic disease. New hypermetabolic soft tissue nodule adjacent  to the right iliac crest. Increase in the size of the left iliac fossa mass. Minimal decrease in the size of the axillary lymph nodes with little change in  the tracer activity. Left level 2 lymph nodes and pleural activity in the left  hemithorax are stable as are the left external iliac and pelvic masses. Lab Results    reviewed  Lab Results   Component Value Date/Time    WBC 8.0 08/02/2018 01:25 PM    HGB 8.8 (L) 08/02/2018 01:25 PM    HCT 28.3 (L) 08/02/2018 01:25 PM    PLATELET 879 34/39/2320 01:25 PM    .6 (H) 08/02/2018 01:25 PM       Lab Results   Component Value Date/Time    Sodium 138 08/02/2018 01:25 PM    Potassium 3.9 08/02/2018 01:25 PM    Chloride 106 08/02/2018 01:25 PM    CO2 26 08/02/2018 01:25 PM    Anion gap 6 08/02/2018 01:25 PM    Glucose 79 08/02/2018 01:25 PM    BUN 14 08/02/2018 01:25 PM    Creatinine 0.84 08/02/2018 01:25 PM    BUN/Creatinine ratio 17 08/02/2018 01:25 PM    GFR est AA >60 08/02/2018 01:25 PM    GFR est non-AA >60 08/02/2018 01:25 PM    Calcium 8.6 08/02/2018 01:25 PM    AST (SGOT) 24 08/02/2018 01:25 PM    Alk. phosphatase 127 (H) 08/02/2018 01:25 PM    Protein, total 6.0 (L) 08/02/2018 01:25 PM    Albumin 3.3 (L) 08/02/2018 01:25 PM    Globulin 2.7 08/02/2018 01:25 PM    A-G Ratio 1.2 08/02/2018 01:25 PM    ALT (SGPT) 15 08/02/2018 01:25 PM     Assessment/Plan:    1. 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 Cycle 1 of CHOP completed in the hospital and treatment outpatient since that time with no difficulty  Pt had a PET 5/16/18 which shows good response to chemo with decreased disease.    Now completed 6 cycles of CHOP (Cyclophosphamide 750mg/m2, Doxorubicin 50mg/m2, Vincristine (dose reduced cycle 5 by 25% for neuropathy to 1.05mg/m2) and post-treatment PET with a mixed response to treatment  Struggled with grade 3 neuropathy   Post-treatment PET with mixed response. Discussed with her and her daughter today. Pt clearly does not want further chemo. Most of symptoms related to high grade lymphoma are gone. Pt has new diffuse facial numbness and needs MRI f/u. ? CNS recurrence. Pt is seeing ENT and will d/w them. Will present PET at cancer conference   She states that she would not any additional chemotherapy regardless and wants port out  Other than facial numbness, pt clinically stable. Active and ambulatory. .    2. Facial numbness/pain. Unilateral on left side with extensive CN involvement. Will obtain brain MRI with attention to CNs/ orbits as well. 3. Renal insufficiency. Stable. Encouraged hydration in the home. 4. History of VSD. ECHO shows EF 55 to 60%. Cardiology following. 5. Anemia. Secondary to chemotherapy. Stable. 6. Neuropathy. 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 Dose reduced by 25% with cycle 5 with no improvement. Held cycle 6. Stable and no improvement today. Will present case at cancer conference given mixed response noted on PET  Pt will f/u with us in a month, pending MRI. Will d/w ENT.     Patient was seen with DO Matthew

## 2018-08-02 NOTE — PROGRESS NOTES
Morelia Strauss is a 75 y.o. female here today for follow up of lymphoma. States pain to head, left ear & mouth. Left side of face is numb. States it feels as though she has received novacane Feels numbness is starting to affect speech. States magic mouthwash only helps for short period. To see ENT tomorrow.

## 2018-08-02 NOTE — PROGRESS NOTES
Outpatient [facility]fusion [facility] Note: 
Arrived - 3946 Visit Vitals  /67  Pulse 70  Resp 18  LMP  (LMP Unknown) Assessment - unchanged. Port accessed & flushed per protocol w/o difficulty. [place] needle removed. 1330 - Tolerated well. Pt denies any acute problems/changes. Discharged from [facility] ambulatory. No distress. Next appt: 09/16 1600

## 2018-08-03 NOTE — PROGRESS NOTES
Dr. Reyna Haines discussed case with Dr. Rafy Thomas ENT. Plan to initiate steroids with medrol dose pack today. Scripts to pharmacy in [facility] per patient preference. Reviewed potential SE of medication. She will start these today. Advise she call scheduling to see if we can get MRI's early next week.      Nursing - please reach out on Monday to see how she is feeling/if any improvement on steroids

## 2018-08-06 NOTE — PROGRESS NOTES
HIPAA verified. Stated facial numbness and pain are same despite steroids. For scans tomorrow. Stated ENT advised may need to see neurologist.  Bard Taylor for call.

## 2018-08-08 NOTE — PROGRESS NOTES
Tell pt will need to review MRI at cancer conference/ add for tues  Will need rad/onc to review also  Looks like lymphoma related with nonspecific mass like enlargement of trigeminal nerve  Did steroids help with pain?

## 2018-08-09 NOTE — TELEPHONE ENCOUNTER
Patient's daughter called requesting a return call. Patient had a MRI on 8/7 and they would like to results of that. Also, the patient is feeling numb and in extreme pain. Daughter stated she can not get her to take anything. She is requesting something to be done soon.  Please return call to discuss [phone].  soledad

## 2018-08-09 NOTE — TELEPHONE ENCOUNTER
Spoke with Meri Engle. Requested 7 day supply of ultram per CDC guidelines. Amended script to # 28. To provider.

## 2018-08-09 NOTE — TELEPHONE ENCOUNTER
Spoke with daughter. Advised of provider note. Requested ultram called to [facility]. Advised to seek ED care for symtom management not controlled with recommended regimen. Andrea Dailey verbalized understanding. Thanked for call.

## 2018-08-09 NOTE — TELEPHONE ENCOUNTER
Advise for pain 10/10 that is uncontrolled, she should proceed to the ED. However, understand she does not wish to do this currently Can try tramadol which we can call into her pharmacy. If she tries this and no relief, recommend ED. Continue gabapentin.

## 2018-08-09 NOTE — TELEPHONE ENCOUNTER
Call returned to [address] [place] [address]. Reviewed result note and provider recommendations. Daughter stated steroids did not help with symptoms. Stated patient is taking acetaminophen alternating with ibuprofen for pain control, but still has facial pain 10/10. Sujey Hayes stated patient has oxycodone at home, but does not want to take as makes patient feel sedated and causes hallucinations. Stated patient reports that face/scalp  \"itching and on fire. \"  Stated patient is taking gabapentin TID also. Discussed taking anti-histamine (ie. Benadryl/Claritin) and using topical anti-histamine and/or steroid cream for symptom management. Advised narcotics and benadryl may cause drowsiness and to use caution with activity. Advised for severe symptoms to proceed to ED. Daughter verbalized understanding and thanked for call. To provider.     (11 min)

## 2018-08-13 NOTE — TELEPHONE ENCOUNTER
Call returned. Spoke with daughter. HIPAA verified. Stated noticed abd swelling worse over past week. Also reported left hip pain. Stated patient is nauseated most of the time. Stated will check on patient and return call.    Advised may need ED eval.

## 2018-08-13 NOTE — TELEPHONE ENCOUNTER
Patients daughter called and stated that she has had some swelling in her abdomen that does not seem to be going away, as well as trouble eating, and nausea. Patients daughter stated that it is just like right before she started chemo.  # [phone]

## 2018-08-13 NOTE — TELEPHONE ENCOUNTER
Given daughter description of fluid, agree she should proceed to ED for evaluation and management.     Will follow

## 2018-08-13 NOTE — TELEPHONE ENCOUNTER
Spoke with MAURISIO. Transferred to [address] [place] [address].   Unable to contact department for appointment in am.  Thanked for assist.    (8:04 min)

## 2018-08-13 NOTE — TELEPHONE ENCOUNTER
Pt had ascites at initial transformation to DLBCL dx  Prior to chemo  All went away with chemo/ was just seen in office  So this must be abrupt  Fine with ultrasound eval and ER if needed

## 2018-08-13 NOTE — TELEPHONE ENCOUNTER
HIPAA verified. Spoke with Shanti Greenberg. Stated patient would like ultrasound in am if possible. Does not think needs IVF. RN advised would try to get asap appt and return call. Per Shanti Greenberg if patient worse over pm would proceed to ED. NP advised.

## 2018-08-13 NOTE — TELEPHONE ENCOUNTER
Spoke with daughter. Advised of NP recommendations for outpatient management. Gail Nayely stated that patient looks like she is \"9 months pregnant\" and feel like patient needs to be seen in ED. Rn advised to let us know what patient requests. Verbalized understanding and thanked for call.

## 2018-08-13 NOTE — TELEPHONE ENCOUNTER
Call to Brittney Urbina. HIPAA verified. Advised would follow on appt early am.  Brittney Urbina verbalized understanding and may proceed to ED if patient condition warrants.

## 2018-08-13 NOTE — TELEPHONE ENCOUNTER
HIPAA verified. Spoke with Johnella Opitz. Stated abd is hard to touch. Line disconnected. Call returned and unable to hear nurse.

## 2018-08-13 NOTE — TELEPHONE ENCOUNTER
Most recent imaging via PET 7/18/18 reviewed. Left hip pain likely secondary to left iliac fossa mass now measuring 4.6cm. Referral placed for Radiation/Oncology for XRT to the hip. Happy to write script for pain medication as well though I know she prefers not to take this. Wondering if abdominal swelling may be secondary to ascites - can always order US guided paracentesis outpatient. This may be making nausea worse. Is she taking antiemetics? We can get her set up in [facility] for IV antiemetics and some IV fluid as well if intake down secondary to nausea.

## 2018-08-14 PROBLEM — R18.8 ASCITES: Status: ACTIVE | Noted: 2018-01-01

## 2018-08-14 PROBLEM — F17.200 NICOTINE DEPENDENCE: Chronic | Status: ACTIVE | Noted: 2018-01-01

## 2018-08-14 NOTE — ED NOTES
11:10 AM  I have evaluated the patient as the Provider in Triage. I have reviewed Her vital signs and the triage nurse assessment. I have talked with the patient and any available family and advised that I am the provider in triage and have ordered the appropriate study to initiate their work up based on the clinical presentation during my assessment. I have advised that the patient will be accommodated in the Main ED as soon as possible. I have also requested to contact the triage nurse or myself immediately if the patient experiences any changes in their condition during this brief waiting period. Lymphoma patient of Dr. Bhaskar Torres. Here for abdominal pain, ascites. Admitted in March for malignant ascites, had CT showing extensive disease. Daughter notes some SOB.  + nausea without vomiting.     MARIZA Aquino

## 2018-08-14 NOTE — ROUTINE PROCESS
TRANSFER - OUT REPORT:    Verbal report given to [facility] (name) on Marleny Gutierrez  being transferred to  (unit) for routine progression of care       Report consisted of patients Situation, Background, Assessment and   Recommendations(SBAR). Information from the following report(s) SBAR, ED Summary and Recent Results was reviewed with the receiving nurse. Lines:   Venous Access Device angiodynamics smart port 04/03/18 Upper chest (subclavicular area, right (Active)       Venous Access Device 04/13/18 (Active)       Peripheral IV 08/14/18 Right Antecubital (Active)   Site Assessment Clean, dry, & intact 8/14/2018 11:44 AM   Phlebitis Assessment 0 8/14/2018 11:44 AM   Infiltration Assessment 0 8/14/2018 11:44 AM   Dressing Status Clean, dry, & intact 8/14/2018 11:44 AM   Dressing Type Transparent 8/14/2018 11:44 AM   Hub Color/Line Status Patent; Flushed;Capped;Pink 8/14/2018 11:44 AM   Action Taken Blood drawn 8/14/2018 11:44 AM        Opportunity for questions and clarification was provided. Patient transported with transport.

## 2018-08-14 NOTE — TELEPHONE ENCOUNTER
Spoke with Kirstin Hester. HIPAA verified. Advised of available appt today for paracentesis. Kirstin Hester stated patient is worse today and does not want to travel to [facility]. Kirstin Hester stated will proceed to ED for eval.  To provider.

## 2018-08-14 NOTE — PROGRESS NOTES
TRANSFER - IN REPORT:    Verbal report received from Valentino Serum (name) on Franny Rapp  being received from ED (unit) for routine progression of care      Report consisted of patients Situation, Background, Assessment and   Recommendations(SBAR). Information from the following report(s) SBAR, Kardex and ED Summary was reviewed with the receiving nurse. Opportunity for questions and clarification was provided.

## 2018-08-14 NOTE — TELEPHONE ENCOUNTER
Message from Chris verdugo that patient can be seen at [facility] for procedure today at 2:30p. Advised patient travels from [place] and not sure if can make appt. Will check with daughter and notify scheduling.

## 2018-08-14 NOTE — ED NOTES
Verbal shift change report given to Candice Tompkins (oncoming nurse) by Brenda Agarwal RN (offgoing nurse). Report included the following information SBAR and ED Summary.

## 2018-08-14 NOTE — TELEPHONE ENCOUNTER
Spoke with Jamarcus Urias.   Department to return call if able to fit patient in this morning.  (12 min)

## 2018-08-14 NOTE — H&P
History & Physical  Marilynn Casas MD, [facility]    Date of admission: 8/14/2018    Patient name: Cristian Daniel  MRN: [id number]  YOB: 1950  Age: 75 y.o. Primary care provider:  Marian Thomas MD   Oncologist: Dr Jh Chin of Information: patient, medical records and daughter at bedside                                 Chief complaint: distended abdomen    History of present illness  Cristian Daniel is a 75 y.o. female with Stage 4 high grade lymphoma s/p 6 cycles of chemo completed 7/6/18, HTN, GERD, h/o herpes zoster, pulmonary HTN, congenital heart murmur, h/o left pleural effusion s/p thoracentesis, anemia, and hypothyroidism presented to the ED from home at the request of her oncologist due to profound ascites and weakness. Pt reports that the abdomen became distended about 3 days ago but worsened quickly in the last 24 hours with some left abdominal pain. She reports a previous episode of ascites that resolved with chemo. Patient denies SOB, CP, fevers/chills/vomiting but reports nausea, 9 pound weight gain over the last 3 days, and loose stools. 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 Recent MRI brain showed mass involving the left trigeminal nerve. Patient also has a lesion on the left hip concerning for bone met. She had an outpatient paracentesis scheduled at [facility] today but felt too weak to drive from her home in [place] so she was recommended to come to [facility] instead. ED triage VS were temp 97.9F, HR 82, /64, RR 22, SpO2 96% on RA.  Oncology was consulted by the ED MD.     Past Medical History:   Diagnosis Date    Anemia NEC     Arrhythmia     VSD; TRICUSPID REGURG    Cancer ([address] 2010    low grade NHL    GERD (gastroesophageal reflux disease)     Hypertension     NHL (non-Hodgkin's lymphoma) ([facility] [address] 2010    received chemo and radiation. not in remission.  Pleural effusion 02/21/2018    LEFT    Pulmonary hypertension ([facility] [address] 02/21/2018    PER CARDIOLOGY NOTES FROM PTS VISIT ON 2/21/18    Thyroid disease       Past Surgical History:   Procedure Laterality Date    HX CATARACT REMOVAL  6/2015 & 7/2015    MERRY    HX CHOLECYSTECTOMY  1982    HX DILATION AND CURETTAGE      HX HEENT  2005    RIGHT EAR     HX OTHER SURGICAL      LEFT PAROTIDECTOMY    CA RPLCMT PROST AORTIC VALVE OPEN XCP HOMOGRF/STENT  1972    STENT PLACED TO STRAIGHTEN THE AORTA     Prior to Admission medications    Medication Sig Start Date End Date Taking? Authorizing Provider   traMADol (ULTRAM) 50 mg tablet Take 1 Tab by mouth every six (6) hours as needed for Pain. Max Daily Amount: 200 mg. 8/9/18   Wellington Howell NP   methylPREDNISolone (MEDROL DOSEPACK) 4 mg tablet Follow instructions on dose pack 8/3/18   Wellington Howell NP   amoxicillin-clavulanate (AUGMENTIN) 875-125 mg per tablet  7/25/18   Historical Provider   magic mouthwash solution Magic mouth wash   Maalox  Lidocaine 2% viscous   Diphenhydramine oral solution     Pharmacy to mix equal portions of ingredients to a total volume as indicated in the dispense amount. 7/24/18   Wellington Howell NP   gabapentin (NEURONTIN) 300 mg capsule  6/23/18   Historical Provider   KLOR-CON M20 20 mEq tablet  2/17/18   Historical Provider   levothyroxine (SYNTHROID) 150 mcg tablet 150 mcg Daily (before breakfast). 6/22/17   Historical Provider   raNITIdine (ZANTAC) 150 mg tablet Take 150 mg by mouth nightly. 6/20/17   Historical Provider   docusate sodium (COLACE) 100 mg capsule DAILY PRN 7/20/09   Historical Provider   cholecalciferol (VITAMIN D3) 1,000 unit tablet Take  by mouth daily. Historical Provider   cyanocobalamin (VITAMIN B-12) 1,000 mcg/mL injection 1,000 mcg by IntraMUSCular route every month.     Historical Provider atenolol (TENORMIN) 25 mg tablet Take 25 mg by mouth daily. Historical Provider     Allergies   Allergen Reactions    Rituxan [Rituximab] Hives      Family History   Problem Relation Age of Onset    Heart Disease Mother     Kidney Disease Mother     Alcohol abuse Father     Liver Disease Father     Cancer Sister      Breast    No Known Problems Brother     Cancer Sister      Colon    No Known Problems Sister     No Known Problems Sister     No Known Problems Daughter     Anesth Problems Neg Hx          Social history  Patient resides  x  Independently      With family care      Assisted living      SNF    Ambulates  x  Independently      With cane       Assisted walker         Alcohol history   x  None     Social     Chronic   Smoking history    None     Former smoker   x  Current smoker: smokes about 5-6 cigarettes daily     Denies illicit drug use. History   Smoking Status    Current Every Day Smoker    Packs/day: 0.50    Years: 45.00    Types: Cigarettes   Smokeless Tobacco    Former User    Quit date: 12/1/2015       Code status  x  Full code     DNR/DNI        Code status discussed with the patient, and she will be Full Code by default as she wants some time to think about it. Review of systems  A comprehensive review of systems was negative except for that written in the History of Present Illness.      Physical Examination   Visit Vitals    /64 (BP 1 Location: Left arm, BP Patient Position: At rest)    Pulse 82    Temp 97.9 °F (36.6 °C)    Resp 22    Ht 5' 2\" (1.575 m)    Wt 69.4 kg (153 lb)    LMP  (LMP Unknown)    SpO2 96%    BMI 27.98 kg/m2          O2 Device: Room air    Gen: awake, NAD, cooperative, pleasant  Head: NCAT  EENT: PERRL, EOMI, MMM, oropharynx benign  Neck: supple, no masses  Lungs: CTAB, no w/r/r  CVS: regular rhythm, normal rate, S1S2, no m/r/g appreciated, trace L>R LE peripheral edema, +pulses  Abd: +BS, mildly firm, distended, LUQ TTP  MSK: moves all extremities  Neuro: AOx3, CN II-XII grossly intact, NFD, responds appropriately to questions and commands  Skin: warm, dry; no rashes, lesions, ulcers noted    Data Review    24 Hour Results:  Recent Results (from the past 24 hour(s))   EKG, 12 LEAD, INITIAL    Collection Time: 08/14/18 11:22 AM   Result Value Ref Range    Ventricular Rate 78 BPM    Atrial Rate 78 BPM    P-R Interval 158 ms    QRS Duration 140 ms    Q-T Interval 434 ms    QTC Calculation (Bezet) 494 ms    Calculated P Axis 63 degrees    Calculated R Axis -68 degrees    Calculated T Axis 43 degrees    Diagnosis       Normal sinus rhythm  Right bundle branch block  Left anterior fascicular block  ** Bifascicular block **  When compared with ECG of 21-MAR-2018 22:45,  No significant change    Confirmed by Cassandra Abernathy M.D., Ashlyn Gaxiola ([id number]) on 8/14/2018 11:31:52 AM     CBC WITH AUTOMATED DIFF    Collection Time: 08/14/18 11:29 AM   Result Value Ref Range    WBC 9.1 3.6 - 11.0 K/uL    RBC 3.12 (L) 3.80 - 5.20 M/uL    HGB 10.1 (L) 11.5 - 16.0 g/dL    HCT 32.3 (L) 35.0 - 47.0 %    .5 (H) 80.0 - 99.0 FL    MCH 32.4 26.0 - 34.0 PG    MCHC 31.3 30.0 - 36.5 g/dL    RDW 14.0 11.5 - 14.5 %    PLATELET 405 982 - 698 K/uL    MPV 9.2 8.9 - 12.9 FL    NRBC 0.0 0  WBC    ABSOLUTE NRBC 0.00 0.00 - 0.01 K/uL    NEUTROPHILS 68 32 - 75 %    BAND NEUTROPHILS 3 0 - 6 %    LYMPHOCYTES 13 12 - 49 %    MONOCYTES 9 5 - 13 %    EOSINOPHILS 3 0 - 7 %    BASOPHILS 2 (H) 0 - 1 %    METAMYELOCYTES 2 (H) 0 %    IMMATURE GRANULOCYTES 0 %    ABS. NEUTROPHILS 6.5 1.8 - 8.0 K/UL    ABS. LYMPHOCYTES 1.2 0.8 - 3.5 K/UL    ABS. MONOCYTES 0.8 0.0 - 1.0 K/UL    ABS. EOSINOPHILS 0.3 0.0 - 0.4 K/UL    ABS. BASOPHILS 0.2 (H) 0.0 - 0.1 K/UL    ABS. IMM.  GRANS. 0.0 K/UL    DF MANUAL      RBC COMMENTS MACROCYTOSIS  1+        RBC COMMENTS ANISOCYTOSIS  1+        WBC COMMENTS REACTIVE LYMPHS     METABOLIC PANEL, COMPREHENSIVE    Collection Time: 08/14/18 11:29 AM   Result Value Ref Range    Sodium 136 136 - 145 mmol/L    Potassium 4.0 3.5 - 5.1 mmol/L    Chloride 103 97 - 108 mmol/L    CO2 21 21 - 32 mmol/L    Anion gap 12 5 - 15 mmol/L    Glucose 91 65 - 100 mg/dL    BUN 20 6 - 20 MG/DL    Creatinine 1.25 (H) 0.55 - 1.02 MG/DL    BUN/Creatinine ratio 16 12 - 20      GFR est AA 52 (L) >60 ml/min/1.73m2    GFR est non-AA 43 (L) >60 ml/min/1.73m2    Calcium 8.6 8.5 - 10.1 MG/DL    Bilirubin, total 0.3 0.2 - 1.0 MG/DL    ALT (SGPT) 11 (L) 12 - 78 U/L    AST (SGOT) 46 (H) 15 - 37 U/L    Alk. phosphatase 102 45 - 117 U/L    Protein, total 5.9 (L) 6.4 - 8.2 g/dL    Albumin 2.9 (L) 3.5 - 5.0 g/dL    Globulin 3.0 2.0 - 4.0 g/dL    A-G Ratio 1.0 (L) 1.1 - 2.2     LIPASE    Collection Time: 08/14/18 11:29 AM   Result Value Ref Range    Lipase 108 73 - 393 U/L   TROPONIN I    Collection Time: 08/14/18 11:29 AM   Result Value Ref Range    Troponin-I, Qt. 0.10 (H) <0.05 ng/mL   URINALYSIS W/MICROSCOPIC    Collection Time: 08/14/18 12:05 PM   Result Value Ref Range    Color DARK YELLOW      Appearance CLOUDY (A) CLEAR      Specific gravity 1.030 1.003 - 1.030      pH (UA) 5.0 5.0 - 8.0      Protein 30 (A) NEG mg/dL    Glucose NEGATIVE  NEG mg/dL    Ketone TRACE (A) NEG mg/dL    Blood NEGATIVE  NEG      Urobilinogen 0.2 0.2 - 1.0 EU/dL    Nitrites NEGATIVE  NEG      Leukocyte Esterase MODERATE (A) NEG      WBC 0-4 0 - 4 /hpf    RBC 0-5 0 - 5 /hpf    Epithelial cells FEW FEW /lpf    Bacteria NEGATIVE  NEG /hpf   URINE CULTURE HOLD SAMPLE    Collection Time: 08/14/18 12:05 PM   Result Value Ref Range    Urine culture hold        URINE ON HOLD IN MICROBIOLOGY DEPT FOR 3 DAYS. IF UNPRESERVED URINE IS SUBMITTED, IT CANNOT BE USED FOR ADDITIONAL TESTING AFTER 24 HRS, RECOLLECTION WILL BE REQUIRED.    BILIRUBIN, CONFIRM    Collection Time: 08/14/18 12:05 PM   Result Value Ref Range    Bilirubin UA, confirm NEGATIVE  NEG       Recent Labs      08/14/18   1129   WBC  9.1   HGB  10.1*   HCT  32.3*   PLT  301 Recent Labs      08/14/18   1129   NA  136   K  4.0   CL  103   CO2  21   GLU  91   BUN  20   CREA  1.25*   CA  8.6   ALB  2.9*   SGOT  46*   ALT  11*       Imaging  CXR PA/Lat - bilateral pleural effusions, atx    CT Results  (Last 48 hours)               08/14/18 1409  CT ABD PELV W CONT Final result    Impression:   impression: Large amount of ascites, significantly increased size of multiple    pelvic masses, multiple liver masses. Narrative:  EXAM:  CT ABD PELV W CONT       INDICATION: recurrent ascites/LUQ pain  patient having lymphoma, nausea without   vomiting recently abdominal swelling past 3 days no trauma       COMPARISON: PET/CT July 18, 2018       CONTRAST:  100 mL of Isovue-370. TECHNIQUE:    Following the uneventful intravenous administration of contrast, thin axial   images were obtained through the abdomen and pelvis. Coronal and sagittal   reconstructions were generated. Oral contrast was not administered. CT dose   reduction was achieved through use of a standardized protocol tailored for this   examination and automatic exposure control for dose modulation. FINDINGS:    There is a small left pleural reaction unchanged. Liver and spleen are normal in   size, there are multiple liver masses suspicious for metastases. There is a   large amount of ascites which is a new finding. Prior cholecystectomy, adrenals   and pancreas do appear unremarkable. Kidneys multiple multiple cysts but no   obstruction. The a left psoas mass is significantly increased in size measuring   at this time 81 x 84 mm, it was 37 x 34. Mental thickening is seen, several   anterior abdominal wall hernia is fat-containing at this time. Major vessels do   appear patent. Left external iliac mass is also increased in size measuring at   this time 55 x 30 mm, a twice 32 x 19. Uterus and ovaries appear normal by this   technique.  The additional left pelvic mass inferiorly is also slightly increased   in size the bladder is midline. There is no free air or bowel obstruction seen. Bone findings appear stable.                  Assessment and Plan   Principal Problem:    Symptomatic ascites (POA) - place in [facility] medical floor  - request IR to do US-guided diagnostic and therapeutic paracentesis and send fluid studies including pathology  - Oncology following    Active Problems:  Bilateral pleural effusions and atx (POA) - seen on CXR  - IS, OOB, ambulate as tolerated  - h/o left pleural effusion s/p thoracentesis    Nausea (POA) - may be due to ascites  - prn antiemetic  - full liquid diet and ADAT       High grade B-cell lymphoma (HCC) - Oncology consulted    Mass involving left trigeminal nerve (POA) - Oncology consulted and Rad/Onc consulted  - resume home gabapentin and may need to increase dose  - pain control      HTN - controlled on home atenolol      Hypothyroidism - resume home levothyroxine      Nicotine dependence - cessation counseling; declined nicotine patch    GERD - H2 blocker    Diet: full liquid for now ADAT  Activity: ambulate with assistance  DVT prophylaxis: SCDs  Isolation precautions: none  Consultations: Oncology  Anticipated disposition: Likely home tomorrow post-paracentesis       Signed by: Candido Summers MD    August 14, 2018 at 4:29 PM

## 2018-08-14 NOTE — PROGRESS NOTES
Came to see/examine patient, but Dr Monica Espinoza was in the room with her and daughter. Will come back.     West Pichardo MD, S

## 2018-08-14 NOTE — IP AVS SNAPSHOT
2700 [facility] [address] 
[phone] Patient: Ingrid Flowers MRN: [id number] XJE:17/13/0810 About your hospitalization You were admitted on:  August 14, 2018 You last received care in the:  [facility] 5 OBSERVATION You were discharged on:  August 17, 2018 Why you were hospitalized Your primary diagnosis was:  Ascites Your diagnoses also included:  Nicotine Dependence, High Grade B-Cell Lymphoma (Hcc), Hypothyroid, Htn (Hypertension), Ascites, Malignant, Neuropathic Facial Nerve, Jaw Pain, Left Ear Pain, Face Pain, Generalized Abdominal Pain, Anxiety About Health Follow-up Information Follow up With Details Comments Contact Info Rigoberto Araujo. [place] [address] 4101 [facility] N[place] Kwan 209 [address] 
[phone] Nai Saez MD   15[facility] Jose 403 PALLIATIVE MEDICINE [address] 
[phone] Prema Moraes MD   [address] [place] [phone] Your Scheduled Appointments Monday August 20, 2018 11:15 AM EDT  
RADIATION ONCOLOGY with RAD ONC THERAPY [facility] RADIATION THERAPY ([address]) [address] [place] [address] [address]  
[phone] Patient should report to outpatient registration ([address] [place] [address]) 30 minutes prior to the appointment time unless instructed otherwise. (NOT FOR MRI) Tuesday August 21, 2018 11:15 AM EDT  
RADIATION ONCOLOGY with RAD ONC THERAPY [facility] RADIATION THERAPY ([address]) [address] [place] [address] [address]  
[phone] Patient should report to outpatient registration ([address] [place] [address]) 30 minutes prior to the appointment time unless instructed otherwise. (NOT FOR MRI) Wednesday August 22, 2018 11:15 AM EDT  
RADIATION ONCOLOGY with RAD ONC THERAPY [facility] RADIATION THERAPY ([address]) [address] [place] [address] Ed[address] 13  
[phone] Patient should report to outpatient registration ([address] [place] [address]) 30 minutes prior to the appointment time unless instructed otherwise. (NOT FOR MRI) Thursday August 23, 2018 11:15 AM EDT  
RADIATION ONCOLOGY with RAD ONC THERAPY [address] RADIATION THERAPY ([address]) [address] 13  
[phone] Patient should report to outpatient registration ([address] [place] [address]) 30 minutes prior to the appointment time unless instructed otherwise. (NOT FOR MRI) Friday August 24, 2018 11:15 AM EDT  
RADIATION ONCOLOGY with RAD ONC THERAPY [address] RADIATION THERAPY ([address]) [address] 13  
[phone] Patient should report to outpatient registration ([address] [place] [address]) 30 minutes prior to the appointment time unless instructed otherwise. (NOT FOR MRI) Monday August 27, 2018 11:15 AM EDT  
RADIATION ONCOLOGY with RAD ONC THERAPY [address] RADIATION THERAPY ([address]) [address] [place] [address] Edgard Godfrey [address] 13  
[phone] Patient should report to outpatient registration ([address] [place] [address]) 30 minutes prior to the appointment time unless instructed otherwise. (NOT FOR MRI) Tuesday August 28, 2018 11:15 AM EDT  
RADIATION ONCOLOGY with RAD ONC THERAPY [address] RADIATION THERAPY ([address]) [address] 13  
[phone] Patient should report to outpatient registration ([address] [place] [address]) 30 minutes prior to the appointment time unless instructed otherwise. (NOT FOR MRI) Wednesday August 29, 2018 11:15 AM EDT  
RADIATION ONCOLOGY with RAD ONC THERAPY [address] RADIATION THERAPY ([address]) [address] 13  
[phone] Patient should report to outpatient registration ([address] [place] [address]) 30 minutes prior to the appointment time unless instructed otherwise. (NOT FOR MRI) Thursday August 30, 2018 11:15 AM EDT  
RADIATION ONCOLOGY with RAD ONC THERAPY [facility] RADIATION THERAPY ([address]) 611 [facility] 57  
[phone] Patient should report to outpatient registration ([address] [place] [address]) 30 minutes prior to the appointment time unless instructed otherwise. (NOT FOR MRI) Friday August 31, 2018 11:15 AM EDT  
RADIATION ONCOLOGY with RAD ONC THERAPY [facility] RADIATION THERAPY ([address]) 611 [facility] 57  
[phone] Patient should report to outpatient registration ([address] [place] [address]) 30 minutes prior to the appointment time unless instructed otherwise. (NOT FOR MRI) Tuesday September 04, 2018 11:15 AM EDT  
RADIATION ONCOLOGY with RAD ONC THERAPY [facility] RADIATION THERAPY ([address]) 611 [facility] 57  
[phone] Patient should report to outpatient registration ([address] [place] [address]) 30 minutes prior to the appointment time unless instructed otherwise. (NOT FOR MRI) Wednesday September 05, 2018 11:15 AM EDT  
RADIATION ONCOLOGY with RAD ONC THERAPY [facility] RADIATION THERAPY ([address]) 611 [facility] 57  
[phone] Patient should report to outpatient registration ([address] [place] [address]) 30 minutes prior to the appointment time unless instructed otherwise. (NOT FOR MRI) Thursday September 06, 2018 11:15 AM EDT  
RADIATION ONCOLOGY with RAD ONC THERAPY [facility] RADIATION THERAPY ([address]) 611 [facility] 57  
[phone] Patient should report to outpatient registration ([facility]) 30 minutes prior to the appointment time unless instructed otherwise. (NOT FOR MRI) Thursday September 06, 2018  4:00 PM EDT Infusion with BREMO INFUSION NURSE 3  
[facility] BREMO ([address]) [address] [address]  
[phone] Go to [address] [facility] [address], ground floor. Friday September 07, 2018 11:15 AM EDT  
RADIATION ONCOLOGY with RAD ONC THERAPY [facility] RADIATION THERAPY ([address]) 611 [facility] [address] [address] 13  
[phone] Patient should report to outpatient registration ([address] [place] [address]) 30 minutes prior to the appointment time unless instructed otherwise. (NOT FOR MRI) Monday September 10, 2018 11:15 AM EDT  
RADIATION ONCOLOGY with RAD ONC THERAPY [facility] RADIATION THERAPY ([address]) 611 [facility] [address] 13  
[phone] Patient should report to outpatient registration ([address] [place] [address]) 30 minutes prior to the appointment time unless instructed otherwise. (NOT FOR MRI) Tuesday September 11, 2018 11:15 AM EDT  
RADIATION ONCOLOGY with RAD ONC THERAPY [facility] RADIATION THERAPY ([address]) 611 [facility] [address] 13  
[phone] Patient should report to outpatient registration ([address] [place] [address]) 30 minutes prior to the appointment time unless instructed otherwise. (NOT FOR MRI) Wednesday September 12, 2018 11:15 AM EDT  
RADIATION ONCOLOGY with RAD ONC THERAPY [facility] RADIATION THERAPY ([address]) 611 [facility]  
[phone] Patient should report to outpatient registration ([address] [place] [address]) 30 minutes prior to the appointment time unless instructed otherwise. (NOT FOR MRI)  Thursday September 13, 2018 11:15 AM EDT  
 RADIATION ONCOLOGY with RAD ONC THERAPY 521 [facility]  
521 [facility] RADIATION THERAPY ([address]) [address]  
[phone] Patient should report to outpatient registration ([address] [place] [address]) 30 minutes prior to the appointment time unless instructed otherwise. (NOT FOR MRI) Friday September 14, 2018 11:15 AM EDT  
RADIATION ONCOLOGY with RAD ONC THERAPY 521 [facility]  
521 [facility] RADIATION THERAPY ([address]) [address]  
[phone] Patient should report to outpatient registration ([address] [place] [address]) 30 minutes prior to the appointment time unless instructed otherwise. (NOT FOR MRI) Monday September 17, 2018 11:15 AM EDT  
RADIATION ONCOLOGY with RAD ONC THERAPY 521 [facility]  
521 [facility] RADIATION THERAPY ([address]) [address]  
[phone] Patient should report to outpatient registration ([address] [place] [address]) 30 minutes prior to the appointment time unless instructed otherwise. (NOT FOR MRI) Tuesday September 18, 2018 11:15 AM EDT  
RADIATION ONCOLOGY with RAD ONC THERAPY 521 [facility]  
521 [facility] RADIATION THERAPY ([address]) [address]  
[phone] Patient should report to outpatient registration ([address] [place] [address]) 30 minutes prior to the appointment time unless instructed otherwise. (NOT FOR MRI) Wednesday September 19, 2018 11:15 AM EDT  
RADIATION ONCOLOGY with RAD ONC THERAPY 521 [facility]  
521 [facility] RADIATION THERAPY ([address]) [address]  
[phone] Patient should report to outpatient registration ([address] [place] [address]) 30 minutes prior to the appointment time unless instructed otherwise. (NOT FOR MRI) Thursday September 20, 2018 11:15 AM EDT  
RADIATION ONCOLOGY with RAD ONC THERAPY 521 [facility]  
521 [facility] RADIATION THERAPY ([address]) [address] [place] [address] [address]  
[phone] Patient should report to outpatient registration ([address] [facility]) 30 minutes prior to the appointment time unless instructed otherwise. (NOT FOR MRI) Thursday October 04, 2018  4:00 PM EDT Infusion with JUANA INFUSION NURSE 3  
[facility] JUANA ([address]) [address] [facility] [address] [id number] [phone] Go to [address] [facility] 81, ground floor. Discharge Orders None A check amol indicates which time of day the medication should be taken. My Medications START taking these medications Instructions Each Dose to Equal  
 Morning Noon Evening Bedtime  
 amitriptyline 25 mg tablet Commonly known as:  ELAVIL Your last dose was: Your next dose is: Take 1 Tab by mouth daily for 30 days. 25 mg CHANGE how you take these medications Instructions Each Dose to Equal  
 Morning Noon Evening Bedtime  
 atenolol 25 mg tablet Commonly known as:  TENORMIN What changed:  additional instructions Your last dose was: Your next dose is: Take 1 Tab by mouth daily. If your blood pressure is under 100/60,do not take this medicine. 25 mg  
    
   
   
   
  
 gabapentin 300 mg capsule Commonly known as:  NEURONTIN What changed:   
- how much to take 
- how to take this - when to take this Your last dose was: Your next dose is: Take 2 Caps by mouth three (3) times daily for 30 days. 600 mg CONTINUE taking these medications Instructions Each Dose to Equal  
 Morning Noon Evening Bedtime  
 cholecalciferol 1,000 unit tablet Commonly known as:  VITAMIN D3 Your last dose was: Your next dose is: Take  by mouth daily. docusate sodium 100 mg capsule Commonly known as:  [place] Your last dose was: Your next dose is:    
   
   
 DAILY PRN  
     
   
   
   
  
 KLOR-CON M20 20 mEq tablet Generic drug:  potassium chloride Your last dose was: Your next dose is:    
   
   
      
   
   
   
  
 levothyroxine 150 mcg tablet Commonly known as:  SYNTHROID Your last dose was: Your next dose is:    
   
   
 150 mcg Daily (before breakfast). 150 mcg  
    
   
   
   
  
 magic mouthwash solution Your last dose was: Your next dose is:    
   
   
 Magic mouth wash  Maalox Lidocaine 2% viscous  Diphenhydramine oral solution   Pharmacy to mix equal portions of ingredients to a total volume as indicated in the dispense amount. raNITIdine 150 mg tablet Commonly known as:  ZANTAC Your last dose was: Your next dose is: Take 150 mg by mouth nightly. 150 mg  
    
   
   
   
  
 VITAMIN B-12 1,000 mcg/mL injection Generic drug:  cyanocobalamin Your last dose was: Your next dose is:    
   
   
 1,000 mcg by IntraMUSCular route every month. 1000 mcg STOP taking these medications   
 amoxicillin-clavulanate 875-125 mg per tablet Commonly known as:  AUGMENTIN  
   
  
 methylPREDNISolone 4 mg tablet Commonly known as:  MEDROL DOSEPACK  
   
  
 traMADol 50 mg tablet Commonly known as:  ULTRAM  
   
  
  
  
Where to Get Your Medications These medications were sent to [address] [place] [address] [phone] Phone:  [phone]  
  amitriptyline 25 mg tablet  
 gabapentin 300 mg capsule Information on where to get these meds will be given to you by the nurse or doctor. ! Ask your nurse or doctor about these medications  
  atenolol 25 mg tablet Discharge Instructions Discharge Instructions PATIENT ID: Jossie Russell MRN: [id number] YOB: 1950 DATE OF ADMISSION: 8/14/2018 11:15 AM   
DATE OF DISCHARGE: 8/17/2018 PRIMARY CARE PROVIDER: Bernarda Powers MD  
 
ATTENDING PHYSICIAN: Raleigh Adame MD 
DISCHARGING PROVIDER: Raleigh Adame MD   
To contact this individual call [phone] and ask the  to page. If unavailable ask to be transferred the Adult Hospitalist Department. DISCHARGE DIAGNOSES Ascites. This is suspected to be due to the cancer. The fluid accumulation may recur and if you notice worsening of the ascites(worsened distension),talk to you doctors as you may need drainage of the fluid;and  if the fluid keep re accumulating you doctors may consider placement of indwelling drainage catheter through which you can manage the fluid yourself at home. Watch for fever,abdominal pain,distension as these could be signs of infection related to the ascites. Do not take the blood pressure medicine  Atenolol if your blood pressure is lower than 10/60. CONSULTATIONS: IP CONSULT TO ONCOLOGY 
IP CONSULT TO RADIATION ONCOLOGY 
IP CONSULT TO PALLIATIVE CARE - PROVIDER 
IP CONSULT TO INTERVENTIONAL RADIOLOGY PROCEDURES/SURGERIES: * No surgery found * PENDING TEST RESULTS:  
At the time of discharge the following test results are still pending: none. FOLLOW UP APPOINTMENTS:  
Follow-up Information Follow up With Details Comments Contact Info Rigoberto Rhodes. [place] [address] 0777 [facility] [address] 
[phone] Maria Esther Whitman MD   [address] [place] [address] PALLIATIVE MEDICINE [address] 
[phone] ADDITIONAL CARE RECOMMENDATIONS:  
 
DIET: Regular Diet ACTIVITY: Activity as tolerated WOUND CARE: NA 
 
EQUIPMENT needed: NA 
 
 
DISCHARGE MEDICATIONS: 
 See Medication Reconciliation Form · It is important that you take the medication exactly as they are prescribed. · Keep your medication in the bottles provided by the pharmacist and keep a list of the medication names, dosages, and times to be taken in your wallet. · Do not take other medications without consulting your doctor. NOTIFY YOUR PHYSICIAN FOR ANY OF THE FOLLOWING:  
Fever over 101 degrees for 24 hours. Chest pain, shortness of breath, fever, chills, nausea, vomiting, diarrhea, change in mentation, falling, weakness, bleeding. Severe pain or pain not relieved by medications. Or, any other signs or symptoms that you may have questions about. DISPOSITION: 
x  Home With: 
 OT  PT  [facility]  RN  
  
 SNF/Inpatient Rehab/LTAC Independent/assisted living Hospice Other:  
 
 
 
Signed: Eddie German MD 
8/17/2018 
4:51 PM 
 
  
  
  
[email or web address] Announcement We are excited to announce that we are making your provider's discharge notes available to you in [email or web address]. You will see these notes when they are completed and signed by the physician that discharged you from your recent hospital stay. If you have any questions or concerns about any information you see in [email or web address], please call the Health Information Department where you were seen or reach out to your Primary Care Provider for more information about your plan of care. Introducing [facility]! Roshan Irving introduces [email or web address] patient portal. Now you can access parts of your medical record, email your doctor's office, and request medication refills online. 1. In your internet browser, go to [email or web address] 2. Click on the First Time User? Click Here link in the Sign In box. You will see the New Member Sign Up page. 3. Enter your [email or web address] Access Code exactly as it appears below. You will not need to use this code after youve completed the sign-up process.  If you do not sign up before the expiration date, you must request a new code. 
 
· T[employer]s Access Code: [email or web address] Expires: 8/24/2018  7:23 AM 
 
4. Enter the last four digits of your Social Security Number (xxxx) and Date of Birth (mm/dd/yyyy) as indicated and click Submit. You will be taken to the next sign-up page. 5. Create a POWWOWt ID. This will be your Tastebuds login ID and cannot be changed, so think of one that is secure and easy to remember. 6. Create a T[employer]buds password. You can change your password at any time. 7. Enter your Password Reset Question and Answer. This can be used at a later time if you forget your password. 8. Enter your e-mail address. You will receive e-mail notification when new information is available in [address] 9. Click Sign Up. You can now view and download portions of your medical record. 10. Click the Download Summary menu link to download a portable copy of your medical information. If you have questions, please visit the Frequently Asked Questions section of the T[employer]buds website. Remember, T[employer]buds is NOT to be used for urgent needs. For medical emergencies, dial 911. Now available from your iPhone and Android! Introducing Kieran Mckay As a [facility] patient, I wanted to make you aware of our electronic visit tool called Kieran Mckay. [facility] 24/7 allows you to connect within minutes with a medical provider 24 hours a day, seven days a week via a mobile device or tablet or logging into a secure website from your computer. You can access Kieran Mckay from anywhere in the [place]. A virtual visit might be right for you when you have a simple condition and feel like you just dont want to get out of bed, or cant get away from work for an appointment, when your regular [facility] provider is not available (evenings, weekends or holidays), or when youre out of town and need minor care.   Electronic visits cost only $49 and if the [facility] 24/7 provider determines a prescription is needed to treat your condition, one can be electronically transmitted to a nearby pharmacy*. Please take a moment to enroll today if you have not already done so. The enrollment process is free and takes just a few minutes. To enroll, please download the [employer] 24/7 toy to your tablet or phone, or visit [email or web address] to enroll on your computer. And, as an [address] [place] [address] patient with a Qwite account, the results of your visits will be scanned into your electronic medical record and your primary care provider will be able to view the scanned results. We urge you to continue to see your regular [employer] provider for your ongoing medical care. And while your primary care provider may not be the one available when you seek a [employer] virtual visit, the peace of mind you get from getting a real diagnosis real time can be priceless. For more information on [employer], view our Frequently Asked Questions (FAQs) at [email or web address]. Sincerely, 
 
Josefina Gabriel MD 
Chief Medical Officer [employer] *:  certain medications cannot be prescribed via [employer] Unresulted Labs-Please follow up with your PCP about these lab tests Order Current Status CULTURE, BODY FLUID W GRAM STAIN Preliminary result Last Palliative Care Discharge Note 08/17/18 8438  Version 1 of 1 Goals of Care/Treatment Preferences The Palliative Medicine team was consulted as part of your/your loved one's care in the hospital. Our team is a supportive service; we strive to relieve suffering and improve quality of life. We reviewed advance care planning information, which includes the following: 
Patient's [address] is[de-identified] Legal Next of Kin Primary Decision Maker Name: Cailin Gordon Primary Decision Maker Phone Number: [phone] Primary Decision Maker Relationship to Patient: Adult child Confirm Advance Directive: None Patient Would Like to Complete Advance Directive: Yes Patient/Health Care Proxy Stated Goals: Other (comment) (improve this pain) We reviewed / discussed your code status as: Full Code Full Code means perform CPR in the event of cardiac arrest. 
    DNR means do NOT perform CPR in the event of cardiac arrest. 
    Partial Code means you have specific preferences, please discuss with your healthcare team. 
    Reardon Green[place] means this issue was not addressed / resolved during your stay Medical Interventions:  (being determined - reviewed AD and will complete as outpatient) Because of the importance of this information, we are providing you with a printed copy to share with other healthcare providers after this hospitalization is complete. Providers Seen During Your Hospitalization Provider Specialty Primary office phone Anna Mera MD Emergency Medicine [phone] Gisel Ventura MD Internal Medicine [phone] Lake Fernandez MD Internal Medicine [phone] Your Primary Care Physician (PCP) Primary Care Physician Office Phone Office Fax [employer] [phone] You are allergic to the following Allergen Reactions Rituxan (Rituximab) Hives Recent Documentation Height Weight BMI OB Status Smoking Status 1.575 m 69.4 kg 27.98 kg/m2 Postmenopausal Current Every Day Smoker Emergency Contacts Name Discharge Info Relation Home Work Mobile [facility] DISCHARGE CAREGIVER [3] Daughter [21] [phone] [facility]  Child [2] [phone] Patient Belongings The following personal items are in your possession at time of discharge: 
     Visual Aid: Glasses, At home             Clothing: At bedside Please provide this summary of care documentation to your next provider. Signatures-by signing, you are acknowledging that this After Visit Summary has been reviewed with you and you have received a copy. Patient Signature:  ____________________________________________________________ Date:  ____________________________________________________________  
  
[facility] Signature:  ____________________________________________________________ Date:  ____________________________________________________________

## 2018-08-14 NOTE — IP AVS SNAPSHOT
[address] [facility] [address] [place] [address] 
[phone] Patient: Martha Mckeon MRN: [id number] TUL:[id number] A check amol indicates which time of day the medication should be taken. My Medications START taking these medications Instructions Each Dose to Equal  
 Morning Noon Evening Bedtime  
 amitriptyline 25 mg tablet Commonly known as:  ELAVIL Your last dose was: Your next dose is: Take 1 Tab by mouth daily for 30 days. 25 mg CHANGE how you take these medications Instructions Each Dose to Equal  
 Morning Noon Evening Bedtime  
 atenolol 25 mg tablet Commonly known as:  TENORMIN What changed:  additional instructions Your last dose was: Your next dose is: Take 1 Tab by mouth daily. If your blood pressure is under 100/60,do not take this medicine. 25 mg  
    
   
   
   
  
 gabapentin 300 mg capsule Commonly known as:  NEURONTIN What changed:   
- how much to take 
- how to take this - when to take this Your last dose was: Your next dose is: Take 2 Caps by mouth three (3) times daily for 30 days. 600 mg CONTINUE taking these medications Instructions Each Dose to Equal  
 Morning Noon Evening Bedtime  
 cholecalciferol 1,000 unit tablet Commonly known as:  VITAMIN D3 Your last dose was: Your next dose is: Take  by mouth daily. docusate sodium 100 mg capsule Commonly known as:  Gina Torres Your last dose was: Your next dose is:    
   
   
 DAILY PRN  
     
   
   
   
  
 KLOR-CON M20 20 mEq tablet Generic drug:  potassium chloride Your last dose was: Your next dose is:    
   
   
      
   
   
   
  
 levothyroxine 150 mcg tablet Commonly known as:  SYNTHROID Your last dose was: Your next dose is:    
   
   
 150 mcg Daily (before breakfast). 150 mcg  
    
   
   
   
  
 magic mouthwash solution Your last dose was: Your next dose is:    
   
   
 Magic mouth wash  Maalox Lidocaine 2% viscous  Diphenhydramine oral solution   Pharmacy to mix equal portions of ingredients to a total volume as indicated in the dispense amount. raNITIdine 150 mg tablet Commonly known as:  ZANTAC Your last dose was: Your next dose is: Take 150 mg by mouth nightly. 150 mg  
    
   
   
   
  
 VITAMIN B-12 1,000 mcg/mL injection Generic drug:  cyanocobalamin Your last dose was: Your next dose is:    
   
   
 1,000 mcg by IntraMUSCular route every month. 1000 mcg STOP taking these medications   
 amoxicillin-clavulanate 875-125 mg per tablet Commonly known as:  AUGMENTIN  
   
  
 methylPREDNISolone 4 mg tablet Commonly known as:  MEDROL DOSEPACK  
   
  
 traMADol 50 mg tablet Commonly known as:  ULTRAM  
   
  
  
  
Where to Get Your Medications These medications were sent to [address] [place], [place] [phone] Phone:  [phone]  
  amitriptyline 25 mg tablet  
 gabapentin 300 mg capsule Information on where to get these meds will be given to you by the nurse or doctor. ! Ask your nurse or doctor about these medications  
  atenolol 25 mg tablet

## 2018-08-14 NOTE — ED TRIAGE NOTES
Triage Note: Patient was sent in by Radu Course for abdominal swelling, shortness of breath and dizziness for the past 3 days.  Patient had MRI scan last week and found some new masses to the left side of chest. + nausea

## 2018-08-14 NOTE — CONSULTS
CC NHL Dx  10/10  Transformation 3/18 with pleural effusion    TREATMENT COURSE:  Orbit radiation  Chlorambucil x 1 cycle 4/16. Cycle 2 3/17. Cycle 3 4/17   Cycle 4 1/18. Attempted one dose of rituxan. 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 Daija Ennis is a 76 y/o female with stage 4 high grade lymphoma recurrent after recent CHOP chemo. Pt refused rituxan. Recently seen in office with face numbness but admitting symptoms now all new and same as at transformation of NHL>   Hx of admitted 3/20/18 and post CHOP cycle 1 in hospital. She completed a total of 6 cycles of CHOP outpatient. She recently followed up with our office with post-treatment PET results and at that time had developed left sided facial numbness that feels like \"novocaine\" was injected. She describes this as painful that feels like the neuropathy that is in her fingers and toes. She states that her teeth are numb. She is having difficulty eating secondary to this. Denies mouth sores or recent dental work. She is also having left ear pain. No change in hearing of left ear. No neck or shoulder pain. MRI brain obtained 8/7/18 with abnormal masslike enlargement and enhancement of the left trigeminal nerve as well as abnormal marrow signal of the adjacent left petrous apex/left clivus. Scans reviewed in tumor board and plans for outpatient Rad/Onc consultation at that time. She tried a course of steroids with no improvement. Has been trying tramadol with little pain relief as well. States that pain has significantly worsened over the previous few days. She also noted over the last 3-4 days she has developed abdominal ascites and pain in left abdomen. She was directed to the ED from our office yesterday but states that she wanted to wait until today as it is a long day of traveling in the car.  CT obtained on admission with large amount of ascites as well as liver and pelvic masses. Accompanied by daughter today. She will be admitted under Hospitalist service. Otherwise, complete ROS is per the symptom report form which has been scanned into the media section of the electronic medical record. Past Medical History:   Diagnosis Date    Anemia NEC     Arrhythmia     VSD; TRICUSPID REGURG    Cancer ([facility] [address] 2010    low grade NHL    GERD (gastroesophageal reflux disease)     Hypertension     NHL (non-Hodgkin's lymphoma) ([facility] [address] 2010    received chemo and radiation. not in remission.     Pleural effusion 02/21/2018    LEFT    Pulmonary hypertension ([facility] [address] 02/21/2018    PER CARDIOLOGY NOTES FROM PTS VISIT ON 2/21/18    Thyroid disease      Past Surgical History:   Procedure Laterality Date    HX CATARACT REMOVAL  6/2015 & 7/2015    MERRY    HX CHOLECYSTECTOMY  1982    HX DILATION AND CURETTAGE      HX HEENT  2005    RIGHT EAR     HX OTHER SURGICAL      LEFT PAROTIDECTOMY    NE RPLCMT PROST AORTIC VALVE OPEN XCP HOMOGRF/STENT  1972    STENT PLACED TO STRAIGHTEN THE AORTA     Social History     Social History    Marital status:      Spouse name: N/A    Number of children: N/A    Years of education: N/A     Social History Main Topics    Smoking status: Current Every Day Smoker     Packs/day: 0.50     Years: 45.00     Types: Cigarettes    Smokeless tobacco: Former User     Quit date: 12/1/2015    Alcohol use Yes      Comment: Rare    Drug use: No    Sexual activity: No      Comment:  has one child     Other Topics Concern    None     Social History Narrative     Family History   Problem Relation Age of Onset    Heart Disease Mother     Kidney Disease Mother     Alcohol abuse Father     Liver Disease Father     Cancer Sister      Breast    No Known Problems Brother     Cancer Sister      Colon    No Known Problems Sister     No Known Problems Sister     No Known Problems Daughter    [facility] Anest[facility] Problems Neg Hx        Current Outpatient Prescriptions   Medication Sig Dispense Refill    traMADol (ULTRAM) 50 mg tablet Take 1 Tab by mouth every six (6) hours as needed for Pain. Max Daily Amount: 200 mg. 30 Tab 0    methylPREDNISolone (MEDROL DOSEPACK) 4 mg tablet Follow instructions on dose pack 1 Dose Pack 0    amoxicillin-clavulanate (AUGMENTIN) 875-125 mg per tablet       magic mouthwash solution Magic mouth wash   Maalox  Lidocaine 2% viscous   Diphenhydramine oral solution     Pharmacy to mix equal portions of ingredients to a total volume as indicated in the dispense amount. 280 mL 1    gabapentin (NEURONTIN) 300 mg capsule       KLOR-CON M20 20 mEq tablet       levothyroxine (SYNTHROID) 150 mcg tablet 150 mcg Daily (before breakfast).  raNITIdine (ZANTAC) 150 mg tablet Take 150 mg by mouth nightly.  docusate sodium (COLACE) 100 mg capsule DAILY PRN      cholecalciferol (VITAMIN D3) 1,000 unit tablet Take  by mouth daily.  cyanocobalamin (VITAMIN B-12) 1,000 mcg/mL injection 1,000 mcg by IntraMUSCular route every month.  atenolol (TENORMIN) 25 mg tablet Take 25 mg by mouth daily. Allergies   Allergen Reactions    Rituxan [Rituximab] Hives       Review of Systems    A comprehensive review of systems was negative except for as noted above.     Objective:  Visit Vitals    /64 (BP 1 Location: Left arm, BP Patient Position: At rest)    Pulse 82    Temp 97.9 °F (36.6 °C)    Resp 22    Ht 5' 2\" (1.575 m)    Wt 153 lb (69.4 kg)    LMP  (LMP Unknown)    SpO2 96%    BMI 27.98 kg/m2     Physical Exam:   General appearance - alert, conversant, facial abnormality as below, no acute distress  Mental status - alert, oriented   Face - Left side of face with decreased mobility, ptosis left eye, hypesthesia left side of face  Eye - conj clear   Neck - supple  CV: regular with murmur  Resp: clear to auscultation bilaterally, normal effort  GI: distended, ascites, non tender  Ext No lower extremity edema noted bilaterally  Skin-Warm and dry. Intact, no rash. Neuro -  Awake, alert, oriented. Diagnostic Imaging   reviewed    2/21/18 PET:  IMPRESSION:   1. Increasing right parietal soft tissue activity. 2. Progression in the bilateral areas of hypermetabolic pleural thickening. 3. New bilateral hypermetabolic axillary lymph nodes and subcarinal lymph node  and progression of retroperitoneal and left pelvic lymphadenopathy. 4. Improvement in the left inguinal lymphadenopathy  5. Improvement in the right parieto-occipital scalp activity. 6. New hypermetabolic soft tissue abnormality left iliac fossa. 7. Stable left cervical hypermetabolic lymph nodes. 2/17/18 PET:  MPRESSION:   1. Findings suspicious for a uterine mass with a soft tissue mass in the left  pelvis and extending into the retroperitoneum concerning for malignancy. Lymphoma is a differential consideration given the patient's history. The mass  encases but does not narrow the aortic bifurcation and IVC. 2. Enlarged left inguinal lymph node is decreased in size since 8/30/2017.  3. Increased large left pleural effusion with left lower lung atelectasis. 7/18/18 PET  IMPRESSION: New focus of increased tracer activity right iliac wing concerning  for osseous metastatic disease. New hypermetabolic soft tissue nodule adjacent  to the right iliac crest. Increase in the size of the left iliac fossa mass. Minimal decrease in the size of the axillary lymph nodes with little change in  the tracer activity. Left level 2 lymph nodes and pleural activity in the left  hemithorax are stable as are the left external iliac and pelvic masses.     Lab Results    reviewed  Lab Results   Component Value Date/Time    WBC 9.1 08/14/2018 11:29 AM    HGB 10.1 (L) 08/14/2018 11:29 AM    HCT 32.3 (L) 08/14/2018 11:29 AM    PLATELET 826 51/87/4746 11:29 AM    .5 (H) 08/14/2018 11:29 AM       Lab Results   Component Value Date/Time    Sodium 136 08/14/2018 11:29 AM    Potassium 4.0 08/14/2018 11:29 AM    Chloride 103 08/14/2018 11:29 AM    CO2 21 08/14/2018 11:29 AM    Anion gap 12 08/14/2018 11:29 AM    Glucose 91 08/14/2018 11:29 AM    BUN 20 08/14/2018 11:29 AM    Creatinine 1.25 (H) 08/14/2018 11:29 AM    BUN/Creatinine ratio 16 08/14/2018 11:29 AM    GFR est AA 52 (L) 08/14/2018 11:29 AM    GFR est non-AA 43 (L) 08/14/2018 11:29 AM    Calcium 8.6 08/14/2018 11:29 AM    AST (SGOT) 46 (H) 08/14/2018 11:29 AM    Alk. phosphatase 102 08/14/2018 11:29 AM    Protein, total 5.9 (L) 08/14/2018 11:29 AM    Albumin 2.9 (L) 08/14/2018 11:29 AM    Globulin 3.0 08/14/2018 11:29 AM    A-G Ratio 1.0 (L) 08/14/2018 11:29 AM    ALT (SGPT) 11 (L) 08/14/2018 11:29 AM         CT Results (most recent):    Results from Hospital Encounter encounter on 08/14/18   CT ABD PELV W CONT   Narrative EXAM:  CT ABD PELV W CONT    INDICATION: recurrent ascites/LUQ pain  patient having lymphoma, nausea without  vomiting recently abdominal swelling past 3 days no trauma    COMPARISON: PET/CT July 18, 2018    CONTRAST:  100 mL of Isovue-370. TECHNIQUE:   Following the uneventful intravenous administration of contrast, thin axial  images were obtained through the abdomen and pelvis. Coronal and sagittal  reconstructions were generated. Oral contrast was not administered. CT dose  reduction was achieved through use of a standardized protocol tailored for this  examination and automatic exposure control for dose modulation. FINDINGS:   There is a small left pleural reaction unchanged. Liver and spleen are normal in  size, there are multiple liver masses suspicious for metastases. There is a  large amount of ascites which is a new finding. Prior cholecystectomy, adrenals  and pancreas do appear unremarkable. Kidneys multiple multiple cysts but no  obstruction. The a left psoas mass is significantly increased in size measuring  at this time 81 x 84 mm, it was 37 x 34.  Mental thickening is seen, several  anterior abdominal wall hernia is fat-containing at this time. Major vessels do  appear patent. Left external iliac mass is also increased in size measuring at  this time 55 x 30 mm, a twice 32 x 19. Uterus and ovaries appear normal by this  technique. The additional left pelvic mass inferiorly is also slightly increased  in size the bladder is midline. There is no free air or bowel obstruction seen. Bone findings appear stable. Impression impression: Large amount of ascites, significantly increased size of multiple   pelvic masses, multiple liver masses. Assessment/Plan:    1. 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 Cycle 1 of CHOP completed in the hospital and treatment outpatient since that time with no difficulty  Pt had a PET 5/16/18 which shows good response to chemo with decreased disease. Completed 6 cycles of CHOP (Cyclophosphamide 750mg/m2, Doxorubicin 50mg/m2, Vincristine (dose reduced cycle 5 by 25% for neuropathy to 1.05mg/m2) and post-treatment PET with a mixed response to treatment  Struggled with grade 3 neuropathy   Post-treatment PET with mixed response which we had reviewed previously. CT on admission with large amount of ascites per #4 and significantly increased size of multiple pelvic masses and now multiple liver masses  Reviewed these scan results in ED with both patient and daughter  Discussed we will need to figure out overall plans in terms of treatment/further chemotherapy/symptom management only but acute focus is on pain and ascites    2. Facial numbness/pain. Unilateral on left side with extensive CN involvement. Brain MRI with enlargement of trigeminal nerve likely secondary to lymphoma. Rad/Onc consult pending. 3. Pain, severe. Secondary to #2 and nerve irritation. Palliative Medicine consulted and appreciated. 4. Ascites. Noted on CT imaging.  Rapid progression as this was not present during last OV. Discussed that this is likely malignant and will need to undergo paracentesis vs paracentesis with drain placement. She is undecided at this time on drain. 5. Renal insufficiency. Stable. Encouraged hydration in the home. 6. History of VSD. ECHO shows EF 55 to 60%. Cardiology following. 7. Anemia. Secondary to chemotherapy. Counts recovering off treatment and now up to 10.1.    8. Peripheral Neuropathy. 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 Dose reduced by 25% with cycle 5 with no improvement. Held cycle 6. Stable and no improvement today. Please call with any questions. Pt seen today in conjunction with [facility]  Talked with pt and daughter at bedside in ER. D/w ER doctor. Pt does not want further chemo. Goal of care is palliative. Pt is open to paracentesis with fluid analysis. Also open to Rad/onc eval.   Consult rad/onc. Case discussed at cancer conference today. Consider palliative care consult also.      Tim Max DO

## 2018-08-15 NOTE — PROGRESS NOTES
Pt tolerated paracentesis. Continously monitored throughout exam. Vs remained stable. Attempted to call report to floor but nurse in isolation room and will have to call back. Family aware of drainage bag and will call for nurse when bag is full. Family remained with pt throughout procedure.

## 2018-08-15 NOTE — PROGRESS NOTES
Bedside and Verbal shift change report given to [address] [place] [address] (oncoming nurse) by Rogelio Ko RN (offgoing nurse). Report included the following information SBAR and Kardex.

## 2018-08-15 NOTE — PROGRESS NOTES
Pt waiting on transport. Liliana Ziegler, RT with pt. Waiting on floor nurse, Jaz, to callback for report.

## 2018-08-15 NOTE — PROGRESS NOTES
Report called to [place] and told drainage bag would need emptying when she arrived back on floor. Also, told if pt drains 5 liters she would need to contact the MD about giving Albumin. She understood.

## 2018-08-15 NOTE — PHYSICIAN ADVISORY
Letter of Status Determination:   Recommend hospitalization status upgraded from   OBSERVATION  to INPATIENT  Status     Pt Name:  Angélica Higgins   MR#   [address] [facility] # [id number] /  [id number]  Payor: Mega Kenny / Plan: [address] [place] [address] / Product Type: Medicare /    DICK#  [id number]   [address]  795/16  @ [facility]   Hospitalization date  8/14/2018 11:15 AM   Current Attending Physician  Eddie German MD   Principal diagnosis  Ascites      Clinicals  75 y.o. y.o  female hospitalized with above diagnosis   The pt presented with significant symptoms from her ascites. She suffers from complex medical/oncological problems including but not limited to high grade B cell lymphoma, recurrent pleural effusions and others. She has had 5L ascitic fluid drained. Her care is going to extend >2Midnights in acute care setting for appropriate and necessary medical care. [place] ([facility] criteria   Does  NOT apply    STATUS DETERMINATION  This patient is at high risk of adverse events and deterioration based on documented clinical data, comorbid conditions and current acute care course. Ms. Angélica Higgins is expected to meet Inpatient Admission status criteria in accordance with CMS regulation Section 43 .3. Specifically, due to medical necessity the patient's stay is expected to exceed Two Midnights. It is our recommendation that this patient's hospitalization status should be upgraded from  OBSERVATION to INPATIENT status. The final decision of the patient's hospitalization status depends on the attending physician's judgment.          Additional comments     Payor: Mega Kenny / Plan: [place] MEDICARE PART A & B / Product Type: Medicare /         Samantha King MD MPH [address] [place] [address] [facility]   President Medical Staff, [address] [place] [address]    Cell  [phone]        [phone]    .

## 2018-08-15 NOTE — PROGRESS NOTES
Care Management Interventions  PCP Verified by CM: Yes  Palliative Care Criteria Met (RRAT>21 & CHF Dx)?: No  Mode of Transport at Discharge: Other (see comment) (Family )  Transition of Care Consult (CM Consult): Discharge Planning  Discharge Durable Medical Equipment: No  Physical Therapy Consult: No  Occupational Therapy Consult: No  Speech Therapy Consult: No  Current Support Network: Lives with Spouse, Own Home  Confirm Follow Up Transport: Family  Plan discussed with Pt/Family/Caregiver: Yes  [place] Resource Information Provided?: No  Discharge Location  Discharge Placement: Unable to determine at this time    Reason for Admission:   Distended abdomen               RRAT Score:     24             Resources/supports as identified by patient/family:  Her spouse and daughterMehdi [phone] at bedside                Top Challenges facing patient (as identified by patient/family and CM): Finances/Medication cost?     Insured by [facility] A [employer] B and [address]                Transportation? Herself and/or Family               Support system or lack thereof? Her spouse and daughter, Jacki Bae lives near by                       Living arrangements? Lives with her spouse in a one level home            Self-care/ADLs/Cognition? Independent with ADLs to include driving and denied use of assistance devices. Current Advanced Directive/Advance Care Plan:  Not of file                           Plan for utilizing home health:    Not home bound. No prior Home Health services. Likelihood of readmission: Low                 Transition of Care Plan: TBD pending clearance and medical stability. Met with patient's daughter, Mehdi Gutierrez at bedside to introduce self and to discuss transitions of care.  Please note, This CM unable to review obs status with patient due to patient being off of the unit this AM. Confirmed demographics and insurance coverage. Patient lives with her . Independent with ADLs to include driving and denied use of assistance devices. Confirmed PCP is Dr. Diony Chu MD and fills Rx at [facility]. CM will continue to follow.    NORMAN Panchal, CRM

## 2018-08-15 NOTE — PROGRESS NOTES
Spiritual Care Assessment/Progress Note  [facility]      NAME: Usman Schmitz      MRN: [id number]  AGE: 75 y.o. SEX: female  Pentecostalism Affiliation: Mendel Rayas   Language: English     8/15/2018     Total Time (in minutes): 5     Spiritual Assessment begun in [facility] [phone] through conversation with:         []Patient        [] Family    [] Friend(s)        Reason for Consult: Palliative Care, Initial/Spiritual Assessment     Spiritual beliefs: (Please include comment if needed)     [] Identifies with a sarah tradition:         [] Supported by a sarah community:            [] Claims no spiritual orientation:           [] Seeking spiritual identity:                [] Adheres to an individual form of spirituality:           [x] Not able to assess: pt unavailable                        Identified resources for coping:      [] Prayer                               [] Music                  [] Guided Imagery     [] Family/friends                 [] Pet visits     [] Devotional reading                         [] Unknown     [] Other                                             Interventions offered during this visit: (See comments for more details)    Patient Interventions: Initial visit           Plan of Care:     [] Support spiritual and/or cultural needs    [] Support AMD and/or advance care planning process      [] Support grieving process   [] Coordinate Rites and/or Rituals    [] Coordination with community clergy   [] No spiritual needs identified at this time   [] Detailed Plan of Care below (See Comments)  [] Make referral to Music Therapy  [] Make referral to Pet Therapy     [] Make referral to Addiction services  [] Make referral to [facility]  [] Make referral to Spiritual Care Partner  [] No future visits requested        [x] Follow up visits as needed     Comments: Attempted visit with Ms. Watson after pt's case discussed with Palliative IDT. Pt was unavailable (out of room) at time of visit. Will attempt to follow-up as able; please page [phone] for support as needed.     Jami Faria, Palliative

## 2018-08-15 NOTE — PROGRESS NOTES
Primary Nurse Waqar Molina RN and Flor Vera RN performed a dual skin assessment on this patient No impairment noted  Reji score is 21

## 2018-08-15 NOTE — PROGRESS NOTES
Problem: Discharge Planning  Goal: *Discharge to safe environment  Outcome: Progressing Towards Goal  See CM notes  NORMAN Ahmadi

## 2018-08-15 NOTE — CONSULTS
Palliative Medicine Consult  Nik: [phone] ([phone])    Patient Name: Larina Saint  YOB: 1950    Date of Initial Consult: 8/15/2018  Reason for Consult: Overwhelming Symptoms  Requesting Provider: Efren Paetl NP  Primary Care Physician: Nancie Russell MD     SUMMARY:   Larina Saint is a 75 y.o. with a past history of Stage 4 high grade lymphoma s/p 6 cycles of chemo completed on 7/6/18, HTN, GERD, h/o herpes zoster, pulmonayr hypertension, congenital heart murmer, h/o left pleural effusion s/p thorecentesis, anemia and hypothyroidism who was admitted on 8/14/2018 from home at the request of her oncologist with a diagnosis of symptomatic ascites. 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 Trigeminal Nerve pain/Neuropathic pain  2. Pain on left jaw  3. Pain in left ear  4. Pain on left side of face  5. Abdominal Pain  6. Anxiety about health         PLAN:   1. Increased Gabapentin to 600mg am and pm, 300mg in the afternoon  2. Increased Acetaminophen to 1000mg four times a day  3. Discontinued Tramadol, as she reports that it makes her sick and does not help her pain  4. She was VERY against opioids, but we discussed having an emergency medication for pain crisis that will not take long to help. She reluctantly agreed to have it as an option if she needs it. Started Fentanyl 25mcg IV every 4 hours as needed for extreme pain  5. Her abdominal pain was dissipating as my visit progressed- she had just returned from having a paracentesis with a drain placed, and told me that it was feeling better as the size of her belly was going down. 6. She was very hungry at the time of my visit, and ate a breakfast sandwich that her family brought in.   She shared with me that she has not been hungry until now, as she has felt full and bloated with the size of her belly. 7. Tomorrow will initiate conversations around Goals of Care- today was more about getting her pain under control  8. She is interested in a Neurology Consult, she feels that she needs to hear from them that there is nothing more that can be done about her pain. She is scheduled to meet with Radiation Oncology this afternoon to discuss radiation. 9.  to follow for emotional support  10. Initial consult note routed to primary continuity provider  11. Communicated plan of care with: Palliative IDT       GOALS OF CARE / TREATMENT PREFERENCES:     GOALS OF CARE:         TREATMENT PREFERENCES:   Code Status: Full Code    Advance Care Planning:  Advance Care Planning 8/15/2018   Patient's Healthcare Decision Maker is: Legal Next of Kin   Primary Decision Maker Name [address] [place] [address]   Primary Decision Maker Phone Number [phone]   Primary Decision Maker Relationship to Patient Adult child   Confirm Advance Directive None   Patient Would Like to Complete Advance Directive -   Does the patient have other document types -           Other Instructions: Other:    As far as possible, the palliative care team has discussed with patient / health care proxy about goals of care / treatment preferences for patient. HISTORY:     History obtained from: patient, daughter and sister    CHIEF COMPLAINT: uncontrolled pain    HPI/SUBJECTIVE:    The patient is:   [x] Verbal and participatory  [] Non-participatory due to:     Extensive pain history took  Opioids have not worked for her, just make her sleepy  Tramadol makes her sick  Gabapentin has given her the most relief, but she still has uncontrolled pain on her current dose.   She has most recently been on 300mg BID, and was increased to 300mg TID yesterday  She notices when she misses doses, especially at night  Her pain is constant throughout the day, but will keep her up at night  Has not had any topical agents, but has tried the [place]- this helps for a few minutes, but as soon as it wears off (aprox 10 min) her pain comes back in full force    She is very frustrated with her current status, and wants better pain control so that she can live better  She is also frustrated with how sick she looks- she tells me she is very vain, and used to be able to get any boy she wanted, and now she is \"bald with droopy eyes\"    She shared with me that she is a fighter, and very strong  She inquired about the possibility of brain surgery to fix her Trigeminal Nerve issue  We discussed her weakened state given her cancer, and recent chemotherapy- she acknowledged that surgery may not be the best option, but she wanted the option to speak to a neurologist about potential treatment options.   It is very important to her to hear them from a [facility] Doctor, because it is a Nerve issue\"      Clinical Pain Assessment (nonverbal scale for severity on nonverbal patients):   Clinical Pain Assessment  Severity: 8  Location: Left side of face, left ear, left side of mouth, left teeth  Character: burning, sharp, itching  Duration: last few weeks  Effect: keeps her up at night, constant distractor  Factors: Gabapentin helps some, Tylenol helps some, no pain relief with opioids  Frequency: constant          Duration: for how long has pt been experiencing pain (e.g., 2 days, 1 month, years)  Frequency: how often pain is an issue (e.g., several times per day, once every few days, constant)     FUNCTIONAL ASSESSMENT:     Palliative Performance Scale (PPS):  PPS: 50       PSYCHOSOCIAL/SPIRITUAL SCREENING:     Palliative IDT has assessed this patient for cultural preferences / practices and a referral made as appropriate to needs (Cultural Services, Patient Advocacy, Ethics, etc.)    Advance Care Planning:  Advance Care Planning 8/15/2018   Patient's Healthcare Decision Maker is: Legal Next of Lanie 68   Primary Decision Maker Name [address] [place] [address] Decision Maker Phone Number [phone]   Primary Decision Maker Relationship to Patient Adult child   Confirm Advance Directive None   Patient Would Like to Complete Advance Directive -   Does the patient have other document types -       Any spiritual / Rastafari concerns:  [] Yes /  [x] No    Caregiver Burnout:  [] Yes /  [x] No /  [] No Caregiver Present      Anticipatory grief assessment:   [] Normal  / [] Maladaptive       ESAS Anxiety: Anxiety: 5    ESAS Depression: Depression: 5        REVIEW OF SYSTEMS:     Positive and pertinent negative findings in ROS are noted above in HPI. The following systems were [x] reviewed / [] unable to be reviewed as noted in HPI  Other findings are noted below. Systems: constitutional, ears/nose/mouth/throat, respiratory, gastrointestinal, genitourinary, musculoskeletal, integumentary, neurologic, psychiatric, endocrine. Positive findings noted below. Modified ESAS Completed by: provider   Fatigue: 3     Depression: 5 Pain: 8   Anxiety: 5 Nausea: 0   Anorexia: 0 Dyspnea: 0     Constipation: No              PHYSICAL EXAM:     From RN flowsheet:  Wt Readings from Last 3 Encounters:   08/14/18 153 lb (69.4 kg)   08/07/18 145 lb (65.8 kg)   08/02/18 145 lb (65.8 kg)     Blood pressure 95/57, pulse 77, temperature 98.1 °F (36.7 °C), resp. rate 20, height 5' 2\" (1.575 m), weight 153 lb (69.4 kg), SpO2 96 %.     Pain Scale 1: Numeric (0 - 10)  Pain Intensity 1: 3  Pain Onset 1: CHronic  Pain Location 1: Face  Pain Orientation 1: Left  Pain Description 1: Stabbing, Numb  Pain Intervention(s) 1: Distraction  Last bowel movement, if known:     Constitutional: alert, very frail and appears older than stated age  Eyes: pupils equal, anicteric  ENMT: no nasal discharge, moist mucous membranes  Cardiovascular: regular rhythm,   Respiratory: breathing not labored, symmetric  Gastrointestinal: distended, drain in place that is draining quite a bit of fluid  Musculoskeletal: no deformity, no tenderness to palpation  Skin: warm, dry  Neurologic: following commands, moving all extremities  Psychiatric: full affect, no hallucinations  Other:       HISTORY:     Principal Problem:    Ascites (8/14/2018)    Active Problems:    HTN (hypertension) (6/6/2012)      Hypothyroid (6/6/2012)      High grade B-cell lymphoma ([facility] [address] (3/12/2018)      Nicotine dependence (8/14/2018)      Past Medical History:   Diagnosis Date    Anemia NEC     Arrhythmia     VSD; TRICUSPID REGURG    Cancer ([facility] [address] 2010    low grade NHL    GERD (gastroesophageal reflux disease)     Hypertension     NHL (non-Hodgkin's lymphoma) ([facility] [address] 2010    received chemo and radiation. not in remission.  Pleural effusion 02/21/2018    LEFT    Pulmonary hypertension ([facility] [address] 02/21/2018    PER CARDIOLOGY NOTES FROM PTS VISIT ON 2/21/18    Thyroid disease       Past Surgical History:   Procedure Laterality Date    HX CATARACT REMOVAL  6/2015 & 7/2015    MERRY    HX CHOLECYSTECTOMY  1982    HX DILATION AND CURETTAGE      HX HEENT  2005    RIGHT EAR     HX OTHER SURGICAL      LEFT PAROTIDECTOMY    [place] RPLCMT PROST AORTIC VALVE OPEN XCP HOMOGRF/STENT  1972    STENT PLACED TO STRAIGHTEN THE AORTA      Family History   Problem Relation Age of Onset    Heart Disease Mother     Kidney Disease Mother     Alcohol abuse Father     Liver Disease Father     Cancer Sister      Breast    No Known Problems Brother     Cancer Sister      Colon    No Known Problems Sister     No Known Problems Sister     No Known Problems Daughter     Anesth Problems Neg Hx       History reviewed, no pertinent family history.   Social History   Substance Use Topics    Smoking status: Current Every Day Smoker     Packs/day: 0.50     Years: 45.00     Types: Cigarettes    Smokeless tobacco: Former User     Quit date: 12/1/2015    Alcohol use Yes      Comment: Rare     Allergies   Allergen Reactions    Rituxan [Rituximab] Hives      Current Facility-Administered Medications   Medication Dose Route Frequency    acetaminophen (TYLENOL) tablet 1,000 mg  1,000 mg Oral Q6H    [START ON 8/16/2018] gabapentin (NEURONTIN) capsule 300 mg  300 mg Oral DAILY    gabapentin (NEURONTIN) capsule 600 mg  600 mg Oral BID    fentaNYL citrate (PF) injection 25 mcg  25 mcg IntraVENous Q4H PRN    albumin human 25% (BUMINATE) solution 12.5 g  12.5 g IntraVENous Q6H    atenolol (TENORMIN) tablet 25 mg  25 mg Oral DAILY    cholecalciferol (VITAMIN D3) tablet 1,000 Units  1,000 Units Oral DAILY    [START ON 8/26/2018] cyanocobalamin (VITAMIN B12) injection 1,000 mcg  1,000 mcg IntraMUSCular EVERY MONTH    levothyroxine (SYNTHROID) tablet 150 mcg  150 mcg Oral ACB    magic mouthwash cpd (without sucralfate)  5 mL Oral QID PRN    famotidine (PEPCID) tablet 20 mg  20 mg Oral QHS    sodium chloride (NS) flush 5-10 mL  5-10 mL IntraVENous Q8H    sodium chloride (NS) flush 5-10 mL  5-10 mL IntraVENous PRN    ondansetron (ZOFRAN) injection 4 mg  4 mg IntraVENous Q4H PRN          LAB AND IMAGING FINDINGS:     Lab Results   Component Value Date/Time    WBC 8.2 08/15/2018 06:26 AM    HGB 9.4 (L) 08/15/2018 06:26 AM    PLATELET 312 03/44/9801 06:26 AM     Lab Results   Component Value Date/Time    Sodium 137 08/15/2018 06:26 AM    Potassium 3.9 08/15/2018 06:26 AM    Chloride 104 08/15/2018 06:26 AM    CO2 19 (L) 08/15/2018 06:26 AM    BUN 18 08/15/2018 06:26 AM    Creatinine 0.99 08/15/2018 06:26 AM    Calcium 8.4 (L) 08/15/2018 06:26 AM    Magnesium 2.2 04/09/2018 01:54 PM    Phosphorus 3.6 03/25/2018 03:17 AM      Lab Results   Component Value Date/Time    AST (SGOT) 53 (H) 08/15/2018 06:26 AM    Alk.  phosphatase 94 08/15/2018 06:26 AM    Protein, total 5.6 (L) 08/15/2018 06:26 AM    Albumin 2.7 (L) 08/15/2018 06:26 AM    Globulin 2.9 08/15/2018 06:26 AM     Lab Results   Component Value Date/Time    INR 1.1 08/15/2018 06:26 AM    Prothrombin time 10.7 08/15/2018 06:26 AM    aPTT 27.3 08/15/2018 06:26 AM      Lab Results   Component Value Date/Time    Iron 63 05/04/2018 10:04 AM    TIBC 282 05/04/2018 10:04 AM    Iron % saturation 22 05/04/2018 10:04 AM    Ferritin 169 05/04/2018 10:04 AM      No results found for: PH, PCO2, PO2  No components found for: GLPOC   No results found for: CPK, CKMB             Total time:   Counseling / coordination time, spent as noted above:   > 50% counseling / coordination?:     Prolonged service was provided for  []30 min   []75 min in face to face time in the presence of the patient, spent as noted above. Time Start:   Time End:   Note: this can only be billed with 90664 (initial) or 15059 (follow up). If multiple start / stop times, list each separately.

## 2018-08-15 NOTE — PROGRESS NOTES
Pt states she normally takes 300mg gabapentin three times daily, and she'd like some tylenol for pain. Dr. Jason Mtz made aware, telephone orders received, verified with readback.

## 2018-08-15 NOTE — PROGRESS NOTES
Initiated consent for paracentesis with patient. Pt states she has some questions and would like to wait to sign consent until tomorrow. Unsigned consent placed on chart.

## 2018-08-15 NOTE — PROGRESS NOTES
CC NHL Dx  10/10  Transformation 3/18 with pleural effusion    \"Feeling okay right now, hoping to get something to eat\"    TREATMENT COURSE:  Orbit radiation  Chlorambucil x 1 cycle 4/16. Cycle 2 3/17. Cycle 3 4/17   Cycle 4 1/18. Attempted one dose of rituxan. 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 Mic Reeves is a 74 y/o female with stage 4 high grade lymphoma recurrent after recent CHOP chemo. Pt refused rituxan. Recently seen in office with face numbness but admitting symptoms now all new and same as at transformation of NHL. Hx of admitted 3/20/18 and post CHOP cycle 1 in hospital. She completed a total of 6 cycles of CHOP outpatient. She recently followed up with our office with post-treatment PET results and at that time had developed left sided facial numbness that feels like \"novocaine\" was injected. She describes this as painful that feels like the neuropathy that is in her fingers and toes. She states that her teeth are numb. She is having difficulty eating secondary to this. Denies mouth sores or recent dental work. She is also having left ear pain. No change in hearing of left ear. No neck or shoulder pain. MRI brain obtained 8/7/18 with abnormal masslike enlargement and enhancement of the left trigeminal nerve as well as abnormal marrow signal of the adjacent left petrous apex/left clivus. Scans reviewed in tumor board and plans for outpatient Rad/Onc consultation at that time. She tried a course of steroids with no improvement. Has been trying tramadol with little pain relief as well. States that pain has significantly worsened over the previous few days. She also noted over the last 3-4 days she has developed abdominal ascites and pain in left abdomen.  She was directed to the ED from our office yesterday but states that she wanted to wait until today as it is a long day of traveling in the car. CT obtained on admission with large amount of ascites as well as liver and pelvic masses. INTERVAL HISTORY: Just returned back from [address] [id number] for radiation therapy. Encouraged about plans for radiation therapy. Multiple family members at bedside. Pain currently controlled. No chest pain, dizziness or headache currently. Intermittent nausea, no vomiting. Pressure improved following drainage of abdominal fluid today. Feels ready to eat. No swelling of extremities. Denies recent fever, chills, SOB or chest pain. Past Medical History:   Diagnosis Date    Anemia NEC     Arrhythmia     VSD; TRICUSPID REGURG    Cancer ([facility] [address] 2010    low grade NHL    GERD (gastroesophageal reflux disease)     Hypertension     NHL (non-Hodgkin's lymphoma) ([facility] [address] 2010    received chemo and radiation. not in remission.     Pleural effusion 02/21/2018    LEFT    Pulmonary hypertension ([facility] [address] 02/21/2018    PER CARDIOLOGY NOTES FROM PTS VISIT ON 2/21/18    Thyroid disease      Past Surgical History:   Procedure Laterality Date    HX CATARACT REMOVAL  6/2015 & 7/2015    MERRY    HX CHOLECYSTECTOMY  1982    HX DILATION AND CURETTAGE      HX HEENT  2005    RIGHT EAR     HX OTHER SURGICAL      LEFT PAROTIDECTOMY    [place] RPLCMT PROST AORTIC VALVE OPEN XCP HOMOGRF/STENT  1972    STENT PLACED TO STRAIGHTEN THE AORTA     Social History     Social History    Marital status:      Spouse name: N/A    Number of children: N/A    Years of education: N/A     Social History Main Topics    Smoking status: Current Every Day Smoker     Packs/day: 0.50     Years: 45.00     Types: Cigarettes    Smokeless tobacco: Former User     Quit date: 12/1/2015    Alcohol use Yes      Comment: Rare    Drug use: No    Sexual activity: No      Comment:  has one child     Other Topics Concern    None     Social History Narrative     Family History   Problem Relation Age of Onset    Heart Disease Mother     Kidney Disease Mother     Alcohol abuse Father     Liver Disease Father     Cancer Sister      Breast    No Known Problems Brother     Cancer Sister      Colon    No Known Problems Sister     No Known Problems Sister     No Known Problems Daughter     Anesth Problems Neg Hx        Current Facility-Administered Medications   Medication Dose Route Frequency Provider Last Rate Last Dose    acetaminophen (TYLENOL) tablet 1,000 mg  1,000 mg Oral Q6H Tiffany Kevan Jeans, NP        [START ON 8/16/2018] gabapentin (NEURONTIN) capsule 300 mg  300 mg Oral DAILY Eulalia Saravia NP        gabapentin (NEURONTIN) capsule 600 mg  600 mg Oral BID Joann Quezada NP        fentaNYL citrate (PF) injection 25 mcg  25 mcg IntraVENous Q4H PRN Joann Quezada NP        albumin human 25% (BUMINATE) solution 12.5 g  12.5 g IntraVENous Q6H Stone Tipton MD   12.5 g at 08/15/18 1554    lidocaine-prilocaine (EMLA) 2.5-2.5 % cream   Topical PRN Effie Marina NP        atenolol (TENORMIN) tablet 25 mg  25 mg Oral DAILY Tonia Arguello MD   25 mg at 08/15/18 1129    cholecalciferol (VITAMIN D3) tablet 1,000 Units  1,000 Units Oral DAILY Mindi Shabazz MD   1,000 Units at 08/15/18 1119    [START ON 8/26/2018] cyanocobalamin (VITAMIN B12) injection 1,000 mcg  1,000 mcg IntraMUSCular EVERY MONTH Mindi Shabazz MD        levothyroxine (SYNTHROID) tablet 150 mcg  150 mcg Oral ACB Tonia Arguello MD   150 mcg at 08/15/18 5145    magic mouthwash cpd (without sucralfate)  5 mL Oral QID PRN Mindi Shabazz MD        famotidine (PEPCID) tablet 20 mg  20 mg Oral QHS Tonia Arguello MD   20 mg at 08/14/18 2218    sodium chloride (NS) flush 5-10 mL  5-10 mL IntraVENous Q8H Mindi Shabazz MD   10 mL at 08/15/18 0600    sodium chloride (NS) flush 5-10 mL  5-10 mL IntraVENous PRN Mindi Shabazz MD        ondansetron (ZOFRAN) injection 4 mg  4 mg IntraVENous Q4H PRN Mindi Shabazz MD           Allergies   Allergen Reactions    Rituxan [Rituximab] Hives Review of Systems    A comprehensive review of systems was negative except for as noted above. Objective:  Visit Vitals    /53 (BP 1 Location: Left arm, BP Patient Position: At rest)    Pulse 73    Temp 98.1 °F (36.7 °C)    Resp 16    Ht 5' 2\" (1.575 m)    Wt 153 lb (69.4 kg)    LMP  (LMP Unknown)    SpO2 97%    BMI 27.98 kg/m2     Physical Exam:   General appearance - alert, conversant, facial abnormality as below, no acute distress  Mental status - alert, oriented   Face - Left side of face with decreased mobility, ptosis left eye, hypesthesia left side of face  Eye - conj clear   Neck - supple  CV: regular with murmur  Resp: clear to auscultation bilaterally, normal effort  GI: distended, ascites, non tender  Ext No lower extremity edema noted bilaterally  Skin-Warm and dry. Intact, no rash. Neuro -  Awake, alert, oriented. Diagnostic Imaging   reviewed    2/21/18 PET:  IMPRESSION:   1. Increasing right parietal soft tissue activity. 2. Progression in the bilateral areas of hypermetabolic pleural thickening. 3. New bilateral hypermetabolic axillary lymph nodes and subcarinal lymph node  and progression of retroperitoneal and left pelvic lymphadenopathy. 4. Improvement in the left inguinal lymphadenopathy  5. Improvement in the right parieto-occipital scalp activity. 6. New hypermetabolic soft tissue abnormality left iliac fossa. 7. Stable left cervical hypermetabolic lymph nodes. 2/17/18 PET:  MPRESSION:   1. Findings suspicious for a uterine mass with a soft tissue mass in the left  pelvis and extending into the retroperitoneum concerning for malignancy. Lymphoma is a differential consideration given the patient's history. The mass  encases but does not narrow the aortic bifurcation and IVC. 2. Enlarged left inguinal lymph node is decreased in size since 8/30/2017.  3. Increased large left pleural effusion with left lower lung atelectasis.     7/18/18 PET  IMPRESSION: New focus of increased tracer activity right iliac wing concerning  for osseous metastatic disease. New hypermetabolic soft tissue nodule adjacent  to the right iliac crest. Increase in the size of the left iliac fossa mass. Minimal decrease in the size of the axillary lymph nodes with little change in  the tracer activity. Left level 2 lymph nodes and pleural activity in the left  hemithorax are stable as are the left external iliac and pelvic masses. Lab Results    reviewed  Lab Results   Component Value Date/Time    WBC 8.2 08/15/2018 06:26 AM    HGB 9.4 (L) 08/15/2018 06:26 AM    HCT 30.2 (L) 08/15/2018 06:26 AM    PLATELET 274 74/92/1496 06:26 AM    .8 (H) 08/15/2018 06:26 AM       Lab Results   Component Value Date/Time    Sodium 137 08/15/2018 06:26 AM    Potassium 3.9 08/15/2018 06:26 AM    Chloride 104 08/15/2018 06:26 AM    CO2 19 (L) 08/15/2018 06:26 AM    Anion gap 14 08/15/2018 06:26 AM    Glucose 86 08/15/2018 06:26 AM    BUN 18 08/15/2018 06:26 AM    Creatinine 0.99 08/15/2018 06:26 AM    BUN/Creatinine ratio 18 08/15/2018 06:26 AM    GFR est AA >60 08/15/2018 06:26 AM    GFR est non-AA 56 (L) 08/15/2018 06:26 AM    Calcium 8.4 (L) 08/15/2018 06:26 AM    AST (SGOT) 53 (H) 08/15/2018 06:26 AM    Alk. phosphatase 94 08/15/2018 06:26 AM    Protein, total 5.6 (L) 08/15/2018 06:26 AM    Albumin 2.7 (L) 08/15/2018 06:26 AM    Globulin 2.9 08/15/2018 06:26 AM    A-G Ratio 0.9 (L) 08/15/2018 06:26 AM    ALT (SGPT) 13 08/15/2018 06:26 AM         CT Results (most recent):    Results from Hospital Encounter encounter on 08/14/18   CT GUIDED THERAPY PLAN/PLACEMENT   Narrative Clinical history: Radiation therapy planning    Comparison to brain MR 8/7/2018. Multiple axial images were obtained from the  skull vertex to the base for the purpose of radiation therapy planning. No  midline shift or herniation. No acute abnormality. Impression IMPRESSION: Radiation therapy planning study as above. Assessment/Plan:    1. 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 Cycle 1 of CHOP completed in the hospital and treatment outpatient since that time with no difficulty  Pt had a PET 5/16/18 which shows good response to chemo with decreased disease. Completed 6 cycles of CHOP (Cyclophosphamide 750mg/m2, Doxorubicin 50mg/m2, Vincristine (dose reduced cycle 5 by 25% for neuropathy to 1.05mg/m2) and post-treatment PET with a mixed response to treatment  Struggled with grade 3 neuropathy   Post-treatment PET with mixed response which we had reviewed previously. CT on admission with large amount of ascites per #4 and significantly increased size of multiple pelvic masses and now multiple liver masses  Reviewed these scan results in ED with both patient and daughter    Concern for rapid progression of disease following treatment completion. May be most appropriate for hospice at this point. Will continue with aggressive symptom management and continue to discuss plan with patient/family. 2. Facial numbness/pain. Unilateral on left side with extensive CN involvement. Brain MRI with enlargement of trigeminal nerve likely secondary to lymphoma. Rad/Onc consulted today. 3. Pain, severe. Secondary to #2 and nerve irritation. Palliative Medicine consulted and recommendations reviewed-Gabapentin increased. Tylenol scheduled. Patient currently refusing opoid based therapy, but agrees for IV Fentanyl PRN. 4. Ascites. Noted on CT imaging. Rapid progression as this was not present during last OV. Likely malignant. S/p paracentesis today with short term catheter placement. Cytology pending. Symptomatically improved. 6. History of VSD. ECHO shows EF 55 to 60%. Cardiology following. 7. Anemia. Secondary to chemotherapy. Stable. 8. Chemotherapy induced peripheral neuropathy. Hope to see improvement off therapy. Will continue to follow while admitted. Pt seen today in conjunction with CHIQUI [facility]  Goal of care is supportive. Case d/w rad/onc. Pt does want palliative radiation for symptom mgmt. Pt does not want chemo. Will continue to discuss goals of care.      Zahra Alvarez, DO

## 2018-08-16 NOTE — PROGRESS NOTES
CC NHL Dx  10/10  Transformation 3/18 with pleural effusion    \"Feeling okay right now, hoping to get something to eat\"    TREATMENT COURSE:  Orbit radiation  Chlorambucil x 1 cycle 4/16. Cycle 2 3/17. Cycle 3 4/17   Cycle 4 1/18. Attempted one dose of rituxan. 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 Caroline Esquivel is a 74 y/o female with stage 4 high grade lymphoma recurrent after recent CHOP chemo. Pt refused rituxan. Recently seen in office with face numbness but admitting symptoms now all new and same as at transformation of NHL. Hx of admitted 3/20/18 and post CHOP cycle 1 in hospital. She completed a total of 6 cycles of CHOP outpatient. She recently followed up with our office with post-treatment PET results and at that time had developed left sided facial numbness that feels like \"novocaine\" was injected. She describes this as painful that feels like the neuropathy that is in her fingers and toes. She states that her teeth are numb. She is having difficulty eating secondary to this. Denies mouth sores or recent dental work. She is also having left ear pain. No change in hearing of left ear. No neck or shoulder pain. MRI brain obtained 8/7/18 with abnormal masslike enlargement and enhancement of the left trigeminal nerve as well as abnormal marrow signal of the adjacent left petrous apex/left clivus. Scans reviewed in tumor board and plans for outpatient Rad/Onc consultation at that time. She tried a course of steroids with no improvement. Has been trying tramadol with little pain relief as well. States that pain has significantly worsened over the previous few days. She also noted over the last 3-4 days she has developed abdominal ascites and pain in left abdomen.  She was directed to the ED from our office yesterday but states that she wanted to wait until today as it is a long day of traveling in the car. CT obtained on admission with large amount of ascites as well as liver and pelvic masses. INTERVAL HISTORY: She is overall feeling well this morning. Did not get much sleep overnight due to IV. She has family at the bedside. States that her pain is about the same and is ready to get started with radiation today. Improvement in abdominal ascites and >5L removed yesterday and pigtail drain placed. No nausea, vomiting, or abdominal pain. No shortness of breath. No fevers or chills. Past Medical History:   Diagnosis Date    Anemia NEC     Arrhythmia     VSD; TRICUSPID REGURG    Cancer ([address] 2010    low grade NHL    GERD (gastroesophageal reflux disease)     Hypertension     NHL (non-Hodgkin's lymphoma) ([facility] [address] 2010    received chemo and radiation. not in remission.     Pleural effusion 02/21/2018    LEFT    Pulmonary hypertension ([facility] [address] 02/21/2018    PER CARDIOLOGY NOTES FROM PTS VISIT ON 2/21/18    Thyroid disease      Past Surgical History:   Procedure Laterality Date    HX CATARACT REMOVAL  6/2015 & 7/2015    MERRY    HX CHOLECYSTECTOMY  1982    HX DILATION AND CURETTAGE      HX HEENT  2005    RIGHT EAR     HX OTHER SURGICAL      LEFT PAROTIDECTOMY    [place] RPLCMT PROST AORTIC VALVE OPEN XCP HOMOGRF/STENT  1972    STENT PLACED TO STRAIGHTEN THE AORTA     Social History     Social History    Marital status:      Spouse name: N/A    Number of children: N/A    Years of education: N/A     Social History Main Topics    Smoking status: Current Every Day Smoker     Packs/day: 0.50     Years: 45.00     Types: Cigarettes    Smokeless tobacco: Former User     Quit date: 12/1/2015    Alcohol use Yes      Comment: Rare    Drug use: No    Sexual activity: No      Comment:  has one child     Other Topics Concern    None     Social History Narrative     Family History   Problem Relation Age of Onset    Heart Disease Mother     Kidney Disease Mother     Alcohol abuse Father     Liver Disease Father     Cancer Sister      Breast    No Known Problems Brother     Cancer Sister      Colon    No Known Problems Sister     No Known Problems Sister     No Known Problems Daughter     Anesth Problems Neg Hx        Current Facility-Administered Medications   Medication Dose Route Frequency Provider Last Rate Last Dose    gabapentin (NEURONTIN) capsule 600 mg  600 mg Oral TID Abdulaziz Hwang NP        acetaminophen (TYLENOL) tablet 1,000 mg  1,000 mg Oral Q6H Eulalia ERICKSON Rani NP   1,000 mg at 08/16/18 1209    fentaNYL citrate (PF) injection 25 mcg  25 mcg IntraVENous Q4H PRN Abdulaziz Hwang NP        albumin human 25% (BUMINATE) solution 12.5 g  12.5 g IntraVENous Q6H Stone Tipton MD   12.5 g at 08/16/18 1001    lidocaine-prilocaine (EMLA) 2.5-2.5 % cream   Topical PRN Calvin Sterling NP        atenolol (TENORMIN) tablet 25 mg  25 mg Oral DAILY Tonia Arguello MD   25 mg at 08/16/18 1001    cholecalciferol (VITAMIN D3) tablet 1,000 Units  1,000 Units Oral DAILY Sunil Thomson MD   1,000 Units at 08/16/18 1001    [START ON 8/26/2018] cyanocobalamin (VITAMIN B12) injection 1,000 mcg  1,000 mcg IntraMUSCular EVERY MONTH Sunil Thomson MD        levothyroxine (SYNTHROID) tablet 150 mcg  150 mcg Oral ACB Tonia Arguello MD   150 mcg at 08/16/18 0656    magic mouthwash cpd (without sucralfate)  5 mL Oral QID PRN Sunil Thomson MD        famotidine (PEPCID) tablet 20 mg  20 mg Oral QHS Sunil Thomosn MD   20 mg at 08/15/18 2158    sodium chloride (NS) flush 5-10 mL  5-10 mL IntraVENous Q8H Sunil Thomson MD   10 mL at 08/16/18 1400    sodium chloride (NS) flush 5-10 mL  5-10 mL IntraVENous PRN Sunil Thomson MD   10 mL at 08/16/18 1002    ondansetron (ZOFRAN) injection 4 mg  4 mg IntraVENous Q4H PRN Sunil Thomson MD           Allergies   Allergen Reactions    Rituxan [Rituximab] Hives       Review of Systems    A comprehensive review of systems was negative except for as noted above.    Objective:  Visit Vitals    /78 (BP 1 Location: Right arm, BP Patient Position: At rest)    Pulse 78    Temp 98 °F (36.7 °C)    Resp 16    Ht 5' 2\" (1.575 m)    Wt 153 lb (69.4 kg)    LMP  (LMP Unknown)    SpO2 95%    BMI 27.98 kg/m2     Physical Exam:   General appearance - alert, conversant, facial abnormality as below, no acute distress  Mental status - alert, oriented   Face - Left side of face with decreased mobility, ptosis left eye, hypesthesia left side of face  Eye - conj clear   Neck - supple  CV: regular with murmur  Resp: clear to auscultation bilaterally, normal effort  GI: distended, ascites, non tender  Ext No lower extremity edema noted bilaterally  Skin-Warm and dry. Intact, no rash. Neuro -  Awake, alert, oriented. Diagnostic Imaging   reviewed    2/21/18 PET:  IMPRESSION:   1. Increasing right parietal soft tissue activity. 2. Progression in the bilateral areas of hypermetabolic pleural thickening. 3. New bilateral hypermetabolic axillary lymph nodes and subcarinal lymph node  and progression of retroperitoneal and left pelvic lymphadenopathy. 4. Improvement in the left inguinal lymphadenopathy  5. Improvement in the right parieto-occipital scalp activity. 6. New hypermetabolic soft tissue abnormality left iliac fossa. 7. Stable left cervical hypermetabolic lymph nodes. 2/17/18 PET:  MPRESSION:   1. Findings suspicious for a uterine mass with a soft tissue mass in the left  pelvis and extending into the retroperitoneum concerning for malignancy. Lymphoma is a differential consideration given the patient's history. The mass  encases but does not narrow the aortic bifurcation and IVC. 2. Enlarged left inguinal lymph node is decreased in size since 8/30/2017.  3. Increased large left pleural effusion with left lower lung atelectasis. 7/18/18 PET  IMPRESSION: New focus of increased tracer activity right iliac wing concerning  for osseous metastatic disease. New hypermetabolic soft tissue nodule adjacent  to the right iliac crest. Increase in the size of the left iliac fossa mass. Minimal decrease in the size of the axillary lymph nodes with little change in  the tracer activity. Left level 2 lymph nodes and pleural activity in the left  hemithorax are stable as are the left external iliac and pelvic masses. Lab Results    reviewed  Lab Results   Component Value Date/Time    WBC 8.2 08/15/2018 06:26 AM    HGB 9.4 (L) 08/15/2018 06:26 AM    HCT 30.2 (L) 08/15/2018 06:26 AM    PLATELET 361 52/93/9068 06:26 AM    .8 (H) 08/15/2018 06:26 AM       Lab Results   Component Value Date/Time    Sodium 138 08/16/2018 04:01 AM    Potassium 3.6 08/16/2018 04:01 AM    Chloride 107 08/16/2018 04:01 AM    CO2 21 08/16/2018 04:01 AM    Anion gap 10 08/16/2018 04:01 AM    Glucose 81 08/16/2018 04:01 AM    BUN 14 08/16/2018 04:01 AM    Creatinine 0.96 08/16/2018 04:01 AM    BUN/Creatinine ratio 15 08/16/2018 04:01 AM    GFR est AA >60 08/16/2018 04:01 AM    GFR est non-AA 58 (L) 08/16/2018 04:01 AM    Calcium 7.8 (L) 08/16/2018 04:01 AM    AST (SGOT) 53 (H) 08/15/2018 06:26 AM    Alk. phosphatase 94 08/15/2018 06:26 AM    Protein, total 5.6 (L) 08/15/2018 06:26 AM    Albumin 2.7 (L) 08/15/2018 06:26 AM    Globulin 2.9 08/15/2018 06:26 AM    A-G Ratio 0.9 (L) 08/15/2018 06:26 AM    ALT (SGPT) 13 08/15/2018 06:26 AM         CT Results (most recent):    Results from Hospital Encounter encounter on 08/14/18   CT GUIDED THERAPY PLAN/PLACEMENT   Narrative Clinical history: Radiation therapy planning    Comparison to brain MR 8/7/2018. Multiple axial images were obtained from the  skull vertex to the base for the purpose of radiation therapy planning. No  midline shift or herniation. No acute abnormality. Impression IMPRESSION: Radiation therapy planning study as above. Assessment/Plan:    1.   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 Cycle 1 of CHOP completed in the hospital and treatment outpatient since that time with no difficulty  Pt had a PET 5/16/18 which shows good response to chemo with decreased disease. Completed 6 cycles of CHOP (Cyclophosphamide 750mg/m2, Doxorubicin 50mg/m2, Vincristine (dose reduced cycle 5 by 25% for neuropathy to 1.05mg/m2) and post-treatment PET with a mixed response to treatment  Struggled with grade 3 neuropathy   Post-treatment PET with mixed response which we had reviewed previously. CT on admission with large amount of ascites per #4 and significantly increased size of multiple pelvic masses and now multiple liver masses  Reviewed these scan results with both patient and daughter  Patient confirms she does not want any further chemotherapy and goals of care are supportive. Appreciate Palliative Medicine support    2. Facial numbness/pain. Unilateral on left side with extensive CN involvement. Brain MRI with enlargement of trigeminal nerve likely secondary to lymphoma. Rad/Onc on board and she will start treatment today. Hopefully pain improves with radiation. 3. Pain, severe. Secondary to #2 and nerve irritation. Palliative Medicine consulted and recommendations reviewed-Gabapentin increased. Tylenol scheduled. Patient currently refusing opoid based therapy, but agrees for IV Fentanyl PRN. Appreciate PM.    4. Ascites. Noted on CT imaging. Rapid progression as this was not present during last OV. Likely malignant. S/p 5L removed 8/15/18 and cytology pending. Continues with pigtail catheter at this time. 6. History of VSD. ECHO shows EF 55 to 60%. Cardiology following. 7. Anemia. Secondary to chemotherapy. Stable. Will repeat CBC overnight. Please call with any questions. Pt seen today in conjunction with [facility]  Met with pt and family at bedside this am.  Pt and family want to proceed with radiation / active treatment. No plans for chemo.    Has paracentesis tube in place.    Case d/w rad/onc this am. Pt is doing better this am.     Hilda Harris, DO

## 2018-08-16 NOTE — CONSULTS
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 She was found to have left orbital involvement in late 2014 , treated with radiation, receiving 36 Gy in 18 fxs. This was followed by chemotherapy, CHOP. She did not tolerate Rituxan well, only receiving one dose. Chemotherapy was resumed when her tumor resumed. Patient declined use of rituxan. She did well with her last chemotherapy given July 6, 2018. According to the patient and family  About 2 weeks later, her left hip began to hurt again, worst on left lateral decubitus. She has had diarrhea with food \"running through me\". Has no Blood per rectum. Found to have significant ascites and had \"a lot\" tapped today. Most recently over the last 4 weeks she began to have numbness and excruciating pain on the left side of the face, describing it as constant numbness and fleeting sharp pain. When pain gets so bad, she develops nausea. Symptoms involves the gum on the left, making eating difficult. She has lost weight, unquantified. Pain is felt from vertex to angle of the mandible to submental region. She was found to have by MRI of the brain abnormal mass like enlargement and enhancement of the left trigeminal nerve as well as abnormal marrow signal of the adjacent left petrous apex/ left clivus. She was given steroids without much response. PET reveals extensive disease in multiple areas. She was recently admitted last night for left pelvic pain. 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 Has no vision lost from her previous radiation. MRI showed most likely post treatment changes.   Of note the patient had a left parotid removed for noncancerous reason by Dr Mariel Hays at [facility] in 2003.  She also had lost of hearing on the right around that time and had her \"mastoid bone \" removed , also by Dr Carli Mejia. Impression:  Transformation from low grade follicular Stage III NHL to high grade NHL. Has received CHOP with good response. Appears to be refractory  With mix response currently. Her new symptoms with left facial pain is most troublesome for her. 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 Palliative radiation will be offer. Expect a response as lymphomas generally are radiosensitive. Occasionally, less so if resistant to chemotherapy. Her left pelvic pain is due to the large left pelvic rohit involvement with extension in to the vagina. Palliative radiation will be offered as she is most miserable from this causing also mild left leg edema. Discussed palliative radiation to the patient and her sisters and daughter present today. Discussed treatment planning and the potential side effects, risks and complications. Their questions were answered to their satisfaction. Patient voiced her interest in having radiation. CT simulation to be done today. Palliative radiation to the left pelvis will start tomorrow. The treatment to the left skull base will take longer due to her previous treatment. Will need to reconstruct previous treated fields, and superimpose on the lesions as best seen on MRI. Films reviewed with radiology. Anticipating offering 3600 to 4000 cgy in 18 -20 fxs. Thank you for the kind referral and the opportunity to participate in your patient's care.     Jossy Gaspar  [facility]

## 2018-08-16 NOTE — PROGRESS NOTES
Hospitalist Progress Note                               Eddie German MD                                     Answering service: [phone]                               OR [phone] from in house phone                                         Date of Service:  2018  NAME:  Angélica Higgins  :  1950  MRN:  [id number]      Admission Summary:   Angélica Higgins is a 75 y.o. female with Stage 4 high grade lymphoma s/p 6 cycles of chemo completed 18, HTN, GERD, h/o herpes zoster, pulmonary HTN, congenital heart murmur, h/o left pleural effusion s/p thoracentesis, anemia, and hypothyroidism presented to the ED from home at the request of her oncologist due to profound ascites and weakness. Pt reports that the abdomen became distended about 3 days ago but worsened quickly in the last 24 hours with some left abdominal pain. She reports a previous episode of ascites that resolved with chemo. Patient denies SOB, CP, fevers/chills/vomiting but reports nausea, 9 pound weight gain over the last 3 days, and loose stools. 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 Recent MRI brain showed mass involving the left trigeminal nerve. Patient also has a lesion on the left hip concerning for bone met. She had an outpatient paracentesis scheduled at [facility] today but felt too weak to drive from her home in [place] so she was recommended to come to [facility] ED instead. Reason for follow up:   Ms Jearldine Nissen complains of left facial pain. No abdominal pain,nausea or vomiting. Ascitic drain in place. Assessment & Plan:   Symptomatic ascites (POA) - place in OBS medical floor  - S/p paracentesis with indwelling catheter in place.  -ordered iv albumin.  -Will remove drain on discharge,likely tomorrow.     Bilateral pleural effusions and atx (POA) - seen on CXR  - IS, OOB, ambulate as tolerated  - h/o left pleural effusion s/p thoracentesis     Nausea (POA) - may be due to ascites  - prn antiemetic  -advance diet. High grade [facility] ([facility] - Oncology consulted     Mass involving left trigeminal nerve (POA) associated with severe facial   -Gabapentin increased. May consider Tegretol  -XRT planned to start today. -       HTN - controlled on home atenolol       Hypothyroidism - resume home levothyroxine       Nicotine dependence - cessation counseling; declined nicotine patch     GERD - H2 blocker     Diet:regular  Activity: ambulate with assistance  DVT prophylaxis: SCDs  Anticipated disposition: Likely home tomorrow        Hospital Problems  Date Reviewed: 8/14/2018          Codes Class Noted POA    * (Principal)Ascites ICD-10-CM: R18.8  ICD-9-CM: 789.59  8/14/2018 Yes        Nicotine dependence (Chronic) ICD-10-CM: F17.200  ICD-9-CM: 305.1  8/14/2018 Yes        Ascites, malignant ICD-10-CM: R18.0  ICD-9-CM: 789.51  3/20/2018 Unknown        High grade B-cell lymphoma ([facility] ICD-10-CM: C85.10  ICD-9-CM: 202.80  3/12/2018 Yes        HTN (hypertension) ICD-10-CM: I10  ICD-9-CM: 401.9  6/6/2012 Yes        Hypothyroid ICD-10-CM: E03.9  ICD-9-CM: 244.9  6/6/2012 Yes                Review of Systems:   A comprehensive review of systems was negative except for that written in the HPI. Physical Examination:      Last 24hrs VS reviewed since prior progress note. Most recent are:  Visit Vitals    BP 98/56 (BP 1 Location: Left arm, BP Patient Position: At rest)    Pulse 85    Temp 98.5 °F (36.9 °C)    Resp 17    Ht 5' 2\" (1.575 m)    Wt 69.4 kg (153 lb)    SpO2 95%    BMI 27.98 kg/m2           Constitutional:  No acute distress, cooperative, pleasant    HEENT: Head is a traumatic,  Un icteric sclera. Pink conjunctiva,no erythema or discharge. Oral mucous moist, oropharynx benign. Neck supple,    Resp:  CTA bilaterally.  No wheezing/rhonchi/rales. No accessory muscle use   CV:  Regular rhythm, normal rate, no murmurs, gallops, rubs    GI:  paracentesis catheter in place,draining straw colored fluid. Abdomen is soft and non tender. :  No CVA or suprapubic tenderness   Skin  :  No erythema,rash,bullae,dipigmentation     Musculoskeletal:  No edema, warm, 2+ pulses throughout    Neurologic:  AAOx3, CN II-XII reviewed. Moves all extremities. Psych:  Good insight, Not anxious nor agitated. Intake/Output Summary (Last 24 hours) at 08/16/18 0938  Last data filed at 08/16/18 0555   Gross per 24 hour   Intake              400 ml   Output             4950 ml   Net            -4550 ml          Data Review:    Review and/or order of clinical lab test  Review and/or order of tests in the radiology section of CPT  Review and/or order of tests in the medicine section of CPT      Labs:     Recent Labs      08/15/18   0626  08/14/18   1129   WBC  8.2  9.1   HGB  9.4*  10.1*   HCT  30.2*  32.3*   PLT  258  301     Recent Labs      08/16/18   0401  08/15/18   0626  08/14/18   1129   NA  138  137  136   K  3.6  3.9  4.0   CL  107  104  103   CO2  21  19*  21   BUN  14  18  20   CREA  0.96  0.99  1.25*   GLU  81  86  91   CA  7.8*  8.4*  8.6     Recent Labs      08/15/18   0626  08/14/18   1129   SGOT  53*  46*   ALT  13  11*   AP  94  102   TBILI  0.3  0.3   TP  5.6*  5.9*   ALB  2.7*  2.9*   GLOB  2.9  3.0   LPSE   --   108     Recent Labs      08/15/18   0626   INR  1.1   PTP  10.7   APTT  27.3      No results for input(s): FE, TIBC, PSAT, FERR in the last 72 hours. Lab Results   Component Value Date/Time    Folate 15.0 05/04/2018 10:04 AM      No results for input(s): PH, PCO2, PO2 in the last 72 hours.   Recent Labs      08/14/18   1129   TROIQ  0.10*     No results found for: CHOL, CHOLX, CHLST, CHOLV, HDL, LDL, LDLC, DLDLP, TGLX, TRIGL, TRIGP, CHHD, CHHDX  No results found for: Dior [place]en[place]  Lab Results   Component Value Date/Time    Color DARK YELLOW 08/14/2018 12:05 PM    Appearance CLOUDY (A) 08/14/2018 12:05 PM    Specific gravity 1.030 08/14/2018 12:05 PM    Specific gravity 1.025 02/16/2018 11:22 PM    pH (UA) 5.0 08/14/2018 12:05 PM    Protein 30 (A) 08/14/2018 12:05 PM    Glucose NEGATIVE  08/14/2018 12:05 PM    Ketone TRACE (A) 08/14/2018 12:05 PM    Bilirubin NEGATIVE  02/16/2018 11:22 PM    Urobilinogen 0.2 08/14/2018 12:05 PM    Nitrites NEGATIVE  08/14/2018 12:05 PM    Leukocyte Esterase MODERATE (A) 08/14/2018 12:05 PM    Epithelial cells FEW 08/14/2018 12:05 PM    Bacteria NEGATIVE  08/14/2018 12:05 PM    WBC 0-4 08/14/2018 12:05 PM    RBC 0-5 08/14/2018 12:05 PM         Medications Reviewed:     Current Facility-Administered Medications   Medication Dose Route Frequency    acetaminophen (TYLENOL) tablet 1,000 mg  1,000 mg Oral Q6H    gabapentin (NEURONTIN) capsule 300 mg  300 mg Oral DAILY    gabapentin (NEURONTIN) capsule 600 mg  600 mg Oral BID    fentaNYL citrate (PF) injection 25 mcg  25 mcg IntraVENous Q4H PRN    albumin human 25% (BUMINATE) solution 12.5 g  12.5 g IntraVENous Q6H    lidocaine-prilocaine (EMLA) 2.5-2.5 % cream   Topical PRN    atenolol (TENORMIN) tablet 25 mg  25 mg Oral DAILY    cholecalciferol (VITAMIN D3) tablet 1,000 Units  1,000 Units Oral DAILY    [START ON 8/26/2018] cyanocobalamin (VITAMIN B12) injection 1,000 mcg  1,000 mcg IntraMUSCular EVERY MONTH    levothyroxine (SYNTHROID) tablet 150 mcg  150 mcg Oral ACB    magic mouthwash cpd (without sucralfate)  5 mL Oral QID PRN    famotidine (PEPCID) tablet 20 mg  20 mg Oral QHS    sodium chloride (NS) flush 5-10 mL  5-10 mL IntraVENous Q8H    sodium chloride (NS) flush 5-10 mL  5-10 mL IntraVENous PRN    ondansetron (ZOFRAN) injection 4 mg  4 mg IntraVENous Q4H PRN     ______________________________________________________________________  EXPECTED LENGTH OF STAY: - - -  ACTUAL LENGTH OF STAY:          1                 Winnie Jolly MD

## 2018-08-16 NOTE — PROGRESS NOTES
Palliative Medicine Consult  [place]: [phone] ([phone])    Patient Name: Cristian Daniel  YOB: 1950    Date of Initial Consult: 8/15/2018  Reason for Consult: Overwhelming Symptoms  Requesting Provider: Jay Henao NP  Primary Care Physician: Marian Thomas MD     SUMMARY:   Cristian Daniel is a 75 y.o. with a past history of Stage 4 high grade lymphoma s/p 6 cycles of chemo completed on 7/6/18, HTN, GERD, h/o herpes zoster, pulmonayr hypertension, congenital heart murmer, h/o left pleural effusion s/p thorecentesis, anemia and hypothyroidism who was admitted on 8/14/2018 from home at the request of her oncologist with a diagnosis of symptomatic ascites. 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 Trigeminal Nerve pain/Neuropathic pain  2. Pain on left jaw  3. Pain in left ear  4. Pain on left side of face  5. Abdominal Pain  6. Anxiety about health         PLAN:   1. Pain better, had a great night dispite the constant beeping from her neighbors equipment  2. This morning, she started having short bursts of intense sharp pain with itching and burning. Suspect this is because her Gapabentin is wearing off  3. Increased her Gabapentin to 600mg TID, rather than 300mg in the afternoon  4. Continue Acetaminophen to 1000mg four times a day  5. Continue PRN Fentanyl for emergency back up  6. Scheduled to start radiation today- she is looking forward to this  7. She no longer feels the need to speak with neurology- she recognizes that rad onc is the best option at this point  8.  to follow for emotional support  9. Initial consult note routed to primary continuity provider  10.  Communicated plan of care with: Palliative IDT       GOALS OF CARE / TREATMENT PREFERENCES:     GOALS OF CARE:         TREATMENT PREFERENCES:   Code Status: Full Code    Advance Care Planning:  Advance Care Planning 8/15/2018   Patient's Healthcare Decision Maker is: Legal Next of Kin   Primary Decision Maker Name [address] [place] [address]   Primary Decision Maker Phone Number [phone]   Primary Decision Maker Relationship to Patient Adult child   Confirm Advance Directive None   Patient Would Like to Complete Advance Directive -   Does the patient have other document types -           Other Instructions: Other:    As far as possible, the palliative care team has discussed with patient / health care proxy about goals of care / treatment preferences for patient.            HISTORY:     History obtained from: patient, and sisters    CHIEF COMPLAINT: had a great night  HPI/SUBJECTIVE:    The patient is:   [x] Verbal and participatory  [] Non-participatory due to:     Doing much better this morning  Having intermittent sharp pain as gabapentin effect wears off    Clinical Pain Assessment (nonverbal scale for severity on nonverbal patients):   Clinical Pain Assessment  Severity: 6  Location: Left side of face  Character: Burning, itching, sharp  Duration: LAst few weeks  Effect: keeps her up at night, constant distractor  Factors: Gabapentin and TYlenol are helping  Frequency: Intermittent sharp pains that last 5-10min          Duration: for how long has pt been experiencing pain (e.g., 2 days, 1 month, years)  Frequency: how often pain is an issue (e.g., several times per day, once every few days, constant)     FUNCTIONAL ASSESSMENT:     Palliative Performance Scale (PPS):  PPS: 50       PSYCHOSOCIAL/SPIRITUAL SCREENING:     Palliative IDT has assessed this patient for cultural preferences / practices and a referral made as appropriate to needs (Cultural Services, Patient Advocacy, Ethics, etc.)    Advance Care Planning:  Advance Care Planning 8/15/2018   Patient's Healthcare Decision Maker is: Legal Next of Lanie 68   Primary Decision Maker Name [address] [place] [address] Decision Maker Phone Number [phone]   Primary Decision Maker Relationship to Patient Adult child   Confirm Advance Directive None   Patient Would Like to Complete Advance Directive -   Does the patient have other document types -       Any spiritual / Episcopalian concerns:  [] Yes /  [x] No    Caregiver Burnout:  [] Yes /  [x] No /  [] No Caregiver Present      Anticipatory grief assessment:   [] Normal  / [] Maladaptive       ESAS Anxiety: Anxiety: 2    ESAS Depression: Depression: 5        REVIEW OF SYSTEMS:     Positive and pertinent negative findings in ROS are noted above in HPI. The following systems were [x] reviewed / [] unable to be reviewed as noted in HPI  Other findings are noted below. Systems: constitutional, ears/nose/mouth/throat, respiratory, gastrointestinal, genitourinary, musculoskeletal, integumentary, neurologic, psychiatric, endocrine. Positive findings noted below. Modified ESAS Completed by: provider   Fatigue: 5     Depression: 5 Pain: 6   Anxiety: 2 Nausea: 0   Anorexia: 0 Dyspnea: 0     Constipation: No     Stool Occurrence(s): 1        PHYSICAL EXAM:     From RN flowsheet:  Wt Readings from Last 3 Encounters:   08/14/18 153 lb (69.4 kg)   08/07/18 145 lb (65.8 kg)   08/02/18 145 lb (65.8 kg)     Blood pressure 100/78, pulse 78, temperature 98 °F (36.7 °C), resp. rate 16, height 5' 2\" (1.575 m), weight 153 lb (69.4 kg), SpO2 95 %.     Pain Scale 1: Numeric (0 - 10)  Pain Intensity 1: 2  Pain Onset 1: CHronic  Pain Location 1: Head  Pain Orientation 1: Right  Pain Description 1: Aching  Pain Intervention(s) 1: Medication (see MAR)  Last bowel movement, if known:     Constitutional: alert, very frail and appears older than stated age  Eyes: pupils equal, anicteric  ENMT: no nasal discharge, moist mucous membranes  Cardiovascular: regular rhythm,   Respiratory: breathing not labored, symmetric  Gastrointestinal: distended, drain in place that is draining quite a bit of fluid  Musculoskeletal: no deformity, no tenderness to palpation  Skin: warm, dry  Neurologic: following commands, moving all extremities  Psychiatric: full affect, no hallucinations  Other:       HISTORY:     Principal Problem:    Ascites (8/14/2018)    Active Problems:    HTN (hypertension) (6/6/2012)      Hypothyroid (6/6/2012)      High grade B-cell lymphoma ([address] (3/12/2018)      Ascites, malignant (3/20/2018)      Nicotine dependence (8/14/2018)      Past Medical History:   Diagnosis Date    Anemia NEC     Arrhythmia     VSD; TRICUSPID REGURG    Cancer ([address] 2010    low grade NHL    GERD (gastroesophageal reflux disease)     Hypertension     NHL (non-Hodgkin's lymphoma) ([address] 2010    received chemo and radiation. not in remission.  Pleural effusion 02/21/2018    LEFT    Pulmonary hypertension ([address] 02/21/2018    PER CARDIOLOGY NOTES FROM PTS VISIT ON 2/21/18    Thyroid disease       Past Surgical History:   Procedure Laterality Date    HX CATARACT REMOVAL  6/2015 & 7/2015    MERRY    HX CHOLECYSTECTOMY  1982    HX DILATION AND CURETTAGE      HX HEENT  2005    RIGHT EAR     HX OTHER SURGICAL      LEFT PAROTIDECTOMY    [place] RPLCMT PROST AORTIC VALVE OPEN XCP HOMOGRF/STENT  1972    STENT PLACED TO STRAIGHTEN THE AORTA      Family History   Problem Relation Age of Onset    Heart Disease Mother     Kidney Disease Mother     Alcohol abuse Father     Liver Disease Father     Cancer Sister      Breast    No Known Problems Brother     Cancer Sister      Colon    No Known Problems Sister     No Known Problems Sister     No Known Problems Daughter     Anesth Problems Neg Hx       History reviewed, no pertinent family history.   Social History   Substance Use Topics    Smoking status: Current Every Day Smoker     Packs/day: 0.50     Years: 45.00     Types: Cigarettes    Smokeless tobacco: Former User     Quit date: 12/1/2015    Alcohol use Yes      Comment: Rare     Allergies   Allergen Reactions  Rituxan [Rituximab] Hives      Current Facility-Administered Medications   Medication Dose Route Frequency    gabapentin (NEURONTIN) capsule 600 mg  600 mg Oral TID    acetaminophen (TYLENOL) tablet 1,000 mg  1,000 mg Oral Q6H    fentaNYL citrate (PF) injection 25 mcg  25 mcg IntraVENous Q4H PRN    albumin human 25% (BUMINATE) solution 12.5 g  12.5 g IntraVENous Q6H    lidocaine-prilocaine (EMLA) 2.5-2.5 % cream   Topical PRN    atenolol (TENORMIN) tablet 25 mg  25 mg Oral DAILY    cholecalciferol (VITAMIN D3) tablet 1,000 Units  1,000 Units Oral DAILY    [START ON 8/26/2018] cyanocobalamin (VITAMIN B12) injection 1,000 mcg  1,000 mcg IntraMUSCular EVERY MONTH    levothyroxine (SYNTHROID) tablet 150 mcg  150 mcg Oral ACB    magic mouthwash cpd (without sucralfate)  5 mL Oral QID PRN    famotidine (PEPCID) tablet 20 mg  20 mg Oral QHS    sodium chloride (NS) flush 5-10 mL  5-10 mL IntraVENous Q8H    sodium chloride (NS) flush 5-10 mL  5-10 mL IntraVENous PRN    ondansetron (ZOFRAN) injection 4 mg  4 mg IntraVENous Q4H PRN          LAB AND IMAGING FINDINGS:     Lab Results   Component Value Date/Time    WBC 8.2 08/15/2018 06:26 AM    HGB 9.4 (L) 08/15/2018 06:26 AM    PLATELET 911 25/03/4055 06:26 AM     Lab Results   Component Value Date/Time    Sodium 138 08/16/2018 04:01 AM    Potassium 3.6 08/16/2018 04:01 AM    Chloride 107 08/16/2018 04:01 AM    CO2 21 08/16/2018 04:01 AM    BUN 14 08/16/2018 04:01 AM    Creatinine 0.96 08/16/2018 04:01 AM    Calcium 7.8 (L) 08/16/2018 04:01 AM    Magnesium 2.2 04/09/2018 01:54 PM    Phosphorus 3.6 03/25/2018 03:17 AM      Lab Results   Component Value Date/Time    AST (SGOT) 53 (H) 08/15/2018 06:26 AM    Alk.  phosphatase 94 08/15/2018 06:26 AM    Protein, total 5.6 (L) 08/15/2018 06:26 AM    Albumin 2.7 (L) 08/15/2018 06:26 AM    Globulin 2.9 08/15/2018 06:26 AM     Lab Results   Component Value Date/Time    INR 1.1 08/15/2018 06:26 AM    Prothrombin time 10.7 08/15/2018 06:26 AM    aPTT 27.3 08/15/2018 06:26 AM      Lab Results   Component Value Date/Time    Iron 63 05/04/2018 10:04 AM    TIBC 282 05/04/2018 10:04 AM    Iron % saturation 22 05/04/2018 10:04 AM    Ferritin 169 05/04/2018 10:04 AM      No results found for: PH, PCO2, PO2  No components found for: GLPOC   No results found for: CPK, CKMB             Total time:   Counseling / coordination time, spent as noted above:   > 50% counseling / coordination?:     Prolonged service was provided for  []30 min   []75 min in face to face time in the presence of the patient, spent as noted above. Time Start:   Time End:   Note: this can only be billed with 03223 (initial) or 57587 (follow up). If multiple start / stop times, list each separately.

## 2018-08-16 NOTE — PROGRESS NOTES
Pt received 1 of 18 radiation therapy treatments to her abdomen, and 1 of 15 to her base of skull.  -NT

## 2018-08-17 NOTE — DISCHARGE INSTRUCTIONS
Discharge Instructions       PATIENT ID: Mar Dale  MRN: [id number]   YOB: 1950    DATE OF ADMISSION: 8/14/2018 11:15 AM    DATE OF DISCHARGE: 8/17/2018    PRIMARY CARE PROVIDER: Nael Camarillo MD     ATTENDING PHYSICIAN: Winnie Jolly MD  DISCHARGING PROVIDER: Winnie Jolly MD    To contact this individual call [phone] and ask the  to page. If unavailable ask to be transferred the Adult Hospitalist Department. DISCHARGE DIAGNOSES     Ascites. This is suspected to be due to the cancer. The fluid accumulation may recur and if you notice worsening of the ascites(worsened distension),talk to you doctors as you may need drainage of the fluid;and  if the fluid keep re accumulating you doctors may consider placement of indwelling drainage catheter through which you can manage the fluid yourself at home. Watch for fever,abdominal pain,distension as these could be signs of infection related to the ascites. Do not take the blood pressure medicine  Atenolol if your blood pressure is lower than 10/60. CONSULTATIONS: IP CONSULT TO ONCOLOGY  IP CONSULT TO RADIATION ONCOLOGY  IP CONSULT TO PALLIATIVE CARE - PROVIDER  IP CONSULT TO INTERVENTIONAL RADIOLOGY    PROCEDURES/SURGERIES: * No surgery found *    PENDING TEST RESULTS:   At the time of discharge the following test results are still pending: none.     FOLLOW UP APPOINTMENTS:   Follow-up Information     Follow up With Details Comments [address] [place] [address] [facility]      Mk Mcknight MD   [address] [place] [address]  [phone]             ADDITIONAL CARE RECOMMENDATIONS:     DIET: Regular Diet      ACTIVITY: Activity as tolerated    WOUND CARE: NA    EQUIPMENT needed: NA      DISCHARGE MEDICATIONS:   See Medication Reconciliation Form    · It is important that you take the medication exactly as they are prescribed. · Keep your medication in the bottles provided by the pharmacist and keep a list of the medication names, dosages, and times to be taken in your wallet. · Do not take other medications without consulting your doctor. NOTIFY YOUR PHYSICIAN FOR ANY OF THE FOLLOWING:   Fever over 101 degrees for 24 hours. Chest pain, shortness of breath, fever, chills, nausea, vomiting, diarrhea, change in mentation, falling, weakness, bleeding. Severe pain or pain not relieved by medications. Or, any other signs or symptoms that you may have questions about. DISPOSITION:  x  Home With:   OT  PT  HH  RN       SNF/Inpatient Rehab/LTAC    Independent/assisted living    Hospice    Other:         Signed:    Jyotsna Andrea MD  8/17/2018  4:51 PM

## 2018-08-17 NOTE — DISCHARGE SUMMARY
Discharge Summary       PATIENT ID: Ilan Henson  MRN: [id number]   YOB: 1950    DATE OF ADMISSION: 8/14/2018 11:15 AM    DATE OF DISCHARGE: 8/17/2018  PRIMARY CARE PROVIDER: Sheeba Berman MD     ATTENDING PHYSICIAN: Wendy Call MD  DISCHARGING PROVIDER: Wendy Call MD    To contact this individual call [phone] and ask the  to page. If unavailable ask to be transferred the Adult Hospitalist Department. CONSULTATIONS: IP CONSULT TO ONCOLOGY  IP CONSULT TO RADIATION ONCOLOGY  IP CONSULT TO PALLIATIVE CARE - PROVIDER  IP CONSULT TO INTERVENTIONAL RADIOLOGY    PROCEDURES/SURGERIES: * No surgery found *    ADMITTING [address] [place] [address] COURSE:      Admission Summary:   Ilan Henson is a 75 y.o. female with Stage 4 high grade lymphoma s/p 6 cycles of chemo completed 7/6/18, HTN, GERD, h/o herpes zoster, pulmonary HTN, congenital heart murmur, h/o left pleural effusion s/p thoracentesis, anemia, and hypothyroidism presented to the ED from home at the request of her oncologist due to profound ascites and weakness. Pt reports that the abdomen became distended about 3 days ago but worsened quickly in the last 24 hours with some left abdominal pain. She reports a previous episode of ascites that resolved with chemo. Patient denies SOB, CP, fevers/chills/vomiting but reports nausea, 9 pound weight gain over the last 3 days, and loose stools. 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 Recent MRI brain showed mass involving the left trigeminal nerve. Patient also has a lesion on the left hip concerning for bone met. She had an outpatient paracentesis scheduled at [facility] today but felt too weak to drive from her home in [place] so she was recommended to come to [facility] ED instead.               Assessment & Plan:   Symptomatic ascites (POA)  - Ascites significantly reduced and symptoms improved with paracentesis. She received iv albumin to prevent hypotension. Bilateral pleural effusions and atx (POA) - seen on CXR  - IS, OOB, ambulate as tolerated  - h/o left pleural effusion s/p thoracentesis     Nausea (POA) - may be due to ascites  -improved. 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 Palliative care team added amitriptyline.  -She had 2/15 XRT to the head. -       HTN - controlled on home atenolol       Hypothyroidism - resume home levothyroxine       Nicotine dependence - cessation counseling; declined nicotine patch     GERD - H2 blocker     She is discharged home in a stable condition. family transporting. PENDING TEST RESULTS:   At the time of discharge the following test results are still pending: none    FOLLOW UP APPOINTMENTS:    Follow-up Information     Follow up With Details Comments [address] [place] [address]  [phone]             ADDITIONAL CARE RECOMMENDATIONS:     Ascites. This is suspected to be due to the cancer. The fluid accumulation may recur and if you notice worsening of the ascites(worsened distension),talk to you doctors as you may need drainage of the fluid;and  if the fluid keep re accumulating you doctors may consider placement of indwelling drainage catheter through which you can manage the fluid yourself at home. Watch for fever,abdominal pain,distension as these could be signs of infection related to the ascites.     DIET: Cardiac Diet    ACTIVITY: Activity as tolerated    WOUND CARE: NA    EQUIPMENT needed: NA      DISCHARGE MEDICATIONS:  Current Discharge Medication List      START taking these medications    Details   amitriptyline (ELAVIL) 25 mg tablet Take 1 Tab by mouth daily for 30 days. Qty: 30 Tab, Refills: 1         CONTINUE these medications which have CHANGED    Details   gabapentin (NEURONTIN) 300 mg capsule Take 2 Caps by mouth three (3) times daily for 30 days. Qty: 180 Cap, Refills: 2         CONTINUE these medications which have NOT CHANGED    Details   magic mouthwash solution Magic mouth wash   Maalox  Lidocaine 2% viscous   Diphenhydramine oral solution     Pharmacy to mix equal portions of ingredients to a total volume as indicated in the dispense amount. Qty: 280 mL, Refills: 1    Associated Diagnoses: Mucositis      KLOR-CON M20 20 mEq tablet       levothyroxine (SYNTHROID) 150 mcg tablet 150 mcg Daily (before breakfast). raNITIdine (ZANTAC) 150 mg tablet Take 150 mg by mouth nightly. docusate sodium (COLACE) 100 mg capsule DAILY PRN      cholecalciferol (VITAMIN D3) 1,000 unit tablet Take  by mouth daily. cyanocobalamin (VITAMIN B-12) 1,000 mcg/mL injection 1,000 mcg by IntraMUSCular route every month. atenolol (TENORMIN) 25 mg tablet Take 25 mg by mouth daily. STOP taking these medications       traMADol (ULTRAM) 50 mg tablet Comments:   Reason for Stopping:         methylPREDNISolone (MEDROL DOSEPACK) 4 mg tablet Comments:   Reason for Stopping:         amoxicillin-clavulanate (AUGMENTIN) 875-125 mg per tablet Comments:   Reason for Stopping:                 NOTIFY YOUR PHYSICIAN FOR ANY OF THE FOLLOWING:   Fever over 101 degrees for 24 hours. Chest pain, shortness of breath, fever, chills, nausea, vomiting, diarrhea, change in mentation, falling, weakness, bleeding. Severe pain or pain not relieved by medications. Or, any other signs or symptoms that you may have questions about.     DISPOSITION:  x  Home With:   OT  PT  HH  RN       Long term SNF/Inpatient Rehab Independent/assisted living    Hospice    Other:       PATIENT CONDITION AT DISCHARGE:     Functional status    Poor     Deconditioned    x Independent      Cognition    x Lucid     Forgetful     Dementia      Catheters/lines (plus indication)    Nelson     PICC     PEG    x None      Code status    xx Full code     DNR      PHYSICAL EXAMINATION AT DISCHARGE:     Visit Vitals    BP 94/58 (BP 1 Location: Left arm, BP Patient Position: Sitting)    Pulse 71    Temp 97.7 °F (36.5 °C)    Resp 16    Ht 5' 2\" (1.575 m)    Wt 69.4 kg (153 lb)    LMP  (LMP Unknown)    SpO2 97%    BMI 27.98 kg/m2    O2 Flow Rate (L/min): 2 l/min O2 Device: Nasal cannula    Temp (24hrs), Av °F (36.7 °C), Min:97.7 °F (36.5 °C), Max:98.2 °F (36.8 °C)     07 -  1900  In: -   Out: 550 [Drains:550]   08/15 1901 -  0700  In: 300 [P.O.:100; I.V.:200]  Out: 3500 [Drains:3500]    GENERAL:  Alert, oriented, cooperative, no apparent distress  HEENT:  Normocephalic, atraumatic, non icteric sclerae, non pallor conjuctivae, EOMs intact, PERRLA. NECK: Supple, trachea midline, no adenopathy, no thyromegally or tenderness, no carotid bruit and no JVD. LUNGS:   Vesicular breath sounds bilaterally, no added sounds. HEART:   S1 and S2 well heard,RRR,  no murmur, click, rub or gallop. ABDOMEB:   Minimal ascites. Non tender. +BS. EXTREMETIES:  Atraumatic, acyanotic, no edema  PULSES: 2+ and symmetric all extremities. SKIN:  No rashes or lesions  NEUROLOGY: Alert and oriented to PPT, CNII-XII intact. Motor and sensory exam grossly intact.       CHRONIC MEDICAL DIAGNOSES:  Problem List as of 2018  Date Reviewed: 2018          Codes Class Noted - Resolved    Neuropathic facial nerve ICD-10-CM: G51.9  ICD-9-CM: 351.9  Unknown - Present        Jaw pain ICD-10-CM: R68.84  ICD-9-CM: 784.92  Unknown - Present        Left ear pain ICD-10-CM: H92.02  ICD-9-CM: 388.70  Unknown - Present        Face pain ICD-10-CM: R51  ICD-9-CM: 784.0 Unknown - Present        Generalized abdominal pain ICD-10-CM: R10.84  ICD-9-CM: 789.07  Unknown - Present        Anxiety about health ICD-10-CM: F41.8  ICD-9-CM: 300.09  Unknown - Present        * (Principal)Ascites ICD-10-CM: R18.8  ICD-9-CM: 789.59  8/14/2018 - Present        Nicotine dependence (Chronic) ICD-10-CM: F17.200  ICD-9-CM: 305.1  8/14/2018 - Present        Ascites, malignant ICD-10-CM: R18.0  ICD-9-CM: 789.51  3/20/2018 - Present        High grade B-cell lymphoma (HCC) ICD-10-CM: C85.10  ICD-9-CM: 202.80  3/12/2018 - Present        Postmenopausal bleeding ICD-10-CM: N95.0  ICD-9-CM: 627.1  2/26/2018 - Present        Uterine mass ICD-10-CM: N85.9  ICD-9-CM: 625.8  2/26/2018 - Present        Pleural effusion, left ICD-10-CM: J90  ICD-9-CM: 511.9  2/26/2018 - Present        Cough ICD-10-CM: R05  ICD-9-CM: 786.2  2/26/2018 - Present        Pneumonia due to infectious organism ICD-10-CM: J18.9  ICD-9-CM: 136.9, 484.8  11/16/2017 - Present        Healed perforation of right ear drum ICD-10-CM: H73.811  ICD-9-CM: 384.81  10/9/2017 - Present        Herpes zoster with complication EKT-29-MG: L79.9  ICD-9-CM: 053.8  9/6/2017 - Present        Chemotherapy follow-up examination ICD-10-CM: H80  ICD-9-CM: V67.2  4/3/2017 - Present        Upper respiratory tract infection ICD-10-CM: J06.9  ICD-9-CM: 465.9  6/7/2016 - Present        Currently attempting to quit using tobacco ICD-10-CM: Z78.9  ICD-9-CM: 305.1  4/6/2016 - Present        PAZ (dyspnea on exertion) ICD-10-CM: R06.09  ICD-9-CM: 786.09  4/6/2016 - Present        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  ICD-9-CM: 053.13  4/6/2016 - Present        Mass of head ICD-10-CM: R22.0  ICD-9-CM: 784.2  2/17/2016 - Present        Herpes zoster without complication D-32-CJ: L59.4  ICD-9-CM: 053.9  2/3/2016 - Present        Tobacco abuse ICD-10-CM: Z72.0  ICD-9-CM: 305.1  11/30/2015 - Present        Headache(784.0) ICD-10-CM: R51  ICD-9-CM: 784.0  2/27/2015 - Present        Renal insufficiency ICD-10-CM: N28.9  ICD-9-CM: 593.9  2/11/2015 - Present        Poor vision ICD-10-CM: H54.7  ICD-9-CM: 369.9  12/9/2014 - Present        Left eye pain ICD-10-CM: H57.12  ICD-9-CM: 379.91  12/9/2014 - Present        Orbital swelling ICD-10-CM: H57.8  ICD-9-CM: 379.92  6/13/2013 - Present        Orbital lymphoma ([facility] ICD-10-CM: C85.99  ICD-9-CM: 202.80  6/13/2013 - Present        Fatigue ICD-10-CM: R53.83  ICD-9-CM: 780.79  12/5/2012 - Present        HTN (hypertension) ICD-10-CM: I10  ICD-9-CM: 401.9  6/6/2012 - Present        Follicular lymphoma grade I ([facility] ICD-10-CM: C82.00  ICD-9-CM: 202.00  6/6/2012 - Present        Heart murmur ICD-10-CM: R01.1  ICD-9-CM: 785.2  6/6/2012 - Present        Hypothyroid ICD-10-CM: E03.9  ICD-9-CM: 244.9  6/6/2012 - Present        Asthma ICD-10-CM: J45.909  ICD-9-CM: 493.90  6/6/2012 - Present        PUD (peptic ulcer disease) ICD-10-CM: K27.9  ICD-9-CM: 533.90  6/6/2012 - Present        Ear infection ICD-10-CM: H66.90  ICD-9-CM: 382.9  6/6/2012 - Present        RESOLVED: Ascites ICD-10-CM: R18.8  ICD-9-CM: 789.59  3/20/2018 - 3/27/2018              Greater than 35 minutes were spent with the patient on counseling and coordination of care    Signed:    Elizbeth Dakins, MD  8/17/2018  4:58 PM

## 2018-08-17 NOTE — CONSULTS
Radiation Oncology Consult    Chief Complaint: Left face pain and swelling stomach    History of Present Illness: 76 yo  female with a history of nodular NHL diagnosed in 2010 that transformed into a high grade lymphoma in 2018. She was found to have left orbital involvement in late 2014 , treated with radiation, receiving 36 Gy in 18 fxs. This was followed by chemotherapy, CHOP. She did not tolerate Rituxan well, only receiving one dose. Chemotherapy was resumed when her tumor resumed. Patient declined use of rituxan. She did well with her last chemotherapy given July 6, 2018. According to the patient and family. About 2 weeks later, her left hip began to hurt again, worst on left lateral decubitus. She has had diarrhea with food \"running through me\". Has no Blood per rectum. Found to have significant ascites and had \"a lot\" tapped today. Most recently over the last 4 weeks she began to have numbness and excruciating pain on the left side of the face, describing it as constant numbness and fleeting sharp pain. When pain gets so bad, she develops nausea. Symptoms involves the gum on the left, making eating difficult. She has lost weight, unquantified. Pain is felt from vertex to angle of the mandible to submental region. She was found to have by MRI of the brain abnormal mass like enlargement and enhancement of the left trigeminal nerve as well as abnormal marrow signal of the adjacent left petrous apex/ left clivus. She was given steroids without much response. PET reveals extensive disease in multiple areas. She was recently admitted last night from the ER for management of her left pelvic pain. 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 Has no vision lost from her previous radiation. MRI showed most likely post treatment changes.  Of note the patient had a left parotid removed for noncancerous reason by Dr Doreen Gardner at [facility] in 2003. She also had lost of hearing on the right around that time and had her \"mastoid bone \" removed , also by Dr Doreen Gardner. Medical History: Anemia NEC, cancer (History of Grade 4 lymphoma previously treated with CHOP chemo, has c/o facial numbness, had MRI of head 8/7/18 noted new mass. developed ascites on abdomen, and increased pain. pt admitted to hospital 8/14/18 CT on admission shows liver and pelvic masses also) , hypertension and thyroid problems. Surgical History: Biopsy on 7/12/2013 (left prbital), cholecystectomy, left parotid gland removal, replace aortic valve and right ear surgery x2. Allergies: Rituxan  Current Medications: Atenolol (25 mg) Tablet Oral daily   Bumetanide (1 mg) Tablet Oral   Levothroid (100 mcg) Tablet Oral daily   Magnesium Oxide -Mg Supplement (250 mg) Tablet Oral daily   Melatonin (3 mg) Tablet Oral at bedtime PRN   Potassium Gluconate (80 mg) Tablet Oral daily   Tenormin (25 mg) Tablet Oral daily  PQRS Medication Reconciliation: Medications documented and reviewed  Family History: No Remarkable Family History  Social History: Current some day smoker 0.5 packs/day for 30 years (15.0 pack years). Never drank. Review of Systems:   Constitutional Complains of moderate fatigue. Complains of a weight loss of 5 to 10 pounds. Denies lack of appetite and fever. Eyes Complains of blurred vision and visual difficulties. Respiratory Complains of dyspnea. Denies cough and wheezing. Gastrointestinal Denies constipation, diarrhea, heartburn / dyspepsia, nausea, pain / cramping, satiety and vomiting. ROS Genitourinary (F) Complains of genital masses. Denies dysuria, frequency, urgency, urine color change and vaginal spotting. Musculoskeletal Denies arthritis, bone pain, joint pain, muscle weakness and decreased range of motion.    Psychiatric Denies delusions, hallucinations, mood swings, depression and euphoria. Endocrine Complains of thyroid disease. Denies diabetes. Performance Status: 40% - Disabled, needs special care and assistance. ([place])  Physical Exam:   Constitutional No evidence of impaired alertness, uncooperativeness and disorientation. Is very uncomfortable and in mild to moderate pain involving the left face and abdomen. .   Eyes No evidence of conjunctivitis, nonreactive pupil(s) and scleral abnormalities. No periorbital edema, slight ptosis left. Slight aymmetry. .   Neck No evidence of tender or enlarged lymph nodes, restricted range of motion and enlarged thyroid gland. Cardiovascular No evidence of abnormal heart rate, heart arrhythmia and abnormal heart sounds. Respiratory No evidence of abnormal breath sounds. Abdomen Presents with abdominal abnormalities. Has ascites. Just had paracentesis; drain still in. .   Extremities Presents with lower extremities abnormalities mild edema in lower leg. Milagros Barrio Neurologic No evidence of impaired cranial nerve(s), uncoordinated gait and motor impairment Speech clear and coherent. A&O x 4..   Psychiatric No evidence of altered affect, lack of comprehension and disorientation. Hematologic/Lymphatic No evidence of tender or enlarged axillae lymph nodes and tender or enlarged neck lymph nodes. VITALS: Performed on 8/15/2018 10:43 AM  BMI - 27.984 kg/m2 (HIGH) -   Height - 62 in -   Weight - 153 lbs -   Pulse - 92 /min -   Respiration - 18 /min -   Systolic - 933 mm(hg) -   Diastolic - 57 mm(hg) (LOW) -   Pain - 5 - stabbing pain in face  Fatigue - 0 -   Fall Risk - 2 -  Vitals are within normal limits  Time and Counselin minutes were spent with the patient and family members, acquiring the vital information vital to the case. The patient was examined to verify findings as related in the HPI, as well as other areas as needed. Time was allowed for all questions about the proposed course of care to be answered.  Greater than 50% time was spent face to face with the patient in counseling and coordination of care. 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 Has received CHOP with good response. Appears to be refractory. With mix response currently. Her new symptoms with left facial pain is most troublesome for her. 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 Palliative radiation will be offer. Expect a response as lymphomas generally are radiosensitive. Occasionally, less so if resistant to chemotherapy. Her left pelvic pain is due to the large left pelvic rohit involvement with extension in to the vagina. Palliative radiation will be offered as she is most miserable from this causing also mild left leg edema. Discussed palliative radiation to the patient and her sisters and daughter present today. Discussed treatment planning and the potential side effects, risks and complications. Their questions were answered to their satisfaction. Patient voiced her interest in having radiation. CT simulation to be done today. Palliative radiation to the left pelvis will start tomorrow. The treatment to the left skull base will take longer due to her previous treatment. Will need to reconstruct previous treated fields, and fuse the known lesions as best seen on the MRI to the planning fields. Films reviewed with radiology. Anticipating offering 3600 to 4000 cgy in 18 -20 fxs. Thank you for the kind referral and the opportunity to participate in your patient's care. Plan: CT simulation  Extensive planning required for the head and neck lesion. Treatment to start tomorrow to both sites.     Pavel Guy MD, [address] Oncology Associate  Radiation Oncology dept  [facility]

## 2018-08-17 NOTE — ACP (ADVANCE CARE PLANNING)
Pall Med    2. Discussed Advance Directive today and she will complete with us in OP clinic next week / provided copies (note mother was ESRD on dialysis and had resuscitation when she had a DDNR but it wasn't with her and she was resuscitated and this was upsetting to patient)    NOTE  is patient's Legal NOK until she completed Advance Directive and specifies, prefers to do as OP.     Advance Care Planning 8/17/2018   Patient's Healthcare Decision Maker is: Legal Next of Kin   Primary Decision Maker Name [address] [place] [address]   Primary Decision Maker Phone Number [phone]   Primary Decision Maker Relationship to Patient Adult child   Confirm Advance Directive None   Patient Would Like to Complete Advance Directive Yes   Does the patient have other document types -

## 2018-08-17 NOTE — PROGRESS NOTES
CM approached by hospitalist Dr. Johnna Mckeon, Dr. Johnna Mckeon states that patient with questions/concerns in re: transportation issues to/from outpatient radiation (lives in [place], [place]), requesting that CM s/w patient's daughter Loren Shaikh. CM called Loren Shaikh [phone], no answer, left voicemail instructing Loren Shaikh to call this writer if seeking additional information/assistance. At first review, this CM with no immediate resolution to transportation issue, transport to radiation is not a service covered by Medicare. CM s/w Joey Isabel at [facility], patient may be eligible to stay at guest house during radiation tx pending director approval.    CM s/w patient directly. Patient states she has no questions/concerns/issues related to outpatient radiation treatments or any related logistics, states that MD must have been mistaken, denies further questions or concerns for this writer, states \"no worries honey, we're good! \"    Discharge home with family and outpatient follow-up, no further CM needs.     NORMAN Mejía

## 2018-08-17 NOTE — PROGRESS NOTES
1745 Patient educated with discharge instructions and Rx. Patient signed copy of the discharge instructions that were then placed on the hard chart.

## 2018-08-17 NOTE — PROGRESS NOTES
CC NHL Dx  10/10  Transformation 3/18 with pleural effusion    \"Feeling okay right now, hoping to get something to eat\"    TREATMENT COURSE:  Orbit radiation  Chlorambucil x 1 cycle 4/16. Cycle 2 3/17. Cycle 3 4/17   Cycle 4 1/18. Attempted one dose of rituxan. 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 Brent Menchaca is a 74 y/o female with stage 4 high grade lymphoma recurrent after recent CHOP chemo. Pt refused rituxan. Recently seen in office with face numbness but admitting symptoms now all new and same as at transformation of NHL. Hx of admitted 3/20/18 and post CHOP cycle 1 in hospital. She completed a total of 6 cycles of CHOP outpatient. She recently followed up with our office with post-treatment PET results and at that time had developed left sided facial numbness that feels like \"novocaine\" was injected. She describes this as painful that feels like the neuropathy that is in her fingers and toes. She states that her teeth are numb. She is having difficulty eating secondary to this. Denies mouth sores or recent dental work. She is also having left ear pain. No change in hearing of left ear. No neck or shoulder pain. MRI brain obtained 8/7/18 with abnormal masslike enlargement and enhancement of the left trigeminal nerve as well as abnormal marrow signal of the adjacent left petrous apex/left clivus. Scans reviewed in tumor board and plans for outpatient Rad/Onc consultation at that time. She tried a course of steroids with no improvement. Has been trying tramadol with little pain relief as well. States that pain has significantly worsened over the previous few days. She also noted over the last 3-4 days she has developed abdominal ascites and pain in left abdomen.  She was directed to the ED from our office yesterday but states that she wanted to wait until today as it is a long day of traveling in the car. CT obtained on admission with large amount of ascites as well as liver and pelvic masses. INTERVAL HISTORY: She is feeling good this morning. She was able to get some rest overnight. Family supportive at the bedside. Started radiation yesterday and states this went well. Treatment #2 planned for today. Breathing remains comfortable and pigtail catheter for ascites still draining, though minimal. She is hoping to get this out today. No nausea, vomiting, or diarrhea. Working on increasing PO intake. Pain is about the same and currently under control. No new symptoms. Past Medical History:   Diagnosis Date    Anemia NEC     Arrhythmia     VSD; TRICUSPID REGURG    Cancer ([address] 2010    low grade NHL    GERD (gastroesophageal reflux disease)     Hypertension     NHL (non-Hodgkin's lymphoma) ([address] 2010    received chemo and radiation. not in remission.     Pleural effusion 02/21/2018    LEFT    Pulmonary hypertension ([address] 02/21/2018    PER CARDIOLOGY NOTES FROM PTS VISIT ON 2/21/18    Thyroid disease      Past Surgical History:   Procedure Laterality Date    HX CATARACT REMOVAL  6/2015 & 7/2015    MERRY    HX CHOLECYSTECTOMY  1982    HX DILATION AND CURETTAGE      HX HEENT  2005    RIGHT EAR     HX OTHER SURGICAL      LEFT PAROTIDECTOMY    SD RPLCMT PROST AORTIC VALVE OPEN XCP HOMOGRF/STENT  1972    STENT PLACED TO STRAIGHTEN THE AORTA     Social History     Social History    Marital status:      Spouse name: N/A    Number of children: N/A    Years of education: N/A     Social History Main Topics    Smoking status: Current Every Day Smoker     Packs/day: 0.50     Years: 45.00     Types: Cigarettes    Smokeless tobacco: Former User     Quit date: 12/1/2015    Alcohol use Yes      Comment: Rare    Drug use: No    Sexual activity: No      Comment:  has one child     Other Topics Concern    None     Social History Narrative     Family History   Problem Relation Age of Onset    Heart Disease Mother     Kidney Disease Mother     Alcohol abuse Father     Liver Disease Father     Cancer Sister      Breast    No Known Problems Brother     Cancer Sister      Colon    No Known Problems Sister     No Known Problems Sister     No Known Problems Daughter     Anesth Problems Neg Hx        Current Facility-Administered Medications   Medication Dose Route Frequency Provider Last Rate Last Dose    gabapentin (NEURONTIN) capsule 600 mg  600 mg Oral TID Nahomy Cruz NP   300 mg at 08/17/18 0909    acetaminophen (TYLENOL) tablet 1,000 mg  1,000 mg Oral Q6H Eulalia Saravia NP   1,000 mg at 08/17/18 1206    fentaNYL citrate (PF) injection 25 mcg  25 mcg IntraVENous Q4H PRN Nahomy Cruz NP        albumin human 25% (BUMINATE) solution 12.5 g  12.5 g IntraVENous Q6H Stone Tipton MD   12.5 g at 08/17/18 1024    lidocaine-prilocaine (EMLA) 2.5-2.5 % cream   Topical PRN Andrea Redd NP        atenolol (TENORMIN) tablet 25 mg  25 mg Oral DAILY Steve Baptiste MD   25 mg at 08/17/18 4312    cholecalciferol (VITAMIN D3) tablet 1,000 Units  1,000 Units Oral DAILY Steve Baptiste MD   1,000 Units at 08/17/18 0908    [START ON 8/26/2018] cyanocobalamin (VITAMIN B12) injection 1,000 mcg  1,000 mcg IntraMUSCular EVERY MONTH Steve Baptiste MD        levothyroxine (SYNTHROID) tablet 150 mcg  150 mcg Oral ACB Tonia Arguello MD   150 mcg at 08/17/18 0700    magic mouthwash cpd (without sucralfate)  5 mL Oral QID PRN Steve Baptiste MD        famotidine (PEPCID) tablet 20 mg  20 mg Oral QHS Tonia Mahan MD   20 mg at 08/16/18 2131    sodium chloride (NS) flush 5-10 mL  5-10 mL IntraVENous Q8H Steve Baptiste MD   10 mL at 08/17/18 0655    sodium chloride (NS) flush 5-10 mL  5-10 mL IntraVENous PRN Steve Baptiste MD   10 mL at 08/17/18 1024    ondansetron (ZOFRAN) injection 4 mg  4 mg IntraVENous Q4H PRN Steve Baptiste MD           Allergies   Allergen Reactions    Rituxan [Rituximab] Hives       Review of Systems  A comprehensive review of systems was negative except for as noted above. Objective:  Visit Vitals    /66 (BP 1 Location: Left arm, BP Patient Position: Sitting)    Pulse 84    Temp 98.1 °F (36.7 °C)    Resp 16    Ht 5' 2\" (1.575 m)    Wt 153 lb (69.4 kg)    LMP  (LMP Unknown)    SpO2 96%    BMI 27.98 kg/m2     Physical Exam:   General appearance - alert, conversant, facial abnormality as below, no acute distress  Mental status - alert, oriented   Face - Left side of face with decreased mobility, ptosis left eye, hypesthesia left side of face  Eye - conj clear   Neck - supple  CV: regular with murmur  Resp: clear to auscultation bilaterally, normal effort  GI: distended, ascites, non tender  Ext No lower extremity edema noted bilaterally  Skin-Warm and dry. Intact, no rash. Neuro -  Awake, alert, oriented. Diagnostic Imaging   reviewed    2/21/18 PET:  IMPRESSION:   1. Increasing right parietal soft tissue activity. 2. Progression in the bilateral areas of hypermetabolic pleural thickening. 3. New bilateral hypermetabolic axillary lymph nodes and subcarinal lymph node  and progression of retroperitoneal and left pelvic lymphadenopathy. 4. Improvement in the left inguinal lymphadenopathy  5. Improvement in the right parieto-occipital scalp activity. 6. New hypermetabolic soft tissue abnormality left iliac fossa. 7. Stable left cervical hypermetabolic lymph nodes. 2/17/18 PET:  MPRESSION:   1. Findings suspicious for a uterine mass with a soft tissue mass in the left  pelvis and extending into the retroperitoneum concerning for malignancy. Lymphoma is a differential consideration given the patient's history. The mass  encases but does not narrow the aortic bifurcation and IVC. 2. Enlarged left inguinal lymph node is decreased in size since 8/30/2017.  3. Increased large left pleural effusion with left lower lung atelectasis.     7/18/18 PET  IMPRESSION: New focus of increased tracer activity right iliac wing concerning  for osseous metastatic disease. New hypermetabolic soft tissue nodule adjacent  to the right iliac crest. Increase in the size of the left iliac fossa mass. Minimal decrease in the size of the axillary lymph nodes with little change in  the tracer activity. Left level 2 lymph nodes and pleural activity in the left  hemithorax are stable as are the left external iliac and pelvic masses. Lab Results    reviewed  Lab Results   Component Value Date/Time    WBC 7.0 08/17/2018 04:00 AM    HGB 8.9 (L) 08/17/2018 04:00 AM    HCT 28.6 (L) 08/17/2018 04:00 AM    PLATELET 573 59/34/7888 04:00 AM    .2 (H) 08/17/2018 04:00 AM       Lab Results   Component Value Date/Time    Sodium 138 08/16/2018 04:01 AM    Potassium 3.6 08/16/2018 04:01 AM    Chloride 107 08/16/2018 04:01 AM    CO2 21 08/16/2018 04:01 AM    Anion gap 10 08/16/2018 04:01 AM    Glucose 81 08/16/2018 04:01 AM    BUN 14 08/16/2018 04:01 AM    Creatinine 0.96 08/16/2018 04:01 AM    BUN/Creatinine ratio 15 08/16/2018 04:01 AM    GFR est AA >60 08/16/2018 04:01 AM    GFR est non-AA 58 (L) 08/16/2018 04:01 AM    Calcium 7.8 (L) 08/16/2018 04:01 AM    AST (SGOT) 53 (H) 08/15/2018 06:26 AM    Alk. phosphatase 94 08/15/2018 06:26 AM    Protein, total 5.6 (L) 08/15/2018 06:26 AM    Albumin 2.7 (L) 08/15/2018 06:26 AM    Globulin 2.9 08/15/2018 06:26 AM    A-G Ratio 0.9 (L) 08/15/2018 06:26 AM    ALT (SGPT) 13 08/15/2018 06:26 AM         CT Results (most recent):    Results from Hospital Encounter encounter on 08/14/18   CT GUIDED THERAPY PLAN/PLACEMENT   Narrative Clinical history: Radiation therapy planning    Comparison to brain MR 8/7/2018. Multiple axial images were obtained from the  skull vertex to the base for the purpose of radiation therapy planning. No  midline shift or herniation. No acute abnormality.          Impression IMPRESSION: Radiation therapy planning study as above. Assessment/Plan:    1. 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 Cycle 1 of CHOP completed in the hospital and treatment outpatient since that time with no difficulty  Pt had a PET 5/16/18 which shows good response to chemo with decreased disease. Completed 6 cycles of CHOP (Cyclophosphamide 750mg/m2, Doxorubicin 50mg/m2, Vincristine (dose reduced cycle 5 by 25% for neuropathy to 1.05mg/m2) and post-treatment PET with a mixed response to treatment  Struggled with grade 3 neuropathy   Post-treatment PET with mixed response which we had reviewed previously. CT on admission with large amount of ascites per #4 and significantly increased size of multiple pelvic masses and now multiple liver masses  Reviewed these scan results with both patient and daughter  Patient confirms she does not want any further chemotherapy and goals of care are supportive. Appreciate Palliative Medicine support    2. Facial numbness/pain. Unilateral on left side with extensive CN involvement. Brain MRI with enlargement of trigeminal nerve likely secondary to lymphoma. Rad/Onc on board and planning for 10 total treatments. Today is #2/10. 3. Pain, severe. Secondary to #2 and nerve irritation. Palliative Medicine on board and managing - appreciate PM. Continue scheduled tylenol and gabapentin. PRN fentanyl for breakthrough. 4. Ascites. Noted on CT imaging on admission. Rapid progression as this was not present during last OV. Likely malignant and have discussed this with her. S/p 5L removed 8/15/18 and cytology pending. Continues with pigtail catheter at this time. 6. History of VSD. ECHO shows EF 55 to 60%. Cardiology following. 7. Anemia. Secondary to chemotherapy. Stable at 8.9. Transfuse to maintain HgB > 7.0. Please call with any questions. Pt seen today in conjunction with [facility]  Pt clinically stable. Getting XRT. Will f/u as outpt.    Call if questions over weekend.      Joseph November, DO

## 2018-08-17 NOTE — PROGRESS NOTES
Paracentesis drain site draining continuously. Placed ostomy pouch over site, educated patient and family on bag, gave extra bag and also gauze and tape for site care. Questions answered.

## 2018-08-17 NOTE — CDMP QUERY
There is noted documentation of ascites  on this record. Could this be further clarified as to cause        =>Pelvic and Liver masses (possible mets) POA  resulting in ascites requiring paracentesis  =>Other Explanation of clinical findings  =>Clinically Undetermined (no explanation for clinical findings)    The medical record reflects the following clinical findings, treatment, and risk factors:    Risk Factors: pelvic masses, liver masses,high grade B cell lymphoma  Clinical Indicators: admitted with ascites   CT-impression: Large amount of ascites, significantly increased size of multiple   pelvic masses, multiple liver masses. Treatment: paracentesis, oncology following       Please clarify and document your clinical opinion in the progress notes and discharge summary including the definitive and/or presumptive diagnosis, (suspected or probable), related to the above clinical findings.  Please include clinical findings supporting your diagnosis    Thank you,         Hossein Ziegler [address] [place] [address]

## 2018-08-17 NOTE — PALLIATIVE CARE DISCHARGE
Goals of Care/Treatment Preferences    The Palliative Medicine team was consulted as part of your/your loved one's care in the hospital. Our team is a supportive service; we strive to relieve suffering and improve quality of life. We reviewed advance care planning information, which includes the following:  Patient's Healthcare Decision Maker is[de-identified] Legal Next of Kin  Primary Decision Maker Name: Marsha Wong  Primary Decision Maker Phone Number: [phone]  Primary Decision Maker Relationship to Patient: Adult child  Confirm Advance Directive: None  Patient Would Like to Complete Advance Directive: Yes    Patient/Health Care Proxy Stated Goals: Other (comment) (improve this pain)    We reviewed / discussed your code status as: Full Code     Full Code means perform CPR in the event of cardiac arrest.      DNR means do NOT perform CPR in the event of cardiac arrest.      Partial Code means you have specific preferences, please discuss with your healthcare team.      Krupa Gipson means this issue was not addressed / resolved during your stay    Medical Interventions:  (being determined - reviewed AD and will complete as outpatient)    Because of the importance of this information, we are providing you with a printed copy to share with other healthcare providers after this hospitalization is complete.

## 2018-08-17 NOTE — PROGRESS NOTES
Palliative Medicine Consult  Nik: [phone] ([phone])    Patient Name: rFanny Rapp  YOB: 1950    Date of Initial Consult: 8/15/2018  Reason for Consult: Overwhelming Symptoms  Requesting Provider: Mustapha German NP  Primary Care Physician: Estevan Davey MD     SUMMARY:   Franny Rapp is a 75 y.o. with a past history of Stage 4 high grade lymphoma s/p 6 cycles of chemo completed on 7/6/18, HTN, GERD, h/o herpes zoster, pulmonary hypertension, congenital heart murmur, h/o left pleural effusion s/p thoracentesis, anemia and hypothyroidism who was admitted on 8/14/2018 from home at the request of her oncologist with a diagnosis of symptomatic ascites. 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 Trigeminal Nerve pain/Neuropathic pain + history of left sided shingles  2. Pain on left jaw  3. Pain in left ear  4. Pain on left side of face  5. Abdominal Pain  6. Anxiety about health     PLAN:   Pain - Neuropathic Pain, left face, has hx also of shingles on that side as well  1. Gabapentin 600mg tid - sent to pharmacy / continue at discharge  2. Acetaminophen to 1000mg four times a day - continue at discharge  3. Fentanyl 25mcg IV for emergency back up - has not used  4. Radiation intervention beginning 8/16/18 and continuing at [facility]   5. Start Amitriptyline 25mg daily - will increase in outpatient clinic, patient has had medication before and tolerated - sent to pharmacy, continue at discharge  6. She has not tolerated any opioids and pain has not responded to steroids. Ascites - new  1. S/p peritoneal drain now d/c  2. Radiation started, Day 2 8/17/18 to resume on 8/20/18 at [facility]  1. Music Therapist saw today, thank you  2.   Discussed Advance Directive today and she will complete with us in OP clinic next week / provided copies (note mother was ESRD on dialysis and had resuscitation when she had a DDNR but it wasn't with her and she was resuscitated and this was upsetting to patient)  7. Recommend OP Palliative Medicine follow up: Appointment scheduled at 1pm on Wed 8/22/18, patient aware  8. Communicated plan of care with: Palliative IDT     GOALS OF CARE / TREATMENT PREFERENCES:     GOALS OF CARE:  Patient/Health Care Proxy Stated Goals: Other (comment) (improve this pain)    TREATMENT PREFERENCES:   Code Status: Full Code    Advance Care Planning:  is legal NOK until AD completed and his name is not in demographics  Advance Care Planning 8/17/2018   Patient's Healthcare Decision Maker is: Legal Next of Lanie 68   Primary Decision Maker Name [id number] \[facility] [place] [facility]\""   Primary Decision Maker Phone Number [phone]   Primary Decision Maker Relationship to Patient Adult child   Confirm Advance Directive None   Patient Would Like to Complete Advance Directive Yes   Does the patient have other document types -       Medical Interventions:  (being determined - reviewed AD and will complete as outpatient)   Other Instructions: Other:    As far as possible, the palliative care team has discussed with patient / health care proxy about goals of care / treatment preferences for patient.      HISTORY:     History obtained from: patient, and sisters    CHIEF COMPLAINT: I have this facial pain    HPI/SUBJECTIVE:    The patient is:   [x] Verbal and participatory  [] Non-participatory due to:       Clinical Pain Assessment (nonverbal scale for severity on nonverbal patients):   Clinical Pain Assessment  Severity: 4  Location: left side of face  Character: burning itching sharp  Duration: weeks  Effect: hard to relax, hard to sleep  Factors: medications are helping some to take edge off  Frequency: intermittent worsening but chronic / daily and constant        Duration: for how long has pt been experiencing pain (e.g., 2 days, 1 month, years)  Frequency: how often pain is an issue (e.g., several times per day, once every few days, constant)     FUNCTIONAL ASSESSMENT:     Palliative Performance Scale (PPS):  PPS: 80       PSYCHOSOCIAL/SPIRITUAL SCREENING:     Palliative IDT has assessed this patient for cultural preferences / practices and a referral made as appropriate to needs (Cultural Services, Patient Advocacy, Ethics, etc.)    Advance Care Planning:  Advance Care Planning 2018   Patient's Healthcare Decision Maker is: Legal Next of Lanie Raygoza   Primary Decision Maker Name [address] [place] [address]   Primary Decision Maker Phone Number [phone]   Primary Decision Maker Relationship to Patient Adult child   Confirm Advance Directive None   Patient Would Like to Complete Advance Directive Yes   Does the patient have other document types -     Any spiritual / Judaism concerns:  [] Yes /  [x] No    Caregiver Burnout:  [] Yes /  [x] No /  [] No Caregiver Present      Anticipatory grief assessment:   [x] Normal  / [] Maladaptive       ESAS Anxiety: Anxiety: 2    ESAS Depression: Depression: 2      REVIEW OF SYSTEMS:     Positive and pertinent negative findings in ROS are noted above in HPI. The following systems were [x] reviewed / [] unable to be reviewed as noted in HPI  Other findings are noted below. Systems: constitutional, ears/nose/mouth/throat, respiratory, gastrointestinal, genitourinary, musculoskeletal, integumentary, neurologic, psychiatric, endocrine. Positive findings noted below. Modified ESAS Completed by: provider   Fatigue: 4 Drowsiness: 0   Depression: 2 Pain: 4   Anxiety: 2 Nausea: 0   Anorexia: 2 Dyspnea: 0   Best Well-Bein Constipation: No     Stool Occurrence(s): 1      PHYSICAL EXAM:     From RN flowsheet:  Wt Readings from Last 3 Encounters:   18 153 lb (69.4 kg)   18 145 lb (65.8 kg)   18 145 lb (65.8 kg)     Blood pressure 102/66, pulse 84, temperature 98.1 °F (36.7 °C), resp.  rate 16, height 5' 2\" (1.575 m), weight 153 lb (69.4 kg), SpO2 96 %. Pain Scale 1: Numeric (0 - 10)  Pain Intensity 1: 0  Pain Onset 1: CHronic  Pain Location 1: Head  Pain Orientation 1: Right  Pain Description 1: Aching  Pain Intervention(s) 1: Medication (see MAR)  Last bowel movement, if known:     Constitutional: alert, very frail and appears older than stated age  Eyes: pupils equal, anicteric  ENMT: no nasal discharge, moist mucous membranes  Cardiovascular: regular rhythm   Respiratory: breathing not labored, symmetric, clear anteriorly  Gastrointestinal: distended, drain has been removed  Musculoskeletal: no deformity, no tenderness to palpation, no edema  Skin: warm, dry, pale  Neurologic: following commands, moving all extremities  Psychiatric: full affect, no hallucinations  Other:     HISTORY:     Principal Problem:    Ascites (8/14/2018)    Active Problems:    HTN (hypertension) (6/6/2012)      Hypothyroid (6/6/2012)      High grade B-cell lymphoma ([address] (3/12/2018)      Ascites, malignant (3/20/2018)      Nicotine dependence (8/14/2018)      Neuropathic facial nerve ()      Jaw pain ()      Left ear pain ()      Face pain ()      Generalized abdominal pain ()      Anxiety about health ()      Past Medical History:   Diagnosis Date    Anemia NEC     Arrhythmia     VSD; TRICUSPID REGURG    Cancer ([address] 2010    low grade NHL    GERD (gastroesophageal reflux disease)     Hypertension     NHL (non-Hodgkin's lymphoma) ([address] 2010    received chemo and radiation. not in remission.     Pleural effusion 02/21/2018    LEFT    Pulmonary hypertension ([address] 02/21/2018    PER CARDIOLOGY NOTES FROM PTS VISIT ON 2/21/18    Thyroid disease       Past Surgical History:   Procedure Laterality Date    HX CATARACT REMOVAL  6/2015 & 7/2015    MERRY    HX CHOLECYSTECTOMY  1982    HX DILATION AND CURETTAGE      HX HEENT  2005    RIGHT EAR     HX OTHER SURGICAL      LEFT PAROTIDECTOMY    CA RPLCMT PROST AORTIC VALVE OPEN XCP HOMOGRF/STENT  1972    STENT PLACED TO STRAIGHTEN THE AORTA      Family History   Problem Relation Age of Onset    Heart Disease Mother     Kidney Disease Mother     Alcohol abuse Father     Liver Disease Father     Cancer Sister      Breast    No Known Problems Brother     Cancer Sister      Colon    No Known Problems Sister     No Known Problems Sister     No Known Problems Daughter     Anesth Problems Neg Hx       History reviewed, no pertinent family history.   Social History   Substance Use Topics    Smoking status: Current Every Day Smoker     Packs/day: 0.50     Years: 45.00     Types: Cigarettes    Smokeless tobacco: Former User     Quit date: 12/1/2015    Alcohol use Yes      Comment: Rare     Allergies   Allergen Reactions    Rituxan [Rituximab] Hives      Current Facility-Administered Medications   Medication Dose Route Frequency    heparin (porcine) pf 300 Units  300 Units InterCATHeter PRN    amitriptyline (ELAVIL) tablet 25 mg  25 mg Oral DAILY    gabapentin (NEURONTIN) capsule 600 mg  600 mg Oral TID    acetaminophen (TYLENOL) tablet 1,000 mg  1,000 mg Oral Q6H    fentaNYL citrate (PF) injection 25 mcg  25 mcg IntraVENous Q4H PRN    lidocaine-prilocaine (EMLA) 2.5-2.5 % cream   Topical PRN    atenolol (TENORMIN) tablet 25 mg  25 mg Oral DAILY    cholecalciferol (VITAMIN D3) tablet 1,000 Units  1,000 Units Oral DAILY    [START ON 8/26/2018] cyanocobalamin (VITAMIN B12) injection 1,000 mcg  1,000 mcg IntraMUSCular EVERY MONTH    levothyroxine (SYNTHROID) tablet 150 mcg  150 mcg Oral ACB    magic mouthwash cpd (without sucralfate)  5 mL Oral QID PRN    famotidine (PEPCID) tablet 20 mg  20 mg Oral QHS    sodium chloride (NS) flush 5-10 mL  5-10 mL IntraVENous Q8H    sodium chloride (NS) flush 5-10 mL  5-10 mL IntraVENous PRN    ondansetron (ZOFRAN) injection 4 mg  4 mg IntraVENous Q4H PRN      LAB AND IMAGING FINDINGS:     Lab Results   Component Value Date/Time    WBC 7.0 08/17/2018 04:00 AM    HGB 8.9 (L) 08/17/2018 04:00 AM    PLATELET 032 73/08/2373 04:00 AM     Lab Results   Component Value Date/Time    Sodium 138 08/16/2018 04:01 AM    Potassium 3.6 08/16/2018 04:01 AM    Chloride 107 08/16/2018 04:01 AM    CO2 21 08/16/2018 04:01 AM    BUN 14 08/16/2018 04:01 AM    Creatinine 0.96 08/16/2018 04:01 AM    Calcium 7.8 (L) 08/16/2018 04:01 AM    Magnesium 2.2 04/09/2018 01:54 PM    Phosphorus 3.6 03/25/2018 03:17 AM      Lab Results   Component Value Date/Time    AST (SGOT) 53 (H) 08/15/2018 06:26 AM    Alk. phosphatase 94 08/15/2018 06:26 AM    Protein, total 5.6 (L) 08/15/2018 06:26 AM    Albumin 2.7 (L) 08/15/2018 06:26 AM    Globulin 2.9 08/15/2018 06:26 AM     Lab Results   Component Value Date/Time    INR 1.1 08/15/2018 06:26 AM    Prothrombin time 10.7 08/15/2018 06:26 AM    aPTT 27.3 08/15/2018 06:26 AM      Lab Results   Component Value Date/Time    Iron 63 05/04/2018 10:04 AM    TIBC 282 05/04/2018 10:04 AM    Iron % saturation 22 05/04/2018 10:04 AM    Ferritin 169 05/04/2018 10:04 AM      No results found for: PH, PCO2, PO2  No components found for: GLPOC   No results found for: CPK, CKMB           Total time: 45 min  Counseling / coordination time, spent as noted above: yes, discussed Advance Directives initially and provided copy to review in OP clinic  > 50% counseling / coordination?: yes    Prolonged service was provided for  []30 min   []75 min in face to face time in the presence of the patient, spent as noted above. Time Start:   Time End:   Note: this can only be billed with 21542 (initial) or 69205 (follow up). If multiple start / stop times, list each separately.

## 2018-08-17 NOTE — PROGRESS NOTES
Music Therapy Assessment    Marino Miller [id number]  [id number]    1950  75 y.o.  female    Patient Telephone Number: [phone] (home)   Lutheran Affiliation: Evelyn Radhadasha   Language: [place]   Extended Emergency Contact Information  Primary Emergency Contact: [address] Phone: [phone]  Mobile Phone: [phone]  Relation: Daughter  Secondary Emergency Contact: [address] Phone: [phone]  Relation: Child   Patient Active Problem List    Diagnosis Date Noted    Neuropathic facial nerve     Jaw pain     Left ear pain     Face pain     Generalized abdominal pain     Anxiety about health     Ascites 08/14/2018    Nicotine dependence 08/14/2018    Ascites, malignant 03/20/2018    High grade B-cell lymphoma ([address] 03/12/2018    Postmenopausal bleeding 02/26/2018    Uterine mass 02/26/2018    Pleural effusion, left 02/26/2018    Cough 02/26/2018    Pneumonia due to infectious organism 11/16/2017    Healed perforation of right ear drum 10/09/2017    Herpes zoster with complication 64/30/6003    Chemotherapy follow-up examination 04/03/2017    Upper respiratory tract infection 06/07/2016    Currently attempting to quit using tobacco 04/06/2016    PAZ (dyspnea on exertion) 04/06/2016    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    Mass of head 02/17/2016    Herpes zoster without complication 61/09/2107    Tobacco abuse 11/30/2015    Headache(784.0) 02/27/2015    Renal insufficiency 02/11/2015    Poor vision 12/09/2014    Left eye pain 12/09/2014    Orbital swelling 06/13/2013    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    Fatigue 12/05/2012    HTN (hypertension) 33/27/8421    Follicular lymphoma grade I ([address] 06/06/2012    Heart murmur 06/06/2012    Hypothyroid 06/06/2012    Asthma 06/06/2012    PUD (peptic ulcer disease) 06/06/2012    Ear infection 06/06/2012        Date: 8/17/2018       Mental Status:   [x] Alert [  ] Deepa Pilsner [  ] Confused  [  ] Minimally responsive    Communication Status: [  ] Impaired Speech [  ] Nonverbal -N/A    Physical Status:   [  ] Oxygen in use  [  ] Hard of Hearing [  ] Vision Impaired  [  ] Ambulatory  [  ] Ambulatory with assistance [  ] Non-ambulatory -N/A    Music Preferences, Background: Country, some [facility] [place]. Clinical Problem addressed: Support healthy pt/family coping, alleviate pain, spiritual support. Goal(s) met in session:  Physical/Pain management (Scale of 1-10):    Pre-session rating: Pt reported pain in her face but she didn't rate it. Post-session rating: N/A: Please see Session Observations below. [  ] Increased relaxation   [  ] Regulated breathing patterns  [x] Decreased muscle tension   [  ] Minimized physical distress     Emotional/Psychological:  [x] Increased self-expression   [  ] Decreased aggressive behavior   [  ] Decreased sadness   [  ] Discussed healthy coping skills     [  ] Improved mood    [  ] Decreased withdrawn behavior     Social:  [  ] Decreased feelings of isolation/loneliness [x] Positive social interaction   [x] Provided support and/or comfort for family/friends    Spiritual:  [x] Spiritual support    [  ] Expressed peace  [  ] Expressed sarah    [  ] Discussed beliefs    Techniques Utilized (Check all that apply):   [  ] Procedural support MT [x] Music for relaxation [x] Patient preferred music  [  ] Cara analysis  [  ] Song choice  [  ] Music for validation  [  ] Entrainment  [  ] Progressive muscle relax. [  ] Guided visualization  [  ] Maylon Sin  [  ] Patient instrument playing [  ] Meghana Carringtony writing  [x] Sing along   [  ] Mandy Gant  [  ] Sensory stimulation  [  ] Active Listening  [x] Music for spiritual support [  ] Making of CDs as gifts    Session Observations:  Referral from Ang Aldridge, Patient Advocate at the [facility]. Patient (pt) was lying in bed and she reported pain in her face.  Her older and younger sisters were at bedside. This music therapist (MT) offered music to help with pt's pain. Pt shared her music preferences and music background when MT asked her about these. [place]. MT sat at bedside and sang and played the [facility] to provide validation through music. Pt decreased facial muscle tension during this song. Pt and her sisters increased self-expression in response to the music as evidenced by (AEB) singing along. Pt and her sisters shared about the closeness and love they feel for each other and their gratitude for this. They shared some of their favorite hymns and MT sang and played one of these, What a Friend We Have in [facility] with [email or web address]. Pt's younger sister sang along to this. Pt and both of her sisters expressed enjoyment in the music and thanked MT for the visit. Will follow as able.     Jesenia Swan MT-BC (Music Therapist-Board Certified)  Spiritual Care Department  Referral-based service

## 2018-08-20 NOTE — TELEPHONE ENCOUNTER
Called patient to advise/confirm upcoming appt with Dr. Niko Hu on  8/22/18    at 1:00pm at [facility]. No answer so left voicemail. Also advised to please bring in your Drivers License and Insurance Card with you to appointment as well as any prescription pain medication in the original container with you to appt.

## 2018-08-21 NOTE — PROGRESS NOTES
[facility]   Medical Oncology at [facility]   Nurse Navigator Note      Follow up call to patient. Admission [facility] 8/14/18-8/17/18  Admitted to hospitalist services      Summary of hospital stay from discharge summary Hui Bazan MD      Assessment & Plan:   Symptomatic ascites (POA)  - Ascites significantly reduced and symptoms improved with paracentesis. She received iv albumin to prevent hypotension.     Bilateral pleural effusions and atx (POA) - seen on CXR  - IS, OOB, ambulate as tolerated  - h/o left pleural effusion s/p thoracentesis     Nausea (POA) - may be due to ascites  -improved.     High grade B-cell lymphoma (HCC) with liver and pelvic mass,poa   Being treated as out patient      Mass involving left trigeminal nerve (POA) associated with severe facial   -Gabapentin increased. Palliative care team added amitriptyline.  -She had 2/15 XRT to the head. -       HTN - controlled on home atenolol       Hypothyroidism - resume home levothyroxine       Nicotine dependence - cessation counseling; declined nicotine patch     GERD - H2 blocker     She is discharged home in a stable condition. family transporting. Patient reports she is going to radiation daily. She is not staying in town (she lives about an hour away). States she is managing her pain and pain level around 3-4/10. Stated she sees Palliative Care tomorrow. Reports she is sleeping well. She is struggling with eating and nutrition. Tries to drink ensure every day. Patient denies any needs at this time and also stated \"I am not sure there is much you can do for me\". Actions/Plan:  NN reinforced local housing if patient wishes [facility] [place] and [facility] support). NN discussed oncology RD and will request RD reach out to patient. Will try to coordinate a visit while patient in for XRT. NN reviewed upcoming appointments and encouraged patient to call if she has difficulty managing  symptoms.

## 2018-08-22 NOTE — PROGRESS NOTES
Palliative Medicine Outpatient Services  [place]: [phone] ([phone])    Patient Name: Marino Miller  YOB: 1950    Date of Current Visit: 08/22/18  Location of Current Visit:    [x] [facility] Office    Date of Initial Visit: 8/22/18  Requesting Physician: Dr. Baron Arcos  Primary Care Physician: Pippa Asif MD      SUMMARY:   Marino Miller is a 75 y.o. with a past history of Stage 4 high grade lymphoma s/p 6 cycles of chemo completed on 7/6/18, HTN, GERD, h/o herpes zoster, pulmonary hypertension, congenital heart murmur, h/o left pleural effusion s/p thoracentesis, anemia and hypothyroidism who was admitted to [facility] on 8/14/2018 from home at the request of her Oncologist with a diagnosis of symptomatic ascites and discharged on 8/17/18 after left sided thoracentesis and paracentesis with drain placement and then removal. She started 3 weeks of radiation to face and abdomen while hospitalized. CT 8/14/18  FINDINGS:  There is a small left pleural reaction unchanged. Liver and spleen are normal in size, there are multiple liver masses suspicious for metastases. There is a large amount of ascites which is a new finding. Prior cholecystectomy, adrenals and pancreas do appear unremarkable. Kidneys multiple multiple cysts but no obstruction. The a left psoas mass is significantly increased in size measuring at this time 81 x 84 mm, it was 37 x 34. Mental thickening is seen, several anterior abdominal wall hernia is fat-containing at this time. Major vessels do appear patent. Left external iliac mass is also increased in size measuring at this time 55 x 30 mm, a twice 32 x 19. Uterus and ovaries appear normal by this technique. The additional left pelvic mass inferiorly is also slightly increased in size the bladder is midline. There is no free air or bowel obstruction seen. Bone findings appear stable.   IMPRESSION: Large amount of ascites, significantly increased size of multiple pelvic masses, multiple liver masses. 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 High grade B-cell lymphoma (HCC) C85.10 202.80    2. Neoplastic malignant related fatigue R53.0 780.79    3. Face pain R51 784.0    4. Right leg pain M79.604 729.5       PLAN:   Patient Instructions     Dear Mirian Jeffery ,    It was a pleasure seeing you today in this location [address]. Your stated goal: To get pain under control    Your described symptoms were: Fatigue: 10 Drowsiness: 7   Depression: 7 Pain: 5   Anxiety: 10 Nausea: 7   Anorexia: 7 Dyspnea: 0   Best Well-Bein Constipation: No   Other Problem (Comment): 0       This is the plan we talked about:     1. Leg pain  -You are having more leg pain, on the right side in the back  -This just started since you have been home from the hospital  -The right leg isn't working as well, though you haven't had a fall  -You had just finished a steroid burst (Medrol Pack) for your face prior to your hospitalization  -You would like to wait on physical therapy to assess your gait and balance until next week  -You have Tylenol for pain as well as Gabapentin - but these are not affecting this pain in your leg  -You do not have numbness or tingling in your leg at this time  -If this worsens, please call us as we will need to assess and possibly try another steroid burst to help alleviate the symptoms    2.  Facial pain  -This is still present on the left side of your face  -You have held the Amitriptyline 25mg as it made you tired with the other medications  -You have been taking between 300 and 600mg of the gabapentin variably  -We talked about how these medications work best if taken on a regular schedule  -We decided on Gabapentin 600mg twice a day and after a week, restart the Amitriptyline 12.5mg (1/2 tab) at night, while continuing the Tylenol throughout the day    3. Radiation  -You have started radiation but your dose yesterday was cancelled due to machine error  -You travel a long distance to get here so this was unfortunate  -You have radiation today at 65  -We talked about how 3 weeks of travel back and forth and radiation itself will make you very tried and it will take time to recover  -You see Dr. Colten Mathis on 9/4/18 and I will see you again the week after that before you finish your radiation    4. Care at home  -Your sister is supporting your care at home, along with other family members including your daughter who was with you today  -If things become difficult at any time, please call our office as we have nursing and social work to help you    5. Advance Care Planning  -You want to defer completing the Advance Directive until next visit when you have more energy and time. We completely understand. This is what you have shared with us about Richard Galvez 78. Planning 8/17/2018   Patient's Healthcare Decision Maker is: Legal Next of Lanie 68   Primary Decision Maker Name [address] [place] [address]   Primary Decision Maker Phone Number [phone]   Primary Decision Maker Relationship to Patient Adult child   Confirm Advance Directive None   Patient Would Like to Complete Advance Directive Yes   Does the patient have other document types -     The Palliative Medicine Team is here to support you and your family. We will see you again in 2 weeks    Sincerely,      Vandana Mcmanus MD and the Palliative Medicine Team      Counseling and Coordination: D/W Dr. Mery Garcia / TREATMENT PREFERENCES:   [====Goals of Care====]  GOALS OF CARE:  Patient / health care proxy stated goals:  To have less pain      TREATMENT PREFERENCES:   Code Status:  [] Attempt Resuscitation       [] Do Not Attempt Resuscitation    Advance Care Planning:  Advance Care Planning 8/17/2018   Patient's Healthcare Decision Maker is: Legal Next of Kin   Primary Decision Maker Name [facility] [place]   Primary Decision Maker Phone Number [phone]   Primary Decision Maker Relationship to Patient Adult child   Confirm Advance Directive None   Patient Would Like to Complete Advance Directive Yes   Does the patient have other document types -       Other:  (If patient appropriate for POST, consider using PALLPOST smart phrase here)    The palliative care team has discussed with patient / health care proxy about goals of care / treatment preferences for patient.  [====Goals of Care====]     PRESCRIPTIONS GIVEN:     Medications Ordered Today   Medications    gabapentin (NEURONTIN) 300 mg capsule     Sig: Take 2 Caps by mouth two (2) times a day for 30 days. Dispense:  180 Cap     Refill:  2    amitriptyline (ELAVIL) 25 mg tablet     Sig: Take 0.5 Tabs by mouth daily for 30 days.      Dispense:  30 Tab     Refill:  1       FOLLOW UP:     Future Appointments  Date Time Provider Tab Cheng   8/23/2018 10:30 AM RAD ONC THERAPY [address] [place] [address]. [facility]   8/24/2018 10:30 AM RAD ONC THERAPY [facility]   8/27/2018 10:30 AM RAD ONC THERAPY [facility]   8/28/2018 10:30 AM RAD ONC THERAPY [facility] H   8/29/2018 10:30 AM RAD ONC THERAPY [address] [place] [address]. [facility]   8/30/2018 10:30 AM RAD ONC THERAPY [address] [place] [address]. [facility]   8/31/2018 10:30 AM RAD ONC THERAPY [address] [place] [address]. [facility]   9/4/2018 10:30 AM RAD ONC THERAPY [facility] H   9/4/2018 3:00 PM DO Fatmata Busch 61[facility]m   9/5/2018 10:30 AM RAD ONC THERAPY [address] [place] [address]. [facility]   9/6/2018 10:30 AM RAD ONC THERAPY [address] [place] [address]. [facility]   9/6/2018 4:00 PM JUANA INFUSION NURSE [facility] H   9/7/2018 10:30 AM RAD ONC THERAPY [facility]   9/10/2018 10:30 AM RAD ONC THERAPY [facility]. [facility]   9/11/2018 10:30 AM RAD ONC THERAPY [facility] [place] [facility]    9/12/2018 10:30 AM RAD ONC THERAPY [address] [place] [address]. [facility]   9/13/2018 10:30 AM RAD ONC THERAPY [address] [place] [address]. [facility]   9/14/2018 10:30 AM RAD ONC THERAPY [address] [place] [address]. [facility]   9/17/2018 10:30 AM RAD ONC THERAPY [address] [place] [address]. [facility]   9/18/2018 10:30 AM RAD ONC THERAPY [address] [place] [address]. [facility]   9/19/2018 10:30 AM RAD ONC THERAPY [address] [place] [address]. [facility]   9/20/2018 10:30 AM RAD ONC THERAPY [address] [place] [address]. [facility]   10/4/2018 4:00 PM BREMO INFUSION NURSE [facility]        PHYSICIANS INVOLVED IN CARE:   Patient Care Team:  Isael Chu MD as PCP - General (Family Practice)  Hien Hollins NP (Nurse Practitioner)  Lolis Franks MD (Gynecologic Oncology)  Noman Thompson DO as Consulting Provider (Oncology)       HISTORY:   Nursing documentation from date of visit reviewed. Reviewed patient-completed ESAS and advance care planning form.   Reviewed patient record in prescription monitoring program.    CHIEF COMPLAINT: My right leg hurts    HPI/SUBJECTIVE:    The patient is: [x] Verbal / [] Nonverbal       Clinical Pain Assessment (nonverbal scale for nonverbal patients):   [++++ Clinical Pain Assessment++++]  [++++Pain Severity++++]: Pain: 5  [++++Pain Character++++]: aching back of right leg  [++++Pain Duration++++]: constant  [++++Pain Effect++++]: hard to walk  [++++Pain Factors++++]: worse with walking  [++++Pain Frequency++++]: constant  [++++Pain Location++++]: right leg, posterior, below buttock   [++++ Clinical Pain Assessment++++]     FUNCTIONAL ASSESSMENT:     Palliative Performance Scale (PPS): 60-70        PSYCHOSOCIAL/SPIRITUAL SCREENING:     Any spiritual / Congregation concerns:  [] Yes /  [x] No    Caregiver Burnout:  [] Yes /  [x] No /  [] No Caregiver Present      Anticipatory grief assessment:   [x] Normal  / [] Maladaptive       ESAS Anxiety: Anxiety: 10    ESAS Depression: Depression: 7       REVIEW OF SYSTEMS:     The following systems were [] reviewed / [] unable to be reviewed  Systems: constitutional, ears/nose/mouth/throat, respiratory, gastrointestinal, genitourinary, musculoskeletal, integumentary, neurologic, psychiatric, endocrine. Positive findings noted below. Modified ESAS Completed by: provider   Fatigue: 10 Drowsiness: 7   Depression: 7 Pain: 5   Anxiety: 10 Nausea: 7   Anorexia: 7 Dyspnea: 0   Best Well-Bein Constipation: No   Other Problem (Comment): 0        PHYSICAL EXAM:     Wt Readings from Last 3 Encounters:   18 144 lb 8 oz (65.5 kg)   18 153 lb (69.4 kg)   18 145 lb (65.8 kg)     Blood pressure 108/64, pulse (!) 102, temperature 95.9 °F (35.5 °C), temperature source Oral, resp. rate 16, height 5' 2\" (1.575 m), weight 144 lb 8 oz (65.5 kg), SpO2 96 %. Last bowel movement: See Nursing Note    Constitutional: weak pale appearing female in w/c, right eye ptosis  Eyes: pupils equal, anicteric  ENMT: no nasal discharge, moist mucous membranes  Cardiovascular: regular rhythm, distal pulses intact  Respiratory: breathing not labored, symmetric  Gastrointestinal: soft non-tender, +bowel sounds  Musculoskeletal: no deformity, no tenderness to palpation, hard for her to put weight on right leg, appears slightly unstable, slight edema bilateral lower extremities, pulses intact, tender below right buttock/back of thigh  Skin: warm, dry  Neurologic: following commands, moving all extremities  Psychiatric: full affect, no hallucinations  Other:     HISTORY:     Past Medical History:   Diagnosis Date    Anemia NEC     Arrhythmia     VSD; TRICUSPID REGURG    Cancer ([address]     low grade NHL    GERD (gastroesophageal reflux disease)     Hypertension     NHL (non-Hodgkin's lymphoma) ([address]     received chemo and radiation. not in remission.     Pleural effusion 2018    LEFT    Pulmonary hypertension ([address] 2018    PER CARDIOLOGY NOTES FROM PTS VISIT ON 18    Thyroid disease       Past Surgical History:   Procedure Laterality Date    HX CATARACT REMOVAL  6/2015 & 7/2015    MERRY    HX CHOLECYSTECTOMY  1982    HX DILATION AND CURETTAGE      HX HEENT  2005    RIGHT EAR     HX OTHER SURGICAL      LEFT PAROTIDECTOMY    SC RPLCMT PROST AORTIC VALVE OPEN XCP HOMOGRF/STENT  1972    STENT PLACED TO STRAIGHTEN THE AORTA      Family History   Problem Relation Age of Onset    Heart Disease Mother     Kidney Disease Mother     Alcohol abuse Father     Liver Disease Father     Cancer Sister      Breast    No Known Problems Brother     Cancer Sister      Colon    No Known Problems Sister     No Known Problems Sister     No Known Problems Daughter     Anesth Problems Neg Hx       History reviewed, no pertinent family history. Social History   Substance Use Topics    Smoking status: Current Every Day Smoker     Packs/day: 0.50     Years: 45.00     Types: Cigarettes    Smokeless tobacco: Former User     Quit date: 12/1/2015    Alcohol use Yes      Comment: Rare     Allergies   Allergen Reactions    Rituxan [Rituximab] Hives      Current Outpatient Prescriptions   Medication Sig    gabapentin (NEURONTIN) 300 mg capsule Take 2 Caps by mouth two (2) times a day for 30 days.  amitriptyline (ELAVIL) 25 mg tablet Take 0.5 Tabs by mouth daily for 30 days.  levothyroxine (SYNTHROID) 150 mcg tablet 150 mcg Daily (before breakfast).  raNITIdine (ZANTAC) 150 mg tablet Take 150 mg by mouth nightly.  docusate sodium (COLACE) 100 mg capsule DAILY PRN    cholecalciferol (VITAMIN D3) 1,000 unit tablet Take  by mouth daily.  cyanocobalamin (VITAMIN B-12) 1,000 mcg/mL injection 1,000 mcg by IntraMUSCular route every month.  atenolol (TENORMIN) 25 mg tablet Take 1 Tab by mouth daily. If your blood pressure is under 100/60,do not take this medicine.     magic mouthwash solution Magic mouth wash   Maalox  Lidocaine 2% viscous   Diphenhydramine oral solution     Pharmacy to mix equal portions of ingredients to a total volume as indicated in the dispense amount. No current facility-administered medications for this visit. LAB DATA REVIEWED:     Lab Results   Component Value Date/Time    WBC 7.0 08/17/2018 04:00 AM    HGB 8.9 (L) 08/17/2018 04:00 AM    PLATELET 595 06/11/1412 04:00 AM     Lab Results   Component Value Date/Time    Sodium 138 08/16/2018 04:01 AM    Potassium 3.6 08/16/2018 04:01 AM    Chloride 107 08/16/2018 04:01 AM    CO2 21 08/16/2018 04:01 AM    BUN 14 08/16/2018 04:01 AM    Creatinine 0.96 08/16/2018 04:01 AM    Calcium 7.8 (L) 08/16/2018 04:01 AM    Magnesium 2.2 04/09/2018 01:54 PM    Phosphorus 3.6 03/25/2018 03:17 AM      Lab Results   Component Value Date/Time    AST (SGOT) 53 (H) 08/15/2018 06:26 AM    Alk. phosphatase 94 08/15/2018 06:26 AM    Protein, total 5.6 (L) 08/15/2018 06:26 AM    Albumin 2.7 (L) 08/15/2018 06:26 AM    Globulin 2.9 08/15/2018 06:26 AM     Lab Results   Component Value Date/Time    INR 1.1 08/15/2018 06:26 AM    Prothrombin time 10.7 08/15/2018 06:26 AM    aPTT 27.3 08/15/2018 06:26 AM      Lab Results   Component Value Date/Time    Iron 63 05/04/2018 10:04 AM    TIBC 282 05/04/2018 10:04 AM    Iron % saturation 22 05/04/2018 10:04 AM    Ferritin 169 05/04/2018 10:04 AM      CONTROLLED SUBSTANCES SAFETY ASSESSMENT (IF ON CONTROLLED SUBSTANCES):            Total time:   Counseling / coordination time:   > 50% counseling / coordination?:

## 2018-08-22 NOTE — MR AVS SNAPSHOT
2700 [facility] 1200 [facility] 1400 [address] [place] [address] 
[phone] Patient: Randal Atkins MRN: [id number] ENS:[id number] Visit Information Date & Time Provider Department Dept. Phone Encounter #  
 8/22/2018  1:00 PM Apollo Marte, [address] [id number] Follow-up Instructions Return in about 2 weeks (around 9/5/2018). Routing History Your Appointments 9/4/2018  3:00 PM  
Follow Up with Chey Mohan,  [facility] at [facility]) Appt Note: follow up [address] [facility] [address] [address]  
[phone]  
  
   
 [id number] Rehan Perez [facility] [address] Upcoming Health Maintenance Date Due Hepatitis C Screening 1950 DTaP/Tdap/Td series (1 - Tdap) 11/19/1970 BREAST CANCER SCRN MAMMOGRAM 11/19/1999 FOBT Q 1 YEAR AGE 50-75 11/19/1999 ZOSTER VACCINE AGE 60> 9/19/2009 GLAUCOMA SCREENING Q2Y 11/19/2014 Bone Densitometry (Dexa) Screening 11/19/2014 Pneumococcal 65+ High/Highest Risk (1 of 2 - PCV13) 11/19/2014 MEDICARE YEARLY EXAM 3/14/2018 Influenza Age 5 to Adult 8/1/2018 Allergies as of 8/22/2018  Review Complete On: 8/22/2018 By: Thaddeus Laboy LPN Severity Noted Reaction Type Reactions Rituxan [Rituximab]  08/30/2017    Hives Current Immunizations  Reviewed on 8/2/2018 Name Date Influenza Vaccine 10/1/2015, 11/5/2014 Not reviewed this visit You Were Diagnosed With   
  
 Codes Comments High grade B-cell lymphoma ([facility] [address]    -  Primary ICD-10-CM: C85.10 ICD-9-CM: 202.80 Neoplastic malignant related fatigue     ICD-10-CM: R53.0 ICD-9-CM: 780.79 Face pain     ICD-10-CM: A12 ICD-9-CM: 784.0 Right leg pain     ICD-10-CM: M79.604 ICD-9-CM: 729.5 Vitals BP Pulse Temp Resp Height(growth percentile) Weight(growth percentile) 108/64 (BP 1 Location: Right arm, BP Patient Position: Sitting) (!) 102 95.9 °F (35.5 °C) (Oral) 16 5' 2\" (1.575 m) 144 lb 8 oz (65.5 kg) LMP SpO2 BMI OB Status Smoking Status (LMP Unknown) 96% 26.43 kg/m2 Postmenopausal Current Every Day Smoker Vitals History BMI and BSA Data Body Mass Index Body Surface Area  
 26.43 kg/m 2 1.69 m 2 Preferred Pharmacy Pharmacy Name Phone [address] [phone] Your Updated Medication List  
  
   
This list is accurate as of 8/22/18  2:39 PM.  Always use your most recent med list.  
  
  
  
  
 amitriptyline 25 mg tablet Commonly known as:  ELAVIL Take 0.5 Tabs by mouth daily for 30 days. atenolol 25 mg tablet Commonly known as:  TENORMIN Take 1 Tab by mouth daily. If your blood pressure is under 100/60,do not take this medicine. cholecalciferol 1,000 unit tablet Commonly known as:  VITAMIN D3 Take  by mouth daily. docusate sodium 100 mg capsule Commonly known as:  COLACE  
DAILY PRN  
  
 gabapentin 300 mg capsule Commonly known as:  NEURONTIN Take 2 Caps by mouth two (2) times a day for 30 days. levothyroxine 150 mcg tablet Commonly known as:  SYNTHROID  
150 mcg Daily (before breakfast). magic mouthwash solution Magic mouth wash  Maalox Lidocaine 2% viscous  Diphenhydramine oral solution   Pharmacy to mix equal portions of ingredients to a total volume as indicated in the dispense amount. raNITIdine 150 mg tablet Commonly known as:  ZANTAC Take 150 mg by mouth nightly. VITAMIN B-12 1,000 mcg/mL injection Generic drug:  cyanocobalamin  
1,000 mcg by IntraMUSCular route every month. Follow-up Instructions Return in about 2 weeks (around 9/5/2018). To-Do List   
 08/23/2018 10:30 AM  
  Appointment with RAD ONC THERAPY [facility] [place] at [address] [place] [address] ([phone])  
  
 08/24/2018 10:30 AM  
 Appointment with RAD ONC THERAPY [facility] at [address] [place] [address] ([phone])  
  
 08/27/2018 10:30 AM  
  Appointment with RAD ONC THERAPY [facility] at [address] [place] [address] ([phone])  
  
 08/28/2018 10:30 AM  
  Appointment with RAD ONC THERAPY [facility] at [address] [place] [address] ([phone])  
  
 08/29/2018 10:30 AM  
  Appointment with RAD ONC THERAPY [facility] at [address] [place] [address] ([phone])  
  
 08/30/2018 10:30 AM  
  Appointment with RAD ONC THERAPY [facility] at [address] [place] [address] ([phone])  
  
 08/31/2018 10:30 AM  
  Appointment with RAD ONC THERAPY [facility] at [address] [place] [address] ([phone])  
  
 09/04/2018 10:30 AM  
  Appointment with RAD ONC THERAPY [facility] at [address] [place] [address] ([phone])  
  
 09/05/2018 10:30 AM  
  Appointment with RAD ONC THERAPY [facility] at [address] [place] [address] ([phone])  
  
 09/06/2018 10:30 AM  
  Appointment with RAD ONC THERAPY [facility] at [address] [place] [address] ([phone])  
  
 09/06/2018 4:00 PM  
  Appointment with [address] [place] [address] [address] [facility] ([phone])  
  
 09/07/2018 10:30 AM  
  Appointment with RAD [address] [facility] at [address] [place] [address] ([phone])  
  
 09/10/2018 10:30 AM  
  Appointment with RAD ONC THERAPY [facility] at [address] [place] [address] ([phone])  
  
 09/11/2018 10:30 AM  
  Appointment with RAD ONC THERAPY [facility] at [address] [place] [address] ([phone])  
  
 09/12/2018 10:30 AM  
  Appointment with RAD ONC THERAPY [facility] at [address] [place] [address] ([phone])  
  
 09/13/2018 10:30 AM  
  Appointment with RAD ONC THERAPY [facility] at [address] [place] [address] ([phone])  
  
 09/14/2018 10:30 AM  
  Appointment with RAD ONC THERAPY [facility] at [address] [place] [address] ([phone])  
  
 09/17/2018 10:30 AM  
  Appointment with RAD ONC THERAPY [facility] at [address] [place] [address] ([phone])  
  
 09/18/2018 10:30 AM  
  Appointment with RAD ONC THERAPY [facility] at [address] [place] [address] ([phone])  
  
 09/19/2018 10:30 AM  
 Appointment with RAD ONC THERAPY [facility] at [address] [place] [address] ([phone])  
  
 2018 10:30 AM  
  Appointment with RAD ONC THERAPY [facility] at [address] [place] [address] ([phone]) 10/04/2018 4:00 PM  
  Appointment with [address] [place] [address] [address] at 455 [facility] ([phone]) Patient Instructions Dear Cristian Daniel , It was a pleasure seeing you today in this location [address]. Your stated goal: To get pain under control Your described symptoms were: Fatigue: 10 Drowsiness: 7 Depression: 7 Pain: 5 Anxiety: 10 Nausea: 7 Anorexia: 7 Dyspnea: 0 Best Well-Bein Constipation: No  
Other Problem (Comment): 0 This is the plan we talked about: 1. Leg pain 
-You are having more leg pain, on the right side in the back 
-This just started since you have been home from the hospital 
-The right leg isn't working as well, though you haven't had a fall 
-You had just finished a steroid burst (Medrol Pack) for your face prior to your hospitalization 
-You would like to wait on physical therapy to assess your gait and balance until next week 
-You have Tylenol for pain as well as Gabapentin - but these are not affecting this pain in your leg 
-You do not have numbness or tingling in your leg at this time 
-If this worsens, please call us as we will need to assess and possibly try another steroid burst to help alleviate the symptoms 2. Facial pain 
-This is still present on the left side of your face 
-You have held the Amitriptyline 25mg as it made you tired with the other medications 
-You have been taking between 300 and 600mg of the gabapentin variably 
-We talked about how these medications work best if taken on a regular schedule 
-We decided on Gabapentin 600mg twice a day and after a week, restart the Amitriptyline 12.5mg (1/2 tab) at night, while continuing the Tylenol throughout the day 3. Radiation -You have started radiation but your dose yesterday was cancelled due to machine error 
-You travel a long distance to get here so this was unfortunate 
-You have radiation today at 65 
-We talked about how 3 weeks of travel back and forth and radiation itself will make you very tried and it will take time to recover 
-You see Dr. Aurora Back on 9/4/18 and I will see you again the week after that before you finish your radiation 4. Care at home 
-Your sister is supporting your care at home, along with other family members including your daughter who was with you today 
-If things become difficult at any time, please call our office as we have nursing and social work to help you 5. Advance Care Planning 
-You want to defer completing the Advance Directive until next visit when you have more energy and time. We completely understand. This is what you have shared with us about Advance Care Planning Advance Care Planning 8/17/2018 Patient's Healthcare Decision Maker is: Legal Next of Kin Primary Decision Maker Name Julia Braswell Primary Decision Maker Phone Number [phone] Primary Decision Maker Relationship to Patient Adult child Confirm Advance Directive None Patient Would Like to Complete Advance Directive Yes Does the patient have other document types - The Palliative Medicine Team is here to support you and your family. We will see you again in 2 weeks Sincerely, Nelda Ramirez MD and the Palliative Medicine Team 
 
 
  
Introducing [facility]! [facility] introduces [employer] patient portal. Now you can access parts of your medical record, email your doctor's office, and request medication refills online. 1. In your internet browser, go to [email or web address] 2. Click on the First Time User? Click Here link in the Sign In box. You will see the New Member Sign Up page. 3. Enter your [employer] Access Code exactly as it appears below.  You will not need to use this code after youve completed the sign-up process. If you do not sign up before the expiration date, you must request a new code. · [employer] Access Code: [email or web address] Expires: 8/24/2018  7:23 AM 
 
4. Enter the last four digits of your Social Security Number (xxxx) and Date of Birth (mm/dd/yyyy) as indicated and click Submit. You will be taken to the next sign-up page. 5. Create a [employer] ID. This will be your [employer] login ID and cannot be changed, so think of one that is secure and easy to remember. 6. Create a [employer] password. You can change your password at any time. 7. Enter your Password Reset Question and Answer. This can be used at a later time if you forget your password. 8. Enter your e-mail address. You will receive e-mail notification when new information is available in [address] 9. Click Sign Up. You can now view and download portions of your medical record. 10. Click the Download Summary menu link to download a portable copy of your medical information. If you have questions, please visit the Frequently Asked Questions section of the [employer] website. Remember, [employer] is NOT to be used for urgent needs. For medical emergencies, dial 911. Now available from your iPhone and Android! Please provide this summary of care documentation to your next provider. Your primary care clinician is listed as Dalton Whitaker. If you have any questions after today's visit, please call [phone].

## 2018-08-22 NOTE — PROGRESS NOTES
Palliative Medicine  Nursing Note  [phone]  Fax [phone]    Clinic Office Visit  Patient Name: Odilon Lozano  YOB: [birth year]    Dr. Keshawn Waters After Visit Summary (AVS) reviewed with patient, pt's sister, and pt's dtr. Patient/family have no questions at this time. Provided Welcome New Patient Packet- Opioid Safety Sheet, Palliative Medicine Introduction Sheet, Welcome Letter, Advance Care Planning information, magnet with on-call phone number and information. Patient verbalized understanding that if any questions or concerns should arise prior to next office visit, patient/family member will call the Palliative Medicine office at [phone] 24 hours a day 7 days a week. Follow up appointment to be scheduled: For 2 weeks. We will call pt to schedule.     Nurse Navigator to provide a post follow up phone call in approximately: 1 week     Connie Riley RN-BC  Palliative Medicine

## 2018-08-22 NOTE — PATIENT INSTRUCTIONS
Dear Odilon Lozano ,    It was a pleasure seeing you today in this location [address]. Your stated goal: To get pain under control    Your described symptoms were: Fatigue: 10 Drowsiness: 7   Depression: 7 Pain: 5   Anxiety: 10 Nausea: 7   Anorexia: 7 Dyspnea: 0   Best Well-Bein Constipation: No   Other Problem (Comment): 0       This is the plan we talked about:     1. Leg pain  -You are having more leg pain, on the right side in the back  -This just started since you have been home from the hospital  -The right leg isn't working as well, though you haven't had a fall  -You had just finished a steroid burst (Medrol Pack) for your face prior to your hospitalization  -You would like to wait on physical therapy to assess your gait and balance until next week  -You have Tylenol for pain as well as Gabapentin - but these are not affecting this pain in your leg  -You do not have numbness or tingling in your leg at this time  -If this worsens, please call us as we will need to assess and possibly try another steroid burst to help alleviate the symptoms    2. Facial pain  -This is still present on the left side of your face  -You have held the Amitriptyline 25mg as it made you tired with the other medications  -You have been taking between 300 and 600mg of the gabapentin variably  -We talked about how these medications work best if taken on a regular schedule  -We decided on Gabapentin 600mg twice a day and after a week, restart the Amitriptyline 12.5mg (1/2 tab) at night, while continuing the Tylenol throughout the day    3.  Radiation  -You have started radiation but your dose yesterday was cancelled due to machine error  -You travel a long distance to get here so this was unfortunate  -You have radiation today at 64  -We talked about how 3 weeks of travel back and forth and radiation itself will make you very tried and it will take time to recover  -You see Dr. Scott Castaneda on 18 and I will see you again the week after that before you finish your radiation    4. Care at home  -Your sister is supporting your care at home, along with other family members including your daughter who was with you today  -If things become difficult at any time, please call our office as we have nursing and social work to help you    5. Advance Care Planning  -You want to defer completing the Advance Directive until next visit when you have more energy and time. We completely understand. This is what you have shared with us about Richard Snowden. Planning 8/17/2018   Patient's Healthcare Decision Maker is: Legal Next of Lanie 68   Primary Decision Maker Name [facility]   Primary Decision Maker Phone Number [phone]   Primary Decision Maker Relationship to Patient Adult child   Confirm Advance Directive None   Patient Would Like to Complete Advance Directive Yes   Does the patient have other document types -     The Palliative Medicine Team is here to support you and your family.      We will see you again in 2 weeks    Sincerely,      Mara Nova MD and the Palliative Medicine Team

## 2018-08-22 NOTE — PROGRESS NOTES
Palliative Medicine Office Visit  Palliative Medicine Nurse Check In  [phone]    Patient Name: Lianne Kim  YOB: 1950      Date of Office Visit: 8/22/2018      Patient states: Patient referred by the Oncologist. Completed the chemo on July 6th. From Check In Sheet (scanned in Media):  Has a medical provider talked with you about cardiopulmonary resuscitation (CPR)? [x] Yes   [] No   [] Unable to obtain    Nurse reminder to complete or update ACP FlowSheet:    Is ACP on the Problem List?    [] Yes    [x] No  IF ACP Document is ON FILE; Nurse to place ACP on Problem List     Is there an ACP Note in Chart Review/Note? [] Yes    [x] No   If NO: [address] 8/17/2018   Patient's Healthcare Decision Maker is: Legal Next of Lanie 68   Primary Decision Maker Name 7[address] \[facility]\""   Primary Decision Maker Phone Number [phone]   Primary Decision Maker Relationship to Patient Adult child   Confirm Advance Directive None   Patient Would Like to Complete Advance Directive Yes   Does the patient have other document types -       Is there anything that we should know about you as a person in order to provide you the best care possible? No  Have you been to the ER, urgent care clinic since your last visit? [x] Yes   [] No   [] Unable to obtain    Have you been hospitalized since your last visit? [x] Yes   [] No   [] Unable to obtain    Have you seen or consulted any other health care providers outside of the [address] [place] [address] since your last visit? [facility].    [x] Yes   [] No   [] Unable to obtain    Functional status (describe):     Last BM:  8/22/2018

## 2018-08-27 NOTE — TELEPHONE ENCOUNTER
Calling patient to schedule her an appt for September 12, 2018. Appt has been scheduled for 9/12/18 at 3:00pm by patient's daughter Eloina Chavez. Eloina Chavez confirmed date/time/location of appt.

## 2018-08-30 PROBLEM — I95.9 HYPOTENSION: Status: ACTIVE | Noted: 2018-01-01

## 2018-08-30 PROBLEM — D64.9 ANEMIA: Status: ACTIVE | Noted: 2018-01-01

## 2018-08-30 PROBLEM — E86.0 DEHYDRATION: Status: ACTIVE | Noted: 2018-01-01

## 2018-08-30 PROBLEM — E87.20 LACTIC ACIDOSIS: Status: ACTIVE | Noted: 2018-01-01

## 2018-08-30 NOTE — ED NOTES

## 2018-08-30 NOTE — ED PROVIDER NOTES
HPI Comments: 75 y.o. female with past medical history significant for anemia, pulmonary hypertension, and non-Hodgkin's lymphoma who presents from a nursing home via a private vehicle with chief complaint of moderate sx of vomiting. Per relatives, the pt has been nauseated and vomiting. Per relatives, the pt had Zofran at 1200 today without significant relief. Furthermore, relatives report they are concerned the pt is dehydrated and has only had 1500 calories to eat in the past three days. Pt reports she recently started radiation for her lymphoma and had a treatment done today - sent here by rad onc. Per relatives, pt finished IV chemotherapy in June, 2018. Pt also complains of diarrhea. Pt states her last episode of diarrhea was at 1600 today. Of note, pt also complains of a cough that is productive of a small amount of sputum. Pt denies any abdominal pain or hemoptysis. There are no other acute medical concerns at this time. Old chart review: Pt was admitted this month from August 14, 2018 to August 17, 2018 for malignant ascites and bilateral pleural effusion. Social hx - Tobacco use: current smoker, Alcohol Use: yes PCP: Marian Thomas MD 
 
Note written by Willa Gilford, Scribe, as dictated by Korey Lucero, DO 5:57 PM. The history is provided by the patient and a relative. No  was used. Past Medical History:  
Diagnosis Date  Anemia NEC  Arrhythmia   
 VSD; TRICUSPID REGURG  
 Cancer ([address] 2010  
 low grade NHL  GERD (gastroesophageal reflux disease)  Hypertension  NHL (non-Hodgkin's lymphoma) ([address] 2010  
 received chemo and radiation. not in remission.  Pleural effusion 02/21/2018 LEFT  Pulmonary hypertension ([address] 02/21/2018 PER CARDIOLOGY NOTES FROM PTS VISIT ON 2/21/18  Thyroid disease Past Surgical History:  
Procedure Laterality Date  HX CATARACT REMOVAL  6/2015 & 7/2015  M[facility]Y  
 [place]  HX DILATION AND CURETTAGE    
 HX HEENT  2005 RIGHT EAR   
 HX OTHER SURGICAL    
 LEFT PAROTIDECTOMY  VA RPLCMT PROST AORTIC VALVE OPEN XCP HOMOGRF/STENT  1972 STENT PLACED TO STRAIGHTEN THE AORTA Family History:  
Problem Relation Age of Onset  Heart Disease Mother  Kidney Disease Mother  Alcohol abuse Father  Liver Disease Father  Cancer Sister Breast  
 No Known Problems Brother  Cancer Sister Colon  No Known Problems Sister  No Known Problems Sister  No Known Problems Daughter  Anesth Problems Neg Hx Social History Social History  Marital status:  Spouse name: N/A  
 Number of children: N/A  
 Years of education: N/A Occupational History  Not on file. Social History Main Topics  Smoking status: Current Every Day Smoker Packs/day: 0.50 Years: 45.00 Types: Cigarettes  Smokeless tobacco: Former User Quit date: 12/1/2015  Alcohol use Yes Comment: Rare  Drug use: No  
 Sexual activity: No  
   Comment:  has one child Other Topics Concern  Not on file Social History Narrative ALLERGIES: Rituxan [rituximab] Review of Systems Constitutional: Negative for chills and fever. HENT: Negative for ear pain and sore throat. Eyes: Negative for pain. Respiratory: Positive for cough. Negative for chest tightness and shortness of breath. Cardiovascular: Negative for chest pain and leg swelling. Gastrointestinal: Positive for diarrhea, nausea and vomiting. Negative for abdominal pain. Genitourinary: Negative for dysuria and flank pain. Musculoskeletal: Negative for back pain. Skin: Negative for rash. Neurological: Negative for headaches. All other systems reviewed and are negative. Vitals:  
 08/30/18 1705 08/30/18 1800 BP: 94/60 113/46 Pulse: 93 88 Resp: 18 21 Temp: 97.7 °F (36.5 °C) SpO2: 96% 100% Weight: 65.3 kg (144 lb) Height: 5' 2\" (1.575 m) Physical Exam  
Constitutional: She is oriented to person, place, and time. Pt is cachectic and appears wasted. Looks older than her stated date of birth. HENT:  
Head: Normocephalic and atraumatic. Nose: Nose normal.  
Mouth/Throat: Oropharynx is clear and moist. Mucous membranes are dry. No oropharyngeal exudate. Eyes: Pupils are equal, round, and reactive to light. Right eye exhibits no discharge. Left eye exhibits no discharge. No scleral icterus. Pale conjunctiva. Neck: Neck supple. No tracheal deviation present. Cardiovascular: Normal rate and regular rhythm. Pulmonary/Chest: Effort normal and breath sounds normal. She has no wheezes. She has no rales. Abdominal: Soft. She exhibits ascites. She exhibits no distension and no mass. There is no tenderness. There is no rebound and no guarding. Musculoskeletal: Normal range of motion. She exhibits no edema or tenderness. Neurological: She is alert and oriented to person, place, and time. No cranial nerve deficit. Skin: Skin is warm and dry. She is not diaphoretic. Psychiatric: She has a normal mood and affect. Her behavior is normal.  
Nursing note and vitals reviewed. Note written by Magdalena Bonds, as dictated by Joshua Arzola DO 6:27 PM. MDM Number of Diagnoses or Management Options Critical Care Total time providing critical care: 30-74 minutes ED Course Procedures ED EKG interpretation: 
Rhythm: normal sinus rhythm; and regular . Rate (approx.): 92; Axis: normal; P wave: normal; QRS interval: prolonged; ST/T wave: non-specific changes; bifascicular block. This EKG was interpreted by Joshua Arzola MD, ED Provider. Total critical care time spent exclusive of procedures:  30 minutes CONSULT NOTE: 
7:07 PM Joshua Arzola DO spoke with Dr. Beba Arreola, Consult for Hospitalist.  Discussed available diagnostic tests and clinical findings. Dr. Joselyn Schneider will evaluate and admit the pt. 
 
7:07 PM 
Patient is being admitted to the hospital.  The results of their tests and reasons for their admission have been discussed with them and/or available family. They convey agreement and understanding for the need to be admitted and for their admission diagnosis. Consultation will be made now with the inpatient physician for hospitalization. IVF Low BP doubt sepsis Labs reviewed Will admit

## 2018-08-30 NOTE — IP AVS SNAPSHOT
[address] 
[phone] Patient: Alejandra Lyle MRN: [id number] TYLER:[birth year] You are allergic to the following Allergen Reactions Rituxan (Rituximab) Hives Recent Documentation Height Weight BMI OB Status Smoking Status 1.575 m 74.2 kg 29.92 kg/m2 Postmenopausal Current Every Day Smoker Unresulted Labs-Please follow up with your PCP about these lab tests Order Current Status CULTURE, BLOOD, PAIRED Preliminary result Emergency Contacts  (Rel.) Home Phone Work Phone Mobile Phone [facility] (Daughter) [phone] -- [phone] [facility] (Child) [phone] -- -- About your hospitalization You were admitted on:  August 30, 2018 You last received care in the:  [facility] You were discharged on:  September 6, 2018 Why you were hospitalized Your primary diagnosis was:  Dehydration Your diagnoses also included:  Pleural Effusion, Left, Lactic Acidosis, Anemia, Hypotension, Advanced Care Planning/Counseling Discussion, Facial Neuropathy, Neoplastic Malignant Related Fatigue, Debility, Anorexia, Nausea Providers Seen During Your Hospitalization Provider Specialty Primary office phone Mic Jasso DO Emergency Medicine [phone] No Devine MD Family Practice [phone] Francisco Javier Pedroza MD Hospitalist [phone] Ysabel Esparza MD Hospitalist [phone] Ladonna Moreno MD Internal Medicine [phone] Your Primary Care Physician (PCP) Primary Care Physician Office Phone Office Fax Lino Ash [phone] Follow-up Information Follow up With Details Comments Contact Info Loli Silva MD   [facility] [address] [place] [address] 
[phone] Appointments for Next 14 days 9/6/2018  4:00 PM  [facility]. [address] [facility]  
 9/7/2018 10:30 AM  RADIATION ONC [facility] RADIATION THERAPY  
 9/10/2018 10:30 AM  RADIATION ONC [facility] RADIATION THERAPY  
 9/11/2018 10:30 AM  RADIATION ONC [facility] RADIATION THERAPY  
 9/12/2018 10:30 AM  RADIATION ONC [facility] RADIATION THERAPY  
 9/12/2018  3:00 PM  [facility] Palliative [place]  
 9/13/2018 10:30 AM  RADIATION ONC [facility] RADIATION THERAPY  
 9/14/2018 10:30 AM  RADIATION ONC [facility] RADIATION THERAPY  
 9/17/2018 10:30 AM  RADIATION ONC [facility] RADIATION THERAPY  
 9/18/2018 10:30 AM  RADIATION ONC [facility] RADIATION THERAPY  
 9/19/2018 10:30 AM  RADIATION ONC [facility] RADIATION THERAPY  
 9/20/2018 10:30 AM  RADIATION ONC [facility] RADIATION THERAPY My Medications STOP taking these medications   
 cholecalciferol 1,000 unit tablet Commonly known as:  VITAMIN D3  
   
  
 docusate sodium 100 mg capsule Commonly known as:  COLACE  
   
  
 potassium chloride SR 10 mEq tablet Commonly known as:  KLOR-CON 10  
   
  
 VITAMIN B-12 1,000 mcg/mL injection Generic drug:  cyanocobalamin TAKE these medications as instructed Instructions Each Dose to Equal  
 Morning Noon Evening Bedtime  
 acetaminophen 325 mg tablet Commonly known as:  TYLENOL Your last dose was: Your next dose is: Take 650 mg by mouth every four (4) hours as needed for Pain. 650 mg  
    
   
   
   
  
 amitriptyline 25 mg tablet Commonly known as:  ELAVIL Your last dose was: Your next dose is: Take 0.5 Tabs by mouth daily for 30 days. 12.5 mg  
    
   
   
   
  
 atenolol 25 mg tablet Commonly known as:  TENORMIN Your last dose was: Your next dose is: Take 12.5 mg by mouth daily. 12.5 mg  
    
   
   
   
  
 gabapentin 300 mg capsule Commonly known as:  NEURONTIN Your last dose was: Your next dose is: Take 2 Caps by mouth two (2) times a day for 30 days. 600 mg  
    
   
   
   
  
 levothyroxine 150 mcg tablet Commonly known as:  SYNTHROID Your last dose was: Your next dose is:    
   
   
 150 mcg Daily (before breakfast). 150 mcg  
    
   
   
   
  
 morphine 10 mg/5 mL oral solution Your last dose was: Your next dose is: Take 2.5 mL by mouth every two (2) hours as needed. Max Daily Amount: 60 mg.  
 5 mg  
    
   
   
   
  
 ondansetron hcl 4 mg/5 mL oral solution Commonly known as:  Joanne Blacklake Your last dose was: Your next dose is: Take 5 mL by mouth now for 1 dose. 4 mg  
    
   
   
   
  
 raNITIdine 150 mg tablet Commonly known as:  ZANTAC Your last dose was: Your next dose is: Take 150 mg by mouth daily. 150 mg Where to Get Your Medications Information on where to get these meds will be given to you by the nurse or doctor. ! Ask your nurse or doctor about these medications  
  morphine 10 mg/5 mL oral solution  
 ondansetron hcl 4 mg/5 mL oral solution Discharge Instructions Comfort per Hospice recommendations on discharge Fabiola Mao MD 9/6/2018 I have reviewed discharge instructions with the caregiver and guardian. The caregiver and guardian verbalized understanding. Discharge Orders None [employer] Announcement We are excited to announce that we are making your provider's discharge notes available to you in [employer]. You will see these notes when they are completed and signed by the physician that discharged you from your recent hospital stay. If you have any questions or concerns about any information you see in [employer], please call the Health Information Department where you were seen or reach out to your Primary Care Provider for more information about your plan of care. Introducing [facility]! [employer] introduces [employer] patient portal. Now you can access parts of your medical record, email your doctor's office, and request medication refills online. 1. In your internet browser, go to [email or web address] 2. Click on the First Time User? Click Here link in the Sign In box. You will see the New Member Sign Up page. 3. Enter your [employer] Access Code exactly as it appears below. You will not need to use this code after youve completed the sign-up process. If you do not sign up before the expiration date, you must request a new code. · [employer] Access Code: [email or web address] Expires: 11/22/2018  1:38 PM 
 
4. Enter the last four digits of your Social Security Number (xxxx) and Date of Birth (mm/dd/yyyy) as indicated and click Submit. You will be taken to the next sign-up page. 5. Create a [employer] ID. This will be your [employer] login ID and cannot be changed, so think of one that is secure and easy to remember. 6. Create a [employer] password. You can change your password at any time. 7. Enter your Password Reset Question and Answer. This can be used at a later time if you forget your password. 8. Enter your e-mail address. You will receive e-mail notification when new information is available in [address] 9. Click Sign Up. You can now view and download portions of your medical record. 10. Click the Download Summary menu link to download a portable copy of your medical information. If you have questions, please visit the Frequently Asked Questions section of the [employer] website. Remember, [employer] is NOT to be used for urgent needs. For medical emergencies, dial 911. Now available from your iPhone and Android! General Information Please provide this summary of care documentation to your next provider. Patient Signature:  ____________________________________________________________ Date:  ____________________________________________________________  
  
Norm Mais Provider Signature:  ____________________________________________________________ Date:  ____________________________________________________________

## 2018-08-30 NOTE — ED TRIAGE NOTES
Patient comes to the ER c/o dehydration, nausea/vomiting and weight loss since beginning chemo in March.

## 2018-08-31 NOTE — CONSULTS
Palliative Medicine Consult Nik: [phone] ([id number]) Patient Name: Aakash Morelos YOB: 1950 Date of Initial Consult: 8/31/2018 Reason for Consult: Care Decisions Requesting Provider: Megan Lama MD 
Primary Care Physician: Carlo Ferris MD 
 
 SUMMARY:  
Aakash Morelos is a 75 y.o. with a past history of high-grade non-hidgkins lymphoma, anemia, gerd, htn, left pleural effusion, pulmonary hypertension, hypothyroidism, ascites, chronic pain, neuropathic facial pain, jaw pain, anxiety, who was admitted on 8/30/2018 from home with a diagnosis of dehydration. Current medical issues leading to Palliative Medicine involvement include: Goals of care discussions in light of progressing lymphoma PALLIATIVE DIAGNOSES:  
1. Advanced Care Planning 2. Facial Neuropathy 3. Fatigue 4. Debility 5. Anorexia 6. Nausea PLAN:  
1. Patient verified that she wants DNR status 2. She tells me she has an AMD that lists her daughter as HPOA, I asked her to have a family member bring it in when they have availability, so we can scan it into her medical record 3. She is frustrated and saddened that she is getting worse despite treatment, and the radiation did not resolve the pain and numbness in her face 4. She verbalized that she is to tired today to go for radiation, and she is frustrated that she does not feel it is helping at all. She wants to stop her treatments as she feels at this point they are more of a burden with little benefit 5. We talked about where she wants to die when it comes time- she tells me that she does not know, but she does not think she wants to die at home because she does not want to leave her daughter with that memory. She tells me she has not shared this fear with her daughter, which I encouraged her to do. We talked about including her daughter in her fears around this, as she is Ms Mariposa Leigh support through this time 6. We talked about Hospice briefly, Ms Arvind Alfaro has some knowledge of hospice, but had some misconceptions. I encouraged her to think about Hospice, as they were a resource for her if she wants to minimize the time she spends in the hospital, allowing her to spend the time she has left with her family. 7. She is going to think on it, but may request more information this weekend 8. Initial consult note routed to primary continuity provider 9. Communicated plan of care with: Palliative IDT 
 
 
 GOALS OF CARE / TREATMENT PREFERENCES:  
 
GOALS OF CARE: 
Patient/Health Care Proxy Stated Goals: Other (comment) (wanted full interventions to prolong her life, but is realizing that she is getting sicker dispite treatment.  ) TREATMENT PREFERENCES:  
Code Status: DNR Advance Care Planning: 
Advance Care Planning 8/30/2018 Patient's Healthcare Decision Maker is: Legal Next of Kin Primary Decision Maker Name Alla Salazar Primary Decision Maker Phone Number [phone] Primary Decision Maker Relationship to Patient Adult child Confirm Advance Directive None Patient Would Like to Complete Advance Directive No  
Does the patient have other document types - Other Instructions: Other: As far as possible, the palliative care team has discussed with patient / health care proxy about goals of care / treatment preferences for patient. HISTORY:  
 
History obtained from: Patient CHIEF COMPLAINT: fatigue HPI/SUBJECTIVE: The patient is:  
[x] Verbal and participatory [] Non-participatory due to:  
 
Appears much sicker than her last admission Very tired, and ill appearing Frustrated with her decline Clinical Pain Assessment (nonverbal scale for severity on nonverbal patients):  
Clinical Pain Assessment Severity: 5 Location: left face/ear/mouth/jaw Character: sharp/burning/numb Duration: months Effect: meds help a little, radiation helped a little Frequency: constant Duration: for how long has pt been experiencing pain (e.g., 2 days, 1 month, years) Frequency: how often pain is an issue (e.g., several times per day, once every few days, constant) FUNCTIONAL ASSESSMENT:  
 
Palliative Performance Scale (PPS): PPS: 40 PSYCHOSOCIAL/SPIRITUAL SCREENING:  
 
Palliative IDT has assessed this patient for cultural preferences / practices and a referral made as appropriate to needs (Cultural Services, Patient Advocacy, Ethics, etc.) Advance Care Planning: 
Advance Care Planning 8/30/2018 Patient's Healthcare Decision Maker is: Legal Next of Kin Primary Decision Maker Name Maria Teresa Gramajo Primary Decision Maker Phone Number [phone] Primary Decision Maker Relationship to Patient Adult child Confirm Advance Directive None Patient Would Like to Complete Advance Directive No  
Does the patient have other document types - Any spiritual / Latter-day concerns: 
[] Yes /  [x] No 
 
Caregiver Burnout: 
[] Yes /  [] No /  [x] No Caregiver Present Anticipatory grief assessment:  
[] Normal  / [] Maladaptive ESAS Anxiety: Anxiety: 0 
 
ESAS Depression: Depression: 6 REVIEW OF SYSTEMS:  
 
Positive and pertinent negative findings in ROS are noted above in HPI. The following systems were [x] reviewed / [] unable to be reviewed as noted in HPI Other findings are noted below. Systems: constitutional, ears/nose/mouth/throat, respiratory, gastrointestinal, genitourinary, musculoskeletal, integumentary, neurologic, psychiatric, endocrine. Positive findings noted below. Modified ESAS Completed by: provider Fatigue: 8 Depression: 6 Pain: 5 Anxiety: 0 Nausea: 7 Anorexia: 10 Dyspnea: 4 Constipation: No  
  Stool Occurrence(s): 1 PHYSICAL EXAM:  
 
From RN flowsheet: 
Wt Readings from Last 3 Encounters:  
08/30/18 148 lb 5.9 oz (67.3 kg) 08/22/18 144 lb 8 oz (65.5 kg) 08/14/18 153 lb (69.4 kg) Blood pressure 100/57, pulse (!) 114, temperature 97.5 °F (36.4 °C), resp. rate 18, height 5' 2\" (1.575 m), weight 148 lb 5.9 oz (67.3 kg), SpO2 94 %. Pain Scale 1: Numeric (0 - 10) Pain Intensity 1: 6 Pain Onset 1: chronic Pain Location 1: Abdomen, Back Pain Orientation 1: Posterior, Anterior Pain Description 1: Aching, Constant, Pressure Pain Intervention(s) 1: Medication (see MAR), Repositioned Last bowel movement, if known:  
 
Constitutional: frail, ill appearing woman Eyes: pupils equal, anicteric ENMT: no nasal discharge,  
Cardiovascular: regular rhythm, Respiratory: breathing not labored, symmetric Gastrointestinal: soft non-tender, distended Musculoskeletal: no deformity, no tenderness to palpation Skin: warm, dry Neurologic: following commands, moving all extremities Psychiatric: full affect, no hallucinations Other: 
 
 
 HISTORY:  
 
Principal Problem: 
  Dehydration (8/30/2018) Active Problems: 
  Pleural effusion, left (2/26/2018) Lactic acidosis (8/30/2018) Anemia (8/30/2018) Hypotension (8/30/2018) Past Medical History:  
Diagnosis Date  Anemia NEC  Arrhythmia   
 VSD; TRICUSPID REGURG  
 Cancer ([address] 2010  
 low grade NHL  GERD (gastroesophageal reflux disease)  Hypertension  NHL (non-Hodgkin's lymphoma) ([address] 2010  
 received chemo and radiation. not in remission.  Pleural effusion 02/21/2018 LEFT  Pulmonary hypertension ([address] 02/21/2018 PER CARDIOLOGY NOTES FROM PTS VISIT ON 2/21/18  Thyroid disease Past Surgical History:  
Procedure Laterality Date  HX CATARACT REMOVAL  6/2015 & 7/2015 MERRY  
 HX CHOLECYSTECTOMY  1982  HX DILATION AND CURETTAGE    
 HX HEENT  2005 RIGHT EAR   
 HX OTHER SURGICAL    
 LEFT PAROTIDECTOMY  RI RPLCMT PROST AORTIC VALVE OPEN XCP HOMOGRF/STENT  1972 STENT PLACED TO STRAIGHTEN THE AORTA Family History Problem Relation Age of Onset  Heart Disease Mother  Kidney Disease Mother  Alcohol abuse Father  Liver Disease Father  Cancer Sister Breast  
 No Known Problems Brother  Cancer Sister Colon  No Known Problems Sister  No Known Problems Sister  No Known Problems Daughter  Anesth Problems Neg Hx History reviewed, no pertinent family history. Social History Substance Use Topics  Smoking status: Current Every Day Smoker Packs/day: 0.50 Years: 45.00 Types: Cigarettes  Smokeless tobacco: Former User Quit date: 12/1/2015  Alcohol use Yes Comment: Rare Allergies Allergen Reactions  Rituxan [Rituximab] Hives Current Facility-Administered Medications Medication Dose Route Frequency  heparin (porcine) injection 5,000 Units  5,000 Units SubCUTAneous Q8H  
 vancomycin (FIRVANQ) 50 mg/mL oral solution 125 mg  125 mg Oral Q6H  
 0.9% sodium chloride infusion  125 mL/hr IntraVENous CONTINUOUS  
 amitriptyline (ELAVIL) tablet 12.5 mg  12.5 mg Oral DAILY  cholecalciferol (VITAMIN D3) tablet 1,000 Units  1,000 Units Oral DAILY  gabapentin (NEURONTIN) capsule 600 mg  600 mg Oral BID  levothyroxine (SYNTHROID) tablet 150 mcg  150 mcg Oral ACB  famotidine (PEPCID) tablet 20 mg  20 mg Oral DAILY  sodium chloride (NS) flush 5-10 mL  5-10 mL IntraVENous Q8H  
 sodium chloride (NS) flush 5-10 mL  5-10 mL IntraVENous PRN  
 ondansetron (ZOFRAN) injection 4 mg  4 mg IntraVENous Q6H PRN  
 
 
 
 LAB AND IMAGING FINDINGS:  
 
Lab Results Component Value Date/Time WBC 5.0 08/31/2018 05:05 AM  
 HGB 8.3 (L) 08/31/2018 05:05 AM  
 PLATELET 914 (L) 16/61/1289 05:05 AM  
 
Lab Results Component Value Date/Time  Sodium 137 08/31/2018 05:05 AM  
 Potassium 3.6 08/31/2018 05:05 AM  
 Chloride 105 08/31/2018 05:05 AM  
 CO2 18 (L) 08/31/2018 05:05 AM  
 BUN 18 08/31/2018 05:05 AM  
 Creatinine 0.88 08/31/2018 05:05 AM  
 Calcium 8.1 (L) 08/31/2018 05:05 AM  
 Magnesium 2.2 04/09/2018 01:54 PM  
 Phosphorus 3.6 03/25/2018 03:17 AM  
  
Lab Results Component Value Date/Time AST (SGOT) 75 (H) 08/30/2018 05:30 PM  
 Alk. phosphatase 120 (H) 08/30/2018 05:30 PM  
 Protein, total 5.6 (L) 08/30/2018 05:30 PM  
 Albumin 2.7 (L) 08/30/2018 05:30 PM  
 Globulin 2.9 08/30/2018 05:30 PM  
 
Lab Results Component Value Date/Time INR 1.1 08/15/2018 06:26 AM  
 Prothrombin time 10.7 08/15/2018 06:26 AM  
 aPTT 27.3 08/15/2018 06:26 AM  
  
Lab Results Component Value Date/Time Iron 63 05/04/2018 10:04 AM  
 TIBC 282 05/04/2018 10:04 AM  
 Iron % saturation 22 05/04/2018 10:04 AM  
 Ferritin 169 05/04/2018 10:04 AM  
  
No results found for: PH, PCO2, PO2 No components found for: [place] No results found for: CPK, CKMB Total time:  
Counseling / coordination time, spent as noted above:  
> 50% counseling / coordination?:  
 
Prolonged service was provided for  []30 min   []75 min in face to face time in the presence of the patient, spent as noted above. Time Start:  
Time End:  
Note: this can only be billed with 63149 (initial) or 67448 (follow up). If multiple start / stop times, list each separately.

## 2018-08-31 NOTE — ROUTINE PROCESS
TRANSFER - OUT REPORT: 
 
Verbal report given to Leanna(name) on Gus Macias  being transferred to [facility](unit) for routine progression of care Report consisted of patients Situation, Background, Assessment and  
Recommendations(SBAR). Information from the following report(s) SBAR, ED Summary, [facility] and Recent Results was reviewed with the receiving nurse. Lines:  
Venous Access Device portacath 08/15/18 Upper chest (subclavicular area, right (Active) Central Line Being Utilized Yes 8/30/2018  5:55 PM  
Site Assessment Clean, dry, & intact 8/30/2018  5:55 PM  
Dressing Status Clean, dry, & intact 8/30/2018  5:55 PM  
Access Needle Size (Site #1) 20 G 8/30/2018  5:55 PM  
Access Needle Length (Medial Site) 1 inch 8/30/2018  5:55 PM  
Positive Blood Return (Medial Site) Yes 8/30/2018  5:55 PM  
Action Taken (Medial Site) Blood drawn;Alcohol;Benzoin; Infusing;Flushed 8/30/2018  5:55 PM  
  
 
Opportunity for questions and clarification was provided. Patient transported with: 
 Monitor Tech

## 2018-08-31 NOTE — PROGRESS NOTES
Admission Medication Reconciliation: 
 
Information obtained from: Patient, family, Rx Query Significant PMH/Disease States:  
Past Medical History:  
Diagnosis Date  Anemia NEC  Arrhythmia   
 VSD; TRICUSPID REGURG  
 [address] ([facility]   
 low grade NHL  GERD (gastroesophageal reflux disease)  Hypertension  NHL (non-Hodgkin's lymphoma) ([facility]   
 received chemo and radiation. not in remission.  Pleural effusion 2018 LEFT  Pulmonary hypertension ([facility] 2018 PER CARDIOLOGY NOTES FROM PTS VISIT ON 18  Thyroid disease Chief Complaint for this Admission: Chief Complaint Patient presents with  Vomiting Allergies:  Rituxan [rituximab] Prior to Admission Medications:  
Prior to Admission Medications Prescriptions Last Dose Informant Patient Reported? Taking?  
acetaminophen (TYLENOL) 325 mg tablet   Yes Yes Sig: Take 650 mg by mouth every four (4) hours as needed for Pain. amitriptyline (ELAVIL) 25 mg tablet 2018 at PM  No Yes Sig: Take 0.5 Tabs by mouth daily for 30 days. atenolol (TENORMIN) 25 mg tablet 2018 at AM  Yes Yes Sig: Take 12.5 mg by mouth daily. cholecalciferol (VITAMIN D3) 1,000 unit tablet 2018  Yes Yes Sig: Take 1,000 Units by mouth daily. cyanocobalamin (VITAMIN B-12) 1,000 mcg/mL injection 2018  Yes Yes Si,000 mcg by IntraMUSCular route every month. docusate sodium (COLACE) 100 mg capsule   Yes No  
Sig: Take 100 mg by mouth daily as needed for Constipation. gabapentin (NEURONTIN) 300 mg capsule 2018 at AM  No Yes Sig: Take 2 Caps by mouth two (2) times a day for 30 days. levothyroxine (SYNTHROID) 150 mcg tablet 2018 at AM  Yes Yes Si mcg Daily (before breakfast). potassium chloride SR (KLOR-CON 10) 10 mEq tablet 2018 at AM  Yes Yes Sig: Take 20 mEq by mouth daily. raNITIdine (ZANTAC) 150 mg tablet 2018 at AM  Yes Yes Sig: Take 150 mg by mouth daily. Facility-Administered Medications: None Comments/Recommendations: Spoke with patient. Changes/pertinent information regarding PTA medication as follows: 
 
Changed: - Atenolol from 25 mg daily to 12.5 mg daily. Family reports cardiologist instructed patient to reduce dose due to low BP readings.   
 
-Ranitidine to AM from PM 
 
Added: 
 
-Laina Bone. Patient just started back on this yesterday after seeing PCP. She has not taken any today. Removed: 
 
-Magic Mouthwash Left docusate on list although patient has not been taking lately due to increased loose bowl movements. Adriana Fernandez, PharmD

## 2018-08-31 NOTE — PROGRESS NOTES
NUTRITION COMPLETE ASSESSMENT 
 
RECOMMENDATIONS:  
1. Continue current diet, Supplements (specify) 2. Encourage increased po intake meals/supplements; please document in flow sheets 3. Daily weights Interventions/Plan:  
Food/Nutrient Delivery:           RD to add supplements Assessment:  
Reason for Assessment:  
[x]BPA/MST Referral  
 
Diet: Cardiac Supplements: None Nutritionally Significant Medications: [x] Reviewed & Includes: Vit D3, Pepcid, Levothyroxine, [email or web address], Zofran prn Meal Intake: Patient Vitals for the past 100 hrs: 
 % Diet Eaten 08/31/18 1212 25 % Pre-Hospitalization: 
Usual Appetite: Poor Current Hospitalization:  
Fluid Restriction:  none Appetite: Poor PO Ability: Independent Average po intake:25-50% Average supplements intake:  n/a Subjective: 
Pt sleeping; spoke with sister Objective: 
Pt seen for MST. Pt admitted with hypotension, dehydration, pleural effusion, lactic acidosis, anemia. PMHx: high-grade non-Hodgkin's lymphoma, anemia, GERD, hypertension, left pleural effusion, pulmonary hypertension, hypothyroidism, ascites, chronic pain, neuropathic facial pain, jaw pain, anxiety, pneumonia, postherpetic neuralgia, herpes zoster, headaches, peptic ulcer disease, heart murmur, asthma, prior tobacco abuse. Pt completed chemo June 2018 and has received 6 radiation tx. Reviewed chart, spoke with sister (pt sleeping); sister indicated pt not eating much PTA and since admission- states taste changes, nausea (receiving Zofran). Noted ~10 lb wt loss over past year but wt likely lower; pt with ascites- paracentesis today. Temporal/clavicular wasting. Pt meets criteria for severe malnutrition. Sister indicated pt has numbness on L side of face/mouth. Patient meets criteria for Severe Protein Calorie Malnutrition as evidenced by:  
ASPEN Malnutrition Criteria Acute Illness, Chronic Illness, or Social/Enviornmental: Chronic illness Energy Intake: Less than/equal to 50% est energy req for greater than/equal to 5 days Body Fat: Moderate Muscle Mass: Moderate ASPEN Malnutrition Score - Acute Illness: 25.   
 
Sister states pt dislikes Ensure; will try Boost, Magic cup. Boost TID, Magic cup BID provides 1080 kcal, 46g protein meeting 67% caloric, 57% protein needs if 100% consumed. Will follow po intake, supplement acceptance, wt status. Estimated Nutrition Needs:  
Kcals/day: 1622 Kcals/day (0711-0886 kcal/day (HB x 1.3-1.5)) Protein: 81 g (81-87g/day (1.2-1.3g/kg)) Fluid: 1700 ml (1mL/kcal) Based On: [place] Weight Used: Actual wt Pt expected to meet estimated nutrient needs:  []   Yes     []  No [x] Unable to predict at this time Nutrition Diagnosis:  
1. Inadequate protein-energy intake related to poor po intake, nausea, taste changes as evidenced by pt consuming 25% meals Goals: Pt will consume 50% meals, supplements over next 5-7 days Monitoring & Evaluation: - Total energy intake, Liquid meal replacement - Weight/weight change -   
 
Previous Nutrition Goals Met:   N/A Previous Recommendations:    N/A Education & Discharge Needs: 
 [x] None Identified 
 [] Identified and addressed  
 [] Participated in care plan, discharge planning, and/or interdisciplinary rounds Cultural, Moravian and ethnic food preferences identified: None Skin Integrity: [x]Intact  []Other Edema: [x]None []Other Last BM: 8/30/2018 Food Allergies: [x]None []Other Diet Restrictions: Cultural/Zoroastrianism Preference(s): None Anthropometrics:   
Weight Loss Metrics 8/30/2018 8/22/2018 8/14/2018 8/7/2018 8/2/2018 7/18/2018 7/6/2018 Today's Wt 148 lb 5.9 oz 144 lb 8 oz 153 lb 145 lb 145 lb 144 lb 145 lb BMI 27.14 kg/m2 26.43 kg/m2 27.98 kg/m2 26.52 kg/m2 26.52 kg/m2 26.34 kg/m2 26.52 kg/m2 Weight Source: Standing scale (comment) Height: 5' 2\" (157.5 cm), Body mass index is 27.14 kg/(m^2). ,   
 ,   
 
Labs:  Lab Results Component Value Date/Time Sodium 137 08/31/2018 05:05 AM  
 Potassium 3.6 08/31/2018 05:05 AM  
 Chloride 105 08/31/2018 05:05 AM  
 CO2 18 (L) 08/31/2018 05:05 AM  
 Glucose 81 08/31/2018 05:05 AM  
 BUN 18 08/31/2018 05:05 AM  
 Creatinine 0.88 08/31/2018 05:05 AM  
 Calcium 8.1 (L) 08/31/2018 05:05 AM  
 Magnesium 2.2 04/09/2018 01:54 PM  
 Phosphorus 3.6 03/25/2018 03:17 AM  
 Albumin 2.7 (L) 08/30/2018 05:30 PM  
 
No results found for: HBA1C, HGBE8, SDL5CMPU, [id number] [address]

## 2018-08-31 NOTE — PROGRESS NOTES
08/31/18 1927 Vitals Temp 97.5 °F (36.4 °C) Temp Source Oral  
Pulse (Heart Rate) (!) 114 Heart Rate Source Monitor Resp Rate 18  
O2 Sat (%) 94 % Level of Consciousness Alert /57 MAP (Calculated) 71 BP 1 Location Left arm BP 1 Method Automatic  
BP Patient Position At rest  
Cardiac Rhythm Sinus Tach MEWS Score 4 Box Number 905 Electrodes Replaced No  
Oxygen Therapy O2 Device Room air Patient is baseline sinus tach asymptomatic, will continue to monitor.

## 2018-08-31 NOTE — CONSULTS
CC NHL Dx  10/10 Transformation 3/18 with pleural effusion TREATMENT COURSE:  Orbit radiation  Chlorambucil x 1 cycle 4/16. Cycle 2 3/17. Cycle 3 4/17 Cycle 4 1/18. Attempted one dose of rituxan. Transformation to high grade lymphoma 3/2018, CHOP started 3/23/18 (patient refused Rituximab due to prior reaction) HISTORY OF PRESENT ILLNESS:  Ms. Aparna Russell is a 74 y/o female with stage 4 high grade lymphoma recurrent after recent CHOP chemo. Pt refused rituxan. Recent admission for pain exacerbation and ascites. Has been receiving palliative radiation outpatient. Previous hospitalization 8/14/18-8/17/18 due to uncontrolled pain and ascites. Since discharge she has been undergoing radiation, but notes little improvement in symptoms. Nausea and vomiting at home. Diarrhea with abdominal cramping. Eating very little. Family is doing calorie count at home and eating 300-500 calories daily. Did eat 800 calories one day, but then vomited most of food. Little appetite. Abdominal pressure due to massive ascites. No change in urination. Energy has been down significantly. Little OOB activity. No known fever, chills in the home. Denies SOB at rest, dizziness, chest pain. Burning pain of left side of face continues. Family at bedside. Past Medical History:  
Diagnosis Date  Anemia NEC  Arrhythmia   
 VSD; TRICUSPID REGURG  
 Cancer ([address] 2010  
 low grade NHL  GERD (gastroesophageal reflux disease)  Hypertension  NHL (non-Hodgkin's lymphoma) ([address] 2010  
 received chemo and radiation. not in remission.  Pleural effusion 02/21/2018 LEFT  Pulmonary hypertension ([address] 02/21/2018 PER CARDIOLOGY NOTES FROM PTS VISIT ON 2/21/18  Thyroid disease Past Surgical History:  
Procedure Laterality Date  HX CATARACT REMOVAL  6/2015 & 7/2015 MERRY  
 HX CHOLECYSTECTOMY  1982  HX DILATION AND CURETTAGE    
 HX HEENT  2005 RIGHT EAR   
 HX OTHER SURGICAL LEFT PAROTIDECTOMY  VA RPLCMT PROST AORTIC VALVE OPEN XCP HOMOGRF/STENT  1972 STENT PLACED TO STRAIGHTEN THE AORTA Social History Social History  Marital status:  Spouse name: N/A  
 Number of children: N/A  
 Years of education: N/A Social History Main Topics  Smoking status: Current Every Day Smoker Packs/day: 0.50 Years: 45.00 Types: Cigarettes  Smokeless tobacco: Former User Quit date: 12/1/2015  Alcohol use Yes Comment: Rare  Drug use: No  
 Sexual activity: No  
   Comment:  has one child Other Topics Concern  None Social History Narrative Family History Problem Relation Age of Onset  Heart Disease Mother  Kidney Disease Mother  Alcohol abuse Father  Liver Disease Father  Cancer Sister Breast  
 No Known Problems Brother  Cancer Sister Colon  No Known Problems Sister  No Known Problems Sister  No Known Problems Daughter  Anesth Problems Neg Hx Current Facility-Administered Medications Medication Dose Route Frequency Provider Last Rate Last Dose  heparin (porcine) injection 5,000 Units  5,000 Units SubCUTAneous Q8H Zoraida Ragsdale MD   Stopped at 08/31/18 0900  
 vancomycin (FIRVANQ) 50 mg/mL oral solution 125 mg  125 mg Oral Q6H Zoraida Ragsdale MD      
 0.9% sodium chloride infusion  125 mL/hr IntraVENous CONTINUOUS Jimbo Ruano  mL/hr at 08/31/18 1441 125 mL/hr at 08/31/18 1441  
 amitriptyline (ELAVIL) tablet 12.5 mg  12.5 mg Oral DAILY Jimbo Ruano MD   12.5 mg at 08/31/18 1288  cholecalciferol (VITAMIN D3) tablet 1,000 Units  1,000 Units Oral DAILY Jimbo Ruano MD   1,000 Units at 08/31/18 0376  
 gabapentin (NEURONTIN) capsule 600 mg  600 mg Oral BID Jimbo Ruano MD   600 mg at 08/31/18 5805  levothyroxine (SYNTHROID) tablet 150 mcg  150 mcg Oral ACB Jimbo Ruano MD   150 mcg at 08/31/18 4556  famotidine (PEPCID) tablet 20 mg  20 mg Oral DAILY Antonette Wadsworth MD   20 mg at 08/31/18 3347  sodium chloride (NS) flush 5-10 mL  5-10 mL IntraVENous Q8H Antonette Wadsworth MD   Stopped at 08/31/18 1400  
 sodium chloride (NS) flush 5-10 mL  5-10 mL IntraVENous PRN Antonette Wadsworth MD      
 ondansetron [facility] injection 4 mg  4 mg IntraVENous Q6H PRN Antonette Wadsworth MD   4 mg at 08/31/18 1205 Allergies Allergen Reactions  Rituxan [Rituximab] Hives Review of Systems A comprehensive review of systems was negative except for as noted above. Objective: 
Visit Vitals  /46 (BP 1 Location: Right arm, BP Patient Position: At rest)  Pulse (!) 107  Temp 98 °F (36.7 °C)  Resp 20  
 Ht 5' 2\" (1.575 m)  Wt 148 lb 5.9 oz (67.3 kg)  LMP  (LMP Unknown)  SpO2 95%  BMI 27.14 kg/m2 Physical Exam:  
General appearance - drowsy, arousable facial abnormality as below, no acute distress Mental status - drowsy, frail Face - Left side of face with decreased mobility, ptosis left eye, hypesthesia left side of face Eye - conj clear Neck - supple CV: regular with murmur Resp: clear to auscultation bilaterally, normal effort GI: distended, ascites, non tender, peritoneal catheter draining light yellow fluid Ext No lower extremity edema noted bilaterally Skin-Warm and dry. Intact, no rash. Neuro -  Awake, alert, oriented. Diagnostic Imaging  
reviewed 2/21/18 PET: 
IMPRESSION:  
1. Increasing right parietal soft tissue activity. 2. Progression in the bilateral areas of hypermetabolic pleural thickening. 3. New bilateral hypermetabolic axillary lymph nodes and subcarinal lymph node 
and progression of retroperitoneal and left pelvic lymphadenopathy. 4. Improvement in the left inguinal lymphadenopathy 5. Improvement in the right parieto-occipital scalp activity. 6. New hypermetabolic soft tissue abnormality left iliac fossa. 7. Stable left cervical hypermetabolic lymph nodes. 2/17/18 PET: 
MPRESSION:  
1. Findings suspicious for a uterine mass with a soft tissue mass in the left 
pelvis and extending into the retroperitoneum concerning for malignancy. Lymphoma is a differential consideration given the patient's history. The mass 
encases but does not narrow the aortic bifurcation and IVC. 2. Enlarged left inguinal lymph node is decreased in size since 8/30/2017. 
3. Increased large left pleural effusion with left lower lung atelectasis. 7/18/18 PET IMPRESSION: New focus of increased tracer activity right iliac wing concerning 
for osseous metastatic disease. New hypermetabolic soft tissue nodule adjacent 
to the right iliac crest. Increase in the size of the left iliac fossa mass. Minimal decrease in the size of the axillary lymph nodes with little change in 
the tracer activity. Left level 2 lymph nodes and pleural activity in the left 
hemithorax are stable as are the left external iliac and pelvic masses. Lab Results 
 
reviewed Lab Results Component Value Date/Time WBC 5.0 08/31/2018 05:05 AM  
 HGB 8.3 (L) 08/31/2018 05:05 AM  
 HCT 26.8 (L) 08/31/2018 05:05 AM  
 PLATELET 565 (L) 99/78/7664 05:05 AM  
 .5 (H) 08/31/2018 05:05 AM  
 
 
Lab Results Component Value Date/Time Sodium 137 08/31/2018 05:05 AM  
 Potassium 3.6 08/31/2018 05:05 AM  
 Chloride 105 08/31/2018 05:05 AM  
 CO2 18 (L) 08/31/2018 05:05 AM  
 Anion gap 14 08/31/2018 05:05 AM  
 Glucose 81 08/31/2018 05:05 AM  
 BUN 18 08/31/2018 05:05 AM  
 Creatinine 0.88 08/31/2018 05:05 AM  
 BUN/Creatinine ratio 20 08/31/2018 05:05 AM  
 GFR est AA >60 08/31/2018 05:05 AM  
 GFR est non-AA >60 08/31/2018 05:05 AM  
 Calcium 8.1 (L) 08/31/2018 05:05 AM  
 AST (SGOT) 75 (H) 08/30/2018 05:30 PM  
 Alk.  phosphatase 120 (H) 08/30/2018 05:30 PM  
 Protein, total 5.6 (L) 08/30/2018 05:30 PM  
 Albumin 2.7 (L) 08/30/2018 05:30 PM  
 Globulin 2.9 08/30/2018 05:30 PM  
 A-G Ratio 0.9 (L) 08/30/2018 05:30 PM  
 ALT (SGPT) 13 08/30/2018 05:30 PM  
 
 
 
CT Results (most recent): 
 
Results from Hospital Encounter encounter on 08/30/18 CT ABD PELV WO CONT Narrative EXAM:  CT ABD PELV WO CONT INDICATION: Abdominal distention, history of lymphoma COMPARISON: 8/4/2018 CONTRAST:  None. TECHNIQUE:  
Thin axial images were obtained through the abdomen and pelvis. Coronal and 
sagittal reconstructions were generated. Oral contrast was not administered. CT 
dose reduction was achieved through use of a standardized protocol tailored for 
this examination and automatic exposure control for dose modulation. The absence of intravenous contrast material reduces the sensitivity for 
evaluation of the solid parenchymal organs of the abdomen. FINDINGS:  
LUNG BASES: There is a small left pleural effusion with underlying atelectasis. INCIDENTALLY IMAGED HEART AND MEDIASTINUM: Unremarkable. LIVER: The hypodense lesions in the liver are stable compared to the prior 
contrast CT. GALLBLADDER: Surgically absent. SPLEEN: No mass. PANCREAS: No mass or ductal dilatation. ADRENALS: Unremarkable. KIDNEYS/URETERS: Bilateral renal cysts are stable. STOMACH: Unremarkable. SMALL BOWEL: No dilatation or wall thickening. COLON: No dilatation or wall thickening. PERITONEUM: Moderate free fluid is unchanged. Elemental thickening is stable. RETROPERITONEUM: The large fluid collection involving the left iliacus muscle is 
unchanged. Retroperitoneal lymphadenopathy is stable. REPRODUCTIVE ORGANS: Pelvic lymphadenopathy is unchanged. URINARY BLADDER: No mass or calculus. BONES: Degenerative changes are seen in the lumbar spine. ADDITIONAL COMMENTS: N/A Impression IMPRESSION: 
Stable compared to 8/14/2018. Hepatic lesions and omental disease is stable as 
are retroperitoneal and pelvic masses. Left iliacus fluid collection is unchanged. Assessment/Plan: 1. 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 Completed 6 cycles of CHOP (Cyclophosphamide 750mg/m2, Doxorubicin 50mg/m2, Vincristine (dose reduced cycle 5 by 25% for neuropathy to 1.05mg/m2) and post-treatment PET with a mixed response to treatment Struggled with grade 3 neuropathy CT on prior admission with progression. Ascites and pleural effusions noted. Rapid progression of disease post treatment. She has previously refused further chemotherapy. Currently on palliative radiation, but does not feel that it is helping her pain or symptoms. Palliative care following for aid in discussion of plan of care. Patient leaning towards hospice. This would be appropriate in this setting. DNR currently. 2. Facial numbness/pain. Unilateral on left side with extensive CN involvement. Brain MRI with enlargement of trigeminal nerve likely secondary to lymphoma. Has been receiving palliative radiation therapy. 3. Pain, severe. Secondary to #2 and nerve irritation. Palliative Medicine following. Discussed with Beba Jane NP. Radiation has done little to control pain. Patient would like to stop radiation therapy at this time. Agree. 4. Ascites, malignant. Due to lymphoma. S/p temporary catheter placement today with 3L removed. Strongly recommend Doy Purl catheter even if she proceeds with hospice. 5. Renal insufficiency. Stable. Encouraged hydration in the home. 6. Anemia. Secondary to chemotherapy and chronic disease. hgb 8.3 today. Transfuse if <7.0.  
 
7. Peripheral Neuropathy. 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 Dose reduced by 25% with cycle 5 with no improvement. Held cycle 6. Stable and no improvement today. Will continue to follow while admitted. Call if questions over weekend Pt seen today in conjunction with [facility] Talked with pt and family at bedside today Noman Jay, DO

## 2018-08-31 NOTE — TELEPHONE ENCOUNTER
CONSULT:    Patient:   Sunita Espinoza    :  [birth year]    Referring Physician:  Dr. Quqiue Soriano    Reason for Consult:  High grade lymphoma    Room:  [phone]

## 2018-08-31 NOTE — PROGRESS NOTES
Bedside shift change report given to Kaitlin Sifuentes (oncoming nurse) by Michael Dougherty RN (offgoing nurse). Report included the following information SBAR, Kardex, Intake/Output, MAR, Accordion and Cardiac Rhythm Sinus tach.

## 2018-08-31 NOTE — PROGRESS NOTES
Hospitalist Progress Note Triny Andrea MD 
Office: [phone] Date of Service:  2018 NAME:  Rashmi Collins :  1950 MRN:  [id number] Admission Summary: A 44-year-old white female with past medical history of high-grade non-Hodgkin's lymphoma, anemia, GERD, hypertension, left pleural effusion, pulmonary hypertension, hypothyroidism, ascites, chronic pain, neuropathic facial pain, jaw pain, anxiety, pneumonia, postherpetic neuralgia, herpes zoster, headaches, peptic ulcer disease, heart murmur, asthma, prior tobacco abuse, presented to the emergency department from home with chief complaint of nausea, vomiting, abdominal pain. The patient provides some history. She is accompanied by her sister and brother and provide limited history. The remainder of history was obtained from review of ED and medical reports. Per the collective reports, patient has been having ongoing nausea, vomiting intermittently since March. She notably was followed by an oncologist with a history of high-grade non-Hodgkin's lymphoma, has been on CHOP therapy. The patient notes the symptoms worsened today, not relieved with Zofran. She has decreased appetite and felt like she was dehydrated over the past 3 days. Patient reported recently being started on radiation for lymphoma with last treatment today. According to reports, she completed chemotherapy in 2018. She complains of having abdominal pain which is dull aching with abdominal distention which remains constant without specific alleviating factors, exacerbated with touch and palpation. There are no reports of syncope, loss of consciousness, new onset headache, neck pain, visual disturbances, chest pain, palpitations, melena, dysuria, hematuria, calf pain, swelling, edema, fever, chills or rash.   She complains of generalized back pain, which she notes in association with known history of lymphoma. She has been followed by Palliative Care in the past with noted pain symptoms. She was last hospitalized in [facility] on 08/14/2018 to 08/17/2018 with noted symptomatic ascites requiring paracentesis on 08/15/2018 with placement of a peritoneal drainage catheter. She also had bilateral pleural effusions with history of left pleural effusion, thoracentesis in the past, nausea, which was thought to be due to ascites, mass involving the left trigeminal nerve was associated with severe facial pain for which she has been on gabapentin and amitriptyline.   
  
 
Interval history / Subjective:  
  Pt. Still c/o nausea, watery stools this AM, appears and feels weak. Assessment & Plan:  
 
 # c.diff positive: started oral vanc, monitor for diarrhea and improvement. # Dehydration sec to decreased po intake, Diarrhea: IVF. # high grade lymphoma: onc and radiation onc consulted. Pt now getting radiations # Ascities,  previous  ascitic fluid cytology with high grade follicular lymphoma, Will do US guided paracentesis. # Lactic acidosis on admission resolved # Hypotension on admission BP improved continue IVF watch for 3rd spacing ,resp status. If BP drops will try albumin first. 
 
# Hypoalbuminemia # Hypothyroidism: Levothyroxine. DVT PPX : SQ Heparin Code: DNR Care Plan discussed with: Patient/Family Disposition: TBD Hospital Problems  Date Reviewed: 8/30/2018 Codes Class Noted POA * (Principal)Dehydration ICD-10-CM: E86.0 ICD-9-CM: 276.51  8/30/2018 Unknown Lactic acidosis ICD-10-CM: E87.2 ICD-9-CM: 276.2  8/30/2018 Unknown Anemia ICD-10-CM: D64.9 ICD-9-CM: 285.9  8/30/2018 Unknown Hypotension ICD-10-CM: I95.9 ICD-9-CM: 458.9  8/30/2018 Unknown Pleural effusion, left ICD-10-CM: J90 ICD-9-CM: 511.9  2/26/2018 Unknown Review of Systems: A comprehensive review of systems was negative except for that written in the HPI. Vital Signs:  
 Last 24hrs VS reviewed since prior progress note. Most recent are: 
Visit Vitals  /46 (BP 1 Location: Right arm, BP Patient Position: At rest)  Pulse (!) 107  Temp 98 °F (36.7 °C)  Resp 20  
 Ht 5' 2\" (1.575 m)  Wt 67.3 kg (148 lb 5.9 oz)  SpO2 95%  BMI 27.14 kg/m2 Intake/Output Summary (Last 24 hours) at 08/31/18 1510 Last data filed at 08/31/18 1212 Gross per 24 hour Intake          1967.91 ml Output             2775 ml Net          -807.09 ml Physical Examination:  
 
 
     
Constitutional:  No acute distress, cooperative, pleasant   
ENT:  Oral mucous moist, oropharynx benign. Neck supple, Resp:  CTA bilaterally. Decreased breath sounds CV:  Regular rhythm, tachy, no murmurs, gallops, rubs GI:  Soft, distended, tender. normoactive bowel sounds Musculoskeletal:  No edema, warm, 2+ pulses throughout Neurologic:  Moves all extremities. AAOx3, CN II-XII reviewed Data Review:  
 Review and/or order of clinical lab test 
Review and/or order of tests in the radiology section of CPT Review and/or order of tests in the medicine section of CPT Labs:  
 
Recent Labs 08/31/18 
 0505  08/30/18 
 1730 WBC  5.0  6.2 HGB  8.3*  9.3* HCT  26.8*  30.4* PLT  105*  126* Recent Labs 08/31/18 
 0505  08/30/18 
 1730 NA  137  135* K  3.6  3.6 CL  105  102 CO2  18*  22 BUN  18  23* CREA  0.88  1.18* GLU  81  94 CA  8.1*  8.4* Recent Labs 08/30/18 
 1730 SGOT  75* ALT  13  
AP  120* TBILI  0.2 TP  5.6* ALB  2.7*  
GLOB  2.9 No results for input(s): INR, PTP, APTT in the last 72 hours. No lab exists for component: INREXT No results for input(s): FE, TIBC, PSAT, FERR in the last 72 hours. Lab Results Component Value Date/Time  Folate 15.0 05/04/2018 10:04 AM  
  
 No results for input(s): PH, PCO2, PO2 in the last 72 hours. No results for input(s): CPK, CKNDX, TROIQ in the last 72 hours. No lab exists for component: CPKMB Lab Results Component Value Date/Time Cholesterol, total 114 08/31/2018 05:05 AM  
 HDL Cholesterol 34 08/31/2018 05:05 AM  
 LDL, calculated 46.6 08/31/2018 05:05 AM  
 Triglyceride 167 (H) 08/31/2018 05:05 AM  
 CHOL/HDL Ratio 3.4 08/31/2018 05:05 AM  
 
No results found for: Jnjavier [place] Lab Results Component Value Date/Time Color DARK YELLOW 08/30/2018 05:42 PM  
 Appearance CLOUDY (A) 08/30/2018 05:42 PM  
 Specific gravity 1.025 08/30/2018 05:42 PM  
 Specific gravity 1.025 02/16/2018 11:22 PM  
 pH (UA) 5.5 08/30/2018 05:42 PM  
 Protein 30 (A) 08/30/2018 05:42 PM  
 Glucose NEGATIVE  08/30/2018 05:42 PM  
 Ketone TRACE (A) 08/30/2018 05:42 PM  
 Bilirubin NEGATIVE  02/16/2018 11:22 PM  
 Urobilinogen 0.2 08/30/2018 05:42 PM  
 Nitrites NEGATIVE  08/30/2018 05:42 PM  
 Leukocyte Esterase NEGATIVE  08/30/2018 05:42 PM  
 Epithelial cells MODERATE (A) 08/30/2018 05:42 PM  
 Bacteria NEGATIVE  08/30/2018 05:42 PM  
 WBC 0-4 08/30/2018 05:42 PM  
 RBC 0-5 08/30/2018 05:42 PM  
 
 
 
Medications Reviewed:  
 
Current Facility-Administered Medications Medication Dose Route Frequency  heparin (porcine) injection 5,000 Units  5,000 Units SubCUTAneous Q8H  
 vancomycin (FIRVANQ) 50 mg/mL oral solution 125 mg  125 mg Oral Q6H  
 0.9% sodium chloride infusion  125 mL/hr IntraVENous CONTINUOUS  
 amitriptyline (ELAVIL) tablet 12.5 mg  12.5 mg Oral DAILY  cholecalciferol (VITAMIN D3) tablet 1,000 Units  1,000 Units Oral DAILY  gabapentin (NEURONTIN) capsule 600 mg  600 mg Oral BID  levothyroxine (SYNTHROID) tablet 150 mcg  150 mcg Oral ACB  famotidine (PEPCID) tablet 20 mg  20 mg Oral DAILY  sodium chloride (NS) flush 5-10 mL  5-10 mL IntraVENous Q8H  
 sodium chloride (NS) flush 5-10 mL  5-10 mL IntraVENous PRN  
  ondansetron (ZOFRAN) injection 4 mg  4 mg IntraVENous Q6H PRN  
 
______________________________________________________________________ EXPECTED LENGTH OF STAY: 2d 14h ACTUAL LENGTH OF STAY:          1 Olu Collier MD

## 2018-08-31 NOTE — PROGRESS NOTES
Reason for Admission:  Dehydration, High-grade non-Hodgkin's lymphoma, status post chemo and on radiation therapy, s/p paracentesis on 8/31.18 
            
RRAT Score:    22 Resources/supports as identified by patient/family:  Patient is , has a daughter , a brother and sister who are very involved in her care, Dr Reece Mac, Oncologist and PCP are also following her Top Challenges facing patient (as identified by patient/family and CM): Side effects of chemo and radiation, having to travel I hour to and from [facility] 5 x week for radiation treatments in [facility] and repeated paracentesis for recurrent ascites. Finances/Medication cost?  Patient has Medicare, sister Lisa Mcguire did not voice any financial stressors but stated patient's daily trips from [place], [place] to [facility] were                 hard on the patient and family. Transportation? Family will assist 
           
Support system or lack thereof? See above Living arrangements? She lives with her  in their private residence. Self-care/ADL's/Cognition? Patient needs assistance with ADL's and IADL's. Her family takes turns to help. She does not use an assistive an device but needs one  
assist with ambulation. Current Advanced Directive/Advance Care Plan:  Sister Lisa Mcguire said patient has the ACP forms but has not completed them. CM also noted that Palliative Care will address same. Plan for utilizing home health:  TBD Likelihood of readmission: High due to her current medical condition and past medical history of high-grade non-Hodgkin's lymphoma, anemia, GERD, hypertension, left pleural effusion, pulmonary hypertension, hypothyroidism, ascites, chronic pain, neuropathic facial pain, jaw pain, anxiety, pneumonia, postherpetic neuralgia, herpes zoster, headaches, peptic ulcer disease, heart murmur, asthma, prior tobacco abuse, 
              
Transition of Care Plan:    CM met with patient and her sister Julisa Mckeon to discuss discharge planning. Patient was fast asleep from nausea injection she received, sister Julisa Mckeon said patient's daughter is [facility] and that she lived in the same area. Patient uses the [address] in [facility] for her prescriptions. Disposition plan will be determined when patient is medically stable. CM will continue to follow for transition of care. Tru Sow MSA, RN, CRM. Care Management Interventions PCP Verified by CM: Yes 
Palliative Care Criteria Met (RRAT>21 & CHF Dx)?: Yes 
Palliative Consult Recommended?: Yes Mode of Transport at Discharge: Other (see comment) (Private car) Transition of Care Consult (CM Consult): Discharge Planning MyChart Signup: No 
Discharge Durable Medical Equipment: No 
Health Maintenance Reviewed: Yes Physical Therapy Consult: No 
Occupational Therapy Consult: No 
Speech Therapy Consult: No 
Current Support Network: Lives with Spouse Confirm Follow Up Transport: Family Plan discussed with Pt/Family/Caregiver: Yes Discharge Location Discharge Placement: Home with family assistance

## 2018-08-31 NOTE — ROUTINE PROCESS
TRANSFER - OUT REPORT: 
 
Verbal report given to Jami(name) on Sunday Friend  being transferred to [facility](unit) for routine progression of care Report consisted of patients Situation, Background, Assessment and  
Recommendations(SBAR). Information from the following report(s) SBAR was reviewed with the receiving nurse. Lines:  
Venous Access Device portacath 08/15/18 Upper chest (subclavicular area, right (Active) Central Line Being Utilized Yes 8/31/2018  4:56 AM  
Site Assessment Clean, dry, & intact 8/31/2018  4:56 AM  
Date of Last Dressing Change 08/30/18 8/31/2018  4:56 AM  
Dressing Status Clean, dry, & intact 8/31/2018  4:56 AM  
Dressing Type Transparent 8/31/2018  4:56 AM  
Action Taken Open ports on tubing capped 8/31/2018  4:56 AM  
Access Needle Size (Site #1) 20 G 8/30/2018  5:55 PM  
Access Needle Length (Medial Site) 1 inch 8/30/2018  5:55 PM  
Positive Blood Return (Medial Site) Yes 8/31/2018  4:56 AM  
Action Taken (Medial Site) Blood drawn 8/31/2018  4:56 AM  
  
 
Opportunity for questions and clarification was provided. Patient transported with: 
 [employer]

## 2018-08-31 NOTE — PROGRESS NOTES
Spiritual Care Assessment/Progress Note [address] 
 
 
NAME: Tito Ley      MRN: [id number] AGE: 75 y.o. SEX: female Restorationism Affiliation: Jian Flores Language: [place] 8/31/2018     Total Time (in minutes): 10 Spiritual Assessment begun in [facility] 4 SURG/BARIATRICS through conversation with: 
  
    []Patient        [x] Family    [] Friend(s) Reason for Consult: Palliative Care, Initial/Spiritual Assessment Spiritual beliefs: (Please include comment if needed) [x] Identifies with a sarah tradition:     
   [] Supported by a saarh community:        
   [] Claims no spiritual orientation:       
   [] Seeking spiritual identity:            
   [] Adheres to an individual form of spirituality:       
   [] Not able to assess:                   
 
    
Identified resources for coping:  
   [x] Prayer                           
   [] Music                  [] Guided Imagery [x] Family/friends                 [] Pet visits [x] Devotional reading                         [] Unknown 
   [] Other:                                          
 
 
Interventions offered during this visit: (See comments for more details) Family/Friend(s): Affirmation of emotions/emotional suffering, Affirmation of sarah, Coping skills reviewed/reinforced, Catharsis/review of pertinent events in supportive environment, Initial Assessment, Prayer (assurance of), Normalization of emotional/spiritual concerns, Restorationism beliefs/image of God discussed Plan of Care: 
 
 [] Support spiritual and/or cultural needs  
 [] Support AMD and/or advance care planning process    
 [] Support grieving process 
 [] Coordinate Rites and/or Rituals  
 [] Coordination with community clergy [] No spiritual needs identified at this time 
 [] Detailed Plan of Care below (See Comments)  [] Make referral to Music Therapy 
[] Make referral to Pet Therapy    
[] Make referral to Addiction services [] Make referral to [facility] 
[] Make referral to Spiritual Care Partner 
[] No future visits requested       
[x] Follow up visits as needed Comments: Initial visit with patient on PSBU due to new palliative care consult. Patient was unable to engage in visit at this time but met with patient's sister who was present at the bedside. Lead sister in processing of emotions as she shared that the patient has been in and out of the hospital multiple times recently. Spent time with sister discussing her appreciation for prayer and the power she believes it holds. Sister also shared the struggle she has with waiting for God's answers and how daily scripture reading and the experiences with her sister have helped her to develop more patience and appreciation for what she currently has. Acknowledged the difficulties with awaiting God's answer and spent time talking about the way prayers are answered. Advised sister of  availability and assured of ongoing support as needed and desired. Chaplain Cecilia Goodrich M.Div.  Paging Service [phone])

## 2018-08-31 NOTE — PROGRESS NOTES
Problem: Falls - Risk of 
Goal: *Absence of Falls Document Daly Kiran Fall Risk and appropriate interventions in the flowsheet. Outcome: Progressing Towards Goal 
Fall Risk Interventions: 
  
 
  
 
Medication Interventions: Patient to call before getting OOB, Teach patient to arise slowly Elimination Interventions: Patient to call for help with toileting needs, Call light in reach, Toileting schedule/hourly rounds

## 2018-08-31 NOTE — PROCEDURES
PROCEDURE:Paracentesis with catheter. INDICATION:refractory ascites. ANESTHESIA:local. 
COMPLICATION:NONE. SPECIMENS REMOVED:samples to lab;ongoing drainage. BLOOD LOSS:NONE. /ASSISTANT:CHASITY Vogt RECOMMENDATIONS:none. CONSENT OBTAINED:YES. 
NOTES:none.

## 2018-09-01 NOTE — CONSULTS
CC NHL Dx  10/10 Transformation 3/18 with pleural effusion TREATMENT COURSE:  Orbit radiation  Chlorambucil x 1 cycle 4/16. Cycle 2 3/17. Cycle 3 4/17 Cycle 4 1/18. Attempted one dose of rituxan. Transformation to high grade lymphoma 3/2018, CHOP started 3/23/18 (patient refused Rituximab due to prior reaction) HISTORY OF PRESENT ILLNESS:  Ms. Daija Ennis is a 74 y/o female with stage 4 high grade lymphoma recurrent after recent CHOP chemo. Pt refused rituxan. Recent admission for pain exacerbation and ascites. Has been receiving palliative radiation outpatient. Previous hospitalization 8/14/18-8/17/18 due to uncontrolled pain and ascites. Since discharge she has been undergoing radiation, but notes little improvement in symptoms. Nausea and vomiting at home. Diarrhea with abdominal cramping. Eating very little. Family is doing calorie count at home and eating 300-500 calories daily. Did eat 800 calories one day, but then vomited most of food. Little appetite. Abdominal pressure due to massive ascites. No change in urination. Energy has been down significantly. Little OOB activity. No known fever, chills in the home. Denies SOB at rest, dizziness, chest pain. Burning pain of left side of face continues. Family at bedside. Interim Hx:  Pt seen today for f/u of high grade NHL recurrent/ progressive. Pt has been getting XRT. Met with radiation/ onc yesterday and apparently pt/ family want to continue with palliative XRT. Pt is in bed with eyes closed. Family at bedside. Pt apparently met with palliative care yesterday and DNR was ordered. Pt not ready for hospice yet. Daughter has paperwork from home to bring in. C/o weakness/ nausea/ fatigue. Can barely get OOB. Not eating. Past Medical History:  
Diagnosis Date  Anemia NEC  Arrhythmia   
 VSD; TRICUSPID REGURG  
 Cancer ([facility] [address] 2010  
 low grade NHL  GERD (gastroesophageal reflux disease)  Hypertension  NHL (non-Hodgkin's lymphoma) ([facility] [address] 2010  
 received chemo and radiation. not in remission.  Pleural effusion 02/21/2018 LEFT  Pulmonary hypertension ([facility] [address] 02/21/2018 PER CARDIOLOGY NOTES FROM PTS VISIT ON 2/21/18  Thyroid disease Past Surgical History:  
Procedure Laterality Date  HX CATARACT REMOVAL  6/2015 & 7/2015 MERRY  
 HX CHOLECYSTECTOMY  1982  HX DILATION AND CURETTAGE    
 HX HEENT  2005 RIGHT EAR   
 HX OTHER SURGICAL    
 LEFT PAROTIDECTOMY  RI RPLCMT PROST AORTIC VALVE OPEN XCP HOMOGRF/STENT  1972 STENT PLACED TO STRAIGHTEN THE AORTA Social History Social History  Marital status:  Spouse name: N/A  
 Number of children: N/A  
 Years of education: N/A Social History Main Topics  Smoking status: Current Every Day Smoker Packs/day: 0.50 Years: 45.00 Types: Cigarettes  Smokeless tobacco: Former User Quit date: 12/1/2015  Alcohol use Yes Comment: Rare  Drug use: No  
 Sexual activity: No  
   Comment:  has one child Other Topics Concern  None Social History Narrative Family History Problem Relation Age of Onset  Heart Disease Mother  Kidney Disease Mother  Alcohol abuse Father  Liver Disease Father  Cancer Sister Breast  
 No Known Problems Brother  Cancer Sister Colon  No Known Problems Sister  No Known Problems Sister  No Known Problems Daughter  Anesth Problems Neg Hx Current Facility-Administered Medications Medication Dose Route Frequency Provider Last Rate Last Dose  magnesium oxide (MAG-OX) tablet 400 mg  400 mg Oral BID Rachelle Gonzalez MD   400 mg at 09/01/18 9279  heparin (porcine) injection 5,000 Units  5,000 Units SubCUTAneous Q8H Aurelio Barrera MD   5,000 Units at 09/01/18 3553  vancomycin (FIRVANQ) 50 mg/mL oral solution 125 mg  125 mg Oral Q6H Zoraida Ragsdale MD   125 mg at 09/01/18 1421  0.9% sodium chloride infusion  125 mL/hr IntraVENous CONTINUOUS Estephania Marie  mL/hr at 09/01/18 0642 125 mL/hr at 09/01/18 0642  
 amitriptyline (ELAVIL) tablet 12.5 mg  12.5 mg Oral DAILY Luis Manuel Braun MD   12.5 mg at 08/31/18 2804  cholecalciferol (VITAMIN D3) tablet 1,000 Units  1,000 Units Oral DAILY Luis Manuel Braun MD   1,000 Units at 09/01/18 0824  
 gabapentin (NEURONTIN) capsule 600 mg  600 mg Oral BID Luis Manuel Braun MD   600 mg at 09/01/18 9514  levothyroxine (SYNTHROID) tablet 150 mcg  150 mcg Oral ACB Luis Manuel Braun MD   150 mcg at 09/01/18 4491  famotidine (PEPCID) tablet 20 mg  20 mg Oral DAILY Luis Manuel Braun MD   20 mg at 09/01/18 0824  
 sodium chloride (NS) flush 5-10 mL  5-10 mL IntraVENous Amandeep Huerta MD   10 mL at 09/01/18 8311  sodium chloride (NS) flush 5-10 mL  5-10 mL IntraVENous PRN Luis Manuel Braun MD      
 ondansetron [facility]) injection 4 mg  4 mg IntraVENous Q6H PRN Luis Manuel Braun MD   4 mg at 09/01/18 1141 Allergies Allergen Reactions  Rituxan [Rituximab] Hives Review of Systems A comprehensive review of systems was negative except for as noted above. Objective: 
Visit Vitals  /58 (BP 1 Location: Left arm, BP Patient Position: At rest;Supine)  Pulse (!) 118  Temp 97.8 °F (36.6 °C)  Resp 18  Ht 5' 2\" (1.575 m)  Wt 148 lb 2.4 oz (67.2 kg)  LMP  (LMP Unknown)  SpO2 95%  BMI 27.1 kg/m2 Physical Exam:  
General appearance - awake but not opening eyes Mental status - drowsy, frail Face - Left side of face with decreased mobility, ptosis left eye, hypesthesia left side of face Eye - conj clear Neck - supple Ext No lower extremity edema noted bilaterally Skin-Warm and dry. Intact, no rash. Neuro -  General weakness, converses some, eyes closed Diagnostic Imaging  
reviewed 2/21/18 PET: 
IMPRESSION:  
1. Increasing right parietal soft tissue activity. 2. Progression in the bilateral areas of hypermetabolic pleural thickening. 3. New bilateral hypermetabolic axillary lymph nodes and subcarinal lymph node 
and progression of retroperitoneal and left pelvic lymphadenopathy. 4. Improvement in the left inguinal lymphadenopathy 5. Improvement in the right parieto-occipital scalp activity. 6. New hypermetabolic soft tissue abnormality left iliac fossa. 7. Stable left cervical hypermetabolic lymph nodes. 2/17/18 PET: 
MPRESSION:  
1. Findings suspicious for a uterine mass with a soft tissue mass in the left 
pelvis and extending into the retroperitoneum concerning for malignancy. Lymphoma is a differential consideration given the patient's history. The mass 
encases but does not narrow the aortic bifurcation and IVC. 2. Enlarged left inguinal lymph node is decreased in size since 8/30/2017. 
3. Increased large left pleural effusion with left lower lung atelectasis. 7/18/18 PET IMPRESSION: New focus of increased tracer activity right iliac wing concerning 
for osseous metastatic disease. New hypermetabolic soft tissue nodule adjacent 
to the right iliac crest. Increase in the size of the left iliac fossa mass. Minimal decrease in the size of the axillary lymph nodes with little change in 
the tracer activity. Left level 2 lymph nodes and pleural activity in the left 
hemithorax are stable as are the left external iliac and pelvic masses. Lab Results 
 
reviewed Lab Results Component Value Date/Time WBC 5.8 09/01/2018 02:56 AM  
 HGB 9.3 (L) 09/01/2018 02:56 AM  
 HCT 30.3 (L) 09/01/2018 02:56 AM  
 PLATELET 227 (L) 03/18/2614 02:56 AM  
 .4 (H) 09/01/2018 02:56 AM  
 
 
Lab Results Component Value Date/Time  Sodium 138 09/01/2018 02:56 AM  
 Potassium 3.5 09/01/2018 02:56 AM  
 Chloride 108 09/01/2018 02:56 AM  
 CO2 21 09/01/2018 02:56 AM  
 Anion gap 9 09/01/2018 02:56 AM  
 Glucose 83 09/01/2018 02:56 AM  
 BUN 18 09/01/2018 02:56 AM  
 Creatinine 0.84 09/01/2018 02:56 AM  
 BUN/Creatinine ratio 21 (H) 09/01/2018 02:56 AM  
 GFR est AA >60 09/01/2018 02:56 AM  
 GFR est non-AA >60 09/01/2018 02:56 AM  
 Calcium 7.5 (L) 09/01/2018 02:56 AM  
 AST (SGOT) 75 (H) 08/30/2018 05:30 PM  
 Alk. phosphatase 120 (H) 08/30/2018 05:30 PM  
 Protein, total 5.6 (L) 08/30/2018 05:30 PM  
 Albumin 2.7 (L) 08/30/2018 05:30 PM  
 Globulin 2.9 08/30/2018 05:30 PM  
 A-G Ratio 0.9 (L) 08/30/2018 05:30 PM  
 ALT (SGPT) 13 08/30/2018 05:30 PM  
 
 
 
CT Results (most recent): 
 
Results from Hospital Encounter encounter on 08/30/18 CT ABD PELV WO CONT Narrative EXAM:  CT ABD PELV WO CONT INDICATION: Abdominal distention, history of lymphoma COMPARISON: 8/4/2018 CONTRAST:  None. TECHNIQUE:  
Thin axial images were obtained through the abdomen and pelvis. Coronal and 
sagittal reconstructions were generated. Oral contrast was not administered. CT 
dose reduction was achieved through use of a standardized protocol tailored for 
this examination and automatic exposure control for dose modulation. The absence of intravenous contrast material reduces the sensitivity for 
evaluation of the solid parenchymal organs of the abdomen. FINDINGS:  
LUNG BASES: There is a small left pleural effusion with underlying atelectasis. INCIDENTALLY IMAGED HEART AND MEDIASTINUM: Unremarkable. LIVER: The hypodense lesions in the liver are stable compared to the prior 
contrast CT. GALLBLADDER: Surgically absent. SPLEEN: No mass. PANCREAS: No mass or ductal dilatation. ADRENALS: Unremarkable. KIDNEYS/URETERS: Bilateral renal cysts are stable. STOMACH: Unremarkable. SMALL BOWEL: No dilatation or wall thickening. COLON: No dilatation or wall thickening. PERITONEUM: Moderate free fluid is unchanged. Elemental thickening is stable. RETROPERITONEUM: The large fluid collection involving the left iliacus muscle is unchanged. Retroperitoneal lymphadenopathy is stable. REPRODUCTIVE ORGANS: Pelvic lymphadenopathy is unchanged. URINARY BLADDER: No mass or calculus. BONES: Degenerative changes are seen in the lumbar spine. ADDITIONAL COMMENTS: N/A Impression IMPRESSION: 
Stable compared to 8/14/2018. Hepatic lesions and omental disease is stable as 
are retroperitoneal and pelvic masses. Left iliacus fluid collection is 
unchanged. Assessment/Plan: 1. 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 Completed 6 cycles of CHOP (Cyclophosphamide 750mg/m2, Doxorubicin 50mg/m2, Vincristine (dose reduced cycle 5 by 25% for neuropathy to 1.05mg/m2) and post-treatment PET with a mixed response to treatment Struggled with grade 3 neuropathy CT on prior admission with progression. Ascites and pleural effusions noted. Rapid progression of disease post treatment. She has previously refused further chemotherapy. Currently on palliative radiation, but does not feel that it is helping her pain or symptoms. Pt clinically has continued to decline. Has limited life expectancy. Recommend palliative care/ hospice. Palliative care following for aid in discussion of plan of care. Pt made DNR by palliative care. Reviewed supportive care plan and code status today. Family has some confusion about this. Daughter has paperwork to bring in today. Agree with DNR and reviewed this with pt/ family today. Recommended hospice but pt/ family not ready to start hospice yet. Pt wants to continue with radiation perhaps on tues. 2. Facial numbness/pain. Unilateral on left side with extensive CN involvement. Brain MRI with enlargement of trigeminal nerve likely secondary to lymphoma. Has been receiving palliative radiation therapy. 3. Pain, severe. Secondary to #2 and nerve irritation. Palliative Medicine following. Radiation has done little to control pain. 4. Ascites, malignant. Due to lymphoma. S/p temporary catheter placement today with 3L removed. Strongly recommend Phoebe Harrow catheter even if she proceeds with hospice. 5. Renal insufficiency. Stable. .  
 
6. Anemia. Secondary to chemotherapy and chronic disease. hgb 8.3 today. Transfuse if <7.0.  
 
7.  Weakness/ debility due to cancer. Not likely to get better. Will continue to follow while admitted. Call if questions over weekend Noman Moose, DO

## 2018-09-01 NOTE — CONSULTS
Radiation Oncology Consult/ Follow up Patient currently receiving palliative radiation Diagnosis: C85.96 - Non-Hodgkin lymphoma, unspecified, intrapelvic lymph nodes, Diagnosed 2018 (Active) Stage JANEY, IV, A  
C85.91 - Non-Hodgkin lymphoma, unspecified, lymph nodes of head, face, and neck, Diagnosed 2018 (Active) Stage JANEY, IV, A  
C85.80 - Nodular lymphoma, unspecified site, extranodal and solid organ sites, Diagnosed 12/15/2014 (Active) ,  
C85.91 - Other malignant lymphomas of lymph nodes of head, face, and neck, Diagnosed 12/15/2014 (Active) , Treatment Summary: Course: C2 Treatment Site Ref. ID Energy Dose/Fx (cGy) #Fx Dose Correction (cGy) Total Dose (cGy) Start Date End Date Elapsed Days Pelvis/LT Psoas PELVIS 15X 200 9 / 18 0 1,800 8/16/2018 8/30/2018 14 LT Base of Skull trigeminal nerve partia Brain 6X 200 9 / 15 0 1,800 8/16/2018 8/30/2018 14 Chief Complaint: Left face pain and swelling stomach 8/30/18- Patient appears to be struggling more with her level of energy. Not eating at all or drinking much fluid. Feels nauseated with bloated stomach. Has continuation of diarrhea despite Imodium. Will send patient to ER for further evaluation and IV hydration. Sister and brother present and accompanied to the ER. 
  
8/31/18- patient seen briefly today. She was found to have C-diff in her stools and caused of diarrhea. Think it is a mixed picture( some due to her radiation.)  She is stable tonight but still not taking in a lot. Had ascites tapped. Discussed with family of current status. My take away is the patient is still interested in radiation and not quite ready for hospice/palliative care although entirely different. Her sister who is present expressed a desire to continue with treatment. She admits the pain in the face is less intense and not as biting as before.  The ascites is the tumor progressing but feel the she has not had time to demonstrate a good response. Would consider increasing the dose per day. Was hesitant to go fast due to the large field and her frail state at first. Will reeval come early next week, depending on how she is feeling. Currently, support her treatment per the hospitalist.   
 
History of Present Illness: 74 yo  female with a history of nodular NHL diagnosed in 2010 that transformed into a High grade lymphoma in 2018. She was found to have left orbital involvement in late 2014 , treated with radiation, receiving 36 Gy in 18 fxs. This was followed by chemotherapy, CHOP. She did not tolerate Rituxan well, only receiving one dose. Chemotherapy was resumed when her tumor resumed. Patient declined use of rituxan. She did well with her last chemotherapy given July 6, 2018. According to the patient and family About 2 weeks later, her left hip began to hurt again, worst on left lateral decubitus. She has had diarrhea with food \"running through me\". Has no Blood per rectum. Found to have significant ascites and had \"a lot\" tapped today. Most recently over the last 4 weeks she began to have numbness and excruciating pain on the left side of the face, describing it as constant numbness and fleeting sharp pain. When pain gets so bad, she develops nausea. Symptoms involves the gum on the left, making eating difficult. She has lost weight, unquantified. Pain is felt from vertex to angle of the mandible to submental region. She was found to have by MRI of the brain abnormal mass like enlargement and enhancement of the left trigeminal nerve as well as abnormal marrow signal of the adjacent left petrous apex/ left clivus. She was given steroids without much response. PET reveals extensive disease in multiple areas. She was recently admitted last night from the ER for management of her left pelvic pain.   
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 Has no vision lost from her previous radiation. MRI showed most likely post treatment changes. Of note the patient had a left parotid removed for noncancerous reason by Dr Pedro Santoyo at [facility] in 2003. She also had lost of hearing on the right around that time and had her \"mastoid bone \" removed , also by Dr Pedro Santoyo. Current Medications: see hospital chart Atenolol (25 mg) Tablet Oral daily Bumetanide (1 mg) Tablet Oral  
Levothroid (100 mcg) Tablet Oral daily Magnesium Oxide -Mg Supplement (250 mg) Tablet Oral daily Melatonin (3 mg) Tablet Oral at bedtime PRN Potassium Gluconate (80 mg) Tablet Oral daily Tenormin (25 mg) Tablet Oral daily Allergies: Rituxan Vitals: 
Performed on 8/31/2018 Height - 62 in - Temperature - 97.7 F -  
Pulse - 98 /min - Respiration - 18 /min - Systolic - 94 mm(hg) - Diastolic - 60 mm(hg) (LOW) -  
O2 Sat - 96 % - Fatigue - 10 - Smoking: non user Medication Reconciliation: Current Medications not documented Physical Exam:  
Constitutional No evidence of uncooperativeness and disorientation. Frail with decline in energy level. Responsive appropriately. Appears very fatique. Not complaining of pain as she had or is too tired to complain. Cami Lentz Neck No evidence of tender or enlarged lymph nodes, restricted range of motion and enlarged thyroid gland. Cardiovascular No evidence of abnormal heart rate, heart arrhythmia and abnormal heart sounds. Respiratory No evidence of abnormal breath sounds. Abdomen Presents Increased in ascites. Catheter is out. Extremities Presents with lower extremities abnormalities mild edema in lower leg. Cami Lentz Neurologic No evidence of impaired cranial nerve(s) and motor impairment Speech clear and coherent. In wheelchair; very weak to move around but moving all extremities. Very weak but responses appropriately to command. Psychiatric No evidence of lack of comprehension and disorientation. Current Status: Living with active cancer Time Spent: Total Visit time: 15 minutes. Greater than 50% of the total visit time was spent in counseling and coordination of care with the patient. 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 Has received CHOP with good response. Appears to be refractory With mix response currently. Currently receiving palliative radiation. Thus far, not a significant response. Is still too early to render an opinion of her response to radiation as she is not even half way through. She has developed diarrhea , most likely from radiation but now with findings of C diff. She also has recurrence of her ascites due to her significant disease in the abldomen and pelvis. She is dehydrated and is probably anemic due to large treated radiation field and underlying malignancy. She needs hydration, medical support and assessment of her ascites with possible paracentesis. Plan:Reeval  On Tuesday for continuation of radiation or not. Currently family is desiring radiation Hailey Morrison MD, [facility] Radiation Oncology Dept [facility]

## 2018-09-01 NOTE — PROGRESS NOTES
Problem: Management of Known or Suspected C. difficile Goal: Minimize complications of C.difficile infection and prevent spread of infection Outcome: Progressing Towards Goal 
Day two of oral vancomycin for c- diff management, stool x 2 today, patient still reports stomach tenderness and mild nausea, hyperactive bowel sounds in all quarters. . Patient placed on enteric precautions, gloves changed by nursing between procedures. Daily skin assessment - skin intact, protective lotion applied to buttocks and perineum. 2038-Bedside shift change report given to Vielka SHIN  (oncoming nurse) by Omar Rojas RN  (offgoing nurse). Report included the following information SBAR, Kardex, Procedure Summary, Intake/Output and MAR.

## 2018-09-01 NOTE — PROGRESS NOTES
Hospitalist Progress Note Reilly Jimenez MD 
Office: [phone] Date of Service:  2018 NAME:  Fam Milton :  1950 MRN:  [id number] Admission Summary: A 54-year-old white female with past medical history of high-grade non-Hodgkin's lymphoma, anemia, GERD, hypertension, left pleural effusion, pulmonary hypertension, hypothyroidism, ascites, chronic pain, neuropathic facial pain, jaw pain, anxiety, pneumonia, postherpetic neuralgia, herpes zoster, headaches, peptic ulcer disease, heart murmur, asthma, prior tobacco abuse, presented to the emergency department from home with chief complaint of nausea, vomiting, abdominal pain. The patient provides some history. She is accompanied by her sister and brother and provide limited history. The remainder of history was obtained from review of ED and medical reports. Per the collective reports, patient has been having ongoing nausea, vomiting intermittently since March. She notably was followed by an oncologist with a history of high-grade non-Hodgkin's lymphoma, has been on CHOP therapy. The patient notes the symptoms worsened today, not relieved with Zofran. She has decreased appetite and felt like she was dehydrated over the past 3 days. Patient reported recently being started on radiation for lymphoma with last treatment today. According to reports, she completed chemotherapy in 2018. She complains of having abdominal pain which is dull aching with abdominal distention which remains constant without specific alleviating factors, exacerbated with touch and palpation. There are no reports of syncope, loss of consciousness, new onset headache, neck pain, visual disturbances, chest pain, palpitations, melena, dysuria, hematuria, calf pain, swelling, edema, fever, chills or rash.   She complains of generalized back pain, which she notes in association with known history of lymphoma. She has been followed by Palliative Care in the past with noted pain symptoms. She was last hospitalized in [facility] on 08/14/2018 to 08/17/2018 with noted symptomatic ascites requiring paracentesis on 08/15/2018 with placement of a peritoneal drainage catheter. She also had bilateral pleural effusions with history of left pleural effusion, thoracentesis in the past, nausea, which was thought to be due to ascites, mass involving the left trigeminal nerve was associated with severe facial pain for which she has been on gabapentin and amitriptyline.   
  
 
Interval history / Subjective:  
  Patient is seen and examined at bedside. She does not feel better. Diarrhea improved. Feels week. No appetite. Assessment & Plan: C.diff colitis 
- continue oral vanc 
- improving Dehydration sec to decreased po intake, Diarrhea: IVF. High grade NHL:  
Appreciate onc and radiation onc input Pt was on palliative radiations which is hold for now. Reeval on Tues regarding radiation therapy Ascities Most likely due to lymphoma 
repeat US guided paracentesis done 8/31 and drain placed Lactic acidosis on admission resolved Hypotension on admission BP improved  
continue IVF watch for 3rd spacing ,resp status. If BP drops will try albumin first. 
 
Anemia - sec to chemotherapy and malignancy Transfuse Hb <7 Hypoalbuminemia - poor intake and malignancy Hypothyroidism: Levothyroxine. Chronic pain - cont gabapentin, amitriptyline GI ppx: pepcid DVT PPX : SQ Heparin Code: DNR Care Plan discussed with: Patient/Family Disposition: TBD Hospital Problems  Date Reviewed: 8/30/2018 Codes Class Noted POA Advanced care planning/counseling discussion ICD-10-CM: Z71.89 ICD-9-CM: V65.49  Unknown Unknown Facial neuropathy ICD-10-CM: G51.9 ICD-9-CM: 351.9  Unknown Unknown Neoplastic malignant related fatigue ICD-10-CM: R53.0 ICD-9-CM: 780.79  Unknown Unknown Debility ICD-10-CM: R53.81 ICD-9-CM: 799.3  Unknown Unknown Anorexia ICD-10-CM: R63.0 ICD-9-CM: 783.0  Unknown Unknown Nausea ICD-10-CM: R11.0 ICD-9-CM: 787.02  Unknown Unknown * (Principal)Dehydration ICD-10-CM: E86.0 ICD-9-CM: 276.51  8/30/2018 Unknown Lactic acidosis ICD-10-CM: E87.2 ICD-9-CM: 276.2  8/30/2018 Unknown Anemia ICD-10-CM: D64.9 ICD-9-CM: 285.9  8/30/2018 Unknown Hypotension ICD-10-CM: I95.9 ICD-9-CM: 458.9  8/30/2018 Unknown Pleural effusion, left ICD-10-CM: J90 ICD-9-CM: 511.9  2/26/2018 Unknown Review of Systems: A comprehensive review of systems was negative except for that written in the HPI. Vital Signs:  
 Last 24hrs VS reviewed since prior progress note. Most recent are: 
Visit Vitals  /62 (BP 1 Location: Left arm, BP Patient Position: Supine)  Pulse (!) 119  Temp 98.1 °F (36.7 °C)  Resp 20  
 Ht 5' 2\" (1.575 m)  Wt 67.2 kg (148 lb 2.4 oz)  SpO2 96%  BMI 27.1 kg/m2 Intake/Output Summary (Last 24 hours) at 09/01/18 1215 Last data filed at 09/01/18 1134 Gross per 24 hour Intake          3495.43 ml Output             2300 ml Net          1195.43 ml Physical Examination:  
 
 
     
Constitutional:  No acute distress, cooperative, pleasant , Frail ENT:  Oral mucous moist, oropharynx benign. Neck supple, ptosis left eye Resp:  CTA bilaterally. Decreased breath sounds CV:  Regular rhythm, tachy, no murmurs, gallops, rubs GI:  Soft, distended, tender. normoactive bowel sounds Musculoskeletal:  No edema, warm, 2+ pulses throughout Neurologic:  Moves all extremities. AAOx3, generalized weakness Data Review:  
 Review and/or order of clinical lab test 
Review and/or order of tests in the radiology section of CPT Review and/or order of tests in the medicine section of [facility] Labs:  
 
Recent Labs  
   09/01/18 
 0256  08/31/18 
 0505 WBC  5.8  5.0 HGB  9.3*  8.3* HCT  30.3*  26.8*  
PLT  112*  105* Recent Labs  
   09/01/18 
 0256  08/31/18 
 0505  08/30/18 
 1730 NA  138  137  135* K  3.5  3.6  3.6 CL  108  105  102 CO2  21  18*  22 BUN  18  18  23* CREA  0.84  0.88  1.18* GLU  83  81  94 CA  7.5*  8.1*  8.4* MG  1.6   --    --   
 
Recent Labs 08/30/18 
 1730 SGOT  75* ALT  13  
AP  120* TBILI  0.2 TP  5.6* ALB  2.7*  
GLOB  2.9 No results for input(s): INR, PTP, APTT in the last 72 hours. No lab exists for component: INREXT, INREXT No results for input(s): FE, TIBC, PSAT, FERR in the last 72 hours. Lab Results Component Value Date/Time Folate 15.0 05/04/2018 10:04 AM  
  
No results for input(s): PH, PCO2, PO2 in the last 72 hours. No results for input(s): CPK, CKNDX, TROIQ in the last 72 hours. No lab exists for component: CPKMB Lab Results Component Value Date/Time Cholesterol, total 114 08/31/2018 05:05 AM  
 HDL Cholesterol 34 08/31/2018 05:05 AM  
 LDL, calculated 46.6 08/31/2018 05:05 AM  
 Triglyceride 167 (H) 08/31/2018 05:05 AM  
 CHOL/HDL Ratio 3.4 08/31/2018 05:05 AM  
 
No results found for: Camilo Ochoa Lab Results Component Value Date/Time  Color DARK YELLOW 08/30/2018 05:42 PM  
 Appearance CLOUDY (A) 08/30/2018 05:42 PM  
 Specific gravity 1.025 08/30/2018 05:42 PM  
 Specific gravity 1.025 02/16/2018 11:22 PM  
 pH (UA) 5.5 08/30/2018 05:42 PM  
 Protein 30 (A) 08/30/2018 05:42 PM  
 Glucose NEGATIVE  08/30/2018 05:42 PM  
 Ketone TRACE (A) 08/30/2018 05:42 PM  
 Bilirubin NEGATIVE  02/16/2018 11:22 PM  
 Urobilinogen 0.2 08/30/2018 05:42 PM  
 Nitrites NEGATIVE  08/30/2018 05:42 PM  
 Leukocyte Esterase NEGATIVE  08/30/2018 05:42 PM  
 Epithelial cells MODERATE (A) 08/30/2018 05:42 PM  
 Bacteria NEGATIVE  08/30/2018 05:42 PM  
 WBC 0-4 08/30/2018 05:42 PM  
 RBC 0-5 08/30/2018 05:42 PM  
 
 
 
Medications Reviewed:  
 
Current Facility-Administered Medications Medication Dose Route Frequency  magnesium oxide (MAG-OX) tablet 400 mg  400 mg Oral BID  heparin (porcine) injection 5,000 Units  5,000 Units SubCUTAneous Q8H  
 vancomycin (FIRVANQ) 50 mg/mL oral solution 125 mg  125 mg Oral Q6H  
 0.9% sodium chloride infusion  125 mL/hr IntraVENous CONTINUOUS  
 amitriptyline (ELAVIL) tablet 12.5 mg  12.5 mg Oral DAILY  cholecalciferol (VITAMIN D3) tablet 1,000 Units  1,000 Units Oral DAILY  gabapentin (NEURONTIN) capsule 600 mg  600 mg Oral BID  levothyroxine (SYNTHROID) tablet 150 mcg  150 mcg Oral ACB  famotidine (PEPCID) tablet 20 mg  20 mg Oral DAILY  sodium chloride (NS) flush 5-10 mL  5-10 mL IntraVENous Q8H  
 sodium chloride (NS) flush 5-10 mL  5-10 mL IntraVENous PRN  
 ondansetron (ZOFRAN) injection 4 mg  4 mg IntraVENous Q6H PRN  
 
______________________________________________________________________ EXPECTED LENGTH OF STAY: 2d 14h ACTUAL LENGTH OF STAY:          2 Nicolette Colby MD

## 2018-09-02 NOTE — PROGRESS NOTES
CC NHL Dx  10/10 Transformation 3/18 with pleural effusion TREATMENT COURSE:  Orbit radiation  Chlorambucil x 1 cycle 4/16. Cycle 2 3/17. Cycle 3 4/17 Cycle 4 1/18. Attempted one dose of rituxan. Transformation to high grade lymphoma 3/2018, CHOP started 3/23/18 (patient refused Rituximab due to prior reaction) HISTORY OF PRESENT ILLNESS:  Ms. Jearldine Nissen is a 74 y/o female with stage 4 high grade lymphoma recurrent after recent CHOP chemo. Pt refused rituxan. Recent admission for pain exacerbation and ascites. Has been receiving palliative radiation outpatient. Previous hospitalization 8/14/18-8/17/18 due to uncontrolled pain and ascites. Since discharge she has been undergoing radiation, but notes little improvement in symptoms. Nausea and vomiting at home. Diarrhea with abdominal cramping. Eating very little. Family is doing calorie count at home and eating 300-500 calories daily. Did eat 800 calories one day, but then vomited most of food. Little appetite. Abdominal pressure due to massive ascites. No change in urination. Energy has been down significantly. Little OOB activity. No known fever, chills in the home. Denies SOB at rest, dizziness, chest pain. Burning pain of left side of face continues. Family at bedside. Interim Hx:  Pt seen today for f/u of high grade NHL recurrent/ progressive. In bed with family at bedside. Still c/o nausea. No new issues overnight. DNR. Past Medical History:  
Diagnosis Date  Anemia NEC  Arrhythmia   
 VSD; TRICUSPID REGURG  
 Cancer ([address] 2010  
 low grade NHL  GERD (gastroesophageal reflux disease)  Hypertension  NHL (non-Hodgkin's lymphoma) ([address] 2010  
 received chemo and radiation. not in remission.  Pleural effusion 02/21/2018 LEFT  Pulmonary hypertension ([address] 02/21/2018 PER CARDIOLOGY NOTES FROM PTS VISIT ON 2/21/18  Thyroid disease Past Surgical History:  
Procedure Laterality Date  HX CATARACT REMOVAL  6/2015 & 7/2015 MERRY  
 HX CHOLECYSTECTOMY  1982  HX DILATION AND CURETTAGE    
 HX HEENT  2005 RIGHT EAR   
 HX OTHER SURGICAL    
 LEFT PAROTIDECTOMY  HI RPLCMT PROST AORTIC VALVE OPEN XCP HOMOGRF/STENT  1972 STENT PLACED TO STRAIGHTEN THE AORTA Social History Social History  Marital status:  Spouse name: N/A  
 Number of children: N/A  
 Years of education: N/A Social History Main Topics  Smoking status: Current Every Day Smoker Packs/day: 0.50 Years: 45.00 Types: Cigarettes  Smokeless tobacco: Former User Quit date: 12/1/2015  Alcohol use Yes Comment: Rare  Drug use: No  
 Sexual activity: No  
   Comment:  has one child Other Topics Concern  None Social History Narrative Family History Problem Relation Age of Onset  Heart Disease Mother  Kidney Disease Mother  Alcohol abuse Father  Liver Disease Father  Cancer Sister Breast  
 No Known Problems Brother  Cancer Sister Colon  No Known Problems Sister  No Known Problems Sister  No Known Problems Daughter  Anesth Problems Neg Hx Current Facility-Administered Medications Medication Dose Route Frequency Provider Last Rate Last Dose  magnesium oxide (MAG-OX) tablet 400 mg  400 mg Oral BID Ruben Stroud MD   400 mg at 09/02/18 4377  heparin (porcine) injection 5,000 Units  5,000 Units SubCUTAneous Q8H Zoraida Ragsdale MD   5,000 Units at 09/02/18 0842  
 vancomycin (FIRVANQ) 50 mg/mL oral solution 125 mg  125 mg Oral Q6H Zoraida Ragsdale MD   125 mg at 09/02/18 0635  
 0.9% sodium chloride infusion  125 mL/hr IntraVENous CONTINUOUS Gertrude Chaudhry  mL/hr at 09/01/18 2335 125 mL/hr at 09/01/18 2335  
 amitriptyline (ELAVIL) tablet 12.5 mg  12.5 mg Oral DAILY Jocelyne Kelsey MD   12.5 mg at 08/31/18 0999  cholecalciferol (VITAMIN D3) tablet 1,000 Units  1,000 Units Oral DAILY Godwin Valencia MD   1,000 Units at 09/02/18 0841  
 gabapentin (NEURONTIN) capsule 600 mg  600 mg Oral BID Godwin Valencia MD   600 mg at 09/02/18 8439  levothyroxine (SYNTHROID) tablet 150 mcg  150 mcg Oral ACB Godwin Valencia MD   150 mcg at 09/02/18 2206  famotidine (PEPCID) tablet 20 mg  20 mg Oral DAILY Godwin Valencia MD   20 mg at 09/02/18 1377  sodium chloride (NS) flush 5-10 mL  5-10 mL IntraVENous Q8H Godwin Valencia MD   5 mL at 09/02/18 0600  
 sodium chloride (NS) flush 5-10 mL  5-10 mL IntraVENous PRN Godwin Valencia MD      
 ondansetron [facility]) injection 4 mg  4 mg IntraVENous Q6H PRN Godwin Valencia MD   4 mg at 09/02/18 8570 Allergies Allergen Reactions  Rituxan [Rituximab] Hives Review of Systems A comprehensive review of systems was negative except for as noted above. Objective: 
Visit Vitals  /42 (BP 1 Location: Right arm, BP Patient Position: At rest)  Pulse (!) 103  Temp 98.3 °F (36.8 °C)  Resp 17  Ht 5' 2\" (1.575 m)  Wt 157 lb 13.6 oz (71.6 kg)  LMP  (LMP Unknown)  SpO2 96%  BMI 28.87 kg/m2 Physical Exam:  
General appearance - awake but not opening eyes Mental status - drowsy, frail Face - Left side of face with decreased mobility, ptosis left eye, hypesthesia left side of face Eye - conj clear Neck - supple Ext No lower extremity edema noted bilaterally Skin-Warm and dry. Intact, no rash. Neuro -  General weakness, converses some, eyes closed Diagnostic Imaging  
reviewed 2/21/18 PET: 
IMPRESSION:  
1. Increasing right parietal soft tissue activity. 2. Progression in the bilateral areas of hypermetabolic pleural thickening. 3. New bilateral hypermetabolic axillary lymph nodes and subcarinal lymph node 
and progression of retroperitoneal and left pelvic lymphadenopathy. 4. Improvement in the left inguinal lymphadenopathy 5. Improvement in the right parieto-occipital scalp activity. 6. New hypermetabolic soft tissue abnormality left iliac fossa. 7. Stable left cervical hypermetabolic lymph nodes. 2/17/18 PET: 
MPRESSION:  
1. Findings suspicious for a uterine mass with a soft tissue mass in the left 
pelvis and extending into the retroperitoneum concerning for malignancy. Lymphoma is a differential consideration given the patient's history. The mass 
encases but does not narrow the aortic bifurcation and IVC. 2. Enlarged left inguinal lymph node is decreased in size since 8/30/2017. 
3. Increased large left pleural effusion with left lower lung atelectasis. 7/18/18 PET IMPRESSION: New focus of increased tracer activity right iliac wing concerning 
for osseous metastatic disease. New hypermetabolic soft tissue nodule adjacent 
to the right iliac crest. Increase in the size of the left iliac fossa mass. Minimal decrease in the size of the axillary lymph nodes with little change in 
the tracer activity. Left level 2 lymph nodes and pleural activity in the left 
hemithorax are stable as are the left external iliac and pelvic masses. Lab Results 
 
reviewed Lab Results Component Value Date/Time WBC 6.6 09/02/2018 03:46 AM  
 HGB 9.4 (L) 09/02/2018 03:46 AM  
 HCT 30.3 (L) 09/02/2018 03:46 AM  
 PLATELET 714 (L) 20/77/9782 03:46 AM  
 .1 (H) 09/02/2018 03:46 AM  
 
 
Lab Results Component Value Date/Time  Sodium 137 09/02/2018 03:46 AM  
 Potassium 3.6 09/02/2018 03:46 AM  
 Chloride 107 09/02/2018 03:46 AM  
 CO2 18 (L) 09/02/2018 03:46 AM  
 Anion gap 12 09/02/2018 03:46 AM  
 Glucose 100 09/02/2018 03:46 AM  
 BUN 17 09/02/2018 03:46 AM  
 Creatinine 0.84 09/02/2018 03:46 AM  
 BUN/Creatinine ratio 20 09/02/2018 03:46 AM  
 GFR est AA >60 09/02/2018 03:46 AM  
 GFR est non-AA >60 09/02/2018 03:46 AM  
 Calcium 7.5 (L) 09/02/2018 03:46 AM  
 AST (SGOT) 75 (H) 08/30/2018 05:30 PM  
 Alk. phosphatase 120 (H) 08/30/2018 05:30 PM  
 Protein, total 5.6 (L) 08/30/2018 05:30 PM  
 Albumin 2.7 (L) 08/30/2018 05:30 PM  
 Globulin 2.9 08/30/2018 05:30 PM  
 A-G Ratio 0.9 (L) 08/30/2018 05:30 PM  
 ALT (SGPT) 13 08/30/2018 05:30 PM  
 
 
 
CT Results (most recent): 
 
Results from Hospital Encounter encounter on 08/30/18 CT ABD PELV WO CONT Narrative EXAM:  CT ABD PELV WO CONT INDICATION: Abdominal distention, history of lymphoma COMPARISON: 8/4/2018 CONTRAST:  None. TECHNIQUE:  
Thin axial images were obtained through the abdomen and pelvis. Coronal and 
sagittal reconstructions were generated. Oral contrast was not administered. CT 
dose reduction was achieved through use of a standardized protocol tailored for 
this examination and automatic exposure control for dose modulation. The absence of intravenous contrast material reduces the sensitivity for 
evaluation of the solid parenchymal organs of the abdomen. FINDINGS:  
LUNG BASES: There is a small left pleural effusion with underlying atelectasis. INCIDENTALLY IMAGED HEART AND MEDIASTINUM: Unremarkable. LIVER: The hypodense lesions in the liver are stable compared to the prior 
contrast CT. GALLBLADDER: Surgically absent. SPLEEN: No mass. PANCREAS: No mass or ductal dilatation. ADRENALS: Unremarkable. KIDNEYS/URETERS: Bilateral renal cysts are stable. STOMACH: Unremarkable. SMALL BOWEL: No dilatation or wall thickening. COLON: No dilatation or wall thickening. PERITONEUM: Moderate free fluid is unchanged. Elemental thickening is stable. RETROPERITONEUM: The large fluid collection involving the left iliacus muscle is 
unchanged. Retroperitoneal lymphadenopathy is stable. REPRODUCTIVE ORGANS: Pelvic lymphadenopathy is unchanged. URINARY BLADDER: No mass or calculus. BONES: Degenerative changes are seen in the lumbar spine. ADDITIONAL COMMENTS: N/A  Impression IMPRESSION: 
 Stable compared to 8/14/2018. Hepatic lesions and omental disease is stable as 
are retroperitoneal and pelvic masses. Left iliacus fluid collection is 
unchanged. Assessment/Plan: 1. 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 Completed 6 cycles of CHOP (Cyclophosphamide 750mg/m2, Doxorubicin 50mg/m2, Vincristine (dose reduced cycle 5 by 25% for neuropathy to 1.05mg/m2) and post-treatment PET with a mixed response to treatment Struggled with grade 3 neuropathy CT on prior admission with progression. Ascites and pleural effusions noted. Rapid progression of disease post treatment. She has previously refused further chemotherapy. Currently on palliative radiation, but does not feel that it is helping her pain or symptoms. Pt clinically has continued to decline. Has limited life expectancy. Recommend palliative care/ hospice. Pt is DNR and reviewed this today again. Agree with DNR Recommended hospice but pt/ family not ready to start hospice yet. Pt wants to continue with radiation perhaps on tues. 2. Facial numbness/pain. Unilateral on left side with extensive CN involvement. Brain MRI with enlargement of trigeminal nerve likely secondary to lymphoma. Has been receiving palliative radiation therapy. 3. Pain, severe. Secondary to #2 and nerve irritation. Palliative Medicine following. Radiation has done little to control pain. 4. Ascites, malignant. Due to lymphoma. S/p temporary catheter placement today with 3L removed. Strongly recommend Thana Sam catheter even if she proceeds with hospice. 5. Renal insufficiency. Stable. .  
 
6. Anemia. Secondary to chemotherapy and chronic disease. hgb 8.3 today. Transfuse if <7.0.  
 
7.  Weakness/ debility due to cancer. Not likely to get better. Will continue to follow while admitted. Call if questions over weekend Armond Curtis, DO

## 2018-09-02 NOTE — PROGRESS NOTES
Problem: Falls - Risk of 
Goal: *Absence of Falls Document Randal Johnson Fall Risk and appropriate interventions in the flowsheet. Outcome: Progressing Towards Goal 
Fall Risk Interventions: 
Mobility Interventions: Patient to call before getting OOB Medication Interventions: Patient to call before getting OOB, Teach patient to arise slowly Elimination Interventions: Patient to call for help with toileting needs Problem: Pressure Injury - Risk of 
Goal: *Prevention of pressure injury Document Reji Scale and appropriate interventions in the flowsheet. Outcome: Progressing Towards Goal 
Pressure Injury Interventions: Activity Interventions: Pressure redistribution bed/mattress(bed type) Mobility Interventions: Pressure redistribution bed/mattress (bed type) Nutrition Interventions: Document food/fluid/supplement intake Friction and Shear Interventions: Lift sheet, Minimize layers

## 2018-09-02 NOTE — PROGRESS NOTES
Bedside and Verbal shift change report given to [address] [place] [address] (oncoming nurse) by Sydnee Major RN (offgoing nurse). Report included the following information SBAR, OR Summary, Procedure Summary, MAR and Cardiac Rhythm Sinus Tach.

## 2018-09-02 NOTE — PROGRESS NOTES
Hospitalist Progress Note Virginia Batista MD 
Office: [phone] Date of Service:  2018 NAME:  Philip Younger :  1950 MRN:  [id number] Admission Summary: A 21-year-old white female with past medical history of high-grade non-Hodgkin's lymphoma, anemia, GERD, hypertension, left pleural effusion, pulmonary hypertension, hypothyroidism, ascites, chronic pain, neuropathic facial pain, jaw pain, anxiety, pneumonia, postherpetic neuralgia, herpes zoster, headaches, peptic ulcer disease, heart murmur, asthma, prior tobacco abuse, presented to the emergency department from home with chief complaint of nausea, vomiting, abdominal pain. The patient provides some history. She is accompanied by her sister and brother and provide limited history. The remainder of history was obtained from review of ED and medical reports. Per the collective reports, patient has been having ongoing nausea, vomiting intermittently since March. She notably was followed by an oncologist with a history of high-grade non-Hodgkin's lymphoma, has been on CHOP therapy. The patient notes the symptoms worsened today, not relieved with Zofran. She has decreased appetite and felt like she was dehydrated over the past 3 days. Patient reported recently being started on radiation for lymphoma with last treatment today. According to reports, she completed chemotherapy in 2018. She complains of having abdominal pain which is dull aching with abdominal distention which remains constant without specific alleviating factors, exacerbated with touch and palpation. There are no reports of syncope, loss of consciousness, new onset headache, neck pain, visual disturbances, chest pain, palpitations, melena, dysuria, hematuria, calf pain, swelling, edema, fever, chills or rash.   She complains of generalized back pain, which she notes in association with known history of lymphoma. She has been followed by Palliative Care in the past with noted pain symptoms. She was last hospitalized in [facility] on 08/14/2018 to 08/17/2018 with noted symptomatic ascites requiring paracentesis on 08/15/2018 with placement of a peritoneal drainage catheter. She also had bilateral pleural effusions with history of left pleural effusion, thoracentesis in the past, nausea, which was thought to be due to ascites, mass involving the left trigeminal nerve was associated with severe facial pain for which she has been on gabapentin and amitriptyline.   
  
 
Interval history / Subjective:  
  Patient is seen and examined at bedside. She feels tired. She had 4-5 loose watery bowel movements yesterday. No appetite. Denied abd pain or fever. Family present Assessment & Plan: C.diff colitis 
- continue oral vanc 
- improving Dehydration sec to decreased po intake, Diarrhea: IVF. High grade NHL:  
Appreciate onc and radiation onc input Pt was on palliative radiations which is hold for now. Reeval on Tues regarding radiation therapy Patient and family wish to go ahead with palliative XRT Poor prognosis Palliative care on board. Patient/ family not ready for hospice yet. Ascities Most likely due to lymphoma 
repeat US guided paracentesis done 8/31 and drain placed Lactic acidosis on admission resolved Hypotension on admission BP improved  
continue IVF watch for 3rd spacing ,resp status. If BP drops will try albumin first. 
 
Anemia - sec to chemotherapy and malignancy Transfuse Hb <7 Hypoalbuminemia - poor intake and malignancy Hypothyroidism: Levothyroxine. Chronic pain - cont gabapentin, amitriptyline GI ppx: pepcid DVT PPX : SQ Heparin Code: DNR Care Plan discussed with: Patient/Family Disposition: TBD, likely on Tuesday after XRT Hospital Problems  Date Reviewed: 8/30/2018 Codes Class Noted POA Advanced care planning/counseling discussion ICD-10-CM: Z71.89 ICD-9-CM: V65.49  Unknown Unknown Facial neuropathy ICD-10-CM: G51.9 ICD-9-CM: 351.9  Unknown Unknown Neoplastic malignant related fatigue ICD-10-CM: R53.0 ICD-9-CM: 780.79  Unknown Unknown Debility ICD-10-CM: R53.81 ICD-9-CM: 799.3  Unknown Unknown Anorexia ICD-10-CM: R63.0 ICD-9-CM: 783.0  Unknown Unknown Nausea ICD-10-CM: R11.0 ICD-9-CM: 787.02  Unknown Unknown * (Principal)Dehydration ICD-10-CM: E86.0 ICD-9-CM: 276.51  8/30/2018 Unknown Lactic acidosis ICD-10-CM: E87.2 ICD-9-CM: 276.2  8/30/2018 Unknown Anemia ICD-10-CM: D64.9 ICD-9-CM: 285.9  8/30/2018 Unknown Hypotension ICD-10-CM: I95.9 ICD-9-CM: 458.9  8/30/2018 Unknown Pleural effusion, left ICD-10-CM: J90 ICD-9-CM: 511.9  2/26/2018 Unknown Review of Systems: A comprehensive review of systems was negative except for that written in the HPI. Vital Signs:  
 Last 24hrs VS reviewed since prior progress note. Most recent are: 
Visit Vitals  /42 (BP 1 Location: Right arm, BP Patient Position: At rest)  Pulse (!) 103  Temp 98.3 °F (36.8 °C)  Resp 17  Ht 5' 2\" (1.575 m)  Wt 71.6 kg (157 lb 13.6 oz)  SpO2 96%  BMI 28.87 kg/m2 Intake/Output Summary (Last 24 hours) at 09/02/18 1039 Last data filed at 09/02/18 8761 Gross per 24 hour Intake          2441.67 ml Output             2100 ml Net           341.67 ml Physical Examination:  
 
 
     
Constitutional:  No acute distress, cooperative, pleasant , Frail ENT:  Oral mucous moist, oropharynx benign. Neck supple, ptosis left eye Resp:  CTA bilaterally. Decreased breath sounds CV:  Regular rhythm, tachy, no murmurs, gallops, rubs GI:  Soft, distended, mild tender. normoactive bowel sounds, Drain + Musculoskeletal:  No edema, warm, 2+ pulses throughout Neurologic:  Moves all extremities. AAOx3, generalized weakness Data Review:  
 Review and/or order of clinical lab test 
Review and/or order of tests in the radiology section of CPT Review and/or order of tests in the medicine section of CPT Labs:  
 
Recent Labs  
   09/02/18 0346  09/01/18 
 0256 WBC  6.6  5.8 HGB  9.4*  9.3* HCT  30.3*  30.3* PLT  110*  112* Recent Labs  
   09/02/18 
 0346  09/01/18 
 0256  08/31/18 
 0505 NA  137  138  137  
K  3.6  3.5  3.6 CL  107  108  105 CO2  18*  21  18* BUN  17  18  18 CREA  0.84  0.84  0.88 GLU  100  83  81  
CA  7.5*  7.5*  8.1*  
MG   --   1.6   --   
 
Recent Labs 08/30/18 
 1730 SGOT  75* ALT  13  
AP  120* TBILI  0.2 TP  5.6* ALB  2.7*  
GLOB  2.9 No results for input(s): INR, PTP, APTT in the last 72 hours. No lab exists for component: INREXT, INREXT No results for input(s): FE, TIBC, PSAT, FERR in the last 72 hours. Lab Results Component Value Date/Time Folate 15.0 05/04/2018 10:04 AM  
  
No results for input(s): PH, PCO2, PO2 in the last 72 hours. No results for input(s): CPK, CKNDX, TROIQ in the last 72 hours. No lab exists for component: CPKMB Lab Results Component Value Date/Time Cholesterol, total 114 08/31/2018 05:05 AM  
 HDL Cholesterol 34 08/31/2018 05:05 AM  
 LDL, calculated 46.6 08/31/2018 05:05 AM  
 Triglyceride 167 (H) 08/31/2018 05:05 AM  
 CHOL/HDL Ratio 3.4 08/31/2018 05:05 AM  
 
Lab Results Component Value Date/Time Glucose (POC) 128 (H) 09/01/2018 08:20 PM  
 
Lab Results Component Value Date/Time  Color DARK YELLOW 08/30/2018 05:42 PM  
 Appearance CLOUDY (A) 08/30/2018 05:42 PM  
 Specific gravity 1.025 08/30/2018 05:42 PM  
 Specific gravity 1.025 02/16/2018 11:22 PM  
 pH (UA) 5.5 08/30/2018 05:42 PM  
 Protein 30 (A) 08/30/2018 05:42 PM  
 Glucose NEGATIVE  08/30/2018 05:42 PM  
 Ketone TRACE (A) 08/30/2018 05:42 PM  
 Bilirubin NEGATIVE  02/16/2018 11:22 PM  
 Urobilinogen 0.2 08/30/2018 05:42 PM  
 Nitrites NEGATIVE  08/30/2018 05:42 PM  
 Leukocyte Esterase NEGATIVE  08/30/2018 05:42 PM  
 Epithelial cells MODERATE (A) 08/30/2018 05:42 PM  
 Bacteria NEGATIVE  08/30/2018 05:42 PM  
 WBC 0-4 08/30/2018 05:42 PM  
 RBC 0-5 08/30/2018 05:42 PM  
 
 
 
Medications Reviewed:  
 
Current Facility-Administered Medications Medication Dose Route Frequency  magnesium oxide (MAG-OX) tablet 400 mg  400 mg Oral BID  heparin (porcine) injection 5,000 Units  5,000 Units SubCUTAneous Q8H  
 vancomycin (FIRVANQ) 50 mg/mL oral solution 125 mg  125 mg Oral Q6H  
 0.9% sodium chloride infusion  125 mL/hr IntraVENous CONTINUOUS  
 amitriptyline (ELAVIL) tablet 12.5 mg  12.5 mg Oral DAILY  cholecalciferol (VITAMIN D3) tablet 1,000 Units  1,000 Units Oral DAILY  gabapentin (NEURONTIN) capsule 600 mg  600 mg Oral BID  levothyroxine (SYNTHROID) tablet 150 mcg  150 mcg Oral ACB  famotidine (PEPCID) tablet 20 mg  20 mg Oral DAILY  sodium chloride (NS) flush 5-10 mL  5-10 mL IntraVENous Q8H  
 sodium chloride (NS) flush 5-10 mL  5-10 mL IntraVENous PRN  
 ondansetron (ZOFRAN) injection 4 mg  4 mg IntraVENous Q6H PRN  
 
______________________________________________________________________ EXPECTED LENGTH OF STAY: 2d 14h ACTUAL LENGTH OF STAY:          3 Melita Humphrey MD

## 2018-09-03 NOTE — PROGRESS NOTES
Hospitalist Progress Note Deidra Hoang MD 
Office: [phone] Date of Service:  9/3/2018 NAME:  Ananth Ordonez :  1950 MRN:  [id number] Admission Summary: A 68-year-old white female with past medical history of high-grade non-Hodgkin's lymphoma, anemia, GERD, hypertension, left pleural effusion, pulmonary hypertension, hypothyroidism, ascites, chronic pain, neuropathic facial pain, jaw pain, anxiety, pneumonia, postherpetic neuralgia, herpes zoster, headaches, peptic ulcer disease, heart murmur, asthma, prior tobacco abuse, presented to the emergency department from home with chief complaint of nausea, vomiting, abdominal pain. The patient provides some history. She is accompanied by her sister and brother and provide limited history. The remainder of history was obtained from review of ED and medical reports. Per the collective reports, patient has been having ongoing nausea, vomiting intermittently since March. She notably was followed by an oncologist with a history of high-grade non-Hodgkin's lymphoma, has been on CHOP therapy. The patient notes the symptoms worsened today, not relieved with Zofran. She has decreased appetite and felt like she was dehydrated over the past 3 days. Patient reported recently being started on radiation for lymphoma with last treatment today. According to reports, she completed chemotherapy in 2018. She complains of having abdominal pain which is dull aching with abdominal distention which remains constant without specific alleviating factors, exacerbated with touch and palpation. There are no reports of syncope, loss of consciousness, new onset headache, neck pain, visual disturbances, chest pain, palpitations, melena, dysuria, hematuria, calf pain, swelling, edema, fever, chills or rash.   She complains of generalized back pain, which she notes in association with known history of lymphoma. She has been followed by Palliative Care in the past with noted pain symptoms. She was last hospitalized in [facility] on 08/14/2018 to 08/17/2018 with noted symptomatic ascites requiring paracentesis on 08/15/2018 with placement of a peritoneal drainage catheter. She also had bilateral pleural effusions with history of left pleural effusion, thoracentesis in the past, nausea, which was thought to be due to ascites, mass involving the left trigeminal nerve was associated with severe facial pain for which she has been on gabapentin and amitriptyline.   
  
 
Interval history / Subjective:  
  Patient is seen and examined at bedside. Morning labs showed low Bicarb of 14 and lactic acid was ordered. She still feels tired. She had 4-5 loose watery bowel movements yesterday. No appetite. Denied abd pain or fever. She has mild productive cough. Denied chest pain or worsening sob. No urinary symptoms. Family present. Assessment & Plan:  
 
Sepsis Possibly due to C.diff colitis Lactic acidosis 
- lactic acid elevated 2.5 
- IVF fluids given per protocol. 
- Blood, urine, sputum cx sent - CXR showed Interval worsening of heterogeneous opacity at the left lung base, suspicious 
for pneumonia in the context of sepsis 
- started on IV Vanco and Zosyn for possible PNA 
- continue oral vanc Dehydration sec to decreased po intake, Diarrhea: IVF. High grade NHL:  
Appreciate onc and radiation onc input Pt was on palliative radiations which is hold for now. Reeval on Tues regarding radiation therapy Poor prognosis Palliative care on board. Recommend meeting with family to re discuss the goals of treatment and care. Ascities Most likely due to lymphoma 
repeat US guided paracentesis done 8/31 and drain placed Hypotension on admission BP improved  
continue IVF watch for 3rd spacing ,resp status.   If BP drops will try albumin first. 
 
Anemia - sec to chemotherapy and malignancy Transfuse Hb <7 Hypoalbuminemia - poor intake and malignancy Hypothyroidism: Levothyroxine. Chronic pain - cont gabapentin, amitriptyline GI ppx: pepcid DVT PPX : SQ Heparin Code: DNR Care Plan discussed with: Patient/Family Disposition: TBD, family discussion with palliative team required to discuss goals of treatment. Hospital Problems  Date Reviewed: 8/30/2018 Codes Class Noted POA Advanced care planning/counseling discussion ICD-10-CM: Z71.89 ICD-9-CM: V65.49  Unknown Unknown Facial neuropathy ICD-10-CM: G51.9 ICD-9-CM: 351.9  Unknown Unknown Neoplastic malignant related fatigue ICD-10-CM: R53.0 ICD-9-CM: 780.79  Unknown Unknown Debility ICD-10-CM: R53.81 ICD-9-CM: 799.3  Unknown Unknown Anorexia ICD-10-CM: R63.0 ICD-9-CM: 783.0  Unknown Unknown Nausea ICD-10-CM: R11.0 ICD-9-CM: 787.02  Unknown Unknown * (Principal)Dehydration ICD-10-CM: E86.0 ICD-9-CM: 276.51  8/30/2018 Unknown Lactic acidosis ICD-10-CM: E87.2 ICD-9-CM: 276.2  8/30/2018 Unknown Anemia ICD-10-CM: D64.9 ICD-9-CM: 285.9  8/30/2018 Unknown Hypotension ICD-10-CM: I95.9 ICD-9-CM: 458.9  8/30/2018 Unknown Pleural effusion, left ICD-10-CM: J90 ICD-9-CM: 511.9  2/26/2018 Unknown Review of Systems: A comprehensive review of systems was negative except for that written in the HPI. Vital Signs:  
 Last 24hrs VS reviewed since prior progress note. Most recent are: 
Visit Vitals  /44 (BP 1 Location: Right arm, BP Patient Position: At rest)  Pulse (!) 105  Temp 98.1 °F (36.7 °C)  Resp 18  Ht 5' 2\" (1.575 m)  Wt 72.4 kg (159 lb 9.8 oz)  SpO2 100%  BMI 29.19 kg/m2 Intake/Output Summary (Last 24 hours) at 09/03/18 1129 Last data filed at 09/03/18 5786 Gross per 24 hour Intake          3097.92 ml Output             1645 ml Net          1452.92 ml Physical Examination:  
 
 
     
Constitutional:  Mild distress, cooperative, pleasant , Frail ENT:  Oral mucous moist, oropharynx benign. Neck supple, ptosis left eye Resp:   Decreased breath sounds at bases CV:  Regular rhythm, tachy, no murmurs, gallops, rubs GI:  Soft, distended, mild tender. normoactive bowel sounds, Drain + Musculoskeletal:  No edema, warm, 2+ pulses throughout Neurologic:  Moves all extremities. AAOx3, generalized weakness Data Review:  
 Review and/or order of clinical lab test 
Review and/or order of tests in the radiology section of CPT Review and/or order of tests in the medicine section of [facility] Labs:  
 
Recent Labs  
   09/03/18 0411 09/02/18 
 9361 WBC  6.2  6.6 HGB  9.7*  9.4* HCT  31.5*  30.3* PLT  107*  110* Recent Labs  
   09/03/18 0411 09/02/18 
 0346  09/01/18 
 0256 NA  138  137  138  
K  3.5  3.6  3.5 CL  109*  107  108 CO2  14*  18*  21 BUN  18  17  18 CREA  0.93  0.84  0.84 GLU  102*  100  83 CA  7.7*  7.5*  7.5* MG  1.7   --   1.6 Recent Labs  
   09/03/18 0411 SGOT  64* ALT  9* AP  81 TBILI  0.2 TP  4.0* ALB  1.7*  
GLOB  2.3 No results for input(s): INR, PTP, APTT in the last 72 hours. No lab exists for component: INREXT, INREXT No results for input(s): FE, TIBC, PSAT, FERR in the last 72 hours. Lab Results Component Value Date/Time Folate 15.0 05/04/2018 10:04 AM  
  
No results for input(s): PH, PCO2, PO2 in the last 72 hours. Recent Labs  
   09/03/18 0411 TROIQ  0.23* Lab Results Component Value Date/Time Cholesterol, total 114 08/31/2018 05:05 AM  
 HDL Cholesterol 34 08/31/2018 05:05 AM  
 LDL, calculated 46.6 08/31/2018 05:05 AM  
 Triglyceride 167 (H) 08/31/2018 05:05 AM  
 CHOL/HDL Ratio 3.4 08/31/2018 05:05 AM  
 
Lab Results Component Value Date/Time Glucose (POC) 128 (H) 09/01/2018 08:20 PM  
 
Lab Results Component Value Date/Time Color DARK YELLOW 08/30/2018 05:42 PM  
 Appearance CLOUDY (A) 08/30/2018 05:42 PM  
 Specific gravity 1.025 08/30/2018 05:42 PM  
 Specific gravity 1.025 02/16/2018 11:22 PM  
 pH (UA) 5.5 08/30/2018 05:42 PM  
 Protein 30 (A) 08/30/2018 05:42 PM  
 Glucose NEGATIVE  08/30/2018 05:42 PM  
 Ketone TRACE (A) 08/30/2018 05:42 PM  
 Bilirubin NEGATIVE  02/16/2018 11:22 PM  
 Urobilinogen 0.2 08/30/2018 05:42 PM  
 Nitrites NEGATIVE  08/30/2018 05:42 PM  
 Leukocyte Esterase NEGATIVE  08/30/2018 05:42 PM  
 Epithelial cells MODERATE (A) 08/30/2018 05:42 PM  
 Bacteria NEGATIVE  08/30/2018 05:42 PM  
 WBC 0-4 08/30/2018 05:42 PM  
 RBC 0-5 08/30/2018 05:42 PM  
 
 
 
Medications Reviewed:  
 
Current Facility-Administered Medications Medication Dose Route Frequency  sodium chloride 0.9 % bolus infusion 1,000 mL  1,000 mL IntraVENous ONCE Followed by  
 sodium chloride 0.9 % bolus infusion 1,000 mL  1,000 mL IntraVENous ONCE Followed by  
 sodium chloride 0.9 % bolus infusion 160 mL  160 mL IntraVENous ONCE  piperacillin-tazobactam (ZOSYN) 3.375 g in 0.9% sodium chloride (MBP/ADV) 100 mL  3.375 g IntraVENous Q8H  
 vancomycin (VANCOCIN) 1750 mg in  ml infusion  1,750 mg IntraVENous ONCE  Vancomycin - Pharmacy to Dose   Other Rx Dosing/Monitoring  [START ON 9/4/2018] vancomycin (VANCOCIN) 1,000 mg in 0.9% sodium chloride (MBP/ADV) 250 mL  1,000 mg IntraVENous Q16H  
 heparin (porcine) injection 5,000 Units  5,000 Units SubCUTAneous Q8H  
 vancomycin (FIRVANQ) 50 mg/mL oral solution 125 mg  125 mg Oral Q6H  
 0.9% sodium chloride infusion  125 mL/hr IntraVENous CONTINUOUS  
 amitriptyline (ELAVIL) tablet 12.5 mg  12.5 mg Oral DAILY  cholecalciferol (VITAMIN D3) tablet 1,000 Units  1,000 Units Oral DAILY  gabapentin (NEURONTIN) capsule 600 mg  600 mg Oral BID  
  levothyroxine (SYNTHROID) tablet 150 mcg  150 mcg Oral ACB  famotidine (PEPCID) tablet 20 mg  20 mg Oral DAILY  sodium chloride (NS) flush 5-10 mL  5-10 mL IntraVENous Q8H  
 sodium chloride (NS) flush 5-10 mL  5-10 mL IntraVENous PRN  
 ondansetron (ZOFRAN) injection 4 mg  4 mg IntraVENous Q6H PRN  
 
______________________________________________________________________ EXPECTED LENGTH OF STAY: 2d 14h ACTUAL LENGTH OF STAY:          4 Virginia Batista MD

## 2018-09-03 NOTE — PROGRESS NOTES
Problem: Falls - Risk of 
Goal: *Absence of Falls Document Kenneth Rahul Fall Risk and appropriate interventions in the flowsheet. Outcome: Progressing Towards Goal 
Fall Risk Interventions: 
Mobility Interventions: Patient to call before getting OOB Medication Interventions: Patient to call before getting OOB, Teach patient to arise slowly Elimination Interventions: Patient to call for help with toileting needs Problem: Pressure Injury - Risk of 
Goal: *Prevention of pressure injury Document Reji Scale and appropriate interventions in the flowsheet. Outcome: Progressing Towards Goal 
Pressure Injury Interventions: Activity Interventions: Increase time out of bed Mobility Interventions: Pressure redistribution bed/mattress (bed type) Nutrition Interventions: Document food/fluid/supplement intake Friction and Shear Interventions: Lift sheet

## 2018-09-03 NOTE — PROGRESS NOTES
Pharmacist Note - Vancomycin Dosing Consult provided for this 75 y.o. female for indication of HAP. Antibiotic regimen(s): vancomycin, Zosyn, vancomycin oral for positive C. diff Recent Labs  
   18 
 0411  18 
 0346  18 
 0256 WBC  6.2  6.6  5.8 CREA  0.93  0.84  0.84 BUN  18  17  18 Frequency of BMP: tomorrow AM 
Height: 157.5 cm Weight: 72.4 kg Est CrCl: ~54 ml/min Temp (24hrs), Av.3 °F (36.8 °C), Min:98 °F (36.7 °C), Max:98.5 °F (36.9 °C) Cultures: 
C. Diff, positive Goal trough = 15 - 20 mcg/mL Therapy will be initiated with a loading dose of 1750 mg IV x 1 to be followed by a maintenance dose of 1000 mg IV every 16 hours. Pharmacy to follow patient daily and order levels / make dose adjustments as appropriate.

## 2018-09-03 NOTE — PROGRESS NOTES
CC NHL Dx  10/10 Transformation 3/18 with pleural effusion TREATMENT COURSE:  Orbit radiation  Chlorambucil x 1 cycle 4/16. Cycle 2 3/17. Cycle 3 4/17 Cycle 4 1/18. Attempted one dose of rituxan. Transformation to high grade lymphoma 3/2018, CHOP started 3/23/18 (patient refused Rituximab due to prior reaction) HISTORY OF PRESENT ILLNESS:  Ms. Kassandra Perry is a 76 y/o female with stage 4 high grade lymphoma recurrent after recent CHOP chemo. Pt refused rituxan. Recent admission for pain exacerbation and ascites. Has been receiving palliative radiation outpatient. Previous hospitalization 8/14/18-8/17/18 due to uncontrolled pain and ascites. Since discharge she has been undergoing radiation, but notes little improvement in symptoms. Nausea and vomiting at home. Diarrhea with abdominal cramping. Eating very little. Family is doing calorie count at home and eating 300-500 calories daily. Did eat 800 calories one day, but then vomited most of food. Little appetite. Abdominal pressure due to massive ascites. No change in urination. Energy has been down significantly. Little OOB activity. No known fever, chills in the home. Denies SOB at rest, dizziness, chest pain. Burning pain of left side of face continues. Family at bedside. Interim Hx:  Pt seen today for f/u of high grade NHL recurrent/ progressive. In bed with family at bedside. Pt resting in bed. Has some pain occ. Pt states today that she does not want further radiation or treatment and wants to be made comfortable and \"go home to God\". Past Medical History:  
Diagnosis Date  Anemia NEC  Arrhythmia   
 VSD; TRICUSPID REGURG  
 Cancer ([address] 2010  
 low grade NHL  GERD (gastroesophageal reflux disease)  Hypertension  NHL (non-Hodgkin's lymphoma) ([address] 2010  
 received chemo and radiation. not in remission.  Pleural effusion 02/21/2018 LEFT  Pulmonary hypertension ([address] 02/21/2018 PER CARDIOLOGY NOTES FROM PTS VISIT ON 2/21/18  Thyroid disease Past Surgical History:  
Procedure Laterality Date  HX CATARACT REMOVAL  6/2015 & 7/2015 MERRY  
 HX CHOLECYSTECTOMY  1982  HX DILATION AND CURETTAGE    
 HX HEENT  2005 RIGHT EAR   
 HX OTHER SURGICAL    
 LEFT PAROTIDECTOMY  HI RPLCMT PROST AORTIC VALVE OPEN XCP HOMOGRF/STENT  1972 STENT PLACED TO STRAIGHTEN THE AORTA Social History Social History  Marital status:  Spouse name: N/A  
 Number of children: N/A  
 Years of education: N/A Social History Main Topics  Smoking status: Current Every Day Smoker Packs/day: 0.50 Years: 45.00 Types: Cigarettes  Smokeless tobacco: Former User Quit date: 12/1/2015  Alcohol use Yes Comment: Rare  Drug use: No  
 Sexual activity: No  
   Comment:  has one child Other Topics Concern  None Social History Narrative Family History Problem Relation Age of Onset  Heart Disease Mother  Kidney Disease Mother  Alcohol abuse Father  Liver Disease Father  Cancer Sister Breast  
 No Known Problems Brother  Cancer Sister Colon  No Known Problems Sister  No Known Problems Sister  No Known Problems Daughter  Anesth Problems Neg Hx Current Facility-Administered Medications Medication Dose Route Frequency Provider Last Rate Last Dose  sodium chloride 0.9 % bolus infusion 1,000 mL  1,000 mL IntraVENous Torri Ellis MD      
 Followed by  
Sheila.Rushing sodium chloride 0.9 % bolus infusion 1,000 mL  1,000 mL IntraVENous Torri Ellis MD      
 Followed by  
Sheila.Rushing sodium chloride 0.9 % bolus infusion 160 mL  160 mL IntraVENous ONCE Kristene Goodpasture, MD      
 heparin (porcine) injection 5,000 Units  5,000 Units SubCUTAneous Toni Sanabria MD   5,000 Units at 09/03/18 0219  
 vancomycin (FIRVANQ) 50 mg/mL oral solution 125 mg  125 mg Oral Q6H Darren Montes MD   125 mg at 09/03/18 0620  
 0.9% sodium chloride infusion  125 mL/hr IntraVENous CONTINUOUS Darren Montes  mL/hr at 09/03/18 0412 125 mL/hr at 09/03/18 0412  
 amitriptyline (ELAVIL) tablet 12.5 mg  12.5 mg Oral DAILY Kimberly Pedro MD   12.5 mg at 08/31/18 2780  cholecalciferol (VITAMIN D3) tablet 1,000 Units  1,000 Units Oral DAILY Kimberly Pedro MD   1,000 Units at 09/02/18 0841  
 gabapentin (NEURONTIN) capsule 600 mg  600 mg Oral BID Kimberly Pedro MD   600 mg at 09/02/18 1819  levothyroxine (SYNTHROID) tablet 150 mcg  150 mcg Oral ACB Kimberly Pedro MD   150 mcg at 09/03/18 8607  famotidine (PEPCID) tablet 20 mg  20 mg Oral DAILY Kimberly Pedro MD   20 mg at 09/02/18 5969  sodium chloride (NS) flush 5-10 mL  5-10 mL IntraVENous Q8H Kimberly Pedro MD   10 mL at 09/03/18 0621  
 sodium chloride (NS) flush 5-10 mL  5-10 mL IntraVENous PRN Kimberly Pedro MD      
 ondansetron [facility]) injection 4 mg  4 mg IntraVENous Q6H PRN Kimberly Pedro MD   4 mg at 09/02/18 1817 Allergies Allergen Reactions  Rituxan [Rituximab] Hives Review of Systems A comprehensive review of systems was negative except for as noted above. Objective: 
Visit Vitals  /44 (BP 1 Location: Right arm, BP Patient Position: At rest)  Pulse (!) 105  Temp 98.1 °F (36.7 °C)  Resp 18  Ht 5' 2\" (1.575 m)  Wt 159 lb 9.8 oz (72.4 kg)  LMP  (LMP Unknown)  SpO2 100%  BMI 29.19 kg/m2 Physical Exam:  
General appearance - awake but not opening eyes Mental status - drowsy, frail Face - Left side of face with decreased mobility, ptosis left eye, hypesthesia left side of face Eye - conj clear Neck - supple CV regular 
resp decreased bs in bases 
abd distended. Ext trace lower extremity edema noted bilaterally Skin-Warm and dry. Intact, no rash. Neuro -  General weakness, converses some, eyes closed Diagnostic Imaging  
reviewed 2/21/18 PET: 
IMPRESSION:  
1. Increasing right parietal soft tissue activity. 2. Progression in the bilateral areas of hypermetabolic pleural thickening. 3. New bilateral hypermetabolic axillary lymph nodes and subcarinal lymph node 
and progression of retroperitoneal and left pelvic lymphadenopathy. 4. Improvement in the left inguinal lymphadenopathy 5. Improvement in the right parieto-occipital scalp activity. 6. New hypermetabolic soft tissue abnormality left iliac fossa. 7. Stable left cervical hypermetabolic lymph nodes. 2/17/18 PET: 
MPRESSION:  
1. Findings suspicious for a uterine mass with a soft tissue mass in the left 
pelvis and extending into the retroperitoneum concerning for malignancy. Lymphoma is a differential consideration given the patient's history. The mass 
encases but does not narrow the aortic bifurcation and IVC. 2. Enlarged left inguinal lymph node is decreased in size since 8/30/2017. 
3. Increased large left pleural effusion with left lower lung atelectasis. 7/18/18 PET IMPRESSION: New focus of increased tracer activity right iliac wing concerning 
for osseous metastatic disease. New hypermetabolic soft tissue nodule adjacent 
to the right iliac crest. Increase in the size of the left iliac fossa mass. Minimal decrease in the size of the axillary lymph nodes with little change in 
the tracer activity. Left level 2 lymph nodes and pleural activity in the left 
hemithorax are stable as are the left external iliac and pelvic masses. Lab Results 
 
reviewed Lab Results Component Value Date/Time WBC 6.2 09/03/2018 04:11 AM  
 HGB 9.7 (L) 09/03/2018 04:11 AM  
 HCT 31.5 (L) 09/03/2018 04:11 AM  
 PLATELET 337 (L) 91/15/7517 04:11 AM  
 .3 (H) 09/03/2018 04:11 AM  
 
 
Lab Results Component Value Date/Time  Sodium 138 09/03/2018 04:11 AM  
 Potassium 3.5 09/03/2018 04:11 AM  
 Chloride 109 (H) 09/03/2018 04:11 AM  
 CO2 14 (LL) 09/03/2018 04:11 AM  
 Anion gap 15 09/03/2018 04:11 AM  
 Glucose 102 (H) 09/03/2018 04:11 AM  
 BUN 18 09/03/2018 04:11 AM  
 Creatinine 0.93 09/03/2018 04:11 AM  
 BUN/Creatinine ratio 19 09/03/2018 04:11 AM  
 GFR est AA >60 09/03/2018 04:11 AM  
 GFR est non-AA 60 (L) 09/03/2018 04:11 AM  
 Calcium 7.7 (L) 09/03/2018 04:11 AM  
 AST (SGOT) 75 (H) 08/30/2018 05:30 PM  
 Alk. phosphatase 120 (H) 08/30/2018 05:30 PM  
 Protein, total 5.6 (L) 08/30/2018 05:30 PM  
 Albumin 2.7 (L) 08/30/2018 05:30 PM  
 Globulin 2.9 08/30/2018 05:30 PM  
 A-G Ratio 0.9 (L) 08/30/2018 05:30 PM  
 ALT (SGPT) 13 08/30/2018 05:30 PM  
 
 
 
CT Results (most recent): 
 
Results from Hospital Encounter encounter on 08/30/18 CT ABD PELV WO CONT Narrative EXAM:  CT ABD PELV WO CONT INDICATION: Abdominal distention, history of lymphoma COMPARISON: 8/4/2018 CONTRAST:  None. TECHNIQUE:  
Thin axial images were obtained through the abdomen and pelvis. Coronal and 
sagittal reconstructions were generated. Oral contrast was not administered. CT 
dose reduction was achieved through use of a standardized protocol tailored for 
this examination and automatic exposure control for dose modulation. The absence of intravenous contrast material reduces the sensitivity for 
evaluation of the solid parenchymal organs of the abdomen. FINDINGS:  
LUNG BASES: There is a small left pleural effusion with underlying atelectasis. INCIDENTALLY IMAGED HEART AND MEDIASTINUM: Unremarkable. LIVER: The hypodense lesions in the liver are stable compared to the prior 
contrast CT. GALLBLADDER: Surgically absent. SPLEEN: No mass. PANCREAS: No mass or ductal dilatation. ADRENALS: Unremarkable. KIDNEYS/URETERS: Bilateral renal cysts are stable. STOMACH: Unremarkable. SMALL BOWEL: No dilatation or wall thickening. COLON: No dilatation or wall thickening. PERITONEUM: Moderate free fluid is unchanged. Elemental thickening is stable. RETROPERITONEUM: The large fluid collection involving the left iliacus muscle is 
unchanged. Retroperitoneal lymphadenopathy is stable. REPRODUCTIVE ORGANS: Pelvic lymphadenopathy is unchanged. URINARY BLADDER: No mass or calculus. BONES: Degenerative changes are seen in the lumbar spine. ADDITIONAL COMMENTS: N/A Impression IMPRESSION: 
Stable compared to 8/14/2018. Hepatic lesions and omental disease is stable as 
are retroperitoneal and pelvic masses. Left iliacus fluid collection is 
unchanged. Assessment/Plan: 1. 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 Completed 6 cycles of CHOP (Cyclophosphamide 750mg/m2, Doxorubicin 50mg/m2, Vincristine (dose reduced cycle 5 by 25% for neuropathy to 1.05mg/m2) and post-treatment PET with a mixed response to treatment Struggled with grade 3 neuropathy CT on prior admission with progression. Ascites and pleural effusions noted. Rapid progression of disease post treatment. She has previously refused further chemotherapy. Currently on palliative radiation, but does not feel that it is helping her pain or symptoms. Pt clinically has continued to decline. Recommend palliative care/ hospice. Pt is DNR Pt today states that she does not want any further radiation or treatment. Pt states she \"just wants to go home to GOD\". Continue supportive care here. Would get palliative care involved again tomorrow. Pt/family do not like word hospice. Pt likely has limited time to live/ perhaps days. 2. Facial numbness/pain. Unilateral on left side with extensive CN involvement. Brain MRI with enlargement of trigeminal nerve likely secondary to lymphoma. Has been receiving palliative radiation therapy. No more radiation. 3. Pain, severe. Secondary to #2 and nerve irritation. Palliative Medicine following. 4. Ascites, malignant. Due to lymphoma.   temporary catheter placement today with 3L removed. 5. Renal insufficiency. Stable. .  
 
6. Anemia. Secondary to chemotherapy and chronic disease. hgb 8.3 today. Transfuse if <7.0.  
 
7.  Weakness/ debility due to cancer. Not likely to get better. Will continue to follow while admitted. Pt does not want further radiation or treatment. Focus on comfort care with palliative care. Pt/family do not like word hospice. Call if questions over weekend Ana Sorrel, DO

## 2018-09-03 NOTE — PROGRESS NOTES
0630: DR [place] paged. Advised CO2=14. Orders received 0800: Bedside shift change report given to Jackson Orourke (oncoming nurse) by Vaishnavi Hinton (offgoing nurse). Report included the following information SBAR, Kardex, Intake/Output and Recent Results.

## 2018-09-04 NOTE — PROGRESS NOTES
Supportive visit with pt's sisters who were present at bedside. Pt was resting and did not appear aware of 's presence. Pt's sisters shared of their [address] and requested continued prayers for Eileen Lindquist. They shared that pt is expressing peace with regards to end of life concerns. Assured pt's sister's of  availability. Jami Ordonez, Palliative

## 2018-09-04 NOTE — PROGRESS NOTES
Day #2 of Vancomycin (consulted for 7 days of therapy) Indication:  HAP Current regimen:  1000 mg IV q16h Abx regimen:  vancomycin, Zosyn, vancomycin oral for positive C. diff ID Following ?: NO 
Concomitant nephrotoxic drugs (requires more frequent monitoring): None Frequency of BMP?: daily through  Recent Labs  
   18 
 0247  18 
 0411  18 
 4304 WBC  7.4  6.2  6.6 CREA  1.09*  0.93  0.84 BUN  *  18  17 Est CrCl: 47 ml/min; UO: unmeasured ml/kg/hr Temp (24hrs), Av.9 °F (36.6 °C), Min:97.3 °F (36.3 °C), Max:98.6 °F (37 °C) Cultures:  
 C diff positive - final 
9/3 blood - NGTD - prelim Goal trough = 15 - 20 mcg/mL Recent trough history (date/time/level/dose/action taken): 
none Plan: SCr has risen about 0.25 mg/dl over the past 48 hours. Has received a loading dose of 1750 mg and one 1000 mg dose this morning at 02:21. Change from q16h dosing to q18h dosing for an anticipated trough level of ~17 mcg/ml. Ordering a 12 hour level for today at 14:00 to ensure patient is not accumulating vancomycin prior to next scheduled dose administration this evening. Pharmacy to follow patient daily and order levels / make dose adjustments as appropriate.

## 2018-09-04 NOTE — PROGRESS NOTES
NUTRITION Brief note. Chart reviewed, discussed with RN and team during interdisciplinary rounds. Report indicates pt's intake has been minimal (no documentation in I/O's since 9/1, but multiple nurses and family members report she's not eating). There was discussion regarding possible TPN for nutrition support; however, noted updated discussion between the pt and Hospitalist that the pt has decided on DNR with Hospice for comfort care only. If this is pursued, aggressive nutrition support would not be warranted. In the meantime, would continue po for pleasure and consider liberalizing diet to Regular without restrictions. RD to follow and assist as needed. [address]

## 2018-09-04 NOTE — PROGRESS NOTES
09/04/18 1436 Vitals Temp 98.2 °F (36.8 °C) Temp Source Oral  
Pulse (Heart Rate) (!) 101 Heart Rate Source Radial  
Resp Rate 20 Level of Consciousness Alert  
BP (!) 88/47 MAP (Calculated) (!) 61 BP 1 Location Right arm BP Patient Position At rest  
MEWS Score 3 Atenolol held.   MD aware of low SBP

## 2018-09-04 NOTE — ROUTINE PROCESS
1155:  Family concerned about provider discontinuing Atenolol. Cornel Couch and Dominic Sargent (sisters) reported patient's cardiologist wants patient to take half pill (12.5mg) if unable to take the daily whole pill of 25 mg. Sisters are adamant that cardiologist said to give 12.5 mg IF SBP at least 100 (\"does not matter the bottom number\") and heart rate greater than 100 beats per minute. Dr. Phylicia Mcdonnell paged.

## 2018-09-04 NOTE — PROGRESS NOTES
Brief:\ 
 
Discussed case with family and pt at bedside; Pt has chosen: · DNR 
· Hospice care · Comfort care onlyl Orders reflex this as of this am 2018 10:53 AM  
 
Martina Mejía MD 2018 Hospitalist Progress Note Martina Mejía MD 
Answering service: [phone] OR [phone] from in house phone NAME:  Aakash Morelos :  1950 MRN:  [id number] Admission Summary: She has decreased appetite and felt like she was dehydrated over the past 3 days. Patient reported recently being started on radiation for lymphoma with last treatment today. According to reports, she completed chemotherapy in 2018. She complains of having abdominal pain which is dull aching with abdominal distention which remains constant without specific alleviating factors, exacerbated with touch and palpation. Interval history / Subjective:  
 
 
Discussed andrés with pt in room for 40 minutes. Went over all records of this admission and recent Discusses thoughts of consultants At end of rounds pt decides as noted above Assessment & Plan:  
 
Sepsis Possibly due to C.diff colitis Lactic acidosis 
- lactic acid elevated 2.5 
- IVF fluids given per protocol. 
- Blood, urine, sputum cx sent - CXR showed Interval worsening of heterogeneous opacity at the left lung base, suspicious 
for pneumonia in the context of sepsis 
- started on IV Vanco and Zosyn for possible PNA 
- continue oral  
- as of , comfort only,  
  
Dehydration sec to decreased po intake, Diarrhea: IVF. 
 cont on ivf for now else comfort, High grade NHL:  
Appreciate onc and radiation onc input Pt was on palliative radiations which is hold for now. Reeval on Tues regarding radiation therapy Poor prognosis Palliative care on board. Recommend meeting with family to re discuss the goals of treatment and care.  
  
Ascities Most likely due to lymphoma 
repeat US guided paracentesis done 8/31 and drain placed 
  
Hypotension on admission BP improved  
continue IVF watch for 3rd spacing ,resp status. If BP drops will try albumin first. 
  
Anemia - sec to chemotherapy and malignancy Transfuse Hb < comfort on 9/4 f/u lab no longer needed.  
  
Hypoalbuminemia - poor intake and malignancy 
  
Hypothyroidism: Levothyroxine. 
  
Chronic pain - cont gabapentin, amitriptyline 
  
GI ppx: pepcid 
  
DVT PPX : SQ Heparin 
  
Code: DNR 
  
Care Plan discussed with: Patient/Family Disposition: TBD, family discussion with palliative team required to discuss goals of treatment.   
   
 
Hospital Problems  Date Reviewed: 8/30/2018 Codes Class Noted POA Advanced care planning/counseling discussion ICD-10-CM: Z71.89 ICD-9-CM: V65.49  Unknown Unknown Facial neuropathy ICD-10-CM: G51.9 ICD-9-CM: 351.9  Unknown Unknown Neoplastic malignant related fatigue ICD-10-CM: R53.0 ICD-9-CM: 780.79  Unknown Unknown Debility ICD-10-CM: R53.81 ICD-9-CM: 799.3  Unknown Unknown Anorexia ICD-10-CM: R63.0 ICD-9-CM: 783.0  Unknown Unknown Nausea ICD-10-CM: R11.0 ICD-9-CM: 787.02  Unknown Unknown * (Principal)Dehydration ICD-10-CM: E86.0 ICD-9-CM: 276.51  8/30/2018 Unknown Lactic acidosis ICD-10-CM: E87.2 ICD-9-CM: 276.2  8/30/2018 Unknown Anemia ICD-10-CM: D64.9 ICD-9-CM: 285.9  8/30/2018 Unknown Hypotension ICD-10-CM: I95.9 ICD-9-CM: 458.9  8/30/2018 Unknown Pleural effusion, left ICD-10-CM: J90 ICD-9-CM: 511.9  2/26/2018 Unknown Review of Systems:  
  
 some abd pain Vital Signs:  
 Last 24hrs VS reviewed since prior progress note. Most recent are: 
Visit Vitals  BP (!) 87/60 (BP 1 Location: Right arm, BP Patient Position: At rest)  Pulse (!) 111  Temp 97.6 °F (36.4 °C)  Resp 26  
 Ht 5' 2\" (1.575 m)  Wt 74.2 kg (163 lb 9.3 oz)  SpO2 95%  BMI 29.92 kg/m2 Physical Examination:  
 
 
     
Constitutional:  ++ acute distress, cooperative, uncomfortable else tries to be  pleasant   
ENT:  Oral mucous moist, oropharynx benign. Neck supple, Resp:  CTA bilaterally. No wheezing/rhonchi/rales. No accessory muscle use CV:  Regular rhythm,tachy GI:    + distended, + tender. Diminished n  bowel sounds, no hepatosplenomegaly Musculoskeletal:  ++  edema, warm, 1+ pulses throughout Neurologic:  Moves all extremities. AAOx3, CN II-XII reviewed Data Review:  
 Review and/or order of clinical lab test 
 
 
Labs:  
 
Recent Labs  
   09/04/18 0247 09/03/18 0411 WBC  7.4  6.2 HGB  10.1*  9.7* HCT  33.3*  31.5* PLT  96*  107* Recent Labs  
   09/04/18 0247 09/03/18 0411 NA  136  138  
K  3.7  3.5 CL  112*  109* CO2  12*  14*  
BUN  21*  18  
CREA  1.09*  0.93 GLU  83  102* CA  7.7*  7.7* MG   --   1.7 Recent Labs  
   09/03/18 0411 SGOT  64* ALT  9* AP  81 TBILI  0.2 TP  4.0* ALB  1.7*  
GLOB  2.3 No results for input(s): INR, PTP, APTT in the last 72 hours. No lab exists for component: INREXT No results for input(s): FE, TIBC, PSAT, FERR in the last 72 hours. Lab Results Component Value Date/Time Folate 15.0 05/04/2018 10:04 AM  
  
No results for input(s): PH, PCO2, PO2 in the last 72 hours. Recent Labs  
   09/03/18 0411 TROIQ  0.23* Lab Results Component Value Date/Time Cholesterol, total 114 08/31/2018 05:05 AM  
 HDL Cholesterol 34 08/31/2018 05:05 AM  
 LDL, calculated 46.6 08/31/2018 05:05 AM  
 Triglyceride 167 (H) 08/31/2018 05:05 AM  
 CHOL/HDL Ratio 3.4 08/31/2018 05:05 AM  
 
Lab Results Component Value Date/Time Glucose (POC) 128 (H) 09/01/2018 08:20 PM  
 
Lab Results Component Value Date/Time  Color DARK YELLOW 08/30/2018 05:42 PM  
 Appearance CLOUDY (A) 08/30/2018 05:42 PM  
 Specific gravity 1.025 08/30/2018 05:42 PM  
 Specific gravity 1.025 02/16/2018 11:22 PM  
 pH (UA) 5.5 08/30/2018 05:42 PM  
 Protein 30 (A) 08/30/2018 05:42 PM  
 Glucose NEGATIVE  08/30/2018 05:42 PM  
 Ketone TRACE (A) 08/30/2018 05:42 PM  
 Bilirubin NEGATIVE  02/16/2018 11:22 PM  
 Urobilinogen 0.2 08/30/2018 05:42 PM  
 Nitrites NEGATIVE  08/30/2018 05:42 PM  
 Leukocyte Esterase NEGATIVE  08/30/2018 05:42 PM  
 Epithelial cells MODERATE (A) 08/30/2018 05:42 PM  
 Bacteria NEGATIVE  08/30/2018 05:42 PM  
 WBC 0-4 08/30/2018 05:42 PM  
 RBC 0-5 08/30/2018 05:42 PM  
 
 
 
Medications Reviewed:  
 
Current Facility-Administered Medications Medication Dose Route Frequency  fluconazole (DIFLUCAN) tablet 200 mg  200 mg Oral DAILY  atenolol (TENORMIN) tablet 12.5 mg  12.5 mg Oral DAILY  vancomycin (FIRVANQ) 50 mg/mL oral solution 125 mg  125 mg Oral Q6H  
 amitriptyline (ELAVIL) tablet 12.5 mg  12.5 mg Oral DAILY  gabapentin (NEURONTIN) capsule 600 mg  600 mg Oral BID  levothyroxine (SYNTHROID) tablet 150 mcg  150 mcg Oral ACB  famotidine (PEPCID) tablet 20 mg  20 mg Oral DAILY  sodium chloride (NS) flush 5-10 mL  5-10 mL IntraVENous Q8H  
 sodium chloride (NS) flush 5-10 mL  5-10 mL IntraVENous PRN  
 ondansetron (ZOFRAN) injection 4 mg  4 mg IntraVENous Q6H PRN  
 
______________________________________________________________________ EXPECTED LENGTH OF STAY: 2d 14h ACTUAL LENGTH OF STAY:          6 Rylee Younger MD

## 2018-09-04 NOTE — PROGRESS NOTES
Radiation Progress note Dropped in to see patient. Since last seen on Friday, she continues to do poorly, probably with sepsis from her C diff. Her sisters, Annie and Radha Dhaliwal have been with her since Friday and voiced their concerns of patient's edema in all her extremities and that no one is explaining what is going on. Patient is not eating ( poor appetite, fatique and diarrhea)  and wonders why nutritional support such as TPN has not been offered. They are frustrated. We talked about whether or not the patient wanted to continue on with her radiation on Tuesday. I feel she has gained some improvement with her trigeminal pain. The area is more numb than pain now. However, it does not appear her abdominal mass has responded. . However, she has not receive half of what was prescribed. Her prognosis is guarded especially now with the C diff infection. If she wants to continue with radiation, I would increase her dose per fraction and complete by 3-5 days. I had a long chat with the patient and her sisters. They are aware that she can stop at any time and that there is no guarantee that this will work. Patient is currently willing to have radiation tomorrow. Told her she can think about it . We will plan for treatment but can change her mind at any time. Patient is resting comfortably although very weak to stand, requiring assistance to bedside commode. Very fatique. Closes her eyes but appears to be listening in on the conversation. In terms of nutritional support,  TPN is a thought. Would have to have the hospitalist address this. Thank you for having me follow her, Thais Campos MD [facility]iation Oncology Dept [facility] [place]

## 2018-09-04 NOTE — PROGRESS NOTES
TRANSFER - OUT REPORT: 
 
Verbal report given to Mariana (name) on Gus Macias  being transferred to (unit) for routine progression of care Report consisted of patients Situation, Background, Assessment and  
Recommendations(SBAR). Information from the following report(s) SBAR, Kardex, OR Summary and Recent Results was reviewed with the receiving nurse. Lines:  
Venous Access Device portacath 08/15/18 Upper chest (subclavicular area, right (Active) Central Line Being Utilized Yes 9/4/2018  4:10 PM  
Criteria for Appropriate Use Limited/no vessel suitable for conventional peripheral access 9/4/2018  4:10 PM  
Site Assessment Clean, dry, & intact 9/4/2018  4:10 PM  
Date of Last Dressing Change 08/30/18 9/4/2018  4:10 PM  
Dressing Status Clean, dry, & intact 9/4/2018  4:50 PM  
Dressing Type Disk with Chlorhexadine gluconate (CHG) 9/4/2018  4:10 PM  
Action Taken Open ports on tubing capped 9/4/2018  4:10 PM  
Access Needle Size (Site #1) 20 G 9/4/2018  4:59 AM  
Access Needle Length (Medial Site) 1 inch 9/2/2018  8:00 PM  
Positive Blood Return (Medial Site) Yes 9/4/2018  4:59 AM  
Action Taken (Medial Site) Flushed 9/4/2018  4:50 PM  
Positive Blood Return (Lateral Site) Yes 9/4/2018  4:50 PM  
Alcohol Cap Used Yes 9/4/2018  4:50 PM  
  
 
Opportunity for questions and clarification was provided. Patient transported with: 
 Registered Nurse

## 2018-09-04 NOTE — PROGRESS NOTES
Nurse from [facility] called to give report. Spoke with alina in patient placement and was informed to defer patient until 730p when we have staff. Informed sejal on [facility].

## 2018-09-04 NOTE — PROGRESS NOTES
Bedside and Verbal shift change report given to Blanca1 Lowell Macias (oncoming nurse) by Jackson Orourke RN (offgoing nurse). Report included the following information SBAR, OR Summary, Procedure Summary, MAR and Cardiac Rhythm Sinus TACH.

## 2018-09-04 NOTE — ROUTINE PROCESS
1645:  Unable to obtain clean urine sample as pt stool with urination. 2024:  Prepare pt for transfer.   Family aware of new room but pt wanted to use bsc first.

## 2018-09-05 NOTE — PROGRESS NOTES
09/05/18 3634 Vital Signs Temp 97.6 °F (36.4 °C) Temp Source Axillary Pulse (Heart Rate) (!) 111 Heart Rate Source Monitor Resp Rate 26  
O2 Sat (%) 95 % Level of Consciousness Alert  
BP (!) 87/60 MAP (Calculated) 69 BP 1 Method Automatic  
BP 1 Location Right arm BP Patient Position At rest  
MEWS Score 5 Comfort measures; nausea medicine administered; patient denies pain.

## 2018-09-05 NOTE — PROGRESS NOTES
NAM spoke with Dr. Sil Tyler as he inquired about hospice follow-up from referral on 9/4/18. Referral sent via [facility] to [facility] today.   MARILUZ Barba, CRM

## 2018-09-05 NOTE — HOSPICE
[facility] referral received. Chart review/pt evaluation in progress. Thank you for referring to [facility]. 2:45 At bedside to follow up on consult. Pt sleeping at time of visit, pt sister at bedside. Daughter not present, and per pt sister plan in progress is for pt to go to her own home with hospice - pt lives in [place]. Per hospice Intake, [facility] does not go as far as [place]. Notified CM, as well as pt sister at bedside, CM will consult an alternate agency that can serve pt in [place].

## 2018-09-05 NOTE — CDMP QUERY
There is noted documentation of \"sepsis\" on the medical record. Please include the clinical indicators that support the dx in your progress notes. The medical record reflects the following clinical findings, treatment, and risk factors. Please clarify and document your clinical opinion in the progress notes and discharge summary including the definitive and/or presumptive diagnosis, (suspected or probable), related to the above clinical findings. Please include clinical findings supporting your diagnosis. Thank You 
[email or web address] 
[phone]

## 2018-09-05 NOTE — PROGRESS NOTES
Bedside shift change report given to Jaiden Carney (oncoming nurse) by Vonn Claude RN (offgoing nurse). Report included the following information SBAR.

## 2018-09-05 NOTE — CONSULTS
PALLIATIVE MEDICINE Appreciate consult request on Ms. Linda Sierra. Discussed with case management, attending (Dr. June De La Cruz), and primary nurse, Nora Damico that we will defer consult to hospice at this time. Goals are clear for hospice and symptoms are being managed by the primary team.   
 
Willing to see after meeting with hospice if there are unmet Palliative needs. Please call our team if that is the case. Thank you, Jalil Molina.  [address] MSN, FNP-BC, [facility]

## 2018-09-05 NOTE — PROGRESS NOTES
CC NHL Dx  10/10 Transformation 3/18 with pleural effusion TREATMENT COURSE:  Orbit radiation  Chlorambucil x 1 cycle 4/16. Cycle 2 3/17. Cycle 3 4/17 Cycle 4 1/18. Attempted one dose of rituxan. Transformation to high grade lymphoma 3/2018, CHOP started 3/23/18 (patient refused Rituximab due to prior reaction) HISTORY OF PRESENT ILLNESS:  Ms. Mehul Robles is a 76 y/o female with stage 4 high grade lymphoma recurrent after recent CHOP chemo. Pt refused rituxan. Recent admission for pain exacerbation and ascites. Has been receiving palliative radiation outpatient. Previous hospitalization 8/14/18-8/17/18 due to uncontrolled pain and ascites. Since discharge she has been undergoing radiation, but notes little improvement in symptoms. Nausea and vomiting at home. Diarrhea with abdominal cramping. Eating very little. Family is doing calorie count at home and eating 300-500 calories daily. Did eat 800 calories one day, but then vomited most of food. Little appetite. Abdominal pressure due to massive ascites. No change in urination. Energy has been down significantly. Little OOB activity. No known fever, chills in the home. Denies SOB at rest, dizziness, chest pain. Burning pain of left side of face continues. Family at bedside. Interim Hx:  Pt seen today for f/u of high grade NHL recurrent/ progressive. In bed with family at bedside. Pt resting in bed with eyes closed. Family is trying to feed pt but pt does not want to eat. Hospice to see pt today. Past Medical History:  
Diagnosis Date  Anemia NEC  Arrhythmia   
 VSD; TRICUSPID REGURG  
 Cancer ([address] 2010  
 low grade NHL  GERD (gastroesophageal reflux disease)  Hypertension  NHL (non-Hodgkin's lymphoma) ([address] 2010  
 received chemo and radiation. not in remission.  Pleural effusion 02/21/2018 LEFT  Pulmonary hypertension ([address] 02/21/2018 PER CARDIOLOGY NOTES FROM PTS VISIT ON 2/21/18  Thyroid disease Past Surgical History:  
Procedure Laterality Date  HX CATARACT REMOVAL  6/2015 & 7/2015 MERRY  
 HX CHOLECYSTECTOMY  1982  HX DILATION AND CURETTAGE    
 HX HEENT  2005 RIGHT EAR   
 HX OTHER SURGICAL    
 LEFT PAROTIDECTOMY  RI RPLCMT PROST AORTIC VALVE OPEN XCP HOMOGRF/STENT  1972 STENT PLACED TO STRAIGHTEN THE AORTA Social History Social History  Marital status:  Spouse name: N/A  
 Number of children: N/A  
 Years of education: N/A Social History Main Topics  Smoking status: Current Every Day Smoker Packs/day: 0.50 Years: 45.00 Types: Cigarettes  Smokeless tobacco: Former User Quit date: 12/1/2015  Alcohol use Yes Comment: Rare  Drug use: No  
 Sexual activity: No  
   Comment:  has one child Other Topics Concern  None Social History Narrative Family History Problem Relation Age of Onset  Heart Disease Mother  Kidney Disease Mother  Alcohol abuse Father  Liver Disease Father  Cancer Sister Breast  
 No Known Problems Brother  Cancer Sister Colon  No Known Problems Sister  No Known Problems Sister  No Known Problems Daughter  Anesth Problems Neg Hx Current Facility-Administered Medications Medication Dose Route Frequency Provider Last Rate Last Dose  fentaNYL citrate (PF) injection 25-50 mcg  25-50 mcg IntraVENous Q4H PRN Rylee Younger MD      
 ondansetron [facility]) injection 4 mg  4 mg IntraVENous Q4H PRN Rylee Younger MD      
 heparin (porcine) pf 300-500 Units  300-500 Units InterCATHeter PRN Rylee Younger MD      
 sodium chloride (NS) flush 10-30 mL  10-30 mL InterCATHeter PRN Rylee Younger MD      
 sodium chloride (NS) flush 10-40 mL  10-40 mL Luis Daniel Fallon MD   10 mL at 09/05/18 1511  alteplase (CATHFLO) 1 mg in sterile water (preservative free) 1 mL injection  1 mg InterCATHeter PRN MD Heather Bob [facility]) 50 mg/mL oral solution 125 mg  125 mg Oral Q6H Zoraida Ragsdale MD   125 mg at 09/05/18 1322  levothyroxine (SYNTHROID) tablet 150 mcg  150 mcg Oral ACB Jimmie Hatch MD   150 mcg at 09/05/18 6387  sodium chloride (NS) flush 5-10 mL  5-10 mL IntraVENous Q8H Jimmie Hatch MD   10 mL at 09/05/18 5880  sodium chloride (NS) flush 5-10 mL  5-10 mL IntraVENous PRN Jimmie Hatch MD      
 
 
Allergies Allergen Reactions  Rituxan [Rituximab] Hives Review of Systems A comprehensive review of systems was negative except for as noted above. Objective: 
Visit Vitals  BP (!) 87/60 (BP 1 Location: Left arm, BP Patient Position: At rest)  Pulse (!) 109  Temp 97.6 °F (36.4 °C)  Resp 26  
 Ht 5' 2\" (1.575 m)  Wt 163 lb 9.3 oz (74.2 kg)  LMP  (LMP Unknown)  SpO2 96%  BMI 29.92 kg/m2 Physical Exam:  
General appearance - awake, comfortable, not opening eyes Mental status - drowsy, frail Face - Left side of face with decreased mobility, ptosis left eye Skin-Warm and dry. Intact, no rash. Neuro -  General weakness, converses some, eyes closed Diagnostic Imaging  
reviewed 2/21/18 PET: 
IMPRESSION:  
1. Increasing right parietal soft tissue activity. 2. Progression in the bilateral areas of hypermetabolic pleural thickening. 3. New bilateral hypermetabolic axillary lymph nodes and subcarinal lymph node 
and progression of retroperitoneal and left pelvic lymphadenopathy. 4. Improvement in the left inguinal lymphadenopathy 5. Improvement in the right parieto-occipital scalp activity. 6. New hypermetabolic soft tissue abnormality left iliac fossa. 7. Stable left cervical hypermetabolic lymph nodes. 2/17/18 PET: 
MPRESSION:  
1. Findings suspicious for a uterine mass with a soft tissue mass in the left 
pelvis and extending into the retroperitoneum concerning for malignancy. Lymphoma is a differential consideration given the patient's history. The mass 
encases but does not narrow the aortic bifurcation and IVC. 2. Enlarged left inguinal lymph node is decreased in size since 8/30/2017. 
3. Increased large left pleural effusion with left lower lung atelectasis. 7/18/18 PET IMPRESSION: New focus of increased tracer activity right iliac wing concerning 
for osseous metastatic disease. New hypermetabolic soft tissue nodule adjacent 
to the right iliac crest. Increase in the size of the left iliac fossa mass. Minimal decrease in the size of the axillary lymph nodes with little change in 
the tracer activity. Left level 2 lymph nodes and pleural activity in the left 
hemithorax are stable as are the left external iliac and pelvic masses. Lab Results 
 
reviewed Lab Results Component Value Date/Time WBC 7.4 09/04/2018 02:47 AM  
 HGB 10.1 (L) 09/04/2018 02:47 AM  
 HCT 33.3 (L) 09/04/2018 02:47 AM  
 PLATELET 96 (L) 62/86/2934 02:47 AM  
 .1 (H) 09/04/2018 02:47 AM  
 
 
Lab Results Component Value Date/Time Sodium 136 09/04/2018 02:47 AM  
 Potassium 3.7 09/04/2018 02:47 AM  
 Chloride 112 (H) 09/04/2018 02:47 AM  
 CO2 12 (LL) 09/04/2018 02:47 AM  
 Anion gap 12 09/04/2018 02:47 AM  
 Glucose 83 09/04/2018 02:47 AM  
 BUN 21 (H) 09/04/2018 02:47 AM  
 Creatinine 1.09 (H) 09/04/2018 02:47 AM  
 BUN/Creatinine ratio 19 09/04/2018 02:47 AM  
 GFR est AA >60 09/04/2018 02:47 AM  
 GFR est non-AA 50 (L) 09/04/2018 02:47 AM  
 Calcium 7.7 (L) 09/04/2018 02:47 AM  
 AST (SGOT) 64 (H) 09/03/2018 04:11 AM  
 Alk. phosphatase 81 09/03/2018 04:11 AM  
 Protein, total 4.0 (L) 09/03/2018 04:11 AM  
 Albumin 1.7 (L) 09/03/2018 04:11 AM  
 Globulin 2.3 09/03/2018 04:11 AM  
 A-G Ratio 0.7 (L) 09/03/2018 04:11 AM  
 ALT (SGPT) 9 (L) 09/03/2018 04:11 AM  
 
 
 
CT Results (most recent): 
 
Results from Hospital Encounter encounter on 08/30/18 CT ABD PELV WO CONT Narrative EXAM:  CT ABD PELV WO CONT INDICATION: Abdominal distention, history of lymphoma COMPARISON: 8/4/2018 CONTRAST:  None. TECHNIQUE:  
Thin axial images were obtained through the abdomen and pelvis. Coronal and 
sagittal reconstructions were generated. Oral contrast was not administered. CT 
dose reduction was achieved through use of a standardized protocol tailored for 
this examination and automatic exposure control for dose modulation. The absence of intravenous contrast material reduces the sensitivity for 
evaluation of the solid parenchymal organs of the abdomen. FINDINGS:  
LUNG BASES: There is a small left pleural effusion with underlying atelectasis. INCIDENTALLY IMAGED HEART AND MEDIASTINUM: Unremarkable. LIVER: The hypodense lesions in the liver are stable compared to the prior 
contrast CT. GALLBLADDER: Surgically absent. SPLEEN: No mass. PANCREAS: No mass or ductal dilatation. ADRENALS: Unremarkable. KIDNEYS/URETERS: Bilateral renal cysts are stable. STOMACH: Unremarkable. SMALL BOWEL: No dilatation or wall thickening. COLON: No dilatation or wall thickening. PERITONEUM: Moderate free fluid is unchanged. Elemental thickening is stable. RETROPERITONEUM: The large fluid collection involving the left iliacus muscle is 
unchanged. Retroperitoneal lymphadenopathy is stable. REPRODUCTIVE ORGANS: Pelvic lymphadenopathy is unchanged. URINARY BLADDER: No mass or calculus. BONES: Degenerative changes are seen in the lumbar spine. ADDITIONAL COMMENTS: N/A Impression IMPRESSION: 
Stable compared to 8/14/2018. Hepatic lesions and omental disease is stable as 
are retroperitoneal and pelvic masses. Left iliacus fluid collection is 
unchanged. Assessment/Plan: 1. 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 Completed 6 cycles of CHOP (Cyclophosphamide 750mg/m2, Doxorubicin 50mg/m2, Vincristine (dose reduced cycle 5 by 25% for neuropathy to 1.05mg/m2) and post-treatment PET with a mixed response to treatment Struggled with grade 3 neuropathy CT on prior admission with progression. Ascites and pleural effusions noted. Rapid progression of disease post treatment. pt refused further chemotherapy. tried palliative radiation, but did not feel that it was helping so stopped. Pt clinically continues to decline. Recommend palliative care/ hospice. Pt is DNR Hospice to see pt/ family today. 2. Facial numbness/pain. Unilateral on left side with extensive CN involvement. Brain MRI with enlargement of trigeminal nerve likely secondary to lymphoma. Had been receiving palliative radiation therapy. No more radiation. 3. Pain improved. Secondary to #2 and nerve irritation. Palliative Medicine following. 4. Ascites, malignant. Due to lymphoma. temporary catheter. 5. Renal insufficiency. Stable. .  
 
6. Anemia. Secondary to chemotherapy and chronic disease. hgb 8.3 today. Transfuse if <7.0.  
 
7.  Weakness/ debility due to cancer. Not likely to get better. Will continue to follow while admitted. Focus on comfort care with palliative care. Call if questions Armond Curtis, DO

## 2018-09-05 NOTE — PROGRESS NOTES
Brief:\ 
 
Discussed case with family and pt at bedside; Hospitalist Progress Note Audrey Kaur MD 
Answering service: [phone] OR [phone] from in house phone NAME:  Philip Younger :  1950 MRN:  [id number] Admission Summary: She has decreased appetite and felt like she was dehydrated over the past 3 days. Patient reported recently being started on radiation for lymphoma with last treatment today. According to reports, she completed chemotherapy in 2018. She complains of having abdominal pain which is dull aching with abdominal distention which remains constant without specific alleviating factors, exacerbated with touch and palpation. Interval history / Subjective:  
 
2018 Pt terminal - hypotensive, tachy Pt is uncomfortable desires pain management,  
Case discussed with RN,  Case,  Hospice reconsulted Pt is give pain management now see orders. Assessment & Plan:  
 
Sepsis Possibly due to C.diff colitis Lactic acidosis 
- lactic acid elevated 2.5 
- IVF fluids given per protocol. 
- Blood, urine, sputum cx sent - CXR showed Interval worsening of heterogeneous opacity at the left lung base, suspicious 
for pneumonia in the context of sepsis 
- started on IV Vanco and Zosyn for possible PNA 
- continue oral  
- as of , comfort only,  
  
Dehydration sec to decreased po intake, Diarrhea: IVF. 
 cont on ivf for now else comfort, High grade NHL:  
Appreciate onc and radiation onc input Pt was on palliative radiations which is hold for now. Reeval on Tues regarding radiation therapy Poor prognosis Palliative care on board. Recommend meeting with family to re discuss the goals of treatment and care.  
  
Ascities Most likely due to lymphoma 
repeat US guided paracentesis done  and drain placed 
  
Hypotension on admission BP improved continue IVF watch for 3rd spacing ,resp status. If BP drops will try albumin first. 
  
Anemia - sec to chemotherapy and malignancy Transfuse Hb < comfort on 9/4 f/u lab no longer needed.  
  
Hypoalbuminemia - poor intake and malignancy 
  
Hypothyroidism: Levothyroxine. 
  
Chronic pain - cont gabapentin, amitriptyline 
  
GI ppx: pepcid 
  
DVT PPX : SQ Heparin 
  
Code: DNR 
  
Care Plan discussed with: Patient/Family Disposition: TBD, family discussion with palliative team required to discuss goals of treatment.   
   
 
Hospital Problems  Date Reviewed: 8/30/2018 Codes Class Noted POA Advanced care planning/counseling discussion ICD-10-CM: Z71.89 ICD-9-CM: V65.49  Unknown Unknown Facial neuropathy ICD-10-CM: G51.9 ICD-9-CM: 351.9  Unknown Unknown Neoplastic malignant related fatigue ICD-10-CM: R53.0 ICD-9-CM: 780.79  Unknown Unknown Debility ICD-10-CM: R53.81 ICD-9-CM: 799.3  Unknown Unknown Anorexia ICD-10-CM: R63.0 ICD-9-CM: 783.0  Unknown Unknown Nausea ICD-10-CM: R11.0 ICD-9-CM: 787.02  Unknown Unknown * (Principal)Dehydration ICD-10-CM: E86.0 ICD-9-CM: 276.51  8/30/2018 Unknown Lactic acidosis ICD-10-CM: E87.2 ICD-9-CM: 276.2  8/30/2018 Unknown Anemia ICD-10-CM: D64.9 ICD-9-CM: 285.9  8/30/2018 Unknown Hypotension ICD-10-CM: I95.9 ICD-9-CM: 458.9  8/30/2018 Unknown Pleural effusion, left ICD-10-CM: J90 ICD-9-CM: 511.9  2/26/2018 Unknown Review of Systems:  
  
 some abd pain Vital Signs:  
 Last 24hrs VS reviewed since prior progress note. Most recent are: 
Visit Vitals  BP (!) 87/60 (BP 1 Location: Right arm, BP Patient Position: At rest)  Pulse (!) 111  Temp 97.6 °F (36.4 °C)  Resp 26  
 Ht 5' 2\" (1.575 m)  Wt 74.2 kg (163 lb 9.3 oz)  SpO2 95%  BMI 29.92 kg/m2 Physical Examination: Worsening vs, less animated, can verbalize. Constitutional:  ++ acute distress, cooperative, uncomfortable else tries to be  pleasant   
ENT:  Oral mucous moist, oropharynx benign. Neck supple, Resp:  CTA bilaterally. No wheezing/rhonchi/rales. No accessory muscle use CV:  Regular rhythm,tachy GI:    + distended, + tender. Diminished n  bowel sounds, no hepatosplenomegaly Musculoskeletal:  ++  edema, warm, 1+ pulses throughout Neurologic:  Moves all extremities. AAOx3, CN II-XII reviewed Data Review:  
 Review and/or order of clinical lab test 
 
 
Labs:  
 
Recent Labs  
   09/04/18 0247 09/03/18 0411 WBC  7.4  6.2 HGB  10.1*  9.7* HCT  33.3*  31.5* PLT  96*  107* Recent Labs  
   09/04/18 0247 09/03/18 0411 NA  136  138  
K  3.7  3.5 CL  112*  109* CO2  12*  14*  
BUN  21*  18  
CREA  1.09*  0.93 GLU  83  102* CA  7.7*  7.7* MG   --   1.7 Recent Labs  
   09/03/18 0411 SGOT  64* ALT  9* AP  81 TBILI  0.2 TP  4.0* ALB  1.7*  
GLOB  2.3 No results for input(s): INR, PTP, APTT in the last 72 hours. No lab exists for component: INREXT, INREXT No results for input(s): FE, TIBC, PSAT, FERR in the last 72 hours. Lab Results Component Value Date/Time Folate 15.0 05/04/2018 10:04 AM  
  
No results for input(s): PH, PCO2, PO2 in the last 72 hours. Recent Labs  
   09/03/18 0411 TROIQ  0.23* Lab Results Component Value Date/Time Cholesterol, total 114 08/31/2018 05:05 AM  
 HDL Cholesterol 34 08/31/2018 05:05 AM  
 LDL, calculated 46.6 08/31/2018 05:05 AM  
 Triglyceride 167 (H) 08/31/2018 05:05 AM  
 CHOL/HDL Ratio 3.4 08/31/2018 05:05 AM  
 
Lab Results Component Value Date/Time Glucose (POC) 128 (H) 09/01/2018 08:20 PM  
 
Lab Results Component Value Date/Time  Color DARK YELLOW 08/30/2018 05:42 PM  
 Appearance CLOUDY (A) 08/30/2018 05:42 PM  
 Specific gravity 1.025 08/30/2018 05:42 PM  
 Specific gravity 1.025 02/16/2018 11:22 PM  
 pH (UA) 5.5 08/30/2018 05:42 PM  
 Protein 30 (A) 08/30/2018 05:42 PM  
 Glucose NEGATIVE  08/30/2018 05:42 PM  
 Ketone TRACE (A) 08/30/2018 05:42 PM  
 Bilirubin NEGATIVE  02/16/2018 11:22 PM  
 Urobilinogen 0.2 08/30/2018 05:42 PM  
 Nitrites NEGATIVE  08/30/2018 05:42 PM  
 Leukocyte Esterase NEGATIVE  08/30/2018 05:42 PM  
 Epithelial cells MODERATE (A) 08/30/2018 05:42 PM  
 Bacteria NEGATIVE  08/30/2018 05:42 PM  
 WBC 0-4 08/30/2018 05:42 PM  
 RBC 0-5 08/30/2018 05:42 PM  
 
 
 
Medications Reviewed:  
 
Current Facility-Administered Medications Medication Dose Route Frequency  fluconazole (DIFLUCAN) tablet 200 mg  200 mg Oral DAILY  atenolol (TENORMIN) tablet 12.5 mg  12.5 mg Oral DAILY  vancomycin (FIRVANQ) 50 mg/mL oral solution 125 mg  125 mg Oral Q6H  
 amitriptyline (ELAVIL) tablet 12.5 mg  12.5 mg Oral DAILY  gabapentin (NEURONTIN) capsule 600 mg  600 mg Oral BID  levothyroxine (SYNTHROID) tablet 150 mcg  150 mcg Oral ACB  famotidine (PEPCID) tablet 20 mg  20 mg Oral DAILY  sodium chloride (NS) flush 5-10 mL  5-10 mL IntraVENous Q8H  
 sodium chloride (NS) flush 5-10 mL  5-10 mL IntraVENous PRN  
 ondansetron (ZOFRAN) injection 4 mg  4 mg IntraVENous Q6H PRN  
 
______________________________________________________________________ EXPECTED LENGTH OF STAY: 2d 14h ACTUAL LENGTH OF STAY:          6 Jorge Hernandez MD

## 2018-09-05 NOTE — PROGRESS NOTES
Primary Nurse Sharlene Cameron RN and Ignacio Dumont RN performed a dual skin assessment on this patient Impairment noted- see wound doc flow sheet Reji score is 16 Pt has paracentesis drain and excoriation to sacrum

## 2018-09-05 NOTE — PROGRESS NOTES
Reviewed medical chart. [facility] referral received. Patient to discharge home with Hospice care. However, Patient lives out of service area for [facility]. Called patient's daughter, Chan Bradshaw [phone] to discuss transitional of care. Patient's daughter is currently at work and unable to discuss options for alternative agencies. CM will provide list of Hospice agencies and leave on hard chart for pick-up. Please note, Patient's daughter is a Supervisor at [employer] and plan to outreach to colleagues for additional resources. CM will continue to follow. Michael Bowie, NORMAN,CRM

## 2018-09-06 PROBLEM — E87.20 LACTIC ACIDOSIS: Status: RESOLVED | Noted: 2018-01-01 | Resolved: 2018-01-01

## 2018-09-06 NOTE — DISCHARGE SUMMARY
Discharge Summary PATIENT ID: Ingrid Flowers MRN: [id number] YOB: 1950 DATE OF ADMISSION: 8/30/2018  5:05 PM   
DATE OF DISCHARGE: 9/6/2018 PRIMARY CARE PROVIDER: Prema Moraes MD  
 
ATTENDING PHYSICIAN: Casey Echols MD  
DISCHARGING PROVIDER: Casey Echols MD   
To contact this individual call [phone] and ask the  to page. If unavailable ask to be transferred the Adult Hospitalist Department. CONSULTATIONS: IP CONSULT TO HOSPITALIST 
IP CONSULT TO ONCOLOGY 
IP CONSULT TO RADIATION ONCOLOGY 
IP CONSULT TO PALLIATIVE CARE - PROVIDER PROCEDURES/SURGERIES: * No surgery found * [address] COURSE: To hospice at home Admission Summary: She has decreased appetite and felt like she was dehydrated over the past 3 days.  Patient reported recently being started on radiation for lymphoma with last treatment today.  According to reports, she completed chemotherapy in June 2018.  She complains of having abdominal pain which is dull aching with abdominal distention which remains constant without specific alleviating factors, exacerbated with touch and palpation. 
  
Interval history / Subjective:  
  
9/5/2018 Pt terminal - hypotensive, tachy Pt is uncomfortable desires pain management,  
Case discussed with RN,  Case,  Hospice reconsulted Pt is give pain management now see orders.   
  
Assessment & Plan:  
  
Sepsis Possibly due to C.diff colitis Lactic acidosis 
- lactic acid elevated 2.5 
- IVF fluids given per protocol. 
- Blood, urine, sputum cx sent - CXR showed Interval worsening of heterogeneous opacity at the left lung base, suspicious 
for pneumonia in the context of sepsis 
- started on IV Vanco and Zosyn for possible PNA 
- continue oral  
- as of 9/04, comfort only,  
   
Dehydration sec to decreased po intake, Diarrhea: IVF. 
 cont on ivf for now else comfort,  
  
High grade NHL:  
 Appreciate onc and radiation onc input Pt was on palliative radiations which is hold for now. Reeval on Tues regarding radiation therapy Poor prognosis Palliative care on board. Recommend meeting with family to re discuss the goals of treatment and care.  
   
Ascities  
Most likely due to lymphoma 
repeat US guided paracentesis done 8/31 and drain placed 
   
Hypotension on admission BP improved  
continue IVF watch for 3rd spacing ,resp status.  If BP drops will try albumin first. 
   
Anemia - sec to chemotherapy and malignancy Transfuse Hb < comfort on 9/4 f/u lab no longer needed.  
   
Hypoalbuminemia - poor intake and malignancy 
   
Hypothyroidism: Levothyroxine. 
   
Chronic pain - cont gabapentin, amitriptyline 
   
GI ppx: pepcid 
   
DVT PPX : SQ Heparin 
   
Code: DNR 
   
Care Plan discussed with: Patient/Family Disposition: TBD, family discussion with palliative team required to discuss goals of treatment.   
    
  
 
 
 
Assessment/Plan: 
  
1. 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 Completed 6 cycles of CHOP (Cyclophosphamide 750mg/m2, Doxorubicin 50mg/m2, Vincristine (dose reduced cycle 5 by 25% for neuropathy to 1.05mg/m2) and post-treatment PET with a mixed response to treatment Struggled with grade 3 neuropathy CT on prior admission with progression. Ascites and pleural effusions noted. Rapid progression of disease post treatment. pt refused further chemotherapy. tried palliative radiation, but did not feel that it was helping so stopped.  
  
Pt clinically continues to decline. Recommend palliative care/ hospice. Pt is DNR Hospice to see pt/ family today.  
  
2. Facial numbness/pain. Unilateral on left side with extensive CN involvement. Brain MRI with enlargement of trigeminal nerve likely secondary to lymphoma. Had been receiving palliative radiation therapy.   
No more radiation.  
  
 3. Pain improved. Secondary to #2 and nerve irritation. Palliative Medicine following.  
  
4. Ascites, malignant. Due to lymphoma. temporary catheter. 
  
5. Renal insufficiency. Stable. .  
  
6. Anemia. Secondary to chemotherapy and chronic disease. hgb 8.3 today. Transfuse if <7.0.  
  
7.  Weakness/ debility due to cancer. Not likely to get better.  
  
Will continue to follow while admitted. Focus on comfort care with palliative care. Call if questions  
  
  
Hilda Harris DO 
  
 
 
DISCHARGE DIAGNOSES / PLAN:   
 
1.  as above PENDING TEST RESULTS:  
At the time of discharge the following test results are still pending: na 
 
FOLLOW UP APPOINTMENTS:   
Follow-up Information Follow up With Details Comments Contact Info Elmer Jameson MD   [address] [facility] [address] 
[phone] ADDITIONAL CARE RECOMMENDATIONS: na 
 
DIET: as tolerated ACTIVITY: Activity as tolerated WOUND CARE: na 
 
EQUIPMENT needed: na 
 
 
DISCHARGE MEDICATIONS: 
Current Discharge Medication List  
  
START taking these medications Details  
morphine 10 mg/5 mL oral solution Take 2.5 mL by mouth every two (2) hours as needed. Max Daily Amount: 60 mg. 
Qty: 30 mL, Refills: 0 Associated Diagnoses: Malignant ascites  
  
ondansetron hcl (ZOFRAN) 4 mg/5 mL oral solution Take 5 mL by mouth now for 1 dose. Qty: 50 mL, Refills: 0 CONTINUE these medications which have NOT CHANGED Details  
atenolol (TENORMIN) 25 mg tablet Take 12.5 mg by mouth daily. acetaminophen (TYLENOL) 325 mg tablet Take 650 mg by mouth every four (4) hours as needed for Pain.  
  
gabapentin (NEURONTIN) 300 mg capsule Take 2 Caps by mouth two (2) times a day for 30 days. Qty: 180 Cap, Refills: 2  
  
amitriptyline (ELAVIL) 25 mg tablet Take 0.5 Tabs by mouth daily for 30 days. Qty: 30 Tab, Refills: 1 levothyroxine (SYNTHROID) 150 mcg tablet 150 mcg Daily (before breakfast). raNITIdine (ZANTAC) 150 mg tablet Take 150 mg by mouth daily. STOP taking these medications  
  
 potassium chloride SR (KLOR-CON 10) 10 mEq tablet Comments:  
Reason for Stopping:   
   
 cholecalciferol (VITAMIN D3) 1,000 unit tablet Comments:  
Reason for Stopping:   
   
 cyanocobalamin (VITAMIN B-12) 1,000 mcg/mL injection Comments:  
Reason for Stopping:   
   
 docusate sodium (COLACE) 100 mg capsule Comments:  
Reason for Stopping:   
   
  
 
 
 
NOTIFY YOUR PHYSICIAN FOR ANY OF THE FOLLOWING:  
Fever over 101 degrees for 24 hours. Chest pain, shortness of breath, fever, chills, nausea, vomiting, diarrhea, change in mentation, falling, weakness, bleeding. Severe pain or pain not relieved by medications. Or, any other signs or symptoms that you may have questions about. DISPOSITION: 
  Home With: 
 OT  PT  HH  RN  
  
 Long term SNF/Inpatient Rehab Independent/assisted living  
x Hospice Other:  
 
 
PATIENT CONDITION AT DISCHARGE:  
 
Functional status  
x Poor Deconditioned Independent Cognition Lucid   
x Forgetful Dementia Catheters/lines (plus indication) Nelson PICC   
 PEG   
  abd drain tube Code status Full code   
x DNR PHYSICAL EXAMINATION AT DISCHARGE: 
 Refer to Progress Note Terminal  
 
 
CHRONIC MEDICAL DIAGNOSES: 
Problem List as of 9/6/2018  Date Reviewed: 9/6/2018 Codes Class Noted - Resolved Advanced care planning/counseling discussion ICD-10-CM: Z71.89 ICD-9-CM: V65.49  Unknown - Present Facial neuropathy ICD-10-CM: G51.9 ICD-9-CM: 351.9  Unknown - Present Neoplastic malignant related fatigue ICD-10-CM: R53.0 ICD-9-CM: 780.79  Unknown - Present Debility ICD-10-CM: R53.81 ICD-9-CM: 799.3  Unknown - Present Anorexia ICD-10-CM: R63.0 ICD-9-CM: 783.0  Unknown - Present Nausea ICD-10-CM: R11.0 ICD-9-CM: 787.02  Unknown - Present * (Principal)Dehydration ICD-10-CM: E86.0 ICD-9-CM: 276.51  8/30/2018 - Present Anemia ICD-10-CM: D64.9 ICD-9-CM: 285.9  8/30/2018 - Present Hypotension ICD-10-CM: I95.9 ICD-9-CM: 458.9  8/30/2018 - Present Neuropathic facial nerve ICD-10-CM: G51.9 ICD-9-CM: 351.9  Unknown - Present Jaw pain ICD-10-CM: R68.84 ICD-9-CM: 784.92  Unknown - Present Left ear pain ICD-10-CM: H92.02 
ICD-9-CM: 388.70  Unknown - Present Face pain ICD-10-CM: G65 ICD-9-CM: 784.0  Unknown - Present Generalized abdominal pain ICD-10-CM: R10.84 ICD-9-CM: 789.07  Unknown - Present Anxiety about health ICD-10-CM: F41.8 ICD-9-CM: 300.09  Unknown - Present Ascites ICD-10-CM: R18.8 ICD-9-CM: 789.59  8/14/2018 - Present Nicotine dependence (Chronic) ICD-10-CM: A69.785 ICD-9-CM: 305.1  8/14/2018 - Present Ascites, malignant ICD-10-CM: R18.0 ICD-9-CM: 789.51  3/20/2018 - Present High grade B-cell lymphoma ([facility] [address] ICD-10-CM: C85.10 ICD-9-CM: 202.80  3/12/2018 - Present Postmenopausal bleeding ICD-10-CM: N95.0 ICD-9-CM: 627.1  2/26/2018 - Present Uterine mass ICD-10-CM: N85.9 ICD-9-CM: 625.8  2/26/2018 - Present Pleural effusion, left ICD-10-CM: J90 ICD-9-CM: 511.9  2/26/2018 - Present Cough ICD-10-CM: R05 ICD-9-CM: 786.2  2/26/2018 - Present Pneumonia due to infectious organism ICD-10-CM: J18.9 ICD-9-CM: 136.9, 484.8  11/16/2017 - Present Healed perforation of right ear drum ICD-10-CM: H73.811 ICD-9-CM: 384.81  10/9/2017 - Present Herpes zoster with complication NEN-05-IB: Q47.2 ICD-9-CM: 053.8  9/6/2017 - Present Chemotherapy follow-up examination ICD-10-CM: W86 ICD-9-CM: V67.2  4/3/2017 - Present Upper respiratory tract infection ICD-10-CM: J06.9 ICD-9-CM: 465.9  6/7/2016 - Present Currently attempting to quit using tobacco ICD-10-CM: Z78.9 ICD-9-CM: 305.1  4/6/2016 - Present PAZ (dyspnea on exertion) ICD-10-CM: R06.09 
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 
ICD-9-CM: 053.13  4/6/2016 - Present Mass of head ICD-10-CM: R22.0 ICD-9-CM: 784.2  2/17/2016 - Present Herpes zoster without complication CIY-47-PN: H97.3 ICD-9-CM: 053.9  2/3/2016 - Present Tobacco abuse ICD-10-CM: Z72.0 ICD-9-CM: 305.1  11/30/2015 - Present LPEAOSZS(841.1) ICD-10-CM: K63 ICD-9-CM: 784.0  2/27/2015 - Present Renal insufficiency ICD-10-CM: N28.9 ICD-9-CM: 593.9  2/11/2015 - Present Poor vision ICD-10-CM: H54.7 ICD-9-CM: 369.9  12/9/2014 - Present Left eye pain ICD-10-CM: H57.12 
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 
ICD-9-CM: 202.80  6/13/2013 - Present Fatigue ICD-10-CM: R53.83 ICD-9-CM: 780.79  12/5/2012 - Present HTN (hypertension) ICD-10-CM: I10 
ICD-9-CM: 401.9  6/6/2012 - Present Follicular lymphoma grade I (HCC) ICD-10-CM: C82.00 ICD-9-CM: 202.00  6/6/2012 - Present Heart murmur ICD-10-CM: R01.1 ICD-9-CM: 785.2  6/6/2012 - Present Hypothyroid ICD-10-CM: E03.9 ICD-9-CM: 244.9  6/6/2012 - Present Asthma ICD-10-CM: C81.078 ICD-9-CM: 493.90  6/6/2012 - Present PUD (peptic ulcer disease) ICD-10-CM: K27.9 ICD-9-CM: 533.90  6/6/2012 - Present Ear infection ICD-10-CM: H66.90 ICD-9-CM: 382.9  6/6/2012 - Present RESOLVED: Lactic acidosis ICD-10-CM: E87.2 ICD-9-CM: 276.2  8/30/2018 - 9/6/2018 RESOLVED: Ascites ICD-10-CM: R18.8 ICD-9-CM: 789.59  3/20/2018 - 3/27/2018 Greater than  20  minutes were spent with the patient on counseling and coordination of care Signed:  
Jose Manuel MD 
 9/6/2018 
1:44 PM

## 2018-09-06 NOTE — PROGRESS NOTES
[facility] 
Spoke with pt's daughter via phone. DME has been delivered to home. Family awaiting arrival of pt to the home. [facility] to transport home. Addendum 14:44PM  
Spoke with patient's daughter, Niall Botello and other extended family at bedside to confirm discharge orders for today. Patient lives with her  and has supportive family in the area to assist with care. Family agreeable to discharge plan. Sent referral to [facility] via [employer] and confirmed 17:00PM for transport home. [facility]) to confirm discharge plan. DME has been ordered and deliver of DME is pending for today 16:00PM.  Notified RN. Right port cath will be change prior to discharge. CM will be available in case needs arise. Met with patient, daughter-Francia ([phone]) and sister at bedside to discuss transitions of care. Daughter selected [facility] for hospice care and has been in contact with Admission Director, Iram Joyner ([phone]). Family will need transportation home. CM will request WILL CALL with [facility] via [employer]. Called Lizzette to discuss patient's disposition. Faxed documentation to ([phone]) to initiate referral to hospice care. Will continue to follow. NORMAN Scott,CRM

## 2018-09-06 NOTE — PROGRESS NOTES
Palliative Medicine Consult [place]: [phone] ([phone] Patient Name: Lolis Laureano YOB: 1950 Date of Initial Consult: 8/31/2018 Reason for Consult: Care Decisions Requesting Provider: Brenda Cantrell MD 
Primary Care Physician: Chris Renee MD 
 
 SUMMARY:  
Lolis Laureano is a 75 y.o. with a past history of high-grade non-hidgkins lymphoma, anemia, gerd, htn, left pleural effusion, pulmonary hypertension, hypothyroidism, ascites, chronic pain, neuropathic facial pain, jaw pain, anxiety, who was admitted on 8/30/2018 from home with a diagnosis of dehydration. Current medical issues leading to Palliative Medicine involvement include: Goals of care discussions in light of progressing lymphoma 9/6/18: plans for home with hospice PALLIATIVE DIAGNOSES:  
 
1. Facial Neuropathy 2. obtunded 3. Debility 4. Anorexia/feeding difficulties 5. Nausea PLAN:  
1. Pt unable to provide any information due to obtunded state 2. Decision made to transition to full comfort measures and hospice at home yesterday 3. Unfortunately, [facility] hospice does not provide services in the patient's home town 4. Another hospice has been secured and family plans to meet with CM today to discuss logistics 5. We reviewed the patient's condition and explained in detail why the oral medications were discontinued. family feels that blood pressure medication should be continued as it helped the patient's racing heart 6. Explained that the pt has been deemed unsafe to swallow and the risks if we try to feed or administer pills 7. Discussed that we would not be able to administer the neurontin but could give morphine via drops 8. Family shared the patient's concern with opioids and how the fentanyl made the pt break out in a sweat 9. Explained the goal of comfort and to eliminate suffering and that we could do a trial of the drops at a very low dose to see if that would help the pt when in pain 10. Reviewed objective indications of pain. Much reassurance provided 11. Morphine 10mg/5ml concentration 5mg q 2 hours prn 12. DDNR completed 13. Will continue to follow until the pt is discharged home with hospice 14.  
15. Initial consult note routed to primary continuity provider 16. Communicated plan of care with: Palliative IDT 
 
 
 GOALS OF CARE / TREATMENT PREFERENCES:  
 
GOALS OF CARE: 
Patient/Health Care Proxy Stated Goals: Comfort TREATMENT PREFERENCES:  
Code Status: DNR Advance Care Planning: 
Advance Care Planning 8/31/2018 Patient's Healthcare Decision Maker is: Named in scanned ACP document Primary Decision Maker Name Julia Braswell Primary Decision Maker Phone Number [phone] Primary Decision Maker Relationship to Patient Adult child Confirm Advance Directive Yes, not on file Patient Would Like to Complete Advance Directive - Does the patient have other document types - Medical Interventions: Comfort measures Other Instructions:  
Artificially Administered Nutrition: No feeding tube Other: As far as possible, the palliative care team has discussed with patient / health care proxy about goals of care / treatment preferences for patient. HISTORY:  
 
History obtained from: Patient CHIEF COMPLAINT: pt with advanced cancer now family electing comfort measures HPI/SUBJECTIVE: The patient is:  
[] Verbal and participatory [x] Non-participatory due to:  
Obtunded state Clinical Pain Assessment (nonverbal scale for severity on nonverbal patients):  
Clinical Pain Assessment Severity: 5 Location: left face/ear/mouth/jaw Character: sharp/burning/numb Duration: months Effect: meds help a little, radiation helped a little Frequency: constant Duration: for how long has pt been experiencing pain (e.g., 2 days, 1 month, years) Frequency: how often pain is an issue (e.g., several times per day, once every few days, constant) FUNCTIONAL ASSESSMENT:  
 
Palliative Performance Scale (PPS): PPS: 40 PSYCHOSOCIAL/SPIRITUAL SCREENING:  
 
Palliative IDT has assessed this patient for cultural preferences / practices and a referral made as appropriate to needs (Cultural Services, Patient Advocacy, Ethics, etc.) Advance Care Planning: 
Advance Care Planning 8/31/2018 Patient's Healthcare Decision Maker is: Named in scanned ACP document Primary Decision Maker Name Kurt Aguilera Primary Decision Maker Phone Number [phone] Primary Decision Maker Relationship to Patient Adult child Confirm Advance Directive Yes, not on file Patient Would Like to Complete Advance Directive - Does the patient have other document types - Any spiritual / Hoahaoism concerns: 
[] Yes /  [x] No 
 
Caregiver Burnout: 
[] Yes /  [] No /  [x] No Caregiver Present Anticipatory grief assessment:  
[] Normal  / [] Maladaptive ESAS Anxiety: Anxiety: 0 
 
ESAS Depression: Depression: 6 REVIEW OF SYSTEMS:  
 
Positive and pertinent negative findings in ROS are noted above in HPI. The following systems were [x] reviewed / [] unable to be reviewed as noted in HPI Other findings are noted below. Systems: constitutional, ears/nose/mouth/throat, respiratory, gastrointestinal, genitourinary, musculoskeletal, integumentary, neurologic, psychiatric, endocrine. Positive findings noted below. Modified ESAS Completed by: provider Fatigue: 8 Depression: 6 Pain: 5 Anxiety: 0 Nausea: 7 Anorexia: 10 Dyspnea: 4 Constipation: No  
  Stool Occurrence(s): 1 PHYSICAL EXAM:  
 
From RN flowsheet: 
Wt Readings from Last 3 Encounters:  
09/05/18 163 lb 9.3 oz (74.2 kg) 08/22/18 144 lb 8 oz (65.5 kg) 08/14/18 153 lb (69.4 kg) Blood pressure 114/59, pulse (!) 104, temperature 98.1 °F (36.7 °C), resp. rate 22, height 5' 2\" (1.575 m), weight 163 lb 9.3 oz (74.2 kg), SpO2 97 %.  
 
Pain Scale 1: Visual 
 Pain Intensity 1: 0 Pain Onset 1: with movement Pain Location 1: Back Pain Orientation 1: Posterior Pain Description 1: Aching Pain Intervention(s) 1: Repositioned, Rest 
Last bowel movement, if known:  
 
Constitutional: frail, ill appearing woman Eyes: pupils equal, anicteric ENMT: no nasal discharge,  
Cardiovascular: regular rhythm, Respiratory: breathing not labored, symmetric Gastrointestinal: soft non-tender, distended Musculoskeletal: no deformity, no tenderness to palpation Skin: warm, dry Neurologic: unresponsive Psychiatric: unable to assess Other: 
 
 
 HISTORY:  
 
Principal Problem: 
  Dehydration (8/30/2018) Active Problems: 
  Pleural effusion, left (2/26/2018) Lactic acidosis (8/30/2018) Anemia (8/30/2018) Hypotension (8/30/2018) Advanced care planning/counseling discussion () Facial neuropathy () Neoplastic malignant related fatigue () Debility () Anorexia () Nausea () Past Medical History:  
Diagnosis Date  Anemia NEC  Arrhythmia   
 VSD; TRICUSPID REGURG  
 Cancer ([facility] [address] 2010  
 low grade NHL  GERD (gastroesophageal reflux disease)  Hypertension  NHL (non-Hodgkin's lymphoma) ([address] 2010  
 received chemo and radiation. not in remission.  Pleural effusion 02/21/2018 LEFT  Pulmonary hypertension ([address] 02/21/2018 PER CARDIOLOGY NOTES FROM PTS VISIT ON 2/21/18  Thyroid disease Past Surgical History:  
Procedure Laterality Date  HX CATARACT REMOVAL  6/2015 & 7/2015 MERRY  
 HX CHOLECYSTECTOMY  1982  HX DILATION AND CURETTAGE    
 HX HEENT  2005 RIGHT EAR   
 HX OTHER SURGICAL    
 LEFT PAROTIDECTOMY  [place] RPLCMT PROST AORTIC VALVE OPEN XCP HOMOGRF/STENT  1972 STENT PLACED TO STRAIGHTEN THE AORTA Family History Problem Relation Age of Onset  Heart Disease Mother  Kidney Disease Mother  Alcohol abuse Father  Liver Disease Father  Cancer Sister Breast  
 No Known Problems Brother  Cancer Sister Colon  No Known Problems Sister  No Known Problems Sister  No Known Problems Daughter  Anesth Problems Neg Hx History reviewed, no pertinent family history. Social History Substance Use Topics  Smoking status: Current Every Day Smoker Packs/day: 0.50 Years: 45.00 Types: Cigarettes  Smokeless tobacco: Former User Quit date: 12/1/2015  Alcohol use Yes Comment: Rare Allergies Allergen Reactions  Rituxan [Rituximab] Hives Current Facility-Administered Medications Medication Dose Route Frequency  morphine 10 mg/5 mL oral solution 5 mg  5 mg Oral Q2H PRN  
 fentaNYL citrate (PF) injection 25-50 mcg  25-50 mcg IntraVENous Q4H PRN  
 ondansetron (ZOFRAN) injection 4 mg  4 mg IntraVENous Q4H PRN  
 heparin (porcine) pf 300-500 Units  300-500 Units InterCATHeter PRN  
 sodium chloride (NS) flush 10-30 mL  10-30 mL InterCATHeter PRN  
 sodium chloride (NS) flush 10-40 mL  10-40 mL InterCATHeter Q8H  
 alteplase (CATHFLO) 1 mg in sterile water (preservative free) 1 mL injection  1 mg InterCATHeter PRN  
 gabapentin (NEURONTIN) capsule 600 mg  600 mg Oral BID  vancomycin (FIRVANQ) 50 mg/mL oral solution 125 mg  125 mg Oral Q6H  
 levothyroxine (SYNTHROID) tablet 150 mcg  150 mcg Oral ACB  sodium chloride (NS) flush 5-10 mL  5-10 mL IntraVENous Q8H  
 sodium chloride (NS) flush 5-10 mL  5-10 mL IntraVENous PRN  
 
 
 
 LAB AND IMAGING FINDINGS:  
 
Lab Results Component Value Date/Time WBC 7.4 09/04/2018 02:47 AM  
 HGB 10.1 (L) 09/04/2018 02:47 AM  
 PLATELET 96 (L) 11/45/5949 02:47 AM  
 
Lab Results Component Value Date/Time  Sodium 136 09/04/2018 02:47 AM  
 Potassium 3.7 09/04/2018 02:47 AM  
 Chloride 112 (H) 09/04/2018 02:47 AM  
 CO2 12 (LL) 09/04/2018 02:47 AM  
 BUN 21 (H) 09/04/2018 02:47 AM  
 Creatinine 1.09 (H) 09/04/2018 02:47 AM  
 Calcium 7.7 (L) 09/04/2018 02:47 AM  
 Magnesium 1.7 09/03/2018 04:11 AM  
 Phosphorus 3.6 03/25/2018 03:17 AM  
  
Lab Results Component Value Date/Time AST (SGOT) 64 (H) 09/03/2018 04:11 AM  
 Alk. phosphatase 81 09/03/2018 04:11 AM  
 Protein, total 4.0 (L) 09/03/2018 04:11 AM  
 Albumin 1.7 (L) 09/03/2018 04:11 AM  
 Globulin 2.3 09/03/2018 04:11 AM  
 
Lab Results Component Value Date/Time INR 1.1 08/15/2018 06:26 AM  
 Prothrombin time 10.7 08/15/2018 06:26 AM  
 aPTT 27.3 08/15/2018 06:26 AM  
  
Lab Results Component Value Date/Time Iron 63 05/04/2018 10:04 AM  
 TIBC 282 05/04/2018 10:04 AM  
 Iron % saturation 22 05/04/2018 10:04 AM  
 Ferritin 169 05/04/2018 10:04 AM  
  
No results found for: PH, PCO2, PO2 No components found for: [place] No results found for: CPK, CKMB Total time: 35 minutes Counseling / coordination time, spent as noted above: 25 minutes 
> 50% counseling / coordination?: y 
 
Prolonged service was provided for  []30 min   []75 min in face to face time in the presence of the patient, spent as noted above. Time Start:  
Time End:  
Note: this can only be billed with 53571 (initial) or 70056 (follow up). If multiple start / stop times, list each separately.

## 2018-09-06 NOTE — DISCHARGE INSTRUCTIONS
Comfort per Hospice recommendations on discharge    Jared Scruggs MD 9/6/2018     I have reviewed discharge instructions with the caregiver and guardian. The caregiver and guardian verbalized understanding.

## 2018-09-06 NOTE — PROGRESS NOTES
Bedside shift change report given to Yeison Strickland RN (oncoming nurse) by Tenzin Becerra RN (offgoing nurse). Report included the following information SBAR, Kardex, Procedure Summary, Intake/Output, MAR and Recent Results.

## 2018-09-07 NOTE — PROGRESS NOTES
Brief: addendum to dc note and hosp course Pt was labled as being septic on 09/3 In setting of allready on abx, probable PNA by CXR 9/3 and immune compromized with pan cytopenias, abx adjusted a See note: on 9/3:  
Sepsis Possibly due to C.diff colitis Lactic acidosis 
lactic acid elevated 2.5 
- IVF fluids given per protocol. 
- Blood, urine, sputum cx sent - CXR showed Interval worsening of heterogeneous opacity at the left lung base, suspicious 
for pneumonia in the context of sepsis 
- started on IV Vanco and Zosyn for possible PNA 
- continue oral vanc For the remaining day in hospital pt cont to deteriorate and therapies withdrawn and pt when comfort and home hospice. Discharged on 9/6 three days later. With therapies largely w/d on 9/5. Gricelda Benítez MD 9/7/2018

## 2018-09-11 ENCOUNTER — APPOINTMENT (OUTPATIENT)
Dept: RADIATION THERAPY | Age: 68
End: 2018-09-11

## 2018-09-11 ENCOUNTER — APPOINTMENT (OUTPATIENT)
Dept: RADIATION THERAPY | Age: 68
End: 2018-09-11
Payer: MEDICARE

## 2018-09-11 ENCOUNTER — TELEPHONE (OUTPATIENT)
Dept: PALLATIVE CARE | Age: 68
End: 2018-09-11

## 2018-09-11 NOTE — TELEPHONE ENCOUNTER
Called patient to advise/confirm upcoming appt with Dr. Gavin Duran on 9/12/18     at 3:00pm.  [facility] states patient passed away 9/10/18 around 12:45am.  Also advised to please bring in your Drivers License and Insurance Card with you to appointment as well as any prescription pain medication in the original container with you to appt.

## 2018-09-12 ENCOUNTER — APPOINTMENT (OUTPATIENT)
Dept: RADIATION THERAPY | Age: 68
End: 2018-09-12

## 2018-09-12 ENCOUNTER — APPOINTMENT (OUTPATIENT)
Dept: RADIATION THERAPY | Age: 68
End: 2018-09-12
Payer: MEDICARE

## 2018-09-13 ENCOUNTER — APPOINTMENT (OUTPATIENT)
Dept: RADIATION THERAPY | Age: 68
End: 2018-09-13
Payer: MEDICARE

## 2018-09-13 ENCOUNTER — APPOINTMENT (OUTPATIENT)
Dept: RADIATION THERAPY | Age: 68
End: 2018-09-13

## 2018-09-14 ENCOUNTER — APPOINTMENT (OUTPATIENT)
Dept: RADIATION THERAPY | Age: 68
End: 2018-09-14

## 2018-09-14 ENCOUNTER — APPOINTMENT (OUTPATIENT)
Dept: RADIATION THERAPY | Age: 68
End: 2018-09-14
Payer: MEDICARE

## 2018-09-17 ENCOUNTER — APPOINTMENT (OUTPATIENT)
Dept: RADIATION THERAPY | Age: 68
End: 2018-09-17
Payer: MEDICARE

## 2018-09-17 ENCOUNTER — APPOINTMENT (OUTPATIENT)
Dept: RADIATION THERAPY | Age: 68
End: 2018-09-17

## 2018-09-18 ENCOUNTER — APPOINTMENT (OUTPATIENT)
Dept: RADIATION THERAPY | Age: 68
End: 2018-09-18

## 2018-09-18 ENCOUNTER — APPOINTMENT (OUTPATIENT)
Dept: RADIATION THERAPY | Age: 68
End: 2018-09-18
Payer: MEDICARE

## 2018-09-19 ENCOUNTER — APPOINTMENT (OUTPATIENT)
Dept: RADIATION THERAPY | Age: 68
End: 2018-09-19

## 2018-09-19 ENCOUNTER — APPOINTMENT (OUTPATIENT)
Dept: RADIATION THERAPY | Age: 68
End: 2018-09-19
Payer: MEDICARE

## 2018-09-20 ENCOUNTER — APPOINTMENT (OUTPATIENT)
Dept: RADIATION THERAPY | Age: 68
End: 2018-09-20

## 2018-09-20 ENCOUNTER — APPOINTMENT (OUTPATIENT)
Dept: RADIATION THERAPY | Age: 68
End: 2018-09-20
Payer: MEDICARE

## 2018-10-04 ENCOUNTER — APPOINTMENT (OUTPATIENT)
Dept: INFUSION THERAPY | Age: 68
End: 2018-10-04

## 2019-06-26 NOTE — PROGRESS NOTES
Bedside and Verbal shift change report given to Susanna Keenan and Mony Oviedo (oncoming nurse) by Ysabel Reardon (offgoing nurse). Report included the following information SBAR, Kardex, Intake/Output, MAR and Recent Results. Edita Tolbert)  Obstetrics and Gynecology  [address], [address]  [place] [address]  Phone: [phone]  Fax: [phone]  Follow Up Time:

## 2019-08-16 NOTE — PROGRESS NOTES
HIPAA verified. Advised of provider note. Verbalized understanding and thanked for call.
Tell pt PET is good with decreased disease
not examined

## 2019-10-07 NOTE — PROCEDURES
[facility]  *** FINAL REPORT ***    Name: Leatha Vergara  MRN: [id number]    Outpatient  : 1950  HIS Order #: [id number]  [id number] [facility] Visit #: [id number]  Date: 28 Mar 2018    TYPE OF TEST: Peripheral Venous Testing    REASON FOR TEST  Limb swelling    Left Leg:-  Deep venous thrombosis:           No  Superficial venous thrombosis:    No  Deep venous insufficiency:        Not examined  Superficial venous insufficiency: Not examined      INTERPRETATION/FINDINGS  PROCEDURE:  Color duplex ultrasound imaging of lower extremity veins. FINDINGS:       Right: The common femoral vein is patent and without evidence of  thrombus. Phasic flow is observed. This extremity was not otherwise  evaluated. Left:   The common femoral, deep femoral, femoral, popliteal,  posterior tibial, peroneal, and great saphenous are patent and without   evidence of thrombus;  each is fully compressible and there is no  narrowing of the flow channel on color Doppler imaging. Phasic flow  is observed in the common femoral vein. IMPRESSION:  No evidence of left lower extremity vein thrombosis. There is an incidental finding of a prominent left groin lymph node. ADDITIONAL COMMENTS    I have personally reviewed the data relevant to the interpretation of  this  study.     TECHNOLOGIST: Winnie Reyez RVT  Signed: 2018 05:12 PM    PHYSICIAN: Luis Dubose MD  Signed: 2018 07:24 AM No Yes

## 2019-11-15 NOTE — PROGRESS NOTES
Agree with chemo  Pt is not sure if she will have to pay for this or not
Prescription for chlorambucil printed and given to nurse to send to specialty pharmacy. Discussed dosing with Dr. Baron Arcos. Dose is 0.1 mg/kg/day for 3 to 6 weeks. Weight is 72.1 kg. Dose calculates to 7.21 mg, rounded to 8 mg daily for pill size. Chlorambucil only comes in 2 mg tablets. Patient will need twice weekly labs for first 3 weeks (CBC with diff twice weekly and CMP weekly). Can set this up with [facility] if patient prefers.
[email or web address]

## 2023-01-20 NOTE — PROGRESS NOTES
Outpatient Infusion Center - Chemotherapy Progress Note    1000 Pt admit to [facility] for CHOP/C6 ambulatory in stable condition accompanied by family. Assessment completed. No new concerns voiced. Port accessed with positive blood return. Labs drawn per order and sent. Line flushed, clamped, Curos Cap applied to end clave. Pt upstairs to MD appt. 1130 Return to [facility]; line flushed, hydration infusing; Orders to d/c Vincristine d/t neuropathy    Visit Vitals    /60    Pulse 85    Temp 97.1 °F (36.2 °C)    Resp 16    Ht 5' 2.21\" (1.58 m)    LMP  (LMP Unknown)    Breastfeeding No       Medications:  Hydration  NS  Emend 150 mg  Aloxi . 25 mg  Prednisone 100 mg  Doxorubicin x2 syringes  Cyclophosphamide    1420 Pt tolerated treatment well. Port maintained positive blood return throughout treatment, flushed with positive blood return at conclusion, heparinized and de-accessed. D/c home ambulatory in no distress. Pt aware of next [facility] appointment scheduled for 07/07/2018 for Neulasta. Recent Results (from the past 12 hour(s))   CBC WITH AUTOMATED DIFF    Collection Time: 07/06/18 10:05 AM   Result Value Ref Range    WBC 6.4 3.6 - 11.0 K/uL    RBC 2.47 (L) 3.80 - 5.20 M/uL    HGB 8.4 (L) 11.5 - 16.0 g/dL    HCT 26.6 (L) 35.0 - 47.0 %    .7 (H) 80.0 - 99.0 FL    MCH 34.0 26.0 - 34.0 PG    MCHC 31.6 30.0 - 36.5 g/dL    RDW 14.9 (H) 11.5 - 14.5 %    PLATELET 869 698 - 686 K/uL    MPV 9.1 8.9 - 12.9 FL    NRBC 0.0 0  WBC    ABSOLUTE NRBC 0.00 0.00 - 0.01 K/uL    NEUTROPHILS 70 32 - 75 %    LYMPHOCYTES 11 (L) 12 - 49 %    MONOCYTES 15 (H) 5 - 13 %    EOSINOPHILS 1 0 - 7 %    BASOPHILS 1 0 - 1 %    IMMATURE GRANULOCYTES 2 %    ABS. NEUTROPHILS 4.4 1.8 - 8.0 K/UL    ABS. LYMPHOCYTES 0.7 (L) 0.8 - 3.5 K/UL    ABS. MONOCYTES 1.0 0.0 - 1.0 K/UL    ABS. EOSINOPHILS 0.1 0.0 - 0.4 K/UL    ABS. BASOPHILS 0.1 0.0 - 0.1 K/UL    ABS. IMM.  GRANS. 0.1 K/UL    DF AUTOMATED      RBC COMMENTS MACROCYTOSIS  1+ Pt walked into office and needs this form completed so she can return to work  Please complete form as soon as possible and fax to [phone]      Scanned docs and sent to Dr Maryellen Masters and Shikha Thompson METABOLIC PANEL, BASIC    Collection Time: 07/06/18 10:05 AM   Result Value Ref Range    Sodium 144 136 - 145 mmol/L    Potassium 4.2 3.5 - 5.1 mmol/L    Chloride 111 (H) 97 - 108 mmol/L    CO2 25 21 - 32 mmol/L    Anion gap 8 5 - 15 mmol/L    Glucose 98 65 - 100 mg/dL    BUN 20 6 - 20 MG/DL    Creatinine 0.78 0.55 - 1.02 MG/DL    BUN/Creatinine ratio 26 (H) 12 - 20      GFR est AA >60 >60 ml/min/1.73m2    GFR est non-AA >60 >60 ml/min/1.73m2    Calcium 8.7 8.5 - 10.1 MG/DL   HEPATIC FUNCTION PANEL    Collection Time: 07/06/18 10:05 AM   Result Value Ref Range    Protein, total 5.8 (L) 6.4 - 8.2 g/dL    Albumin 3.2 (L) 3.5 - 5.0 g/dL    Globulin 2.6 2.0 - 4.0 g/dL    A-G Ratio 1.2 1.1 - 2.2      Bilirubin, total 0.3 0.2 - 1.0 MG/DL    Bilirubin, direct 0.1 0.0 - 0.2 MG/DL    Alk.  phosphatase 104 45 - 117 U/L    AST (SGOT) 12 (L) 15 - 37 U/L    ALT (SGPT) 11 (L) 12 - 78 U/L

## 2024-05-05 NOTE — MR AVS SNAPSHOT
[address] [facility] [address] 2326 [address] [place] [address] 
[phone] Patient: Ann Nguyen MRN: [id number] VOX:42/20/9961 Visit Information Date & Time Provider Department Dept. Phone Encounter #  
 6/15/2018 10:15 AM Sidonie Cowden,  [facility] at [address] [id number] [id number] Upcoming Health Maintenance Date Due Hepatitis C Screening 1950 DTaP/Tdap/Td series (1 - Tdap) 11/19/1970 BREAST CANCER SCRN MAMMOGRAM 11/19/1999 FOBT Q 1 YEAR AGE 50-75 11/19/1999 ZOSTER VACCINE AGE 60> 9/19/2009 GLAUCOMA SCREENING Q2Y 11/19/2014 Bone Densitometry (Dexa) Screening 11/19/2014 Pneumococcal 65+ High/Highest Risk (1 of 2 - PCV13) 11/19/2014 MEDICARE YEARLY EXAM 3/14/2018 Influenza Age 5 to Adult 8/1/2018 Allergies as of 6/15/2018  Review Complete On: 6/15/2018 By: Sidonie Cowden, NP Severity Noted Reaction Type Reactions Rituxan [Rituximab]  08/30/2017    Hives Current Immunizations  Reviewed on 6/15/2018 Name Date Influenza Vaccine 10/1/2015, 11/5/2014 Reviewed by William David, RN on 6/15/2018 at 10:18 AM  
 Reviewed by Prashanth Benjamin RN on 6/15/2018 at 10:35 AM  
Vitals BP Pulse Temp Resp Height(growth percentile) Weight(growth percentile) 123/76 (BP 1 Location: Left arm, BP Patient Position: Sitting) 84 98.6 °F (37 °C) (Oral) 18 5' 2\" (1.575 m) 144 lb (65.3 kg) LMP SpO2 BMI OB Status Smoking Status (LMP Unknown) 97% 26.34 kg/m2 Postmenopausal Current Every Day Smoker BMI and BSA Data Body Mass Index Body Surface Area  
 26.34 kg/m 2 1.69 m 2 Preferred Pharmacy Pharmacy Name Phone [facility] [place] [address] Your Updated Medication List  
  
   
This list is accurate as of 6/15/18 11:00 AM.  Always use your most recent med list.  
  
  
  
  
 atenolol 25 mg tablet Commonly known as:  TENORMIN Take 25 mg by mouth daily. cholecalciferol 1,000 unit tablet Commonly known as:  VITAMIN D3 Take  by mouth daily. docusate sodium 100 mg capsule Commonly known as:  COLACE  
DAILY PRN  
  
 HYDROcodone-acetaminophen 5-325 mg per tablet Commonly known as:  Elvin Matias Take 1 Tab by mouth every six (6) hours as needed for Pain. Max Daily Amount: 4 Tabs. KLOR-CON M20 20 mEq tablet Generic drug:  potassium chloride  
  
 levothyroxine 150 mcg tablet Commonly known as:  SYNTHROID  
150 mcg Daily (before breakfast). magic mouthwash solution Magic mouth wash  Maalox Lidocaine 2% viscous  Diphenhydramine oral solution   Pharmacy to mix equal portions of ingredients to a total volume as indicated in the dispense amount. Swish and spit 5 mL (1 teaspoon) four times daily as needed for mouth soreness. ondansetron 8 mg disintegrating tablet Commonly known as:  ZOFRAN ODT Take 1 Tab by mouth every eight (8) hours as needed for Nausea. predniSONE 20 mg tablet Commonly known as:  Mercy Melvin Take 5 tablets (100 mg) once daily with breakfast for 4 days starting the day after chemotherapy. raNITIdine 150 mg tablet Commonly known as:  ZANTAC Take 150 mg by mouth nightly. VITAMIN B-12 1,000 mcg/mL injection Generic drug:  cyanocobalamin  
1,000 mcg by IntraMUSCular route every month. To-Do List   
 06/16/2018 11:00 AM  
  Appointment with [facility] at [address] ([phone])  
  
 07/06/2018 10:00 AM  
  Appointment with [facility] at [address] ([phone])  
  
 07/07/2018 11:00 AM  
  Appointment with [facility] at [address] ([phone]) Introducing [facility] SERVICES! Alex Zamora introduces [employer] patient portal. Now you can access parts of your medical record, email your doctor's office, and request medication refills online. 1. In your internet browser, go to [email or web address] 2. Click on the First Time User? Click Here link in the Sign In box. You will see the New Member Sign Up page. 3. Enter your [employer] Access Code exactly as it appears below. You will not need to use this code after youve completed the sign-up process. If you do not sign up before the expiration date, you must request a new code. · [employer] Access Code: [email or web address] Expires: 8/24/2018  7:23 AM 
 
4. Enter the last four digits of your Social Security Number (xxxx) and Date of Birth (mm/dd/yyyy) as indicated and click Submit. You will be taken to the next sign-up page. 5. Create a [employer] ID. This will be your [employer] login ID and cannot be changed, so think of one that is secure and easy to remember. 6. Create a [employer] password. You can change your password at any time. 7. Enter your Password Reset Question and Answer. This can be used at a later time if you forget your password. 8. Enter your e-mail address. You will receive e-mail notification when new information is available in [address] 9. Click Sign Up. You can now view and download portions of your medical record. 10. Click the Download Summary menu link to download a portable copy of your medical information. If you have questions, please visit the Frequently Asked Questions section of the [employer] website. Remember, [employer] is NOT to be used for urgent needs. For medical emergencies, dial 911. Now available from your iPhone and Android! Please provide this summary of care documentation to your next provider. Your primary care clinician is listed as Kimo Shabazz. If you have any questions after today's visit, please call [phone]. Digestive

## (undated) DEVICE — TRAY PREP DRY W/ PREM GLV 2 APPL 6 SPNG 2 UNDPD 1 OVERWRAP

## (undated) DEVICE — X-RAY SPONGES,16 PLY: Brand: DERMACEA

## (undated) DEVICE — NDL PRT INJ NSAF BLNT 18GX1.5 --

## (undated) DEVICE — INFECTION CONTROL KIT SYS

## (undated) DEVICE — HANDLE LT SNAP ON ULT DURABLE LENS FOR TRUMPF ALC DISPOSABLE

## (undated) DEVICE — DEVON™ KNEE AND BODY STRAP 60" X 3" (1.5 M X 7.6 CM): Brand: DEVON

## (undated) DEVICE — NEEDLE BX 14GA LAIN 20MM SPEC NOTCH SCALP SHRP SURG STL CUT

## (undated) DEVICE — PAD SANIT NPKN 4IN GRD

## (undated) DEVICE — GLOVE SURG SZ 75 L1212IN FNGR THK138MIL BRN LTX FREE

## (undated) DEVICE — GOWN,SIRUS,FABRNF,XL,20/CS: Brand: MEDLINE

## (undated) DEVICE — TOWEL SURG W17XL27IN STD BLU COT NONFENESTRATED PREWASHED

## (undated) DEVICE — PERI/GYN PACK: Brand: CONVERTORS

## (undated) DEVICE — SKIN MARKER,REGULAR TIP WITH RULER AND LABELS: Brand: DEVON

## (undated) DEVICE — KENDALL SCD EXPRESS SLEEVES, KNEE LENGTH, MEDIUM: Brand: KENDALL SCD